# Patient Record
Sex: FEMALE | Race: WHITE | NOT HISPANIC OR LATINO | Employment: OTHER | URBAN - METROPOLITAN AREA
[De-identification: names, ages, dates, MRNs, and addresses within clinical notes are randomized per-mention and may not be internally consistent; named-entity substitution may affect disease eponyms.]

---

## 2017-08-31 ENCOUNTER — OFFICE VISIT (OUTPATIENT)
Dept: SPORTS MEDICINE | Facility: CLINIC | Age: 32
End: 2017-08-31

## 2017-08-31 VITALS
TEMPERATURE: 98.3 F | HEIGHT: 68 IN | DIASTOLIC BLOOD PRESSURE: 74 MMHG | OXYGEN SATURATION: 98 % | BODY MASS INDEX: 35.46 KG/M2 | HEART RATE: 77 BPM | WEIGHT: 234 LBS | SYSTOLIC BLOOD PRESSURE: 118 MMHG

## 2017-08-31 DIAGNOSIS — Z00.00 ANNUAL PHYSICAL EXAM: ICD-10-CM

## 2017-08-31 DIAGNOSIS — J34.2 DEVIATED NASAL SEPTUM: Primary | ICD-10-CM

## 2017-08-31 DIAGNOSIS — G47.33 OBSTRUCTIVE SLEEP APNEA SYNDROME: ICD-10-CM

## 2017-08-31 DIAGNOSIS — K21.9 GASTROESOPHAGEAL REFLUX DISEASE, ESOPHAGITIS PRESENCE NOT SPECIFIED: ICD-10-CM

## 2017-08-31 DIAGNOSIS — J30.2 SEASONAL ALLERGIC RHINITIS, UNSPECIFIED ALLERGIC RHINITIS TRIGGER: ICD-10-CM

## 2017-08-31 DIAGNOSIS — G43.109 MIGRAINE WITH AURA AND WITHOUT STATUS MIGRAINOSUS, NOT INTRACTABLE: ICD-10-CM

## 2017-08-31 PROCEDURE — 99204 OFFICE O/P NEW MOD 45 MIN: CPT | Performed by: FAMILY MEDICINE

## 2017-08-31 RX ORDER — OMEPRAZOLE 20 MG/1
20 CAPSULE, DELAYED RELEASE ORAL DAILY
COMMUNITY
End: 2019-05-02

## 2017-08-31 RX ORDER — MOMETASONE FUROATE 50 UG/1
2 SPRAY, METERED NASAL DAILY
COMMUNITY
End: 2018-11-16

## 2017-08-31 RX ORDER — RIZATRIPTAN BENZOATE 5 MG/1
5 TABLET, ORALLY DISINTEGRATING ORAL ONCE AS NEEDED
Qty: 30 TABLET | Refills: 1 | Status: SHIPPED | OUTPATIENT
Start: 2017-08-31 | End: 2018-11-16

## 2017-08-31 RX ORDER — LYSINE HCL 500 MG
TABLET ORAL DAILY
COMMUNITY
End: 2018-11-16

## 2017-08-31 RX ORDER — CYCLOBENZAPRINE HCL 10 MG
10 TABLET ORAL 3 TIMES DAILY PRN
COMMUNITY
End: 2018-11-16

## 2017-08-31 RX ORDER — TRAMADOL HYDROCHLORIDE 50 MG/1
50 TABLET ORAL EVERY 6 HOURS PRN
COMMUNITY
End: 2018-11-16

## 2017-08-31 RX ORDER — ALBUTEROL SULFATE 90 UG/1
2 AEROSOL, METERED RESPIRATORY (INHALATION) EVERY 4 HOURS PRN
COMMUNITY
End: 2022-03-21 | Stop reason: SDUPTHER

## 2017-08-31 RX ORDER — MONTELUKAST SODIUM 10 MG/1
10 TABLET ORAL DAILY
Qty: 30 TABLET | Refills: 2 | Status: SHIPPED | OUTPATIENT
Start: 2017-08-31 | End: 2017-10-19 | Stop reason: SDUPTHER

## 2017-08-31 RX ORDER — RANITIDINE 150 MG/1
150 TABLET ORAL 2 TIMES DAILY
COMMUNITY
End: 2017-11-17 | Stop reason: SDUPTHER

## 2017-08-31 RX ORDER — VITAMIN B COMPLEX
CAPSULE ORAL DAILY
COMMUNITY
End: 2018-11-16

## 2017-08-31 RX ORDER — ERGOCALCIFEROL (VITAMIN D2) 10 MCG
400 TABLET ORAL DAILY
COMMUNITY
End: 2017-09-04

## 2017-08-31 RX ORDER — VITAMIN E 268 MG
400 CAPSULE ORAL DAILY
COMMUNITY
End: 2019-05-02

## 2017-08-31 RX ORDER — LORATADINE 10 MG/1
CAPSULE, LIQUID FILLED ORAL
COMMUNITY
End: 2018-11-16

## 2017-08-31 RX ORDER — RIZATRIPTAN BENZOATE 5 MG/1
5 TABLET, ORALLY DISINTEGRATING ORAL ONCE AS NEEDED
COMMUNITY
End: 2017-08-31 | Stop reason: SDUPTHER

## 2017-08-31 RX ORDER — BUTALBITAL AND ACETAMINOPHEN 300; 50 MG/1; MG/1
TABLET ORAL
Qty: 90 TABLET | Refills: 1 | Status: SHIPPED | OUTPATIENT
Start: 2017-08-31 | End: 2018-11-16

## 2017-08-31 RX ORDER — LANOLIN ALCOHOL/MO/W.PET/CERES
200 CREAM (GRAM) TOPICAL DAILY
COMMUNITY
End: 2019-05-02 | Stop reason: SDUPTHER

## 2017-09-01 LAB
25(OH)D3+25(OH)D2 SERPL-MCNC: 16.3 NG/ML (ref 30–100)
ALBUMIN SERPL-MCNC: 4.5 G/DL (ref 3.5–5.2)
ALBUMIN/GLOB SERPL: 1.7 G/DL
ALP SERPL-CCNC: 58 U/L (ref 39–117)
ALT SERPL-CCNC: 13 U/L (ref 1–33)
APPEARANCE UR: CLEAR
AST SERPL-CCNC: 15 U/L (ref 1–32)
BACTERIA #/AREA URNS HPF: NORMAL /HPF
BASOPHILS # BLD AUTO: 0.03 10*3/MM3 (ref 0–0.2)
BASOPHILS NFR BLD AUTO: 0.3 % (ref 0–1.5)
BILIRUB SERPL-MCNC: 0.3 MG/DL (ref 0.1–1.2)
BILIRUB UR QL STRIP: NEGATIVE
BUN SERPL-MCNC: 12 MG/DL (ref 6–20)
BUN/CREAT SERPL: 17.6 (ref 7–25)
CALCIUM SERPL-MCNC: 9.6 MG/DL (ref 8.6–10.5)
CHLORIDE SERPL-SCNC: 105 MMOL/L (ref 98–107)
CHOLEST SERPL-MCNC: 159 MG/DL (ref 0–200)
CO2 SERPL-SCNC: 21.8 MMOL/L (ref 22–29)
COLOR UR: YELLOW
CREAT SERPL-MCNC: 0.68 MG/DL (ref 0.57–1)
EOSINOPHIL # BLD AUTO: 0.22 10*3/MM3 (ref 0–0.7)
EOSINOPHIL NFR BLD AUTO: 2.4 % (ref 0.3–6.2)
EPI CELLS #/AREA URNS HPF: NORMAL /HPF
ERYTHROCYTE [DISTWIDTH] IN BLOOD BY AUTOMATED COUNT: 13.2 % (ref 11.7–13)
GLOBULIN SER CALC-MCNC: 2.6 GM/DL
GLUCOSE SERPL-MCNC: 76 MG/DL (ref 65–99)
GLUCOSE UR QL: NEGATIVE
HCT VFR BLD AUTO: 44.5 % (ref 35.6–45.5)
HDLC SERPL-MCNC: 52 MG/DL (ref 40–60)
HGB BLD-MCNC: 14.4 G/DL (ref 11.9–15.5)
HGB UR QL STRIP: NEGATIVE
IMM GRANULOCYTES # BLD: 0.03 10*3/MM3 (ref 0–0.03)
IMM GRANULOCYTES NFR BLD: 0.3 % (ref 0–0.5)
KETONES UR QL STRIP: NEGATIVE
LDLC SERPL CALC-MCNC: 79 MG/DL (ref 0–100)
LEUKOCYTE ESTERASE UR QL STRIP: NEGATIVE
LYMPHOCYTES # BLD AUTO: 2.43 10*3/MM3 (ref 0.9–4.8)
LYMPHOCYTES NFR BLD AUTO: 26.4 % (ref 19.6–45.3)
MCH RBC QN AUTO: 31.1 PG (ref 26.9–32)
MCHC RBC AUTO-ENTMCNC: 32.4 G/DL (ref 32.4–36.3)
MCV RBC AUTO: 96.1 FL (ref 80.5–98.2)
MICRO URNS: NORMAL
MICRO URNS: NORMAL
MONOCYTES # BLD AUTO: 0.76 10*3/MM3 (ref 0.2–1.2)
MONOCYTES NFR BLD AUTO: 8.3 % (ref 5–12)
MUCOUS THREADS URNS QL MICRO: PRESENT /HPF
NEUTROPHILS # BLD AUTO: 5.72 10*3/MM3 (ref 1.9–8.1)
NEUTROPHILS NFR BLD AUTO: 62.3 % (ref 42.7–76)
NITRITE UR QL STRIP: NEGATIVE
PH UR STRIP: 7 [PH] (ref 5–7.5)
PLATELET # BLD AUTO: 291 10*3/MM3 (ref 140–500)
POTASSIUM SERPL-SCNC: 4.7 MMOL/L (ref 3.5–5.2)
PROT SERPL-MCNC: 7.1 G/DL (ref 6–8.5)
PROT UR QL STRIP: NEGATIVE
RBC # BLD AUTO: 4.63 10*6/MM3 (ref 3.9–5.2)
RBC #/AREA URNS HPF: NORMAL /HPF
SODIUM SERPL-SCNC: 142 MMOL/L (ref 136–145)
SP GR UR: 1.02 (ref 1–1.03)
TRIGL SERPL-MCNC: 138 MG/DL (ref 0–150)
TSH SERPL DL<=0.005 MIU/L-ACNC: 0.93 UIU/ML (ref 0.45–4.5)
URINALYSIS REFLEX: NORMAL
UROBILINOGEN UR STRIP-MCNC: 0.2 MG/DL (ref 0.2–1)
VLDLC SERPL CALC-MCNC: 27.6 MG/DL (ref 5–40)
WBC # BLD AUTO: 9.19 10*3/MM3 (ref 4.5–10.7)
WBC #/AREA URNS HPF: NORMAL /HPF

## 2017-09-04 RX ORDER — ERGOCALCIFEROL 1.25 MG/1
50000 CAPSULE ORAL WEEKLY
Qty: 6 CAPSULE | Refills: 1 | Status: SHIPPED | OUTPATIENT
Start: 2017-09-04 | End: 2018-11-16

## 2017-09-15 ENCOUNTER — TELEPHONE (OUTPATIENT)
Dept: SPORTS MEDICINE | Facility: CLINIC | Age: 32
End: 2017-09-15

## 2017-09-15 NOTE — TELEPHONE ENCOUNTER
----- Message from Praful Francisco MD sent at 9/4/2017  9:16 AM EDT -----  Labs are all ok except for low vitamin D level. Since you've been taking a supplement already, I'd like you to do a prescription dose supplement (50,000 units once a week for 6 weeks) and recheck your level in 2-3 months.

## 2017-10-05 ENCOUNTER — APPOINTMENT (OUTPATIENT)
Dept: GENERAL RADIOLOGY | Facility: HOSPITAL | Age: 32
End: 2017-10-05

## 2017-10-05 PROCEDURE — 73660 X-RAY EXAM OF TOE(S): CPT | Performed by: GENERAL PRACTICE

## 2017-10-19 ENCOUNTER — OFFICE VISIT (OUTPATIENT)
Dept: SPORTS MEDICINE | Facility: CLINIC | Age: 32
End: 2017-10-19

## 2017-10-19 VITALS
SYSTOLIC BLOOD PRESSURE: 122 MMHG | WEIGHT: 233 LBS | DIASTOLIC BLOOD PRESSURE: 76 MMHG | HEIGHT: 68 IN | BODY MASS INDEX: 35.31 KG/M2

## 2017-10-19 DIAGNOSIS — E55.9 VITAMIN D DEFICIENCY: ICD-10-CM

## 2017-10-19 DIAGNOSIS — S92.502A CLOSED FRACTURE OF PHALANX OF LEFT FIFTH TOE, INITIAL ENCOUNTER: Primary | ICD-10-CM

## 2017-10-19 LAB — 25(OH)D3+25(OH)D2 SERPL-MCNC: 20.3 NG/ML (ref 30–100)

## 2017-10-19 PROCEDURE — 73660 X-RAY EXAM OF TOE(S): CPT | Performed by: FAMILY MEDICINE

## 2017-10-19 PROCEDURE — 99214 OFFICE O/P EST MOD 30 MIN: CPT | Performed by: FAMILY MEDICINE

## 2017-10-19 RX ORDER — MONTELUKAST SODIUM 10 MG/1
10 TABLET ORAL DAILY
Qty: 90 TABLET | Refills: 3 | Status: SHIPPED | OUTPATIENT
Start: 2017-10-19 | End: 2018-05-24 | Stop reason: SDUPTHER

## 2017-10-19 NOTE — PROGRESS NOTES
"Sonam is a 32 y.o. year old female    Chief Complaint   Patient presents with   • Foot Injury     L foot pinky toe       History of Present Illness  HPI   Here to follow-up on fracture left fifth toe.  About 2 weeks ago she caught the toe on a hard bottom bag in her home and had immediate severe throbbing pain.  Was seen in urgent care, diagnosed with a fracture, has been treating it with a postoperative shoe and the pain is gradually improving.  Swelling is improving as well.  No pain with weightbearing on the shoe.    Also due to follow-up on vitamin D deficiency.  Taking rx supplement without side effects.    I have reviewed the patient's medical, family, and social history in detail and updated the computerized patient record.    Review of Systems   Constitutional: Negative for fever.   Skin: Negative for wound.   Neurological: Negative for numbness.   All other systems reviewed and are negative.      /76  Ht 68\" (172.7 cm)  Wt 233 lb (106 kg)  BMI 35.43 kg/m2     Physical Exam    Vital signs reviewed.   General: No acute distress.  Eyes: conjunctiva clear; pupils equally round and reactive  ENT: external ears and nose atraumatic; oropharynx clear  CV: no peripheral edema, 2+ distal pulses  Resp: normal respiratory effort, no use of accessory muscles  Skin: no rashes or wounds; normal turgor  Psych: mood and affect appropriate; recent and remote memory intact  Neuro: sensation to light touch intact    MSK Exam:  Ortho Exam  Left foot: There is swelling of the fifth toe.  Tenderness to palpation in the fifth toe.  Normal range of motion and tendon function.    Left Foot X-Ray  Indication: Pain  AP, Lateral, and Oblique views of the fifth digit    Findings:  Fracture of the fifth phalanx compared to films in PACS from 2 weeks ago, Appears very stable, in fact minimally visible today  No bony lesion  Normal soft tissues  Normal joint spaces      Diagnoses and all orders for this visit:    Closed fracture " of phalanx of left fifth toe, initial encounter  -     XR Toe 2+ View Left    Vitamin D deficiency  -     Vitamin D 25 Hydroxy      Fracture is healing well.  Discussed treatment with postoperative shoe and gradual transition to stiff supportive standard footwear.  Plan follow-up in one month to recheck, okay to cancel if she is pain-free by then.    Recheck vitamin D level today.    EMR Dragon/Transcription disclaimer:    Much of this encounter note is an electronic transcription/translation of spoken language to printed text.  The electronic translation of spoken language may permit erroneous, or at times, nonsensical words or phrases to be inadvertently transcribed.  Although I have reviewed the note for such errors some may still exist.

## 2017-11-17 ENCOUNTER — TELEPHONE (OUTPATIENT)
Dept: SPORTS MEDICINE | Facility: CLINIC | Age: 32
End: 2017-11-17

## 2017-11-17 ENCOUNTER — OFFICE VISIT (OUTPATIENT)
Dept: SPORTS MEDICINE | Facility: CLINIC | Age: 32
End: 2017-11-17

## 2017-11-17 VITALS
WEIGHT: 239.6 LBS | HEART RATE: 87 BPM | TEMPERATURE: 97.7 F | HEIGHT: 68 IN | DIASTOLIC BLOOD PRESSURE: 76 MMHG | SYSTOLIC BLOOD PRESSURE: 124 MMHG | OXYGEN SATURATION: 98 % | BODY MASS INDEX: 36.31 KG/M2

## 2017-11-17 DIAGNOSIS — K21.9 GASTROESOPHAGEAL REFLUX DISEASE, ESOPHAGITIS PRESENCE NOT SPECIFIED: ICD-10-CM

## 2017-11-17 DIAGNOSIS — R10.13 EPIGASTRIC PAIN: Primary | ICD-10-CM

## 2017-11-17 PROCEDURE — 99213 OFFICE O/P EST LOW 20 MIN: CPT | Performed by: FAMILY MEDICINE

## 2017-11-17 RX ORDER — MEDROXYPROGESTERONE ACETATE 150 MG/ML
150 INJECTION, SUSPENSION INTRAMUSCULAR
COMMUNITY
Start: 2017-11-17 | End: 2017-11-17

## 2017-11-17 RX ORDER — AZITHROMYCIN 250 MG/1
TABLET, FILM COATED ORAL
Qty: 6 TABLET | Refills: 0 | Status: SHIPPED | OUTPATIENT
Start: 2017-11-17 | End: 2018-08-03

## 2017-11-17 RX ORDER — RANITIDINE 150 MG/1
150 TABLET ORAL 2 TIMES DAILY
Qty: 180 TABLET | Refills: 1 | Status: SHIPPED | OUTPATIENT
Start: 2017-11-17 | End: 2019-05-02

## 2017-11-17 NOTE — PROGRESS NOTES
"Sonam is a 32 y.o. year old female    Chief Complaint   Patient presents with   • Heartburn     acid reflux has bben going on for several weeks tried many things        History of Present Illness   HPI   Here today for epigastric pain and acid reflux that has been present for several weeks.  Has a history of H. pylori infection twice in the past, both treated with appropriate antibiotics.  This feels similar.  Constant, moderately severe, not improved with diet.  She has not taken any proton pump inhibitors recently.  No bowel changes.    I have reviewed the patient's medical, family, and social history in detail and updated the computerized patient record.    Review of Systems   Constitutional: Negative.        /76 (BP Location: Left arm, Patient Position: Sitting, Cuff Size: Large Adult)  Pulse 87  Temp 97.7 °F (36.5 °C) (Oral)   Ht 68\" (172.7 cm)  Wt 239 lb 9.6 oz (109 kg)  SpO2 98%  BMI 36.43 kg/m2     Physical Exam   Constitutional: She appears well-developed and well-nourished.   HENT:   Mouth/Throat: Oropharynx is clear and moist.   Pulmonary/Chest: Effort normal.   Abdominal: There is tenderness (epigastric).   Psychiatric: She has a normal mood and affect.   Vitals reviewed.       Diagnoses and all orders for this visit:    Epigastric pain  -     H. Pylori Breath Test - Breath, Lung    Gastroesophageal reflux disease, esophagitis presence not specified  -     raNITIdine (ZANTAC) 150 MG tablet; Take 1 tablet by mouth 2 (Two) Times a Day.    Other orders  -     medroxyPROGESTERone (DEPO-PROVERA) 150 MG/ML injection; Inject 150 mg into the shoulder, thigh, or buttocks.  -     azithromycin (ZITHROMAX Z-KAY) 250 MG tablet; Take 2 tablets the first day, then 1 tablet daily for 4 days.  -     H. pylori Breath Test        EMR Dragon/Transcription disclaimer:    Much of this encounter note is an electronic transcription/translation of spoken language to printed text.  The electronic translation of spoken " language may permit erroneous, or at times, nonsensical words or phrases to be inadvertently transcribed.  Although I have reviewed the note for such errors some may still exist.

## 2017-11-20 LAB
UREA BREATH TEST QL: NEGATIVE
UREA BREATH TEST QL: NORMAL

## 2017-11-21 ENCOUNTER — TELEPHONE (OUTPATIENT)
Dept: SPORTS MEDICINE | Facility: CLINIC | Age: 32
End: 2017-11-21

## 2017-11-21 NOTE — TELEPHONE ENCOUNTER
----- Message from Praful Francisco MD sent at 11/20/2017  7:18 AM EST -----  H pylori test is negative. Recommend 2 weeks of prilosec OTC every morning, if not responding to that let me know.

## 2017-12-28 ENCOUNTER — OFFICE VISIT (OUTPATIENT)
Dept: SPORTS MEDICINE | Facility: CLINIC | Age: 32
End: 2017-12-28

## 2017-12-28 VITALS
OXYGEN SATURATION: 99 % | DIASTOLIC BLOOD PRESSURE: 70 MMHG | BODY MASS INDEX: 36.07 KG/M2 | TEMPERATURE: 97.7 F | SYSTOLIC BLOOD PRESSURE: 106 MMHG | HEART RATE: 81 BPM | RESPIRATION RATE: 16 BRPM | WEIGHT: 238 LBS | HEIGHT: 68 IN

## 2017-12-28 DIAGNOSIS — K21.9 GASTROESOPHAGEAL REFLUX DISEASE, ESOPHAGITIS PRESENCE NOT SPECIFIED: ICD-10-CM

## 2017-12-28 DIAGNOSIS — J01.10 ACUTE NON-RECURRENT FRONTAL SINUSITIS: Primary | ICD-10-CM

## 2017-12-28 PROCEDURE — 99213 OFFICE O/P EST LOW 20 MIN: CPT | Performed by: FAMILY MEDICINE

## 2017-12-28 RX ORDER — MEDROXYPROGESTERONE ACETATE 150 MG/ML
INJECTION, SUSPENSION INTRAMUSCULAR
COMMUNITY
Start: 2017-11-17 | End: 2018-11-16

## 2017-12-28 NOTE — PROGRESS NOTES
"Sonam is a 32 y.o. year old female    Chief Complaint   Patient presents with   • GI Problem     acid reflux    • Sore Throat   • Nasal Congestion       History of Present Illness   HPI   Postnasal drainage started Monday, progressively worsening also wtih headaches. Moderately severe. Started old zpak leftover yesterday.    Had an episode a fwe weeks ago where she helped push a stranded vehicle and was short of breath for 24 hours. Feels like asthma has always had an exercise-induced component    GERD not controlled with prilosec but better.     Migraines more frequent recently, episode of daily migraines earlier this month.     I have reviewed the patient's medical, family, and social history in detail and updated the computerized patient record.    Review of Systems   Constitutional: Negative for fever.   Respiratory: Negative for shortness of breath.        /70 (BP Location: Left arm, Patient Position: Sitting, Cuff Size: Large Adult)  Pulse 81  Temp 97.7 °F (36.5 °C) (Oral)   Resp 16  Ht 172.7 cm (68\")  Wt 108 kg (238 lb)  SpO2 99%  BMI 36.19 kg/m2     Physical Exam   Constitutional: She is oriented to person, place, and time. She appears well-developed and well-nourished.   HENT:   Posterior oropharynx erythema   Neck: Normal range of motion.   Cardiovascular: Normal rate, regular rhythm and normal heart sounds.    Pulmonary/Chest: Effort normal and breath sounds normal.   Neurological: She is alert and oriented to person, place, and time.   Skin: Skin is warm and dry.   Psychiatric: She has a normal mood and affect.   Vitals reviewed.       Diagnoses and all orders for this visit:    Acute non-recurrent frontal sinusitis    Gastroesophageal reflux disease, esophagitis presence not specified    Other orders  -     MedroxyPROGESTERone Acetate 150 MG/ML suspension prefilled syringe;     She will finish the Z-Evin.  Add over-the-counter Mucinex D.  Regarding her reflux she is on increased to 40 mg " once daily of omeprazole.  Encourage to use albuterol inhaler generously as needed.  If symptoms persist consider repeat PFTs and possible preventive inhaler.        EMR Dragon/Transcription disclaimer:    Much of this encounter note is an electronic transcription/translation of spoken language to printed text.  The electronic translation of spoken language may permit erroneous, or at times, nonsensical words or phrases to be inadvertently transcribed.  Although I have reviewed the note for such errors some may still exist.

## 2018-01-31 ENCOUNTER — TELEPHONE (OUTPATIENT)
Dept: SPORTS MEDICINE | Facility: CLINIC | Age: 33
End: 2018-01-31

## 2018-03-30 ENCOUNTER — OFFICE VISIT (OUTPATIENT)
Dept: NEUROLOGY | Facility: CLINIC | Age: 33
End: 2018-03-30

## 2018-03-30 VITALS
HEIGHT: 67 IN | BODY MASS INDEX: 36.88 KG/M2 | WEIGHT: 235 LBS | DIASTOLIC BLOOD PRESSURE: 82 MMHG | SYSTOLIC BLOOD PRESSURE: 124 MMHG

## 2018-03-30 DIAGNOSIS — G43.009 MIGRAINE WITHOUT AURA AND WITHOUT STATUS MIGRAINOSUS, NOT INTRACTABLE: ICD-10-CM

## 2018-03-30 DIAGNOSIS — H01.001 BLEPHARITIS OF UPPER EYELIDS OF BOTH EYES, UNSPECIFIED TYPE: Primary | ICD-10-CM

## 2018-03-30 DIAGNOSIS — H01.004 BLEPHARITIS OF UPPER EYELIDS OF BOTH EYES, UNSPECIFIED TYPE: Primary | ICD-10-CM

## 2018-03-30 PROBLEM — H01.006 BLEPHARITIS OF BOTH EYES: Status: ACTIVE | Noted: 2018-03-30

## 2018-03-30 PROBLEM — H01.003 BLEPHARITIS OF BOTH EYES: Status: ACTIVE | Noted: 2018-03-30

## 2018-03-30 PROCEDURE — 99244 OFF/OP CNSLTJ NEW/EST MOD 40: CPT | Performed by: PSYCHIATRY & NEUROLOGY

## 2018-03-30 RX ORDER — TOPIRAMATE 25 MG/1
25 TABLET ORAL DAILY
Qty: 30 TABLET | Refills: 11 | Status: SHIPPED | OUTPATIENT
Start: 2018-03-30 | End: 2018-05-10 | Stop reason: SDUPTHER

## 2018-03-30 NOTE — PROGRESS NOTES
Subjective:     Patient ID: Sonam Lang is a 32 y.o. female.    History of Present Illness     The patient is a 32-year-old right-handed woman who was seen for further evaluation of eye twitching and migraine. The patient was seen today in consultation per the request of Dr. Francisoc; history was also taken from the patient's .  Jessica's had headaches off and on since she was a kid she has of about 2 a week she will lie down the headache she takes butalbital at times and Tylenol she is apparently been on a preventive medicine years and years ago but nothing currently.  She does have GI upset sometimes it is very light sensitive with the headaches get severe she will have to lay down.  She cannot take NSAIDs.  He has had an unremarkable MRI of the brain on 3/1/18.    She also has pulsing of her eyelids.  She has been seen by ophthalmologist and found to have blepharitis.  She is a fair amount of caffeine intake.  She's been under some stress.  The following portions of the patient's history were reviewed and updated as appropriate: allergies, current medications, past family history, past medical history, past social history, past surgical history and problem list.  Family History   Problem Relation Age of Onset   • Migraines Mother    • Migraines Maternal Grandmother    • Migraines Paternal Grandmother    • Dementia Paternal Grandmother    • Stroke Paternal Grandmother      Active Ambulatory Problems     Diagnosis Date Noted   • Blepharitis of both eyes 03/30/2018   • Migraine without aura and without status migrainosus, not intractable 03/30/2018     Resolved Ambulatory Problems     Diagnosis Date Noted   • No Resolved Ambulatory Problems     Past Medical History:   Diagnosis Date   • ADHD (attention deficit hyperactivity disorder)    • Allergic    • Asthma    • Depression    • GERD (gastroesophageal reflux disease)    • Irritable bowel syndrome    • Knee sprain    • Migraine      Social History     Social  History   • Marital status:      Spouse name: N/A   • Number of children: N/A   • Years of education: N/A     Occupational History   • Not on file.     Social History Main Topics   • Smoking status: Former Smoker   • Smokeless tobacco: Never Used   • Alcohol use Yes   • Drug use: Unknown   • Sexual activity: Defer     Other Topics Concern   • Not on file     Social History Narrative   • No narrative on file     Current Outpatient Prescriptions on File Prior to Visit   Medication Sig Dispense Refill   • albuterol (PROVENTIL HFA;VENTOLIN HFA) 108 (90 Base) MCG/ACT inhaler Inhale 2 puffs Every 4 (Four) Hours As Needed for Wheezing.     • Butalbital-Acetaminophen  MG tablet Take 1-2 tabs po every 8 hours prn migraine 90 tablet 1   • Loratadine 10 MG capsule Take  by mouth.     • mometasone (NASONEX) 50 MCG/ACT nasal spray 2 sprays into each nostril Daily.     • montelukast (SINGULAIR) 10 MG tablet Take 1 tablet by mouth Daily. 90 tablet 3   • omeprazole (priLOSEC) 20 MG capsule Take 20 mg by mouth Daily.     • raNITIdine (ZANTAC) 150 MG tablet Take 1 tablet by mouth 2 (Two) Times a Day. 180 tablet 1   • azithromycin (ZITHROMAX Z-KAY) 250 MG tablet Take 2 tablets the first day, then 1 tablet daily for 4 days. 6 tablet 0   • B Complex Vitamins (VITAMIN B COMPLEX) capsule capsule Take  by mouth Daily.     • cyclobenzaprine (FLEXERIL) 10 MG tablet Take 10 mg by mouth 3 (Three) Times a Day As Needed for Muscle Spasms.     • Lysine HCl (L-LYSINE) 500 MG tablet tablet Take  by mouth Daily.     • MedroxyPROGESTERone Acetate 150 MG/ML suspension prefilled syringe      • rizatriptan MLT (MAXALT-MLT) 5 MG disintegrating tablet Take 1 tablet by mouth 1 (One) Time As Needed for Migraine. May repeat in 2 hours if needed 30 tablet 1   • traMADol (ULTRAM) 50 MG tablet Take 50 mg by mouth Every 6 (Six) Hours As Needed for Moderate Pain .     • vitamin B-6 (PYRIDOXINE) 50 MG tablet Take 200 mg by mouth Daily.     • vitamin C  (ASCORBIC ACID) 250 MG tablet Take 250 mg by mouth Daily.     • vitamin D (ERGOCALCIFEROL) 86692 units capsule capsule Take 1 capsule by mouth 1 (One) Time Per Week. 6 capsule 1   • vitamin E 400 UNIT capsule Take 400 Units by mouth Daily.       No current facility-administered medications on file prior to visit.        Review of Systems   Constitutional: Negative for chills, diaphoresis and fatigue.   HENT: Positive for sinus pressure. Negative for ear pain, facial swelling and hearing loss.    Eyes: Negative for pain and itching.   Respiratory: Positive for apnea. Negative for cough, choking and shortness of breath.    Cardiovascular: Negative for chest pain and leg swelling.   Gastrointestinal: Negative for abdominal pain, nausea and vomiting.   Endocrine: Negative for cold intolerance and heat intolerance.   Skin: Negative for color change, pallor, rash and wound.   Allergic/Immunologic: Positive for environmental allergies. Negative for food allergies.   Neurological: Positive for headaches. Negative for dizziness, tremors, seizures, syncope, facial asymmetry, speech difficulty, weakness, light-headedness and numbness.   Hematological: Negative for adenopathy. Does not bruise/bleed easily.   Psychiatric/Behavioral: Positive for sleep disturbance. Negative for agitation, behavioral problems, confusion, decreased concentration, dysphoric mood, hallucinations, self-injury and suicidal ideas. The patient is not nervous/anxious and is not hyperactive.         Objective:    Neurologic Exam  Mental status examination showed normal orientation, memory, and speech.  Attention span and concentration were normal.  Fund of knowledge was normal.  Funduscopic showed no abnormality.  Visual fields were full.  Pupillary reflexes were 5 mm, symmetric, and equally reactive to light.  Eye movements were full and conjugate.  Gag reflex was normal.  Hearing was normal.  Muscles of mastication were normal.  No facial weakness was  noted.  Shoulder shrug strength was normal bilaterally.  Tongue protrudes midline.  There is no drift of outstretched arms.  Deep tendon reflexes are 2+ and symmetric.  No focal weakness or atrophy was noted.  Tone was normal in all extremities.  No cerebellar signs were noted.  No abnormal movements were noted.  The patient's gait was normal.  No other pathologic reflexes such as Babinski's sign were noted.  No sensory abnormalities were noted.  Physical Exam    Assessment/Plan:     Sonam was seen today for migraine.    Diagnoses and all orders for this visit:    Blepharitis of upper eyelids of both eyes, unspecified type    Migraine without aura and without status migrainosus, not intractable    Other orders  -     topiramate (TOPAMAX) 25 MG tablet; Take 1 tablet by mouth Daily.         Her eye twitching is probably not significant neurologically.  She has been noted to have plepharitis the ophthalmologist..  No other testing is warranted.    E the more important problem is that of intermittent migraines.  She has these almost half the month.  She has not been on preventive medicine number of years.    Prescription drug management - med as above    Phone follow-up in 6 weeks.  Follow-up in the office in 3 months. Thank you for allowing me to share in the care of this patient.  Kit Hidalgo M.D.

## 2018-05-09 ENCOUNTER — TELEPHONE (OUTPATIENT)
Dept: NEUROLOGY | Facility: CLINIC | Age: 33
End: 2018-05-09

## 2018-05-09 NOTE — TELEPHONE ENCOUNTER
----- Message from Griffin Guadarrama sent at 5/9/2018 11:13 AM EDT -----  Pt stated that her Topamax was working in the beginning but now seems to have stopped helping.

## 2018-05-10 RX ORDER — TOPIRAMATE 50 MG/1
50 TABLET, FILM COATED ORAL DAILY
Qty: 30 TABLET | Refills: 10 | Status: SHIPPED | OUTPATIENT
Start: 2018-05-10 | End: 2018-08-03

## 2018-05-24 RX ORDER — MONTELUKAST SODIUM 10 MG/1
10 TABLET ORAL DAILY
Qty: 90 TABLET | Refills: 3 | Status: SHIPPED | OUTPATIENT
Start: 2018-05-24 | End: 2018-08-03

## 2018-06-01 ENCOUNTER — TELEPHONE (OUTPATIENT)
Dept: NEUROLOGY | Facility: CLINIC | Age: 33
End: 2018-06-01

## 2018-06-01 NOTE — TELEPHONE ENCOUNTER
----- Message from Agnieszka Douglas sent at 5/31/2018 10:27 AM EDT -----  Contact: [phone]  Patient called back about her medication.  Also she is pregnant or thinking about getting pregnant and the medication said to speak to your doctor about this medication.    If it is ok for her to take, please [facility] to [facility].

## 2018-06-07 ENCOUNTER — OFFICE VISIT (OUTPATIENT)
Dept: OBSTETRICS AND GYNECOLOGY | Facility: CLINIC | Age: 33
End: 2018-06-07

## 2018-06-07 VITALS
WEIGHT: 230 LBS | DIASTOLIC BLOOD PRESSURE: 70 MMHG | SYSTOLIC BLOOD PRESSURE: 110 MMHG | BODY MASS INDEX: 36.1 KG/M2 | HEIGHT: 67 IN

## 2018-06-07 DIAGNOSIS — Z13.9 SCREENING FOR CONDITION: ICD-10-CM

## 2018-06-07 DIAGNOSIS — Z30.09 FAMILY PLANNING COUNSELING: Primary | ICD-10-CM

## 2018-06-07 LAB
B-HCG UR QL: NEGATIVE
BILIRUB BLD-MCNC: NEGATIVE MG/DL
CLARITY, POC: CLEAR
COLOR UR: YELLOW
GLUCOSE UR STRIP-MCNC: NEGATIVE MG/DL
INTERNAL NEGATIVE CONTROL: NEGATIVE
INTERNAL POSITIVE CONTROL: POSITIVE
KETONES UR QL: NEGATIVE
LEUKOCYTE EST, POC: NEGATIVE
Lab: NORMAL
NITRITE UR-MCNC: NEGATIVE MG/ML
PH UR: 5 [PH] (ref 5–8)
PROT UR STRIP-MCNC: NEGATIVE MG/DL
RBC # UR STRIP: ABNORMAL /UL
SP GR UR: 1 (ref 1–1.03)
UROBILINOGEN UR QL: NORMAL

## 2018-06-07 PROCEDURE — 81002 URINALYSIS NONAUTO W/O SCOPE: CPT | Performed by: OBSTETRICS & GYNECOLOGY

## 2018-06-07 PROCEDURE — 99213 OFFICE O/P EST LOW 20 MIN: CPT | Performed by: OBSTETRICS & GYNECOLOGY

## 2018-06-07 PROCEDURE — 81025 URINE PREGNANCY TEST: CPT | Performed by: OBSTETRICS & GYNECOLOGY

## 2018-06-07 NOTE — PROGRESS NOTES
"      Sonam Lang is a 32 y.o. patient who presents for follow up of   Chief Complaint   Patient presents with   • Gynecologic Exam       HPI this is a 32-year-old female who has been attempting pregnancy for the last 9 months.  Her significant other has fathered children the past and is 47 years old.  This patient has irregular cycles and is concerned about tubal patency.  She also has severe pain with each period suggesting dysmenorrhea or endometriosis.    The following portions of the patient's history were reviewed and updated as appropriate: allergies, current medications and problem list.    Review of Systems   Constitutional: Negative for appetite change, fever and unexpected weight change.   Respiratory: Negative for cough and shortness of breath.    Cardiovascular: Negative for chest pain and palpitations.   Gastrointestinal: Negative for abdominal distention, abdominal pain, constipation, diarrhea, nausea and vomiting.   Endocrine: Negative.    Genitourinary: Positive for menstrual problem (irregular periods) and pelvic pain (dysmenorrhea). Negative for dyspareunia and vaginal discharge.   Skin: Negative.    Hematological: Negative.    Psychiatric/Behavioral: Negative for dysphoric mood and sleep disturbance. The patient is not nervous/anxious.        /70   Ht 170.2 cm (67\")   Wt 104 kg (230 lb)   LMP 06/04/2018 (Exact Date)   Breastfeeding? No   BMI 36.02 kg/m²     Physical Exam   Constitutional: She is oriented to person, place, and time. She appears well-developed and well-nourished.   HENT:   Head: Normocephalic and atraumatic.   Abdominal: Soft. She exhibits no distension. There is no tenderness. There is no guarding.   Neurological: She is alert and oriented to person, place, and time.   Psychiatric: She has a normal mood and affect. Her behavior is normal. Judgment and thought content normal.   Nursing note and vitals reviewed.        Assessment/Plan    Sonam was seen today for " gynecologic exam.    Diagnoses and all orders for this visit:    Encounter for initial prescription of injectable contraceptive    Screening for condition  -     POC Urinalysis Dipstick  -     POC Pregnancy, Urine    Given the fact the patient is 32 years old and her spouse's 47 I suggest an appointment with reproductive endocrine for consult and further workup if needed.    No Follow-up on file.      Nico Carlos MD  6/7/2018  4:50 PM

## 2018-08-03 ENCOUNTER — OFFICE VISIT (OUTPATIENT)
Dept: SPORTS MEDICINE | Facility: CLINIC | Age: 33
End: 2018-08-03

## 2018-08-03 VITALS
SYSTOLIC BLOOD PRESSURE: 116 MMHG | TEMPERATURE: 98.1 F | OXYGEN SATURATION: 98 % | HEART RATE: 85 BPM | DIASTOLIC BLOOD PRESSURE: 74 MMHG | BODY MASS INDEX: 35.31 KG/M2 | WEIGHT: 225 LBS | HEIGHT: 67 IN

## 2018-08-03 DIAGNOSIS — Z00.00 ANNUAL PHYSICAL EXAM: Primary | ICD-10-CM

## 2018-08-03 PROCEDURE — 99395 PREV VISIT EST AGE 18-39: CPT | Performed by: FAMILY MEDICINE

## 2018-08-03 RX ORDER — DEXTROAMPHETAMINE SACCHARATE, AMPHETAMINE ASPARTATE, DEXTROAMPHETAMINE SULFATE AND AMPHETAMINE SULFATE 2.5; 2.5; 2.5; 2.5 MG/1; MG/1; MG/1; MG/1
TABLET ORAL
Refills: 0 | COMMUNITY
Start: 2018-06-21 | End: 2019-05-02

## 2018-08-03 NOTE — PROGRESS NOTES
"Sonam Lang is here today for an annual physical exam.     Eating a healthy diet.   Trying to conceive - stopped topamax, now has imitrex if needed. Seeing ob/gyn, fertility, possible endometriosis.  Had to use some leftover hydrocodone due to severe pain.  Allergist this morning - stopping singulair and adding Symbicort, nasal spray, zyrtec, riboflavin, magnesium.     Health Maintenance   Topic Date Due   • ANNUAL PHYSICAL  09/28/1986   • TDAP/TD VACCINES (1 - Tdap) 09/28/2002   • PAP SMEAR  08/31/2017   • INFLUENZA VACCINE  08/01/2018       Review of Systems   Constitutional: Negative for chills and fever.   HENT: Positive for postnasal drip.    Respiratory: Negative for shortness of breath.    Cardiovascular: Negative for chest pain.   Genitourinary: Positive for pelvic pain.       /74   Pulse 85   Temp 98.1 °F (36.7 °C)   Ht 170.2 cm (67\")   Wt 102 kg (225 lb)   SpO2 98%   BMI 35.24 kg/m²      Physical Exam    Vital signs reviewed.  General appearance: No acute distress  Eyes: conjunctiva clear without erythema; pupils equally round and reactive  ENT: external ears and nose normal; hearing normal, oropharynx clear  Neck: supple; no thyromegaly  CV: normal rate and rhythm; no peripheral edema  Respiratory: normal respiratory effort; lungs clear to auscultation bilaterally  MSK: normal gait and station; no focal joint deformity or swelling  Skin: no rash or wounds; normal turgor  Neuro: cranial nerves 2-12 grossly intact; normal sensation to light touch  Psych: mood and affect normal; recent and remote memory intact    Sonam was seen today for annual exam.    Diagnoses and all orders for this visit:    Annual physical exam        Encourage healthy diet and exercise.  Encourage patient to stay up to date on screening examinations as indicated based on age and risk factors.  Encouraged to continue workup with OB/GYN/fertility specialist regarding her painful menstrual cycles.  I will not prescribe " anything for that other than recommend anti-inflammatory medication.  Continue workup with allergist for managing her allergies, suspected underlying asthma, migraine triggers.    EMR Dragon/Transcription disclaimer:    Much of this encounter note is an electronic transcription/translation of spoken language to printed text.  The electronic translation of spoken language may permit erroneous, or at times, nonsensical words or phrases to be inadvertently transcribed.  Although I have reviewed the note for such errors some may still exist.

## 2018-10-18 ENCOUNTER — TELEPHONE (OUTPATIENT)
Dept: SPORTS MEDICINE | Facility: CLINIC | Age: 33
End: 2018-10-18

## 2018-11-16 ENCOUNTER — OFFICE VISIT (OUTPATIENT)
Dept: SPORTS MEDICINE | Facility: CLINIC | Age: 33
End: 2018-11-16

## 2018-11-16 VITALS
HEIGHT: 67 IN | WEIGHT: 215 LBS | DIASTOLIC BLOOD PRESSURE: 70 MMHG | SYSTOLIC BLOOD PRESSURE: 120 MMHG | BODY MASS INDEX: 33.74 KG/M2

## 2018-11-16 DIAGNOSIS — R53.83 FATIGUE, UNSPECIFIED TYPE: ICD-10-CM

## 2018-11-16 DIAGNOSIS — M79.604 RIGHT LEG PAIN: Primary | ICD-10-CM

## 2018-11-16 DIAGNOSIS — L03.011 PARONYCHIA OF RIGHT RING FINGER: ICD-10-CM

## 2018-11-16 DIAGNOSIS — R68.89 COLD INTOLERANCE: ICD-10-CM

## 2018-11-16 LAB
25(OH)D3+25(OH)D2 SERPL-MCNC: 16.9 NG/ML (ref 30–100)
ALBUMIN SERPL-MCNC: 4.4 G/DL (ref 3.5–5.2)
ALBUMIN/GLOB SERPL: 1.6 G/DL
ALP SERPL-CCNC: 65 U/L (ref 39–117)
ALT SERPL-CCNC: 18 U/L (ref 1–33)
AST SERPL-CCNC: 16 U/L (ref 1–32)
BASOPHILS # BLD AUTO: 0.03 10*3/MM3 (ref 0–0.2)
BASOPHILS NFR BLD AUTO: 0.4 % (ref 0–1.5)
BILIRUB SERPL-MCNC: 0.3 MG/DL (ref 0.1–1.2)
BUN SERPL-MCNC: 10 MG/DL (ref 6–20)
BUN/CREAT SERPL: 12.7 (ref 7–25)
CALCIUM SERPL-MCNC: 9.9 MG/DL (ref 8.6–10.5)
CHLORIDE SERPL-SCNC: 104 MMOL/L (ref 98–107)
CO2 SERPL-SCNC: 20.9 MMOL/L (ref 22–29)
CREAT SERPL-MCNC: 0.79 MG/DL (ref 0.57–1)
DIFFERENTIAL COMMENT: NORMAL
EOSINOPHIL # BLD AUTO: 0.11 10*3/MM3 (ref 0–0.7)
EOSINOPHIL NFR BLD AUTO: 1.4 % (ref 0.3–6.2)
ERYTHROCYTE [DISTWIDTH] IN BLOOD BY AUTOMATED COUNT: 13.5 % (ref 11.7–13)
GLOBULIN SER CALC-MCNC: 2.8 GM/DL
GLUCOSE SERPL-MCNC: 84 MG/DL (ref 65–99)
HCT VFR BLD AUTO: 44 % (ref 35.6–45.5)
HGB BLD-MCNC: 14.4 G/DL (ref 11.9–15.5)
IMM GRANULOCYTES # BLD: 0.02 10*3/MM3 (ref 0–0.03)
IMM GRANULOCYTES NFR BLD: 0.2 % (ref 0–0.5)
LYMPHOCYTES # BLD AUTO: 1.68 10*3/MM3 (ref 0.9–4.8)
LYMPHOCYTES NFR BLD AUTO: 20.9 % (ref 19.6–45.3)
MAGNESIUM SERPL-MCNC: 2.3 MG/DL (ref 1.6–2.6)
MCH RBC QN AUTO: 30.9 PG (ref 26.9–32)
MCHC RBC AUTO-ENTMCNC: 32.7 G/DL (ref 32.4–36.3)
MCV RBC AUTO: 94.4 FL (ref 80.5–98.2)
MONOCYTES # BLD AUTO: 0.68 10*3/MM3 (ref 0.2–1.2)
MONOCYTES NFR BLD AUTO: 8.4 % (ref 5–12)
NEUTROPHILS # BLD AUTO: 5.55 10*3/MM3 (ref 1.9–8.1)
NEUTROPHILS NFR BLD AUTO: 68.9 % (ref 42.7–76)
NRBC BLD AUTO-RTO: 0 /100 WBC (ref 0–0)
PLATELET # BLD AUTO: 270 10*3/MM3 (ref 140–500)
PLATELET BLD QL SMEAR: NORMAL
POTASSIUM SERPL-SCNC: 4.3 MMOL/L (ref 3.5–5.2)
PROT SERPL-MCNC: 7.2 G/DL (ref 6–8.5)
RBC # BLD AUTO: 4.66 10*6/MM3 (ref 3.9–5.2)
RBC MORPH BLD: NORMAL
SODIUM SERPL-SCNC: 138 MMOL/L (ref 136–145)
T4 FREE SERPL-MCNC: 1.47 NG/DL (ref 0.93–1.7)
TSH SERPL DL<=0.005 MIU/L-ACNC: 1.6 MIU/ML (ref 0.27–4.2)
VIT B12 SERPL-MCNC: 581 PG/ML (ref 211–946)
WBC # BLD AUTO: 8.05 10*3/MM3 (ref 4.5–10.7)

## 2018-11-16 PROCEDURE — 72170 X-RAY EXAM OF PELVIS: CPT | Performed by: FAMILY MEDICINE

## 2018-11-16 PROCEDURE — 99215 OFFICE O/P EST HI 40 MIN: CPT | Performed by: FAMILY MEDICINE

## 2018-11-16 RX ORDER — PSEUDOEPHEDRINE HCL 30 MG
30 TABLET ORAL EVERY 4 HOURS PRN
COMMUNITY
End: 2018-12-14

## 2018-11-16 RX ORDER — BUDESONIDE AND FORMOTEROL FUMARATE DIHYDRATE 80; 4.5 UG/1; UG/1
2 AEROSOL RESPIRATORY (INHALATION)
COMMUNITY
End: 2022-10-28 | Stop reason: SDUPTHER

## 2018-11-16 RX ORDER — LANOLIN ALCOHOL/MO/W.PET/CERES
CREAM (GRAM) TOPICAL NIGHTLY PRN
COMMUNITY
End: 2019-05-02

## 2018-11-16 RX ORDER — ZOLMITRIPTAN 5 MG/1
5 TABLET, FILM COATED ORAL ONCE AS NEEDED
COMMUNITY
End: 2019-05-02

## 2018-11-16 RX ORDER — UBIDECARENONE 100 MG
100 CAPSULE ORAL DAILY
COMMUNITY
End: 2019-05-02 | Stop reason: SDUPTHER

## 2018-11-16 RX ORDER — LYSINE HCL 500 MG
TABLET ORAL
COMMUNITY
End: 2019-05-02

## 2018-11-16 RX ORDER — MULTIVITAMIN WITH IRON
TABLET ORAL
COMMUNITY
End: 2019-12-30 | Stop reason: SDUPTHER

## 2018-11-16 RX ORDER — CETIRIZINE HYDROCHLORIDE 10 MG/1
10 TABLET ORAL
COMMUNITY
End: 2022-03-21

## 2018-11-16 RX ORDER — CYANOCOBALAMIN (VITAMIN B-12) 500 MCG
LOZENGE ORAL
COMMUNITY
End: 2018-11-16

## 2018-11-16 RX ORDER — PRENATAL VIT NO.126/IRON/FOLIC 28MG-0.8MG
1 TABLET ORAL DAILY
COMMUNITY
End: 2019-12-30 | Stop reason: SDUPTHER

## 2018-11-16 RX ORDER — CLINDAMYCIN HYDROCHLORIDE 150 MG/1
150 CAPSULE ORAL 2 TIMES DAILY
Qty: 20 CAPSULE | Refills: 0 | Status: SHIPPED | OUTPATIENT
Start: 2018-11-16 | End: 2019-01-02

## 2018-11-16 RX ORDER — ALBUTEROL SULFATE 90 UG/1
2 AEROSOL, METERED RESPIRATORY (INHALATION) AS NEEDED
COMMUNITY
End: 2019-05-02 | Stop reason: SDUPTHER

## 2018-11-16 RX ORDER — CHLORAL HYDRATE 500 MG
CAPSULE ORAL
COMMUNITY
End: 2022-08-10 | Stop reason: ALTCHOICE

## 2018-11-16 NOTE — PROGRESS NOTES
"Sonam is a 33 y.o. year old female    Chief Complaint   Patient presents with   • Leg Pain     (r)   • Hand Pain       History of Present Illness  HPI   Recently noticed abnormal leg movement with new exercise - R foot tends to rotate outward.  Has some mild associated discomfort down the leg.  First noted a few weeks ago, constant.    Pain in the right ring finger around the nail with gradually worsening swelling and redness.  Throbbing.  Gradually worsening for several days.  Denies associated symptoms or radiation of the hand.    Also complains of generalized fatigue and cold intolerance.  Gradually worsening for several months.    I have reviewed the patient's medical, family, and social history in detail and updated the computerized patient record.    Review of Systems   Constitutional: Positive for fatigue.   Respiratory: Negative.    Cardiovascular: Negative.    Endocrine: Positive for cold intolerance.   Musculoskeletal: Negative for back pain.   All other systems reviewed and are negative.      /70   Ht 170.2 cm (67.01\")   Wt 97.5 kg (215 lb)   BMI 33.67 kg/m²      Physical Exam    Vital signs reviewed.   General: No acute distress.  Eyes: conjunctiva clear; pupils equally round and reactive  ENT: external ears and nose atraumatic; oropharynx clear  CV: no peripheral edema, 2+ distal pulses  Resp: normal respiratory effort, no use of accessory muscles  Skin: no rashes or wounds; normal turgor  Psych: mood and affect appropriate; recent and remote memory intact  Neuro: sensation to light touch intact    Right ring finger erythema around the nailbed without visible ingrowing    [facility] Exam:  Ortho Exam  Right leg: Normal appearance.  Appears to have some natural retroversion of the femur based on positioning of the leg when laying supine.  Normal range of motion.  Normal strength without pain.    Pelvis X-Ray  Indication: Pain, possible congenital malformation  AP and Frogleg views    Findings:  No " fracture  No bony lesion  Normal soft tissues  Normal joint spaces    No prior studies were available for comparison.        Diagnoses and all orders for this visit:    Right leg pain  -     XR Pelvis 1 or 2 View    Paronychia of right ring finger  -     clindamycin (CLEOCIN) 150 MG capsule; Take 1 capsule by mouth 2 (Two) Times a Day.    Fatigue, unspecified type  -     TSH  -     T4, Free  -     CBC & Differential  -     Comprehensive Metabolic Panel  -     Magnesium  -     Vitamin D 25 Hydroxy  -     Vitamin B12    Cold intolerance  -     TSH  -     T4, Free  -     CBC & Differential  -     Comprehensive Metabolic Panel  -     Magnesium  -     Vitamin D 25 Hydroxy  -     Vitamin B12    Other orders  -     cetirizine (zyrTEC) 10 MG tablet; Take 10 mg by mouth.  -     B COMPLEX-C-FOLIC ACID PO; Take  by mouth.  -     COD LIVER OIL/VITAMINS A & D PO; Take  by mouth.  -     Zinc Sulfate 66 MG tablet; Take  by mouth.  -     Acetaminophen-Caff-Pyrilamine 500-60-15 MG tablet; Take 1 tablet by mouth.  -     Biotin 5000 MCG capsule; Take  by mouth.  -     Omega-3 Fatty Acids (FISH OIL) 1000 MG capsule capsule; Take  by mouth.  -     albuterol (PROVENTIL HFA;VENTOLIN HFA) 108 (90 Base) MCG/ACT inhaler; Inhale 2 puffs As Needed.  -     Lysine HCl (L-LYSINE) 500 MG tablet tablet; Take  by mouth.  -     Pyridoxine HCl (VITAMIN B6) 200 MG tablet; Take  by mouth.  -     Discontinue: Vitamin E 400 units tablet; Take  by mouth.  -     coenzyme Q10 100 MG capsule; Take 100 mg by mouth Daily.  -     Prenatal Vit-Fe Fumarate-FA (PRENATAL, CLASSIC, VITAMIN) 28-0.8 MG tablet tablet; Take 1 tablet by mouth Daily.  -     Magnesium 250 MG tablet; Take  by mouth.  -     ZOLMitriptan (ZOMIG) 5 MG tablet; Take 5 mg by mouth 1 (One) Time As Needed for Migraine.  -     melatonin 3 MG tablet; Take  by mouth At Night As Needed for Sleep.  -     pseudoephedrine (SUDAFED) 30 MG tablet; Take 30 mg by mouth Every 4 (Four) Hours As Needed for  Congestion.  -     budesonide-formoterol (SYMBICORT) 80-4.5 MCG/ACT inhaler; Inhale 2 puffs 2 (Two) Times a Day.  -     budesonide (RHINOCORT AQUA) 32 MCG/ACT nasal spray; 1 spray into the nostril(s) as directed by provider Daily.  -     APAP-Pamabrom-Pyrilamine (PAMPRIN MAX PAIN FORMULA PO); Take 500 mg by mouth.  -     Manual Differential      Workup for fatigue and cold intolerance as above.  Plan clindamycin and soaks for paronychial infection.  Leg complaints appear to be related to some retroversion of the femur.  Discussed hip stability strengthening    EMR Dragon/Transcription disclaimer:    Much of this encounter note is an electronic transcription/translation of spoken language to printed text.  The electronic translation of spoken language may permit erroneous, or at times, nonsensical words or phrases to be inadvertently transcribed.  Although I have reviewed the note for such errors some may still exist.

## 2018-12-14 ENCOUNTER — OFFICE VISIT (OUTPATIENT)
Dept: RETAIL CLINIC | Facility: CLINIC | Age: 33
End: 2018-12-14

## 2018-12-14 VITALS
HEART RATE: 84 BPM | OXYGEN SATURATION: 98 % | DIASTOLIC BLOOD PRESSURE: 83 MMHG | TEMPERATURE: 98.3 F | SYSTOLIC BLOOD PRESSURE: 125 MMHG

## 2018-12-14 DIAGNOSIS — J32.9 SINUSITIS, UNSPECIFIED CHRONICITY, UNSPECIFIED LOCATION: ICD-10-CM

## 2018-12-14 DIAGNOSIS — J06.9 UPPER RESPIRATORY TRACT INFECTION, UNSPECIFIED TYPE: Primary | ICD-10-CM

## 2018-12-14 PROCEDURE — 99213 OFFICE O/P EST LOW 20 MIN: CPT | Performed by: NURSE PRACTITIONER

## 2018-12-14 RX ORDER — PREDNISONE 20 MG/1
20 TABLET ORAL 2 TIMES DAILY
Qty: 10 TABLET | Refills: 0 | Status: SHIPPED | OUTPATIENT
Start: 2018-12-14 | End: 2019-01-02

## 2018-12-14 RX ORDER — GUAIFENESIN 600 MG/1
600 TABLET, EXTENDED RELEASE ORAL 2 TIMES DAILY
Qty: 28 TABLET | Refills: 0 | Status: SHIPPED | OUTPATIENT
Start: 2018-12-14 | End: 2018-12-28

## 2018-12-14 RX ORDER — AMOXICILLIN 500 MG/1
500 CAPSULE ORAL 2 TIMES DAILY
Qty: 20 CAPSULE | Refills: 0 | Status: SHIPPED | OUTPATIENT
Start: 2018-12-14 | End: 2018-12-24

## 2018-12-14 RX ORDER — BROMPHENIRAMINE MALEATE, PSEUDOEPHEDRINE HYDROCHLORIDE, AND DEXTROMETHORPHAN HYDROBROMIDE 2; 30; 10 MG/5ML; MG/5ML; MG/5ML
SYRUP ORAL
Qty: 240 ML | Refills: 0 | Status: SHIPPED | OUTPATIENT
Start: 2018-12-14 | End: 2019-05-02

## 2018-12-14 NOTE — PROGRESS NOTES
Subjective:     Sonam Lang is a 33 y.o.     URI    This is a new problem. The current episode started in the past 7 days. There has been no fever. Associated symptoms include coughing, headaches, a plugged ear sensation, sinus pain, a sore throat and wheezing. Pertinent negatives include no congestion or ear pain. Treatments tried: sudafed, generic mucinex, hot tea, albuterol inhaler. The treatment provided mild relief.         The following portions of the patient's history were reviewed and updated as appropriate: allergies, current medications, past family history, past medical history, past social history, past surgical history and problem list.      Review of Systems   Constitutional: Negative for fever.   HENT: Positive for postnasal drip (yellow), sinus pressure, sinus pain and sore throat. Negative for congestion and ear pain.    Respiratory: Positive for cough, shortness of breath and wheezing.    Cardiovascular: Negative.    Neurological: Positive for headaches.         Objective:      Physical Exam   Constitutional: Vital signs are normal. She is cooperative.   HENT:   Head: Normocephalic and atraumatic.   Right Ear: Ear canal normal. Right ear middle ear effusion: mild    Left Ear: Tympanic membrane and ear canal normal.   Nose: Right sinus exhibits frontal sinus tenderness. Left sinus exhibits frontal sinus tenderness.   Mouth/Throat: Posterior oropharyngeal erythema present. No oropharyngeal exudate.   Post nasal drainage noted   Cardiovascular: Normal rate, regular rhythm, S1 normal, S2 normal and normal heart sounds.   Pulmonary/Chest: Effort normal and breath sounds normal.   Neurological: She is alert.   Vitals reviewed.          Sonam was seen today for cough.    Diagnoses and all orders for this visit:    Upper respiratory tract infection, unspecified type    Sinusitis, unspecified chronicity, unspecified location    Other orders  -     predniSONE (DELTASONE) 20 MG tablet; Take 1 tablet by  mouth 2 (Two) Times a Day.  -     guaiFENesin (MUCINEX) 600 MG 12 hr tablet; Take 1 tablet by mouth 2 (Two) Times a Day for 14 days.  -     brompheniramine-pseudoephedrine-DM (BROMFED DM) 30-2-10 MG/5ML syrup; 5 to 10 cc every 4 hours as needed for cough, congestion, allergies  -     amoxicillin (AMOXIL) 500 MG capsule; Take 1 capsule by mouth 2 (Two) Times a Day for 10 days.

## 2018-12-14 NOTE — PATIENT INSTRUCTIONS
Sinusitis, Adult  Sinusitis is soreness and inflammation of your sinuses. Sinuses are hollow spaces in the bones around your face. Your sinuses are located:  · Around your eyes.  · In the middle of your forehead.  · Behind your nose.  · In your cheekbones.    Your sinuses and nasal passages are lined with a stringy fluid (mucus). Mucus normally drains out of your sinuses. When your nasal tissues become inflamed or swollen, the mucus can become trapped or blocked so air cannot flow through your sinuses. This allows bacteria, viruses, and funguses to grow, which leads to infection.  Sinusitis can develop quickly and last for 7?10 days (acute) or for more than 12 weeks (chronic). Sinusitis often develops after a cold.  What are the causes?  This condition is caused by anything that creates swelling in the sinuses or stops mucus from draining, including:  · Allergies.  · Asthma.  · Bacterial or viral infection.  · Abnormally shaped bones between the nasal passages.  · Nasal growths that contain mucus (nasal polyps).  · Narrow sinus openings.  · Pollutants, such as chemicals or irritants in the air.  · A foreign object stuck in the nose.  · A fungal infection. This is rare.    What increases the risk?  The following factors may make you more likely to develop this condition:  · Having allergies or asthma.  · Having had a recent cold or respiratory tract infection.  · Having structural deformities or blockages in your nose or sinuses.  · Having a weak immune system.  · Doing a lot of swimming or diving.  · Overusing nasal sprays.  · Smoking.    What are the signs or symptoms?  The main symptoms of this condition are pain and a feeling of pressure around the affected sinuses. Other symptoms include:  · Upper toothache.  · Earache.  · Headache.  · Bad breath.  · Decreased sense of smell and taste.  · A cough that may get worse at night.  · Fatigue.  · Fever.  · Thick drainage from your nose. The drainage is often green and  it may contain pus (purulent).  · Stuffy nose or congestion.  · Postnasal drip. This is when extra mucus collects in the throat or back of the nose.  · Swelling and warmth over the affected sinuses.  · Sore throat.  · Sensitivity to light.    How is this diagnosed?  This condition is diagnosed based on symptoms, a medical history, and a physical exam. To find out if your condition is acute or chronic, your health care provider may:  · Look in your nose for signs of nasal polyps.  · Tap over the affected sinus to check for signs of infection.  · View the inside of your sinuses using an imaging device that has a light attached (endoscope).    If your health care provider suspects that you have chronic sinusitis, you may also:  · Be tested for allergies.  · Have a sample of mucus taken from your nose (nasal culture) and checked for bacteria.  · Have a mucus sample examined to see if your sinusitis is related to an allergy.    If your sinusitis does not respond to treatment and it lasts longer than 8 weeks, you may have an MRI or CT scan to check your sinuses. These scans also help to determine how severe your infection is.  In rare cases, a bone biopsy may be done to rule out more serious types of fungal sinus disease.  How is this treated?  Treatment for sinusitis depends on the cause and whether your condition is chronic or acute. If a virus is causing your sinusitis, your symptoms will go away on their own within 10 days. You may be given medicines to relieve your symptoms, including:  · Topical nasal decongestants. They shrink swollen nasal passages and let mucus drain from your sinuses.  · Antihistamines. These drugs block inflammation that is triggered by allergies. This can help to ease swelling in your nose and sinuses.  · Topical nasal corticosteroids. These are nasal sprays that ease inflammation and swelling in your nose and sinuses.  · Nasal saline washes. These rinses can help to get rid of thick mucus in  your nose.    If your condition is caused by bacteria, you will be given an antibiotic medicine. If your condition is caused by a fungus, you will be given an antifungal medicine.  Surgery may be needed to correct underlying conditions, such as narrow nasal passages. Surgery may also be needed to remove polyps.  Follow these instructions at home:  Medicines  · Take, use, or apply over-the-counter and prescription medicines only as told by your health care provider. These may include nasal sprays.  · If you were prescribed an antibiotic medicine, take it as told by your health care provider. Do not stop taking the antibiotic even if you start to feel better.  Hydrate and Humidify  · Drink enough water to keep your urine clear or pale yellow. Staying hydrated will help to thin your mucus.  · Use a cool mist humidifier to keep the humidity level in your home above 50%.  · Inhale steam for 10-15 minutes, 3-4 times a day or as told by your health care provider. You can do this in the bathroom while a hot shower is running.  · Limit your exposure to cool or dry air.  Rest  · Rest as much as possible.  · Sleep with your head raised (elevated).  · Make sure to get enough sleep each night.  General instructions  · Apply a warm, moist washcloth to your face 3-4 times a day or as told by your health care provider. This will help with discomfort.  · Wash your hands often with soap and water to reduce your exposure to viruses and other germs. If soap and water are not available, use hand .  · Do not smoke. Avoid being around people who are smoking (secondhand smoke).  · Keep all follow-up visits as told by your health care provider. This is important.  Contact a health care provider if:  · You have a fever.  · Your symptoms get worse.  · Your symptoms do not improve within 10 days.  Get help right away if:  · You have a severe headache.  · You have persistent vomiting.  · You have pain or swelling around your face or  "eyes.  · You have vision problems.  · You develop confusion.  · Your neck is stiff.  · You have trouble breathing.  This information is not intended to replace advice given to you by your health care provider. Make sure you discuss any questions you have with your health care provider.  Document Released: 12/18/2006 Document Revised: 08/13/2017 Document Reviewed: 10/12/2016  [employer] Interactive Patient Education © 2018 [employer]  Upper Respiratory Infection, Adult  Most upper respiratory infections (URIs) are caused by a virus. A URI affects the nose, throat, and upper air passages. The most common type of URI is often called \"the common cold.\"  Follow these instructions at home:  · Take medicines only as told by your doctor.  · Gargle warm saltwater or take cough drops to comfort your throat as told by your doctor.  · Use a warm mist humidifier or inhale steam from a shower to increase air moisture. This may make it easier to breathe.  · Drink enough fluid to keep your pee (urine) clear or pale yellow.  · Eat soups and other clear broths.  · Have a healthy diet.  · Rest as needed.  · Go back to work when your fever is gone or your doctor says it is okay.  ? You may need to stay home longer to avoid giving your URI to others.  ? You can also wear a face mask and wash your hands often to prevent spread of the virus.  · Use your inhaler more if you have asthma.  · Do not use any tobacco products, including cigarettes, chewing tobacco, or electronic cigarettes. If you need help quitting, ask your doctor.  Contact a doctor if:  · You are getting worse, not better.  · Your symptoms are not helped by medicine.  · You have chills.  · You are getting more short of breath.  · You have brown or red mucus.  · You have yellow or brown discharge from your nose.  · You have pain in your face, especially when you bend forward.  · You have a fever.  · You have puffy (swollen) neck glands.  · You have pain while " swallowing.  · You have white areas in the back of your throat.  Get help right away if:  · You have very bad or constant:  ? Headache.  ? Ear pain.  ? Pain in your forehead, behind your eyes, and over your cheekbones (sinus pain).  ? Chest pain.  · You have long-lasting (chronic) lung disease and any of the following:  ? Wheezing.  ? Long-lasting cough.  ? Coughing up blood.  ? A change in your usual mucus.  · You have a stiff neck.  · You have changes in your:  ? Vision.  ? Hearing.  ? Thinking.  ? Mood.  This information is not intended to replace advice given to you by your health care provider. Make sure you discuss any questions you have with your health care provider.  Document Released: 06/05/2009 Document Revised: 08/20/2017 Document Reviewed: 03/25/2015  [employer] Interactive Patient Education © 2018 [employer]

## 2018-12-17 RX ORDER — AZITHROMYCIN 250 MG/1
TABLET, FILM COATED ORAL
Qty: 6 TABLET | Refills: 0 | Status: SHIPPED | OUTPATIENT
Start: 2018-12-17 | End: 2019-01-02

## 2019-01-02 ENCOUNTER — OFFICE VISIT (OUTPATIENT)
Dept: SPORTS MEDICINE | Facility: CLINIC | Age: 34
End: 2019-01-02

## 2019-01-02 VITALS
DIASTOLIC BLOOD PRESSURE: 76 MMHG | OXYGEN SATURATION: 99 % | HEIGHT: 67 IN | BODY MASS INDEX: 33.9 KG/M2 | SYSTOLIC BLOOD PRESSURE: 118 MMHG | TEMPERATURE: 97.7 F | HEART RATE: 75 BPM | WEIGHT: 216 LBS

## 2019-01-02 DIAGNOSIS — J01.90 SUBACUTE SINUSITIS, UNSPECIFIED LOCATION: Primary | ICD-10-CM

## 2019-01-02 PROCEDURE — 99213 OFFICE O/P EST LOW 20 MIN: CPT | Performed by: FAMILY MEDICINE

## 2019-01-02 RX ORDER — AZITHROMYCIN 250 MG/1
TABLET, FILM COATED ORAL
Qty: 6 TABLET | Refills: 0 | Status: SHIPPED | OUTPATIENT
Start: 2019-01-02 | End: 2019-01-16

## 2019-01-02 NOTE — PROGRESS NOTES
"Sonam is a 33 y.o. year old female    Chief Complaint   Patient presents with   • URI     F/U from  for URI symptoms, x 3 weeks        History of Present Illness   HPI   Sinus congestion persistent for nearly 1 month. Thick yellow mucus with productrive cough, symptoms worse at night. Improved after UC treatment with prednisone and amoxil on 12/14. Moderate severity pressure deep in the head.     I have reviewed the patient's medical, family, and social history in detail and updated the computerized patient record.    Review of Systems   Constitutional: Negative for fever.   Respiratory: Negative for shortness of breath.        /76 (BP Location: Left arm, Patient Position: Sitting, Cuff Size: Large Adult)   Pulse 75   Temp 97.7 °F (36.5 °C)   Ht 170.2 cm (67.01\")   Wt 98 kg (216 lb)   SpO2 99%   BMI 33.82 kg/m²      Physical Exam   Constitutional: She appears well-developed and well-nourished.   HENT:   Right Ear: External ear normal.   Left Ear: External ear normal.   Mouth/Throat: Posterior oropharyngeal erythema present. No posterior oropharyngeal edema. No tonsillar exudate.   Eyes: Conjunctivae are normal. Pupils are equal, round, and reactive to light.   Neck: Normal range of motion.   Cardiovascular: Normal rate, regular rhythm and normal heart sounds.   Pulmonary/Chest: Effort normal and breath sounds normal.   Lymphadenopathy:     She has cervical adenopathy.   Vitals reviewed.       Diagnoses and all orders for this visit:    Subacute sinusitis, unspecified location  -     azithromycin (ZITHROMAX Z-KAY) 250 MG tablet; Take 2 tablets the first day, then 1 tablet daily for 4 days.             EMR Dragon/Transcription disclaimer:    Much of this encounter note is an electronic transcription/translation of spoken language to printed text.  The electronic translation of spoken language may permit erroneous, or at times, nonsensical words or phrases to be inadvertently transcribed.  Although I have " reviewed the note for such errors some may still exist.

## 2019-01-16 ENCOUNTER — TELEPHONE (OUTPATIENT)
Dept: SPORTS MEDICINE | Facility: CLINIC | Age: 34
End: 2019-01-16

## 2019-01-16 RX ORDER — CLARITHROMYCIN 500 MG/1
500 TABLET, COATED ORAL 2 TIMES DAILY
Qty: 20 TABLET | Refills: 0 | Status: SHIPPED | OUTPATIENT
Start: 2019-01-16 | End: 2019-05-02

## 2019-01-16 NOTE — TELEPHONE ENCOUNTER
PT called in stating that you told her if she was still feeling bad after her round of medication that you would call her something else in. She stated that she is still having sinus pressure and headaches and would like for you to call something in.     She stated that she works nights and only needs a call if there is an issue with calling something in.

## 2019-01-25 ENCOUNTER — TELEPHONE (OUTPATIENT)
Dept: SPORTS MEDICINE | Facility: CLINIC | Age: 34
End: 2019-01-25

## 2019-01-25 NOTE — TELEPHONE ENCOUNTER
PT called in stating that since she has been taking the the antibiotic she has been experiencing dry mouth so much so that it effects her sleep. She wants to know if there is something the can do or you can call in for her.     Please advise. Thank you.

## 2019-01-25 NOTE — TELEPHONE ENCOUNTER
Nothing really to call in; she should just increase her fluids as much as possible. If it's too bad she can just stop the antibiotic early.

## 2019-05-02 ENCOUNTER — OFFICE VISIT (OUTPATIENT)
Dept: NEUROLOGY | Facility: CLINIC | Age: 34
End: 2019-05-02

## 2019-05-02 VITALS
DIASTOLIC BLOOD PRESSURE: 70 MMHG | SYSTOLIC BLOOD PRESSURE: 120 MMHG | WEIGHT: 219 LBS | HEIGHT: 68 IN | BODY MASS INDEX: 33.19 KG/M2

## 2019-05-02 DIAGNOSIS — H01.004 BLEPHARITIS OF UPPER EYELIDS OF BOTH EYES, UNSPECIFIED TYPE: Primary | ICD-10-CM

## 2019-05-02 DIAGNOSIS — G43.009 MIGRAINE WITHOUT AURA AND WITHOUT STATUS MIGRAINOSUS, NOT INTRACTABLE: ICD-10-CM

## 2019-05-02 DIAGNOSIS — H01.001 BLEPHARITIS OF UPPER EYELIDS OF BOTH EYES, UNSPECIFIED TYPE: Primary | ICD-10-CM

## 2019-05-02 PROCEDURE — 99213 OFFICE O/P EST LOW 20 MIN: CPT | Performed by: PSYCHIATRY & NEUROLOGY

## 2019-05-02 NOTE — PROGRESS NOTES
Subjective:     Patient ID: Sonam Lang is a 33 y.o. female.    History of Present Illness    Patient was seen back in the office for follow-up of headaches.  Also the patient has a history of eye twitching.  I saw the patient last over a year ago.  That time she was placed on topiramate and was to see me back in the office in 3 months.  This is the first time of seeing her back.  She was seen by her OB/GYN yesterday and placed on butalbital.  She is already on magnesium.  Chronic daily headaches that worsen as the day goes on.  The eye twitching has resolved.  The following portions of the patient's history were reviewed and updated as appropriate: allergies, current medications, past family history, past medical history, past social history, past surgical history and problem list.      Current Outpatient Medications:   •  albuterol (PROVENTIL HFA;VENTOLIN HFA) 108 (90 Base) MCG/ACT inhaler, Inhale 2 puffs Every 4 (Four) Hours As Needed for Wheezing., Disp: , Rfl:   •  B COMPLEX-C-FOLIC ACID PO, Take  by mouth., Disp: , Rfl:   •  Biotin 5000 MCG capsule, Take  by mouth., Disp: , Rfl:   •  budesonide (RHINOCORT AQUA) 32 MCG/ACT nasal spray, 1 spray into the nostril(s) as directed by provider Daily., Disp: , Rfl:   •  budesonide-formoterol (SYMBICORT) 80-4.5 MCG/ACT inhaler, Inhale 2 puffs 2 (Two) Times a Day., Disp: , Rfl:   •  calcium citrate-vitamin d (CALCITRATE) 315-250 MG-UNIT tablet tablet, Take  by mouth Daily., Disp: , Rfl:   •  cetirizine (zyrTEC) 10 MG tablet, Take 10 mg by mouth., Disp: , Rfl:   •  Magnesium 250 MG tablet, Take  by mouth., Disp: , Rfl:   •  Omega-3 Fatty Acids (FISH OIL) 1000 MG capsule capsule, Take  by mouth., Disp: , Rfl:   •  Prenatal Vit-Fe Fumarate-FA (PRENATAL, CLASSIC, VITAMIN) 28-0.8 MG tablet tablet, Take 1 tablet by mouth Daily., Disp: , Rfl:   •  Pyridoxine HCl (VITAMIN B6) 200 MG tablet, Take  by mouth., Disp: , Rfl:   •  Riboflavin (VITAMIN B-2) 100 MG tablet, Take  by  mouth., Disp: , Rfl:     Review of Systems   Constitutional: Negative.    Neurological: Positive for light-headedness and headaches. Negative for dizziness, tremors, seizures, syncope, facial asymmetry, speech difficulty, weakness and numbness.   Psychiatric/Behavioral: Negative.           I have reviewed ROS completed by medical assistant.     Objective:    Neurologic Exam  Mental status examination was appropriate.  Funduscopy, visual fields, eye movements and pupillary reflexes were normal.  No facial weakness was noted.  Gait was normal.  No pattern of focal weakness was noted.  Physical Exam    Assessment/Plan:     Sonam was seen today for headache.    Diagnoses and all orders for this visit:    Blepharitis of upper eyelids of both eyes, unspecified type    Migraine without aura and without status migrainosus, not intractable         Patient has mixed headaches.  There is a combination of migraine and chronic daily headaches.  Unfortunately I have no medications that are known to be safe during pregnancy for the headaches.  Therefore I would recommend following directions by her obstetrician.  If she gets into the third trimester is still having a lot of problems we could consider placing her on a medication for the chronic headaches.  Neurologic examination is unremarkable.  Follow  up in the office in 1 year or sooner if deemed necessary.  Thank you for allowing me to share in the care of this patient.  Kit Hidalgo M.D.

## 2019-08-08 ENCOUNTER — TELEPHONE (OUTPATIENT)
Dept: NEUROLOGY | Facility: CLINIC | Age: 34
End: 2019-08-08

## 2019-08-08 NOTE — TELEPHONE ENCOUNTER
Pt called about FMLA forms, stating date needs to be changed as stated on forms.  I fixed the date and faxed papers back also mailed the pt a copy of forms.

## 2019-12-30 ENCOUNTER — OFFICE VISIT (OUTPATIENT)
Dept: SPORTS MEDICINE | Facility: CLINIC | Age: 34
End: 2019-12-30

## 2019-12-30 VITALS
WEIGHT: 226 LBS | HEIGHT: 68 IN | SYSTOLIC BLOOD PRESSURE: 110 MMHG | DIASTOLIC BLOOD PRESSURE: 66 MMHG | HEART RATE: 68 BPM | OXYGEN SATURATION: 96 % | BODY MASS INDEX: 34.25 KG/M2

## 2019-12-30 DIAGNOSIS — R53.82 CHRONIC FATIGUE: ICD-10-CM

## 2019-12-30 DIAGNOSIS — G43.009 MIGRAINE WITHOUT AURA AND WITHOUT STATUS MIGRAINOSUS, NOT INTRACTABLE: ICD-10-CM

## 2019-12-30 DIAGNOSIS — Z00.00 ANNUAL PHYSICAL EXAM: Primary | ICD-10-CM

## 2019-12-30 PROBLEM — G47.30 SLEEP APNEA: Status: ACTIVE | Noted: 2019-04-02

## 2019-12-30 PROBLEM — E66.9 OBESITY (BMI 30.0-34.9): Status: ACTIVE | Noted: 2019-04-02

## 2019-12-30 PROBLEM — L68.0 HIRSUTISM: Status: ACTIVE | Noted: 2019-02-21

## 2019-12-30 PROBLEM — N80.9 ENDOMETRIOSIS: Status: ACTIVE | Noted: 2019-02-13

## 2019-12-30 PROBLEM — Z86.59 HISTORY OF DEPRESSION: Status: ACTIVE | Noted: 2019-02-21

## 2019-12-30 PROBLEM — E05.90 SUBCLINICAL HYPERTHYROIDISM: Status: ACTIVE | Noted: 2019-04-21

## 2019-12-30 PROBLEM — J45.30 ASTHMA, MILD PERSISTENT: Status: ACTIVE | Noted: 2019-04-02

## 2019-12-30 PROBLEM — E66.811 OBESITY (BMI 30.0-34.9): Status: ACTIVE | Noted: 2019-04-02

## 2019-12-30 PROBLEM — Z88.6 ALLERGY TO NSAIDS: Status: ACTIVE | Noted: 2019-02-13

## 2019-12-30 PROCEDURE — 99395 PREV VISIT EST AGE 18-39: CPT | Performed by: FAMILY MEDICINE

## 2019-12-30 RX ORDER — IBUPROFEN 200 MG
1 CAPSULE ORAL DAILY
COMMUNITY
End: 2022-08-10 | Stop reason: ALTCHOICE

## 2019-12-30 RX ORDER — MONTELUKAST SODIUM 10 MG/1
10 TABLET ORAL DAILY
COMMUNITY
Start: 2019-12-14 | End: 2021-04-28 | Stop reason: SDUPTHER

## 2019-12-30 RX ORDER — GLUCOSAMINE HCL 500 MG
5000 TABLET ORAL
COMMUNITY
End: 2022-08-10 | Stop reason: ALTCHOICE

## 2019-12-30 RX ORDER — DEXTROAMPHETAMINE SACCHARATE, AMPHETAMINE ASPARTATE, DEXTROAMPHETAMINE SULFATE AND AMPHETAMINE SULFATE 2.5; 2.5; 2.5; 2.5 MG/1; MG/1; MG/1; MG/1
1 TABLET ORAL 2 TIMES DAILY
COMMUNITY
Start: 2019-12-14 | End: 2022-03-17

## 2019-12-30 RX ORDER — BUTALBITAL, ACETAMINOPHEN AND CAFFEINE 50; 325; 40 MG/1; MG/1; MG/1
1 TABLET ORAL
COMMUNITY
Start: 2019-12-11 | End: 2022-03-23

## 2019-12-30 RX ORDER — LANOLIN ALCOHOL/MO/W.PET/CERES
CREAM (GRAM) TOPICAL
COMMUNITY
End: 2022-08-10 | Stop reason: ALTCHOICE

## 2019-12-30 NOTE — PROGRESS NOTES
"Sonam Lang is here today for an annual physical exam.     Eating a healthy diet. Exercising routinely.   2 months postpartum, doing well overall.   Noticed increase in migraines during pregnancy and first 6 weeks postpartum.   Saw psychiatry as well, thought maybe a post-spinal headache. No clear depression symptoms but struggles with chronic fatigue. Using adderall 10mg daily with sight benefit.  Describes restlessness trying to go to sleep at night. No change with CPAP.   HA worse with bright lights, especially fluorescent lights.       Health Maintenance   Topic Date Due   • Annual Gynecologic Pelvic and Breast Exam  1985   • ANNUAL PHYSICAL  12/31/2020   • PAP SMEAR  09/20/2021   • TDAP/TD VACCINES (2 - Td) 08/29/2029   • INFLUENZA VACCINE  Completed       Review of Systems   Constitutional: Positive for fatigue.   Respiratory: Negative.    Cardiovascular: Negative.    Neurological: Positive for headaches.   Psychiatric/Behavioral: Positive for sleep disturbance.       /66   Pulse 68   Ht 172.7 cm (67.99\")   Wt 103 kg (226 lb)   SpO2 96%   BMI 34.37 kg/m²      Physical Exam    Vital signs reviewed.  General appearance: No acute distress  Eyes: conjunctiva clear without erythema; pupils equally round and reactive  ENT: external ears and nose normal; hearing normal, oropharynx clear  Neck: supple; no thyromegaly  CV: normal rate and rhythm; no peripheral edema  Respiratory: normal respiratory effort; lungs clear to auscultation bilaterally  MSK: normal gait and station; no focal joint deformity or swelling  Skin: no rash or wounds; normal turgor  Neuro: cranial nerves 2-12 grossly intact; normal sensation to light touch  Psych: mood and affect normal; recent and remote memory intact      Current Outpatient Medications:   •  albuterol (PROVENTIL HFA;VENTOLIN HFA) 108 (90 Base) MCG/ACT inhaler, Inhale 2 puffs Every 4 (Four) Hours As Needed for Wheezing., Disp: , Rfl:   •  " amphetamine-dextroamphetamine (ADDERALL) 10 MG tablet, Take 1 tablet by mouth 2 (Two) Times a Day., Disp: , Rfl:   •  B COMPLEX-C-FOLIC ACID PO, Take  by mouth., Disp: , Rfl:   •  Biotin 5000 MCG capsule, Take  by mouth., Disp: , Rfl:   •  budesonide (RHINOCORT AQUA) 32 MCG/ACT nasal spray, 1 spray into the nostril(s) as directed by provider Daily., Disp: , Rfl:   •  budesonide-formoterol (SYMBICORT) 80-4.5 MCG/ACT inhaler, Inhale 2 puffs 2 (Two) Times a Day., Disp: , Rfl:   •  butalbital-acetaminophen-caffeine (FIORICET, ESGIC) -40 MG per tablet, Take 1 tablet by mouth., Disp: , Rfl:   •  calcium citrate (CALCITRATE) 950 MG tablet, Take 1 tablet by mouth Daily., Disp: , Rfl:   •  cetirizine (zyrTEC) 10 MG tablet, Take 10 mg by mouth., Disp: , Rfl:   •  Cholecalciferol (VITAMIN D3) 75 MCG (3000 UT) tablet, Take 5,000 each by mouth., Disp: , Rfl:   •  Coenzyme Q10 (COQ-10) 100 MG capsule, Take  by mouth., Disp: , Rfl:   •  Magnesium Oxide 400 (240 Mg) MG tablet, Take  by mouth., Disp: , Rfl:   •  montelukast (SINGULAIR) 10 MG tablet, Take 10 mg by mouth Daily., Disp: , Rfl:   •  Omega-3 Fatty Acids (FISH OIL) 1000 MG capsule capsule, Take  by mouth., Disp: , Rfl:   •  Potassium Bicarb & Chloride (POTASSIUM BICARBORNATE & POTASSIUM CHLORIDE) 25 MEQ disintegrating tablet, Take 25 mEq by mouth., Disp: , Rfl:   •  Pyridoxine HCl (VITAMIN B6) 200 MG tablet, Take  by mouth., Disp: , Rfl:   •  Riboflavin (VITAMIN B-2) 100 MG tablet, Take  by mouth., Disp: , Rfl:   •  sertraline (ZOLOFT) 50 MG tablet, , Disp: , Rfl:     Sonam was seen today for annual exam.    Diagnoses and all orders for this visit:    Annual physical exam  -     CBC & Differential  -     Comprehensive Metabolic Panel  -     Lipid Panel With / Chol / HDL Ratio  -     Urinalysis With Culture If Indicated - Urine, Clean Catch  -     T4, Free  -     TSH  -     Vitamin B12  -     Iron Profile  -     Thyroid Antibodies  -     T3, Free    Chronic fatigue  -      CBC & Differential  -     Comprehensive Metabolic Panel  -     Lipid Panel With / Chol / HDL Ratio  -     Urinalysis With Culture If Indicated - Urine, Clean Catch  -     T4, Free  -     TSH  -     Vitamin B12  -     Iron Profile  -     Thyroid Antibodies  -     T3, Free    Migraine without aura and without status migrainosus, not intractable  -     CBC & Differential  -     Comprehensive Metabolic Panel  -     Lipid Panel With / Chol / HDL Ratio  -     Urinalysis With Culture If Indicated - Urine, Clean Catch  -     T4, Free  -     TSH  -     Vitamin B12  -     Iron Profile  -     Thyroid Antibodies  -     T3, Free        Encourage healthy diet and exercise.  Encourage patient to stay up to date on screening examinations as indicated based on age and risk factors.  Continue to optimize lifestyle factors, vitamins, diet, etc for migraines and fatigue.     EMR Dragon/Transcription disclaimer:    Much of this encounter note is an electronic transcription/translation of spoken language to printed text.  The electronic translation of spoken language may permit erroneous, or at times, nonsensical words or phrases to be inadvertently transcribed.  Although I have reviewed the note for such errors some may still exist.

## 2019-12-31 LAB
ALBUMIN SERPL-MCNC: 4.7 G/DL (ref 3.5–5.2)
ALBUMIN/GLOB SERPL: 1.9 G/DL
ALP SERPL-CCNC: 65 U/L (ref 39–117)
ALT SERPL-CCNC: 13 U/L (ref 1–33)
APPEARANCE UR: ABNORMAL
AST SERPL-CCNC: 17 U/L (ref 1–32)
BACTERIA #/AREA URNS HPF: NORMAL /HPF
BASOPHILS # BLD AUTO: 0.03 10*3/MM3 (ref 0–0.2)
BASOPHILS NFR BLD AUTO: 0.5 % (ref 0–1.5)
BILIRUB SERPL-MCNC: 0.3 MG/DL (ref 0.2–1.2)
BILIRUB UR QL STRIP: NEGATIVE
BUN SERPL-MCNC: 14 MG/DL (ref 6–20)
BUN/CREAT SERPL: 15.2 (ref 7–25)
CALCIUM SERPL-MCNC: 10 MG/DL (ref 8.6–10.5)
CHLORIDE SERPL-SCNC: 101 MMOL/L (ref 98–107)
CHOLEST SERPL-MCNC: 198 MG/DL (ref 0–200)
CHOLEST/HDLC SERPL: 3.67 {RATIO}
CO2 SERPL-SCNC: 22.5 MMOL/L (ref 22–29)
COLOR UR: YELLOW
CREAT SERPL-MCNC: 0.92 MG/DL (ref 0.57–1)
EOSINOPHIL # BLD AUTO: 0.08 10*3/MM3 (ref 0–0.4)
EOSINOPHIL NFR BLD AUTO: 1.2 % (ref 0.3–6.2)
EPI CELLS #/AREA URNS HPF: NORMAL /HPF (ref 0–10)
ERYTHROCYTE [DISTWIDTH] IN BLOOD BY AUTOMATED COUNT: 13 % (ref 12.3–15.4)
GLOBULIN SER CALC-MCNC: 2.5 GM/DL
GLUCOSE SERPL-MCNC: 81 MG/DL (ref 65–99)
GLUCOSE UR QL: NEGATIVE
HCT VFR BLD AUTO: 41.9 % (ref 34–46.6)
HDLC SERPL-MCNC: 54 MG/DL (ref 40–60)
HGB BLD-MCNC: 14.5 G/DL (ref 12–15.9)
HGB UR QL STRIP: NEGATIVE
IMM GRANULOCYTES # BLD AUTO: 0.02 10*3/MM3 (ref 0–0.05)
IMM GRANULOCYTES NFR BLD AUTO: 0.3 % (ref 0–0.5)
IRON SATN MFR SERPL: 36 % (ref 20–50)
IRON SERPL-MCNC: 143 MCG/DL (ref 37–145)
KETONES UR QL STRIP: NEGATIVE
LDLC SERPL CALC-MCNC: 109 MG/DL (ref 0–100)
LEUKOCYTE ESTERASE UR QL STRIP: NEGATIVE
LYMPHOCYTES # BLD AUTO: 1.82 10*3/MM3 (ref 0.7–3.1)
LYMPHOCYTES NFR BLD AUTO: 27.6 % (ref 19.6–45.3)
MCH RBC QN AUTO: 31.5 PG (ref 26.6–33)
MCHC RBC AUTO-ENTMCNC: 34.6 G/DL (ref 31.5–35.7)
MCV RBC AUTO: 91.1 FL (ref 79–97)
MICRO URNS: ABNORMAL
MICRO URNS: ABNORMAL
MONOCYTES # BLD AUTO: 0.52 10*3/MM3 (ref 0.1–0.9)
MONOCYTES NFR BLD AUTO: 7.9 % (ref 5–12)
MUCOUS THREADS URNS QL MICRO: PRESENT /HPF
NEUTROPHILS # BLD AUTO: 4.13 10*3/MM3 (ref 1.7–7)
NEUTROPHILS NFR BLD AUTO: 62.5 % (ref 42.7–76)
NITRITE UR QL STRIP: NEGATIVE
NRBC BLD AUTO-RTO: 0 /100 WBC (ref 0–0.2)
PH UR STRIP: 7 [PH] (ref 5–7.5)
PLATELET # BLD AUTO: 275 10*3/MM3 (ref 140–450)
POTASSIUM SERPL-SCNC: 4.2 MMOL/L (ref 3.5–5.2)
PROT SERPL-MCNC: 7.2 G/DL (ref 6–8.5)
PROT UR QL STRIP: NEGATIVE
RBC # BLD AUTO: 4.6 10*6/MM3 (ref 3.77–5.28)
RBC #/AREA URNS HPF: NORMAL /HPF (ref 0–2)
SODIUM SERPL-SCNC: 137 MMOL/L (ref 136–145)
SP GR UR: 1.02 (ref 1–1.03)
T3FREE SERPL-MCNC: 2.7 PG/ML (ref 2–4.4)
T4 FREE SERPL-MCNC: 1.29 NG/DL (ref 0.93–1.7)
THYROGLOB AB SERPL-ACNC: <1 IU/ML (ref 0–0.9)
THYROPEROXIDASE AB SERPL-ACNC: 20 IU/ML (ref 0–34)
TIBC SERPL-MCNC: 394 MCG/DL
TRIGL SERPL-MCNC: 175 MG/DL (ref 0–150)
TSH SERPL DL<=0.005 MIU/L-ACNC: 1.68 UIU/ML (ref 0.27–4.2)
UIBC SERPL-MCNC: 251 MCG/DL (ref 112–346)
URINALYSIS REFLEX: ABNORMAL
UROBILINOGEN UR STRIP-MCNC: 0.2 MG/DL (ref 0.2–1)
VIT B12 SERPL-MCNC: 523 PG/ML (ref 211–946)
VLDLC SERPL CALC-MCNC: 35 MG/DL
WBC # BLD AUTO: 6.6 10*3/MM3 (ref 3.4–10.8)
WBC #/AREA URNS HPF: NORMAL /HPF (ref 0–5)

## 2020-01-03 ENCOUNTER — TELEPHONE (OUTPATIENT)
Dept: SPORTS MEDICINE | Facility: CLINIC | Age: 35
End: 2020-01-03

## 2020-01-03 NOTE — TELEPHONE ENCOUNTER
----- Message from Praful Francisco MD sent at 1/2/2020 11:51 AM EST -----  Labs show increased cholesterol compared to last visit, otherwise normal.  Encourage continued healthy diet and exercise to address cholesterol.  Regarding her energy, I think she needs to continue to work on her physical activity and sleep schedule.

## 2020-01-08 ENCOUNTER — TELEPHONE (OUTPATIENT)
Dept: SPORTS MEDICINE | Facility: CLINIC | Age: 35
End: 2020-01-08

## 2020-01-08 NOTE — TELEPHONE ENCOUNTER
Pt states that she fell on ice and hit her head. Has an apt tomorrow but wants to know if its alright if she can take pain medication today. JORGE

## 2020-01-09 ENCOUNTER — OFFICE VISIT (OUTPATIENT)
Dept: SPORTS MEDICINE | Facility: CLINIC | Age: 35
End: 2020-01-09

## 2020-01-09 VITALS
WEIGHT: 227 LBS | HEIGHT: 68 IN | TEMPERATURE: 98.4 F | OXYGEN SATURATION: 99 % | DIASTOLIC BLOOD PRESSURE: 64 MMHG | BODY MASS INDEX: 34.4 KG/M2 | SYSTOLIC BLOOD PRESSURE: 112 MMHG | HEART RATE: 70 BPM | RESPIRATION RATE: 14 BRPM

## 2020-01-09 DIAGNOSIS — S06.0X0A CONCUSSION WITHOUT LOSS OF CONSCIOUSNESS, INITIAL ENCOUNTER: Primary | ICD-10-CM

## 2020-01-09 PROCEDURE — 99214 OFFICE O/P EST MOD 30 MIN: CPT | Performed by: FAMILY MEDICINE

## 2020-01-09 NOTE — PROGRESS NOTES
"Sonam is a 34 y.o. year old female evaluation of a problem that is new to this examiner.    Chief Complaint   Patient presents with   • Fall     hit back of head no loc   c/o headaches and neck sore        History of Present Illness   HPI   Here today for evaluation of a head injury.  On Monday this week she was skiing, fell backwards and hit her head on the ice.  She was not wearing a helmet.  Headache started about 10 minutes later, generalized, mostly occipital, throbbing, moderately severe, different from her baseline migraine headaches.  This has been waxing and waning with regard to severity since then.  She did use some pain medication yesterday which helped.  Also describes associated light sensitivity worse than baseline.  Also notes increased difficulty concentrating and forgetfulness.  Sleep is been normal.  Headache is worse with mental activity.  Associated with some mild neck stiffness.    I have reviewed the patient's medical, family, and social history in detail and updated the computerized patient record.    Review of Systems   Respiratory: Negative.    Cardiovascular: Negative.    Musculoskeletal: Positive for neck stiffness.   Neurological: Negative for numbness.       /64   Pulse 70   Temp 98.4 °F (36.9 °C)   Resp 14   Ht 172.7 cm (68\")   Wt 103 kg (227 lb)   SpO2 99%   BMI 34.52 kg/m²      Physical Exam   Constitutional: She is oriented to person, place, and time. She appears well-developed and well-nourished.   Eyes: Pupils are equal, round, and reactive to light. Conjunctivae are normal.   Pulmonary/Chest: Effort normal.   Neurological: She is alert and oriented to person, place, and time. She has normal strength. No sensory deficit. She displays a negative Romberg sign.   There is some subtle nystagmus with horizontal tracking.  This does increase symptoms.  Saccades are normal without increased symptoms.   Psychiatric: She has a normal mood and affect.   Vitals " reviewed.        Current Outpatient Medications:   •  albuterol (PROVENTIL HFA;VENTOLIN HFA) 108 (90 Base) MCG/ACT inhaler, Inhale 2 puffs Every 4 (Four) Hours As Needed for Wheezing., Disp: , Rfl:   •  amphetamine-dextroamphetamine (ADDERALL) 10 MG tablet, Take 1 tablet by mouth 2 (Two) Times a Day., Disp: , Rfl:   •  B COMPLEX-C-FOLIC ACID PO, Take  by mouth., Disp: , Rfl:   •  Biotin 5000 MCG capsule, Take  by mouth., Disp: , Rfl:   •  budesonide (RHINOCORT AQUA) 32 MCG/ACT nasal spray, 1 spray into the nostril(s) as directed by provider Daily., Disp: , Rfl:   •  budesonide-formoterol (SYMBICORT) 80-4.5 MCG/ACT inhaler, Inhale 2 puffs 2 (Two) Times a Day., Disp: , Rfl:   •  butalbital-acetaminophen-caffeine (FIORICET, ESGIC) -40 MG per tablet, Take 1 tablet by mouth., Disp: , Rfl:   •  calcium citrate (CALCITRATE) 950 MG tablet, Take 1 tablet by mouth Daily., Disp: , Rfl:   •  cetirizine (zyrTEC) 10 MG tablet, Take 10 mg by mouth., Disp: , Rfl:   •  Cholecalciferol (VITAMIN D3) 75 MCG (3000 UT) tablet, Take 5,000 each by mouth., Disp: , Rfl:   •  Coenzyme Q10 (COQ-10) 100 MG capsule, Take  by mouth., Disp: , Rfl:   •  Magnesium Oxide 400 (240 Mg) MG tablet, Take  by mouth., Disp: , Rfl:   •  montelukast (SINGULAIR) 10 MG tablet, Take 10 mg by mouth Daily., Disp: , Rfl:   •  Omega-3 Fatty Acids (FISH OIL) 1000 MG capsule capsule, Take  by mouth., Disp: , Rfl:   •  Potassium Bicarb & Chloride (POTASSIUM BICARBORNATE & POTASSIUM CHLORIDE) 25 MEQ disintegrating tablet, Take 25 mEq by mouth., Disp: , Rfl:   •  Pyridoxine HCl (VITAMIN B6) 200 MG tablet, Take  by mouth., Disp: , Rfl:   •  Riboflavin (VITAMIN B-2) 100 MG tablet, Take  by mouth., Disp: , Rfl:   •  sertraline (ZOLOFT) 50 MG tablet, , Disp: , Rfl:      Diagnoses and all orders for this visit:    Concussion without loss of consciousness, initial encounter    Discussed nature of concussion in detail including pathophysiology, treatment strategies, etc.   Continue gradual return to normal activities as tolerated.  Discussed gentle exercise as tolerated.  Discussed methods to minimize stressors in the balance of brain rest but continuing nonsymptomatic activity.        EMR Dragon/Transcription disclaimer:    Much of this encounter note is an electronic transcription/translation of spoken language to printed text.  The electronic translation of spoken language may permit erroneous, or at times, nonsensical words or phrases to be inadvertently transcribed.  Although I have reviewed the note for such errors some may still exist.

## 2020-02-10 ENCOUNTER — TELEPHONE (OUTPATIENT)
Dept: SPORTS MEDICINE | Facility: CLINIC | Age: 35
End: 2020-02-10

## 2020-02-10 RX ORDER — AZITHROMYCIN 250 MG/1
TABLET, FILM COATED ORAL
Qty: 6 TABLET | Refills: 0 | Status: SHIPPED | OUTPATIENT
Start: 2020-02-10 | End: 2020-03-25

## 2020-02-10 NOTE — TELEPHONE ENCOUNTER
Pt states that she has URI/ sinus infection x few day would like to have something / antibiotic called in due to her not being able to come in till later this week or next week. JORGE

## 2020-03-20 ENCOUNTER — TELEPHONE (OUTPATIENT)
Dept: SPORTS MEDICINE | Facility: CLINIC | Age: 35
End: 2020-03-20

## 2020-03-20 NOTE — TELEPHONE ENCOUNTER
Pt states that she has been having HA and fatigue x 3 days, would like recommendation on what to do. No other sx reported but states this not unusual for her but with all that was going on would like your recommendation. [phone]. JJ

## 2020-03-20 NOTE — TELEPHONE ENCOUNTER
Returned pt's call. Symptoms not currently concerning for COVID. Recommend standard isolation precautions.

## 2020-03-25 ENCOUNTER — TELEPHONE (OUTPATIENT)
Dept: SPORTS MEDICINE | Facility: CLINIC | Age: 35
End: 2020-03-25

## 2020-03-25 RX ORDER — AZITHROMYCIN 250 MG/1
TABLET, FILM COATED ORAL
Qty: 6 TABLET | Refills: 0 | Status: SHIPPED | OUTPATIENT
Start: 2020-03-25 | End: 2020-03-25 | Stop reason: SDUPTHER

## 2020-03-25 RX ORDER — AZITHROMYCIN 250 MG/1
TABLET, FILM COATED ORAL
Qty: 6 TABLET | Refills: 0 | Status: SHIPPED | OUTPATIENT
Start: 2020-03-25 | End: 2022-03-21

## 2020-03-25 NOTE — TELEPHONE ENCOUNTER
Pt says she has been battling a sinus infection. She says she is having trouble sleeping and is experiencing headaches and it is sometimes hard to breath.  She would like something called into pharmacy.

## 2020-06-16 ENCOUNTER — OFFICE VISIT (OUTPATIENT)
Dept: SPORTS MEDICINE | Facility: CLINIC | Age: 35
End: 2020-06-16

## 2020-06-16 DIAGNOSIS — Z83.3 FAMILY HISTORY OF DIABETES MELLITUS: ICD-10-CM

## 2020-06-16 DIAGNOSIS — E66.9 OBESITY (BMI 30.0-34.9): Primary | ICD-10-CM

## 2020-06-16 PROCEDURE — 99442 PR PHYS/QHP TELEPHONE EVALUATION 11-20 MIN: CPT | Performed by: FAMILY MEDICINE

## 2020-06-16 RX ORDER — FREMANEZUMAB-VFRM 225 MG/1.5ML
INJECTION SUBCUTANEOUS
COMMUNITY
Start: 2020-06-13 | End: 2022-03-21

## 2020-06-16 RX ORDER — SERTRALINE HYDROCHLORIDE 25 MG/1
75 TABLET, FILM COATED ORAL
COMMUNITY
Start: 2020-04-09 | End: 2022-03-23 | Stop reason: SDUPTHER

## 2020-06-16 NOTE — PROGRESS NOTES
Telephone Visit Note    Chief Complaint   Patient presents with   • Diabetes      History of Present Illness  Pt calling today for concern about possible diabetes. Recently learned more family history from her mother - strong FH type 2 DM (mother, father, grandparents) as well as HTN. Also late-onset Alzheimer's dementia. Remote FH spino-cerebellar ataxia.    Concerned about possible diabetes risk due to overweight/obesity.   Frequent HA, easily cold extremities.     A1c 5.1 4/19/19    Diagnoses and all orders for this visit:    Obesity (BMI 30.0-34.9)    Family history of diabetes mellitus    Other orders  -     AJOVY 225 MG/1.5ML solution prefilled syringe; INJECT 1 INJECTION SUBCUTANEOUSLY MONTHLY FOR MIGRAINE HEADACHES  -     sertraline (ZOLOFT) 25 MG tablet    We discussed the nature of diabetes and appropriate concerns.  Thankfully she had hemoglobin A1c last year that was normal, also did well during her pregnancy and passed her glucose tolerance test.  For now I think she should continue healthy diet and exercise and we will evaluate more cautiously at her next physical.      This patient was evaluated by telephone due to current precautions with COVID-19.  Consent to telephone visit for this problem was given by the patient. I spent a total of 15 minutes on the phone with the patient.

## 2020-10-10 ENCOUNTER — DOCUMENTATION (OUTPATIENT)
Dept: SPORTS MEDICINE | Facility: CLINIC | Age: 35
End: 2020-10-10

## 2020-10-10 RX ORDER — HYOSCYAMINE SULFATE 0.12 MG/1
0.12 TABLET SUBLINGUAL EVERY 8 HOURS
Qty: 120 EACH | Refills: 0 | Status: SHIPPED | OUTPATIENT
Start: 2020-10-10 | End: 2020-10-17

## 2021-04-28 ENCOUNTER — TELEPHONE (OUTPATIENT)
Dept: SPORTS MEDICINE | Facility: CLINIC | Age: 36
End: 2021-04-28

## 2021-04-28 RX ORDER — MONTELUKAST SODIUM 10 MG/1
10 TABLET ORAL DAILY
Qty: 90 TABLET | Refills: 0 | Status: SHIPPED | OUTPATIENT
Start: 2021-04-28 | End: 2021-07-21

## 2021-04-28 NOTE — TELEPHONE ENCOUNTER
Attempted to contact patient via phone with no answer. Left a detailed voicemail for patient with Dr. Francisco's response and recommendations, voiced to return call to office with any further questions or concerns.     Thanks  Xochitl

## 2021-04-28 NOTE — TELEPHONE ENCOUNTER
I would not expect hydroxyzine to as the effect of Singulair.  Hydroxyzine is an antihistamine, but Singulair has different effects on controlling asthma.  I will send a refill of it to her pharmacy.

## 2021-04-28 NOTE — TELEPHONE ENCOUNTER
Patient called in stating her insurance changed and she will have to change pcp's, our office and providers are no longer covered.   She is currently taking Singulair prescribed by our office, and has recently started taking Hydroxyzine 25 mg for anxiety. Wants to know if she should stop taking the singulair since she has started the hydroxyzine and if the hydroxyzine will do the same thing as the singulair will. If she needs to continue the singulair, she is out of refills and is requesting we send in a new rx for her to [facility] until she can find a new pcp.     Thanks  Xochitl

## 2021-07-21 RX ORDER — MONTELUKAST SODIUM 10 MG/1
TABLET ORAL
Qty: 90 TABLET | Refills: 0 | Status: SHIPPED | OUTPATIENT
Start: 2021-07-21 | End: 2022-06-29 | Stop reason: SDUPTHER

## 2021-12-15 NOTE — TELEPHONE ENCOUNTER
A catheter was exchanged for a (Cath Storque+ Jr4 6f 100) catheter.  Called pt to let her know per request, that her paperwork was available for pickup. Lm on pt's vm. Also copied paperwork for chart.

## 2022-01-25 ENCOUNTER — TELEPHONE (OUTPATIENT)
Dept: OBSTETRICS AND GYNECOLOGY | Age: 37
End: 2022-01-25

## 2022-01-25 LAB
C TRACH RRNA SPEC DONR QL NAA+PROBE: NEGATIVE
EXTERNAL ABO GROUPING: NORMAL
EXTERNAL ANTIBODY SCREEN: NORMAL
EXTERNAL HEMATOCRIT: 37 %
EXTERNAL HEMOGLOBIN: 12.5 G/DL
EXTERNAL HEPATITIS B SURFACE ANTIGEN: NEGATIVE
EXTERNAL PLATELET COUNT: 272 K/ΜL
EXTERNAL RH FACTOR: POSITIVE
EXTERNAL SYPHILIS RPR SCREEN: NORMAL
EXTERNAL THYROID STIMULATING HORMONE: 1.55 M[IU]/ML
HBA1C MFR BLD: NORMAL %
HIV 1+2 AB+HIV1 P24 AG SERPL QL IA: NORMAL
N GONORRHOEA DNA SPEC QL NAA+PROBE: NEGATIVE
RUBV IGG SERPL IA-ACNC: NORMAL

## 2022-01-25 NOTE — TELEPHONE ENCOUNTER
Pt calls about 21/2 months pregnant, could not provide an LMP but based on that she is estimated LMP of 11/15/2021. She is wanting to transfer care from [facility] due to wanting to delivery at [facility] and not wishing to travel downtown. She has been seen at [facility] 3 times, last appointment was today. , history of 2 ectopic pregnancies, has one daughter 2 years old. Pt is having her records faxed over to us today, please advise if you are willing to accept her as a new OB

## 2022-03-07 ENCOUNTER — TELEPHONE (OUTPATIENT)
Dept: SPORTS MEDICINE | Facility: CLINIC | Age: 37
End: 2022-03-07

## 2022-03-07 NOTE — TELEPHONE ENCOUNTER
Patient called in wanting to know if she can have her CPE labs done at another physicians office within [facility] [place], has an appointment on 03/17/2022 that she states she will probably have labs done for, she is a hard stick, and wants all of her labs drawn for them and Dr. Francisco at the 03/17/2022 visit. I informed patient that would be okay and we can place the orders for her, but she will need to verify with that office that they are okay with drawing and performing another providers lab orders that is not in their office, and we would like her labs to be fasting for accurate readings for her CPE. She verbally understood this information, she will contact that office and return a call to me with an answer.    Thank you   Xochitl

## 2022-03-09 NOTE — TELEPHONE ENCOUNTER
Patient called back in to office, cofirmed that OB office is okay with doing CPE labs for us.   Orders placed for her.   All information was verbally understood.    Thank you  Xochitl

## 2022-03-17 ENCOUNTER — INITIAL PRENATAL (OUTPATIENT)
Dept: OBSTETRICS AND GYNECOLOGY | Age: 37
End: 2022-03-17

## 2022-03-17 VITALS — SYSTOLIC BLOOD PRESSURE: 122 MMHG | DIASTOLIC BLOOD PRESSURE: 66 MMHG | BODY MASS INDEX: 32.2 KG/M2 | WEIGHT: 211.8 LBS

## 2022-03-17 DIAGNOSIS — Q27.0 SINGLE UMBILICAL ARTERY: ICD-10-CM

## 2022-03-17 DIAGNOSIS — Z3A.17 17 WEEKS GESTATION OF PREGNANCY: Primary | ICD-10-CM

## 2022-03-17 DIAGNOSIS — Z13.89 SCREENING FOR HEMATURIA OR PROTEINURIA: ICD-10-CM

## 2022-03-17 DIAGNOSIS — Z13.79 ENCOUNTER FOR GENETIC SCREENING FOR BIRTH DEFECT: ICD-10-CM

## 2022-03-17 DIAGNOSIS — R93.89 ABNORMAL ULTRASOUND: ICD-10-CM

## 2022-03-17 DIAGNOSIS — Z13.220 SCREENING CHOLESTEROL LEVEL: ICD-10-CM

## 2022-03-17 DIAGNOSIS — Z11.2 ENCOUNTER FOR SCREENING FOR OTHER BACTERIAL DISEASE: ICD-10-CM

## 2022-03-17 DIAGNOSIS — O09.529 ANTEPARTUM MULTIGRAVIDA OF ADVANCED MATERNAL AGE: ICD-10-CM

## 2022-03-17 DIAGNOSIS — Z11.3 ROUTINE SCREENING FOR STI (SEXUALLY TRANSMITTED INFECTION): ICD-10-CM

## 2022-03-17 PROCEDURE — 0501F PRENATAL FLOW SHEET: CPT | Performed by: OBSTETRICS & GYNECOLOGY

## 2022-03-17 NOTE — PROGRESS NOTES
New pt to me, see written gyn  Previous  x 1  Previous ectopic x 2-  treated with methotrexate  US done today is S<D, also evidence of ONTD seen, will refer for MFM consult  Panorama today, AFP today  Recommend repeat CS

## 2022-03-18 LAB
ALBUMIN SERPL-MCNC: 4.1 G/DL (ref 3.8–4.8)
ALBUMIN/GLOB SERPL: 1.6 {RATIO} (ref 1.2–2.2)
ALP SERPL-CCNC: 51 IU/L (ref 44–121)
ALT SERPL-CCNC: 8 IU/L (ref 0–32)
AST SERPL-CCNC: 15 IU/L (ref 0–40)
BILIRUB SERPL-MCNC: 0.3 MG/DL (ref 0–1.2)
BUN SERPL-MCNC: 7 MG/DL (ref 6–20)
BUN/CREAT SERPL: 13 (ref 9–23)
CALCIUM SERPL-MCNC: 9.2 MG/DL (ref 8.7–10.2)
CHLORIDE SERPL-SCNC: 102 MMOL/L (ref 96–106)
CHOLEST SERPL-MCNC: 181 MG/DL (ref 100–199)
CO2 SERPL-SCNC: 19 MMOL/L (ref 20–29)
CREAT SERPL-MCNC: 0.54 MG/DL (ref 0.57–1)
EGFRCR SERPLBLD CKD-EPI 2021: 121 ML/MIN/1.73
GLOBULIN SER CALC-MCNC: 2.5 G/DL (ref 1.5–4.5)
GLUCOSE SERPL-MCNC: 70 MG/DL (ref 65–99)
GLUCOSE UR STRIP-MCNC: NEGATIVE MG/DL
HDLC SERPL-MCNC: 86 MG/DL
LDLC SERPL CALC-MCNC: 80 MG/DL (ref 0–99)
POTASSIUM SERPL-SCNC: 4.6 MMOL/L (ref 3.5–5.2)
PROT SERPL-MCNC: 6.6 G/DL (ref 6–8.5)
PROT UR STRIP-MCNC: NEGATIVE MG/DL
SODIUM SERPL-SCNC: 137 MMOL/L (ref 134–144)
TRIGL SERPL-MCNC: 81 MG/DL (ref 0–149)
TSH SERPL DL<=0.005 MIU/L-ACNC: 1.63 UIU/ML (ref 0.45–4.5)
VLDLC SERPL CALC-MCNC: 15 MG/DL (ref 5–40)

## 2022-03-19 ENCOUNTER — TELEPHONE (OUTPATIENT)
Dept: SURGERY | Facility: CLINIC | Age: 37
End: 2022-03-19

## 2022-03-19 LAB
AFP INTERP SERPL-IMP: NORMAL
AFP INTERP SERPL-IMP: NORMAL
AFP MOM SERPL: 2.48
AFP SERPL-MCNC: 84.9 NG/ML
AGE AT DELIVERY: 38.8 YR
BACTERIA UR CULT: NORMAL
BACTERIA UR CULT: NORMAL
C TRACH RRNA SPEC QL NAA+PROBE: NEGATIVE
GA METHOD: NORMAL
GA: 17.7 WEEKS
IDDM PATIENT QL: NO
LABORATORY COMMENT REPORT: NORMAL
MULTIPLE PREGNANCY: NO
N GONORRHOEA RRNA SPEC QL NAA+PROBE: NEGATIVE
NEURAL TUBE DEFECT RISK FETUS: 299 %
RESULT: NORMAL
T VAGINALIS RRNA SPEC QL NAA+PROBE: NEGATIVE

## 2022-03-19 NOTE — TELEPHONE ENCOUNTER
Spoke with pt  Labs normal, AFP screen negative which I consider a positive sign  Consult appt not yet made, pt to call office Monday mid day if she hasnt had appt scheduled yet.

## 2022-03-21 ENCOUNTER — OFFICE VISIT (OUTPATIENT)
Dept: SPORTS MEDICINE | Facility: CLINIC | Age: 37
End: 2022-03-21

## 2022-03-21 VITALS
OXYGEN SATURATION: 98 % | WEIGHT: 210 LBS | DIASTOLIC BLOOD PRESSURE: 72 MMHG | SYSTOLIC BLOOD PRESSURE: 118 MMHG | HEIGHT: 68 IN | TEMPERATURE: 97.5 F | BODY MASS INDEX: 31.83 KG/M2 | HEART RATE: 74 BPM | RESPIRATION RATE: 16 BRPM

## 2022-03-21 DIAGNOSIS — Z00.00 ANNUAL PHYSICAL EXAM: Primary | ICD-10-CM

## 2022-03-21 DIAGNOSIS — J45.30 MILD PERSISTENT ASTHMA WITHOUT COMPLICATION: ICD-10-CM

## 2022-03-21 PROBLEM — H01.003 BLEPHARITIS OF BOTH EYES: Status: RESOLVED | Noted: 2018-03-30 | Resolved: 2022-03-21

## 2022-03-21 PROBLEM — E05.90 SUBCLINICAL HYPERTHYROIDISM: Status: RESOLVED | Noted: 2019-04-21 | Resolved: 2022-03-21

## 2022-03-21 PROBLEM — H01.006 BLEPHARITIS OF BOTH EYES: Status: RESOLVED | Noted: 2018-03-30 | Resolved: 2022-03-21

## 2022-03-21 PROCEDURE — 99395 PREV VISIT EST AGE 18-39: CPT | Performed by: FAMILY MEDICINE

## 2022-03-21 RX ORDER — ALBUTEROL SULFATE 90 UG/1
2 AEROSOL, METERED RESPIRATORY (INHALATION) EVERY 4 HOURS PRN
Qty: 18 G | Refills: 5 | Status: SHIPPED | OUTPATIENT
Start: 2022-03-21 | End: 2022-10-28 | Stop reason: SDUPTHER

## 2022-03-21 RX ORDER — VIT C/B6/B5/MAGNESIUM/HERB 173 50-5-6-5MG
CAPSULE ORAL
COMMUNITY
End: 2022-05-23 | Stop reason: HOSPADM

## 2022-03-21 RX ORDER — FLUTICASONE PROPIONATE 50 MCG
SPRAY, SUSPENSION (ML) NASAL
COMMUNITY
Start: 2022-01-19 | End: 2022-10-28 | Stop reason: SDUPTHER

## 2022-03-21 RX ORDER — HYDROXYZINE HYDROCHLORIDE 10 MG/1
10 TABLET, FILM COATED ORAL
COMMUNITY
Start: 2022-03-07 | End: 2022-05-23 | Stop reason: HOSPADM

## 2022-03-21 NOTE — PROGRESS NOTES
"Sonam Lang is here today for an annual physical exam.     Currently 18 wks pregnant  Sore throat for a few days  Left 1st MTP pain  Chronic sinus problems - better with floanse  Anxiety/Depression symptoms stable with zoloft.   Migraines currently stable; botox held due to pregnancy    Health Maintenance   Topic Date Due   • Annual Gynecologic Pelvic and Breast Exam  Never done   • Pneumococcal Vaccine 0-64 (1 of 2 - PPSV23) Never done   • ANNUAL PHYSICAL  12/31/2020   • COVID-19 Vaccine (3 - Booster for Moderna series) 07/23/2021   • PAP SMEAR  09/20/2021   • TDAP/TD VACCINES (2 - Td or Tdap) 08/29/2029   • HEPATITIS C SCREENING  Completed   • INFLUENZA VACCINE  Completed       Review of Systems    /72 (BP Location: Left arm, Patient Position: Sitting, Cuff Size: Adult)   Pulse 74   Temp 97.5 °F (36.4 °C)   Resp 16   Ht 172.7 cm (68\")   Wt 95.3 kg (210 lb)   SpO2 98%   BMI 31.93 kg/m²      Physical Exam    Vital signs reviewed.  General appearance: No acute distress  Eyes: conjunctiva clear without erythema; pupils equally round and reactive  ENT: external ears normal; hearing normal  Neck: supple; no thyromegaly  CV: normal rate and rhythm; no peripheral edema  Respiratory: normal respiratory effort; lungs clear to auscultation bilaterally  MSK: normal gait and station; no focal joint deformity or swelling  Skin: no rash or wounds; normal turgor  Neuro: cranial nerves 2-12 grossly intact; normal sensation to light touch  Psych: mood and affect normal; recent and remote memory intact      Current Outpatient Medications:   •  albuterol sulfate  (90 Base) MCG/ACT inhaler, Inhale 2 puffs Every 4 (Four) Hours As Needed for Wheezing., Disp: 18 g, Rfl: 5  •  Prenatal Vit-Fe Fumarate-FA (PRENATAL VITAMIN PO), Take  by mouth., Disp: , Rfl:   •  B COMPLEX-C-FOLIC ACID PO, Take  by mouth., Disp: , Rfl:   •  Biotin 5000 MCG capsule, Take  by mouth., Disp: , Rfl:   •  budesonide-formoterol (SYMBICORT) " 80-4.5 MCG/ACT inhaler, Inhale 2 puffs 2 (Two) Times a Day., Disp: , Rfl:   •  butalbital-acetaminophen-caffeine (FIORICET, ESGIC) -40 MG per tablet, Take 1 tablet by mouth., Disp: , Rfl:   •  calcium citrate (CALCITRATE) 950 MG tablet, Take 1 tablet by mouth Daily., Disp: , Rfl:   •  Cholecalciferol (VITAMIN D3) 75 MCG (3000 UT) tablet, Take 5,000 each by mouth., Disp: , Rfl:   •  fluticasone (FLONASE) 50 MCG/ACT nasal spray, , Disp: , Rfl:   •  hydrOXYzine (ATARAX) 10 MG tablet, TAKE 2 TABLETS BY MOUTH AT NIGHT AS NEEDED FOR ANXIETY AND FOR INSOMNIA, Disp: , Rfl:   •  Magnesium Oxide 400 (240 Mg) MG tablet, Take  by mouth., Disp: , Rfl:   •  montelukast (SINGULAIR) 10 MG tablet, TAKE 1 TABLET BY MOUTH EVERY DAY, Disp: 90 tablet, Rfl: 0  •  Omega-3 Fatty Acids (FISH OIL) 1000 MG capsule capsule, Take  by mouth., Disp: , Rfl:   •  Pyridoxine HCl (VITAMIN B6) 200 MG tablet, Take  by mouth., Disp: , Rfl:   •  Riboflavin (VITAMIN B-2) 100 MG tablet, Take  by mouth., Disp: , Rfl:   •  sertraline (ZOLOFT) 25 MG tablet, 75 mg., Disp: , Rfl:   •  sertraline (ZOLOFT) 50 MG tablet, , Disp: , Rfl:   •  Turmeric 500 MG capsule, Take  by mouth., Disp: , Rfl:     Diagnoses and all orders for this visit:    1. Annual physical exam (Primary)    2. Mild persistent asthma without complication  -     albuterol sulfate  (90 Base) MCG/ACT inhaler; Inhale 2 puffs Every 4 (Four) Hours As Needed for Wheezing.  Dispense: 18 g; Refill: 5        Encourage healthy diet and exercise.  Encourage patient to stay up to date on screening examinations as indicated based on age and risk factors.  Recent labs reassuring.  Rec increase to full dose flonase for sore throat appears allergy mediated

## 2022-03-22 ENCOUNTER — TELEPHONE (OUTPATIENT)
Dept: OBSTETRICS AND GYNECOLOGY | Facility: CLINIC | Age: 37
End: 2022-03-22

## 2022-03-22 ENCOUNTER — TELEPHONE (OUTPATIENT)
Dept: OBSTETRICS AND GYNECOLOGY | Age: 37
End: 2022-03-22

## 2022-03-22 DIAGNOSIS — O09.522 MULTIGRAVIDA OF ADVANCED MATERNAL AGE IN SECOND TRIMESTER: Primary | ICD-10-CM

## 2022-03-22 DIAGNOSIS — Q27.0 SINGLE UMBILICAL ARTERY: ICD-10-CM

## 2022-03-22 DIAGNOSIS — R93.89 ABNORMAL ULTRASOUND: ICD-10-CM

## 2022-03-22 NOTE — TELEPHONE ENCOUNTER
Pt calls stating she has not heard from [facility] on scheduling, I do not see a referral placed to [facility], please advise if you will place this referral. PT also states she has been having a lot of fatigue lately and her PCP recommends taking an iron supplement. Pt is wanting to know if you would also recommend taking iron?

## 2022-03-22 NOTE — TELEPHONE ENCOUNTER
Let her know I do recommend she take iron, also I sent another referral for MFM, she had abnormal findings on US that are suspicious for neural tube defect.  Please tell them to get her in this week

## 2022-03-22 NOTE — TELEPHONE ENCOUNTER
Called the patient and scheduled her for tomorrow 3/23/2022 at 2:00 PM at [facility].     Went over visitor policy and location with the patient.

## 2022-03-23 ENCOUNTER — HOSPITAL ENCOUNTER (OUTPATIENT)
Dept: ULTRASOUND IMAGING | Facility: HOSPITAL | Age: 37
Discharge: HOME | End: 2022-03-23
Admitting: OBSTETRICS & GYNECOLOGY

## 2022-03-23 ENCOUNTER — TRANSCRIBE ORDERS (OUTPATIENT)
Dept: ULTRASOUND IMAGING | Facility: HOSPITAL | Age: 37
End: 2022-03-23

## 2022-03-23 ENCOUNTER — OFFICE VISIT (OUTPATIENT)
Dept: OBSTETRICS AND GYNECOLOGY | Facility: CLINIC | Age: 37
End: 2022-03-23

## 2022-03-23 VITALS
DIASTOLIC BLOOD PRESSURE: 61 MMHG | SYSTOLIC BLOOD PRESSURE: 117 MMHG | TEMPERATURE: 98.2 F | HEART RATE: 66 BPM | HEIGHT: 68 IN | WEIGHT: 213 LBS | BODY MASS INDEX: 32.28 KG/M2

## 2022-03-23 DIAGNOSIS — O35.9XX0 PREGNANCY AFFECTED BY MULTIPLE CONGENITAL ANOMALIES OF FETUS, SINGLE OR UNSPECIFIED FETUS: Primary | ICD-10-CM

## 2022-03-23 DIAGNOSIS — Z86.59 HISTORY OF DEPRESSION: ICD-10-CM

## 2022-03-23 DIAGNOSIS — R93.89 ABNORMAL ULTRASOUND: ICD-10-CM

## 2022-03-23 DIAGNOSIS — O35.8XX0 MATERNAL CARE FOR OTHER (SUSPECTED) FETAL ABNORMALITY AND DAMAGE, NOT APPLICABLE OR UNSPECIFIED: ICD-10-CM

## 2022-03-23 DIAGNOSIS — F41.9 ANXIETY: ICD-10-CM

## 2022-03-23 DIAGNOSIS — Q27.0 SINGLE UMBILICAL ARTERY: ICD-10-CM

## 2022-03-23 DIAGNOSIS — J45.30 MILD PERSISTENT ASTHMA WITHOUT COMPLICATION: Primary | ICD-10-CM

## 2022-03-23 DIAGNOSIS — O09.522 MULTIGRAVIDA OF ADVANCED MATERNAL AGE IN SECOND TRIMESTER: ICD-10-CM

## 2022-03-23 PROCEDURE — 99215 OFFICE O/P EST HI 40 MIN: CPT | Performed by: OBSTETRICS & GYNECOLOGY

## 2022-03-23 PROCEDURE — 99417 PROLNG OP E/M EACH 15 MIN: CPT | Performed by: OBSTETRICS & GYNECOLOGY

## 2022-03-23 PROCEDURE — 76811 OB US DETAILED SNGL FETUS: CPT

## 2022-03-23 PROCEDURE — 76811 OB US DETAILED SNGL FETUS: CPT | Performed by: OBSTETRICS & GYNECOLOGY
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
## 2022-03-23 NOTE — PROGRESS NOTES
Pt reports that she is doing well and denies vaginal bleeding, cramping, contractions, LOF. Reports active fetal movement. Hx reviewed and updated with patient. Next OB appointment scheduled for 4/1.

## 2022-03-24 ENCOUNTER — HOSPITAL ENCOUNTER (OUTPATIENT)
Dept: ULTRASOUND IMAGING | Facility: HOSPITAL | Age: 37
Discharge: HOME | End: 2022-03-24
Admitting: OBSTETRICS & GYNECOLOGY

## 2022-03-24 ENCOUNTER — TRANSCRIBE ORDERS (OUTPATIENT)
Dept: ULTRASOUND IMAGING | Facility: HOSPITAL | Age: 37
End: 2022-03-24

## 2022-03-24 VITALS — DIASTOLIC BLOOD PRESSURE: 61 MMHG | TEMPERATURE: 97.8 F | SYSTOLIC BLOOD PRESSURE: 116 MMHG | HEART RATE: 66 BPM

## 2022-03-24 DIAGNOSIS — O35.9XX0 PREGNANCY AFFECTED BY MULTIPLE CONGENITAL ANOMALIES OF FETUS, SINGLE OR UNSPECIFIED FETUS: ICD-10-CM

## 2022-03-24 DIAGNOSIS — O35.9XX0 PREGNANCY AFFECTED BY MULTIPLE CONGENITAL ANOMALIES OF FETUS, SINGLE OR UNSPECIFIED FETUS: Primary | ICD-10-CM

## 2022-03-24 PROCEDURE — 76946 ECHO GUIDE FOR AMNIOCENTESIS: CPT | Performed by: OBSTETRICS & GYNECOLOGY

## 2022-03-24 PROCEDURE — 59000 AMNIOCENTESIS DIAGNOSTIC: CPT | Performed by: OBSTETRICS & GYNECOLOGY

## 2022-03-24 PROCEDURE — 76946 ECHO GUIDE FOR AMNIOCENTESIS: CPT

## 2022-03-24 PROCEDURE — 59000 AMNIOCENTESIS DIAGNOSTIC: CPT

## 2022-03-25 ENCOUNTER — TELEPHONE (OUTPATIENT)
Dept: OBSTETRICS AND GYNECOLOGY | Age: 37
End: 2022-03-25

## 2022-03-25 NOTE — TELEPHONE ENCOUNTER
Spoke with pt  T18 high risk panorama  amnio done yesterday per pt report  Keep appt 4/1 to discuss further

## 2022-04-01 ENCOUNTER — ROUTINE PRENATAL (OUTPATIENT)
Dept: OBSTETRICS AND GYNECOLOGY | Age: 37
End: 2022-04-01

## 2022-04-01 VITALS — WEIGHT: 212.2 LBS | BODY MASS INDEX: 32.26 KG/M2 | DIASTOLIC BLOOD PRESSURE: 62 MMHG | SYSTOLIC BLOOD PRESSURE: 114 MMHG

## 2022-04-01 DIAGNOSIS — Z3A.19 19 WEEKS GESTATION OF PREGNANCY: Primary | ICD-10-CM

## 2022-04-01 DIAGNOSIS — Z13.89 SCREENING FOR HEMATURIA OR PROTEINURIA: ICD-10-CM

## 2022-04-01 LAB
GLUCOSE UR STRIP-MCNC: NEGATIVE MG/DL
PROT UR STRIP-MCNC: NEGATIVE MG/DL

## 2022-04-01 PROCEDURE — 0502F SUBSEQUENT PRENATAL CARE: CPT | Performed by: OBSTETRICS & GYNECOLOGY

## 2022-04-01 RX ORDER — FERROUS SULFATE 325(65) MG
325 TABLET ORAL
COMMUNITY
End: 2022-08-10 | Stop reason: ALTCHOICE

## 2022-04-01 NOTE — PROGRESS NOTES
Pt feeling well, no issues  Discussed panorama result high risk T18  amnio results not in yet, MFM consult 4/7  Reviewed different scenarios with pt, desires to carry the baby if T18, not interested in termination  Follow up 2 weeks for reevaluation after results are in.

## 2022-04-07 ENCOUNTER — OFFICE VISIT (OUTPATIENT)
Dept: OBSTETRICS AND GYNECOLOGY | Facility: CLINIC | Age: 37
End: 2022-04-07

## 2022-04-07 ENCOUNTER — HOSPITAL ENCOUNTER (OUTPATIENT)
Dept: ULTRASOUND IMAGING | Facility: HOSPITAL | Age: 37
Discharge: HOME | End: 2022-04-07
Admitting: OBSTETRICS & GYNECOLOGY

## 2022-04-07 VITALS
TEMPERATURE: 97.8 F | SYSTOLIC BLOOD PRESSURE: 106 MMHG | HEIGHT: 68 IN | BODY MASS INDEX: 32.61 KG/M2 | DIASTOLIC BLOOD PRESSURE: 64 MMHG | HEART RATE: 60 BPM | WEIGHT: 215.2 LBS

## 2022-04-07 DIAGNOSIS — O35.9XX0 PREGNANCY AFFECTED BY MULTIPLE CONGENITAL ANOMALIES OF FETUS, SINGLE OR UNSPECIFIED FETUS: ICD-10-CM

## 2022-04-07 DIAGNOSIS — O35.8XX0 MATERNAL CARE FOR OTHER (SUSPECTED) FETAL ABNORMALITY AND DAMAGE, NOT APPLICABLE OR UNSPECIFIED: Primary | ICD-10-CM

## 2022-04-07 DIAGNOSIS — O35.12X0 TRISOMY 18 OF FETUS IN CURRENT PREGNANCY, SINGLE OR UNSPECIFIED FETUS: ICD-10-CM

## 2022-04-07 PROCEDURE — 99215 OFFICE O/P EST HI 40 MIN: CPT | Performed by: OBSTETRICS & GYNECOLOGY

## 2022-04-07 PROCEDURE — 76815 OB US LIMITED FETUS(S): CPT

## 2022-04-07 PROCEDURE — 99417 PROLNG OP E/M EACH 15 MIN: CPT | Performed by: OBSTETRICS & GYNECOLOGY

## 2022-04-07 PROCEDURE — 76815 OB US LIMITED FETUS(S): CPT | Performed by: OBSTETRICS & GYNECOLOGY

## 2022-04-07 NOTE — PROGRESS NOTES
Consult general  Sonam Lang is a 36 y.o.  @20w5d weeks who presents to review results of amniocentesis    Denies nausea or vomiting  Denies abdominal pain/cramping/tenderness/contractions  Denies vaginal bleeding or leaking of fluid      Patient Active Problem List   Diagnosis   • Migraine without aura and without status migrainosus, not intractable   • Allergy to NSAIDs   • Asthma, mild persistent   • Endometriosis   • Hirsutism   • History of depression   • Obesity (BMI 30.0-34.9)   • Sleep apnea   • Anxiety   • Maternal care for other (suspected) fetal abnormality and damage, not applicable or unspecified       OB History    Para Term  AB Living   4 1 1   2 1   SAB IAB Ectopic Molar Multiple Live Births       2     1      # Outcome Date GA Lbr Hernando/2nd Weight Sex Delivery Anes PTL Lv   4 Current            3 Ectopic      ECTOPIC      2 Ectopic 2020     ECTOPIC      1 Term 2019 38w0d   F [id number] EPI  EL      Complications: Failure to Progress in First Stage, Oligohydramnios      has a past medical history of ADHD (attention deficit hyperactivity disorder), Allergic, Anxiety, Asthma, Depression, GERD (gastroesophageal reflux disease), Hirsutism (2019), Irritable bowel syndrome, Knee sprain, Migraine, PTSD (post-traumatic stress disorder), Sleep apnea (), and Subclinical hyperthyroidism (2019).   has a past surgical history that includes  section (); Sinus surgery; [place] tooth extraction; and Colonoscopy ().    Current Outpatient Medications:   •  albuterol sulfate  (90 Base) MCG/ACT inhaler, Inhale 2 puffs Every 4 (Four) Hours As Needed for Wheezing., Disp: 18 g, Rfl: 5  •  B COMPLEX-C-FOLIC ACID PO, Take  by mouth., Disp: , Rfl:   •  Biotin 5000 MCG capsule, Take  by mouth., Disp: , Rfl:   •  budesonide-formoterol (SYMBICORT) 80-4.5 MCG/ACT inhaler, Inhale 2 puffs 2 (Two) Times a Day., Disp: , Rfl:   •  calcium citrate (CALCITRATE) 950 MG  "tablet, Take 1 tablet by mouth Daily., Disp: , Rfl:   •  Cholecalciferol (VITAMIN D3) 75 MCG (3000 UT) tablet, Take 5,000 each by mouth., Disp: , Rfl:   •  ferrous sulfate 325 (65 FE) MG tablet, Take 325 mg by mouth Daily With Breakfast., Disp: , Rfl:   •  fluticasone (FLONASE) 50 MCG/ACT nasal spray, , Disp: , Rfl:   •  hydrOXYzine (ATARAX) 10 MG tablet, 10 mg., Disp: , Rfl:   •  Magnesium Oxide 400 (240 Mg) MG tablet, Take  by mouth., Disp: , Rfl:   •  montelukast (SINGULAIR) 10 MG tablet, TAKE 1 TABLET BY MOUTH EVERY DAY, Disp: 90 tablet, Rfl: 0  •  Omega-3 Fatty Acids (FISH OIL) 1000 MG capsule capsule, Take  by mouth., Disp: , Rfl:   •  Prenatal Vit-Fe Fumarate-FA (PRENATAL VITAMIN PO), Take  by mouth., Disp: , Rfl:   •  Pyridoxine HCl (VITAMIN B6) 200 MG tablet, Take  by mouth., Disp: , Rfl:   •  Riboflavin (VITAMIN B-2) 100 MG tablet, Take  by mouth., Disp: , Rfl:   •  sertraline (ZOLOFT) 50 MG tablet, 100 mg., Disp: , Rfl:   •  Turmeric 500 MG capsule, Take  by mouth., Disp: , Rfl:   Allergies   Allergen Reactions   • Bactrim [Sulfamethoxazole-Trimethoprim] Hives, Itching and Swelling   • Cefdinir Hives, Itching and Swelling     FELT THROAT START TO SWELL   • Buspirone Unknown - Low Severity     migraines   • Contrast Dye Other (See Comments)     \"felt like insides were on fire\"   • Nsaids Itching and Swelling   • Eggs Or Egg-Derived Products Nausea Only   • Milk-Related Compounds Other (See Comments)     Upset stomach     family history includes Alzheimer's disease in her maternal grandfather; Dementia in her paternal grandmother; Diabetes in her mother, paternal grandfather, and paternal grandmother; Frontotemporal dementia in her paternal grandmother; Hypertension in her father, maternal grandmother, and mother; Migraines in her maternal grandmother, mother, and paternal grandmother; Stroke in her paternal grandmother.  Social History     Tobacco Use   • Smoking status: Former Smoker     Years: 10.00     " "Quit date:      Years since quitting: 10.2   • Smokeless tobacco: Never Used   Vaping Use   • Vaping Use: Never used   Substance Use Topics   • Alcohol use: Not Currently   • Drug use: No     REVIEW OF SYSTEMS  Constitutional:  No Weight Change, No Fever, No Chills, No Fatigue, No Malaise  ENT/Mouth:  No Hearing Changes, No Sinus Pain, No Hoarseness, No sore throat  Eyes:  No Eye Pain, No Vision Changes  Cardiovascular:  No Chest Pain, No SOB, No Palpitations  Respiratory:  No Cough, No Wheezing, No Dyspnea  Gastrointestinal:  No Nausea, No Vomiting, No Diarrhea, No Constipation, No Pain   Genitourinary:   No Dysuria, No Urinary Frequency, No Hematuria, No Flank Pain  Musculoskeletal:  No Arthralgias, No Myalgias  Skin:  No Skin Lesions, No Pruritis  Neuro:  No Weakness, No Numbness, No Loss of Consciousness, No Syncope  Psych:  No Memory Changes, No Violence/Abuse Hx., No Eating Concerns  Heme/Lymph:  No Bruising, No Bleeding  Endocrine:   No Temperature Intolerance    PHYSICAL EXAM:  /64 (BP Location: Right arm, Patient Position: Sitting)   Pulse 60   Temp 97.8 °F (36.6 °C)   Ht 172.7 cm (68\")   Wt 97.6 kg (215 lb 3.2 oz)   BMI 32.72 kg/m²     General: pleasant, alert and oriented  Abdomen: soft, non tender, gravid  Extremites: bilat lower extremities soft, non tender, no edema noted    Assessment: Karyotype consistent with trisomy 21, no additional abnormalities found on microarray  Discussed trisomy 18 at length with the patient who was alone.  Explained the poor prognosis and likely  death.  Answered questions    Recommendations: Consult Gita Barnard, Bereavement Coordinator  Patient to return to discuss when her  can be present next week  Fetal echocardiography at 26 weeks      Thank you for referring Sonam Chavesczyk for a Maternal Fetal Consult. If we can be of any further assistance to you, please do not hesitate to contact us.  Total consult time minutes with greater than 50% " spent in counseling and coordination of care.        Again thank you for requesting a MFM consult.  If we can be of any further assistance to you, please do not hesitate to contact us.    Jodie Gordon MD  MATERNAL FETAL MEDICINE              VISIT SYNOPSIS:    ASSESSMENT/PLAN:  MS. Sonam Lang is a 36 y.o.  at 20w5d with the following visit diagnoses    Diagnoses and all orders for this visit:    1. Maternal care for other (suspected) fetal abnormality and damage, not applicable or unspecified (Primary)  Overview:  SUA, meningocele, club foot, decreased HC and BPD, possible absent fetal kidney      2. Trisomy 18 of fetus in current pregnancy, single or unspecified fetus        The above diagnoses have been evaluated and determined to be stable    This note has been routed to Dr. El     At the end of this consultation all patient questions were answered, concerns addressed, and comprehensive management plan and follow up reviewed with patient.      I spent 120 minutes caring for Sonam on this date of service. This time includes time spent by me in the following activities: reviewing tests, obtaining and/or reviewing a separately obtained history, performing a medically appropriate examination and/or evaluation, counseling and educating the patient/family/caregiver, referring and communicating with other health care professionals, independently interpreting results and communicating that information with the patient/family/caregiver and care coordination with greater than 50% spent in counseling and coordination of care.

## 2022-04-07 NOTE — PROGRESS NOTES
Pt reports that she is doing well and denies vaginal bleeding, cramping, contractions, LOF. Reports active fetal movement. Amniocentesis results on chart for Dr. Gordon to review with patient. Next appointment with OB scheduled for 4/19.

## 2022-04-11 ENCOUNTER — TELEPHONE (OUTPATIENT)
Dept: OBSTETRICS AND GYNECOLOGY | Facility: CLINIC | Age: 37
End: 2022-04-11

## 2022-04-11 NOTE — TELEPHONE ENCOUNTER
Called the patient several times, voice mailbox is full. I left vm on her spouse phone asking for a call back. We have the patient scheduled in our office for this Wednesday for a 4:30 pm appt time due to her husbands work schedule. This appt is to discuss with neonatology and the  care coordinator.     Will call again later today and tomorrow.

## 2022-04-11 NOTE — TELEPHONE ENCOUNTER
Patient called back and confirmed Wednesday appt time. She will go over details with her . She will arrive at 4:00 pm for a limited ultrasound and her  will most likely meet her here at 4:30 pm to meet with Neonatology and Gita Barnard.

## 2022-04-13 ENCOUNTER — OFFICE VISIT (OUTPATIENT)
Dept: OBSTETRICS AND GYNECOLOGY | Facility: CLINIC | Age: 37
End: 2022-04-13

## 2022-04-13 ENCOUNTER — HOSPITAL ENCOUNTER (OUTPATIENT)
Dept: ULTRASOUND IMAGING | Facility: HOSPITAL | Age: 37
Discharge: HOME | End: 2022-04-13
Admitting: OBSTETRICS & GYNECOLOGY

## 2022-04-13 ENCOUNTER — TRANSCRIBE ORDERS (OUTPATIENT)
Dept: ULTRASOUND IMAGING | Facility: HOSPITAL | Age: 37
End: 2022-04-13

## 2022-04-13 VITALS
HEART RATE: 68 BPM | WEIGHT: 217.8 LBS | BODY MASS INDEX: 33.12 KG/M2 | DIASTOLIC BLOOD PRESSURE: 51 MMHG | SYSTOLIC BLOOD PRESSURE: 106 MMHG | TEMPERATURE: 98.2 F

## 2022-04-13 DIAGNOSIS — O35.8XX0 MATERNAL CARE FOR OTHER (SUSPECTED) FETAL ABNORMALITY AND DAMAGE, NOT APPLICABLE OR UNSPECIFIED: ICD-10-CM

## 2022-04-13 DIAGNOSIS — O35.12X0 TRISOMY 18 OF FETUS IN CURRENT PREGNANCY, SINGLE OR UNSPECIFIED FETUS: Primary | ICD-10-CM

## 2022-04-13 DIAGNOSIS — O35.8XX0 MATERNAL CARE FOR OTHER (SUSPECTED) FETAL ABNORMALITY AND DAMAGE, NOT APPLICABLE OR UNSPECIFIED: Primary | ICD-10-CM

## 2022-04-13 PROCEDURE — 76815 OB US LIMITED FETUS(S): CPT | Performed by: OBSTETRICS & GYNECOLOGY

## 2022-04-13 PROCEDURE — 76815 OB US LIMITED FETUS(S): CPT

## 2022-04-13 PROCEDURE — 99213 OFFICE O/P EST LOW 20 MIN: CPT | Performed by: OBSTETRICS & GYNECOLOGY

## 2022-04-13 NOTE — PROGRESS NOTES
Sonam Lang is a 36 y.o. female,  No LMP recorded. Patient is pregnant. EDC of 2022, by Ultrasound now 21w4d weeks seen for F/U as requested by Megan El MD secondary to:    1) Abnormal fetal karyotype on amniocentesis - Trisomy 18; AF-AFP - 5.01 MOM/+acetylcholinesterase     Genetic counseling was then performed.  The association of Trisomy 18 with mental retardation, various birth defects and very poor prognosis of this abnormality was explained.      Trisomy 18 is the second most common autosomal trisomy observed in live births (1 in 5500 live births)   As with trisomy 21, there is a relationship between advanced maternal age and the occurrence of trisomy 18 in offspring due to meiotic nondisjunction. There is a 3:1 female-to-male ratio among affected infants.  A high percentage of pregnancies end in miscarriage or stillbirth (85-90%). Of those pregnancies surviving into the third trimester, nearly 40% of babies diagnosed with Ramirez syndrome (trisomy 18) don’t survive labor, and nearly one-third of the surviving babies deliver . The average lifespan of those babies born with Trisomy 18 is 3 days to 2 weeks  60-70% survive for 24 hours, 20-60% for 7 days 22-44% for 1 month, 9-18% for 6 months and 5-10% past 1 year,    Vitals:    22 1612   BP: 106/51   Pulse: 68   Temp: 98.2 °F (36.8 °C)       Ultrasound images and findings reviewed. Multiple anomalies are again seen: meningocele, SUA, unilateral renal agenesis, club foot, lemon shaped calvarium..   Fetal cardiac activity is present.  Amniotic fluid volume is normal.     A multi-discipline conference was then convened which included Neonatology, MFM, Grief counselors and Hospice care. Trisomy 18 was then discussed in detail with Sonam and her .      ASSESSMENT:     1) Fetus with Trisomy 18    Questions answered.    Recommendations:    1) Follow-up in 2 weeks  2) Fetal ECHO in 2 weeks    Total time spent TODAY on this  encounter, including pre-visit review of separately obtained history, face-to-face interaction performing medically appropriate physical exam, patient counseling/education, interpretation of diagnostic results, care coordination and documentation was 25 minutes.      Thank you for utilizing our ultrasound and consultative services.  If I may be of any further service, please do not hesitate to contact me.    Sincerely,    Jesus Moeller MD  Maternal-Fetal Medicine

## 2022-04-25 ENCOUNTER — ROUTINE PRENATAL (OUTPATIENT)
Dept: OBSTETRICS AND GYNECOLOGY | Age: 37
End: 2022-04-25

## 2022-04-25 VITALS — DIASTOLIC BLOOD PRESSURE: 72 MMHG | BODY MASS INDEX: 33.91 KG/M2 | WEIGHT: 223 LBS | SYSTOLIC BLOOD PRESSURE: 128 MMHG

## 2022-04-25 DIAGNOSIS — Z13.89 SCREENING FOR HEMATURIA OR PROTEINURIA: ICD-10-CM

## 2022-04-25 DIAGNOSIS — O35.12X0 TRISOMY 18 OF FETUS IN CURRENT PREGNANCY, SINGLE OR UNSPECIFIED FETUS: ICD-10-CM

## 2022-04-25 DIAGNOSIS — O35.8XX0 MATERNAL CARE FOR OTHER (SUSPECTED) FETAL ABNORMALITY AND DAMAGE, NOT APPLICABLE OR UNSPECIFIED: ICD-10-CM

## 2022-04-25 DIAGNOSIS — Z3A.23 23 WEEKS GESTATION OF PREGNANCY: Primary | ICD-10-CM

## 2022-04-25 LAB
GLUCOSE UR STRIP-MCNC: NEGATIVE MG/DL
PROT UR STRIP-MCNC: NEGATIVE MG/DL

## 2022-04-25 PROCEDURE — 0502F SUBSEQUENT PRENATAL CARE: CPT | Performed by: OBSTETRICS & GYNECOLOGY

## 2022-04-25 NOTE — PROGRESS NOTES
Pt feels well, no issues  1 hr GCT at fu  Reviewed reports, T18 on amnio, has had Dionisio consult, echo scheduled soon  Pt reports trying to keep positive outlook and hoping for a miracle but has good understanding of prognosis.  Reviewed all questions and offered support to her for however she desires to proceed , plans no aggressive resuscitation

## 2022-05-02 ENCOUNTER — TRANSCRIBE ORDERS (OUTPATIENT)
Dept: ULTRASOUND IMAGING | Facility: HOSPITAL | Age: 37
End: 2022-05-02

## 2022-05-02 DIAGNOSIS — O35.12X0 TRISOMY 18 OF FETUS IN CURRENT PREGNANCY, SINGLE OR UNSPECIFIED FETUS: Primary | ICD-10-CM

## 2022-05-03 ENCOUNTER — TRANSCRIBE ORDERS (OUTPATIENT)
Dept: ULTRASOUND IMAGING | Facility: HOSPITAL | Age: 37
End: 2022-05-03

## 2022-05-03 ENCOUNTER — HOSPITAL ENCOUNTER (OUTPATIENT)
Dept: ULTRASOUND IMAGING | Facility: HOSPITAL | Age: 37
Discharge: HOME | End: 2022-05-03
Admitting: OBSTETRICS & GYNECOLOGY

## 2022-05-03 DIAGNOSIS — O35.12X0 TRISOMY 18 OF FETUS IN CURRENT PREGNANCY, SINGLE OR UNSPECIFIED FETUS: ICD-10-CM

## 2022-05-03 DIAGNOSIS — O35.12X0 TRISOMY 18 OF FETUS IN CURRENT PREGNANCY, SINGLE OR UNSPECIFIED FETUS: Primary | ICD-10-CM

## 2022-05-03 PROCEDURE — 93325 DOPPLER ECHO COLOR FLOW MAPG: CPT

## 2022-05-03 PROCEDURE — 76827 ECHO EXAM OF FETAL HEART: CPT

## 2022-05-03 PROCEDURE — 76825 ECHO EXAM OF FETAL HEART: CPT

## 2022-05-03 NOTE — PROGRESS NOTES
Fetal Cardiology Outpatient Consult  Note    Patient Name:Sonam Lang  :1985  Medical Record Number:[id number]  Date of Service:5/3/2022  Requesting Obstetrician: Dr. Moeller [facility] [place]  Primary Obstetrician: Dr. Megan El  Consult Location: [facility]    Reason for Visit:  Chromosomal abnormality in the fetus    History: I had the pleasure of seeing your patient, Sonam Lang, today for a Fetal Cardiology Initial Consultation.  Fetal cardiology evaluation was requested due to increased risk of Trisomy 18 and multiple congenital anomalies in the fetus.      Sonam is a 36 y.o. woman who presents today at 24 weeks gestation, with a given due date of 2022.   Prenatal imaging has demonstrated multiple congenital anomalies including appearance of the fetal head.  NIPT was performed and was positive for Trisomy 18.    OB History        4    Para   1    Term   1            AB   2    Living   1       SAB        IAB        Ectopic   2    Molar        Multiple        Live Births   1                Past Medical History:  Past Medical History:   Diagnosis Date   • ADHD (attention deficit hyperactivity disorder)    • Allergic    • Anxiety    • Asthma    • Depression    • GERD (gastroesophageal reflux disease)    • Hirsutism 2019   • Irritable bowel syndrome    • Knee sprain    • Migraine    • PTSD (post-traumatic stress disorder)    • Sleep apnea 2016    CPAP   • Subclinical hyperthyroidism 2019       Current Medications:    Current Outpatient Medications:   •  albuterol sulfate  (90 Base) MCG/ACT inhaler, Inhale 2 puffs Every 4 (Four) Hours As Needed for Wheezing., Disp: 18 g, Rfl: 5  •  B COMPLEX-C-FOLIC ACID PO, Take  by mouth., Disp: , Rfl:   •  Biotin 5000 MCG capsule, Take  by mouth., Disp: , Rfl:   •  budesonide-formoterol (SYMBICORT) 80-4.5 MCG/ACT inhaler, Inhale 2 puffs 2 (Two) Times a Day., Disp: , Rfl:   •  calcium  citrate (CALCITRATE) 950 MG tablet, Take 1 tablet by mouth Daily., Disp: , Rfl:   •  Cholecalciferol (VITAMIN D3) 75 MCG (3000 UT) tablet, Take 5,000 each by mouth., Disp: , Rfl:   •  ferrous sulfate 325 (65 FE) MG tablet, Take 325 mg by mouth Daily With Breakfast., Disp: , Rfl:   •  fluticasone (FLONASE) 50 MCG/ACT nasal spray, , Disp: , Rfl:   •  hydrOXYzine (ATARAX) 10 MG tablet, 10 mg., Disp: , Rfl:   •  Magnesium Oxide 400 (240 Mg) MG tablet, Take  by mouth., Disp: , Rfl:   •  montelukast (SINGULAIR) 10 MG tablet, TAKE 1 TABLET BY MOUTH EVERY DAY, Disp: 90 tablet, Rfl: 0  •  Omega-3 Fatty Acids (FISH OIL) 1000 MG capsule capsule, Take  by mouth., Disp: , Rfl:   •  Prenatal Vit-Fe Fumarate-FA (PRENATAL VITAMIN PO), Take  by mouth., Disp: , Rfl:   •  Pyridoxine HCl (VITAMIN B6) 200 MG tablet, Take  by mouth., Disp: , Rfl:   •  Riboflavin (VITAMIN B-2) 100 MG tablet, Take  by mouth., Disp: , Rfl:   •  sertraline (ZOLOFT) 50 MG tablet, 100 mg., Disp: , Rfl:   •  Turmeric 500 MG capsule, Take  by mouth., Disp: , Rfl:     Family History:  Family History   Problem Relation Age of Onset   • Hypertension Mother    • Diabetes Mother    • Migraines Mother    • Hypertension Father    • Hypertension Maternal Grandmother    • Migraines Maternal Grandmother    • Alzheimer's disease Maternal Grandfather    • Diabetes Paternal Grandmother    • Migraines Paternal Grandmother    • Dementia Paternal Grandmother    • Stroke Paternal Grandmother    • Frontotemporal dementia Paternal Grandmother    • Diabetes Paternal Grandfather      There is no known family history of congenital heart disease or genetic abnormalities or early childhood death.    Imaging: A complete 2-D, color, and spectral doppler fetal echocardiogram was performed.  A full report is available separately.  In summary, today's findings show the following:     - Small membranous VSD in the fetus.  - Otherwise normal fetal cardiac anatomy and function at 24 weeks  gestation.    A complete, detailed fetal echocardiogram can identify most major heart defects.  However, there are known limitations to this examination including a limited ability to diagnose some small atrial or ventricular septal defects, minor valve abnormalities, persistent patency of the ductus arteriosus, coarctation of the aorta, and abnormalities in small structures such as coronary arteries and pulmonary veins.    Discussion:   Findings were explained to patient and and her family.  The echo display screens in our examination room   were used.  Questions were answered at length and patient expressed understanding.  I explained the normal fetal circulation, today's fetal echocardiogram findings, the expected course of pregnancy, the impact of today's results on the location and mode of delivery and the expected  course.     Impression: In summary, today's evaluation found the followin) Small membranous VSD in the fetus.  2) Increased risk of Trisomy 18 in the fetus.  3) Multiple congenital anomalies.  4) 24 weeks gestation    Recommendations:   1. There is no evidence of a ductal dependent fetal cardiac lesion.  I do not anticipate the need for immediate initiation of prostaglandin therapy and pediatric cardiology evaluation after birth.  2. There is no fetal cardiac indication to delivery at a hospital which provides specialized pediatric cardiac care.  Sonam is currently planning to delivery at [facility], which is appropriate from the standpoint of the fetal heart and circulation.  3. There is no increased fetal cardiac risk from a trial of labor and vaginal or cesearian delivery based on today's fetal cardiac findings.  4. Based on the results of today's examination, no further fetal echocardiograms are recommended during pregnancy.  5. Presentation of clinical data at [facility] Fetal Care Conference planned.  6. [place] team may consider echocardiogram  based on overall clinical assessment and plan  7. I would be happy to see Sonam again during pregnancy for any changes, problems, or concerns that may arise.  Please do not hesitate to call with any questions you may have.    Thank you for allowing me to share with you in the care of your patient.    I personally spent 60 minutes of direct provider time for the visit today, including review of prior OB and MFM medical records, face-to-face discussion and review of fetal cardiac images in the examination room, and charting.     Raghavendra Cruz MD  [facility]  Pediatric Cardiology  [phone]    5/3/2022  17:10 EDT

## 2022-05-04 ENCOUNTER — HOSPITAL ENCOUNTER (OUTPATIENT)
Dept: ULTRASOUND IMAGING | Facility: HOSPITAL | Age: 37
Discharge: HOME | End: 2022-05-04

## 2022-05-04 ENCOUNTER — OFFICE VISIT (OUTPATIENT)
Dept: OBSTETRICS AND GYNECOLOGY | Facility: CLINIC | Age: 37
End: 2022-05-04

## 2022-05-04 VITALS
DIASTOLIC BLOOD PRESSURE: 65 MMHG | HEART RATE: 64 BPM | TEMPERATURE: 98 F | SYSTOLIC BLOOD PRESSURE: 111 MMHG | BODY MASS INDEX: 34.18 KG/M2 | WEIGHT: 224.8 LBS

## 2022-05-04 DIAGNOSIS — O36.5920 POOR FETAL GROWTH AFFECTING MANAGEMENT OF MOTHER IN SECOND TRIMESTER, SINGLE OR UNSPECIFIED FETUS: ICD-10-CM

## 2022-05-04 DIAGNOSIS — O09.899 SINGLE UMBILICAL ARTERY, MATERNAL, ANTEPARTUM: ICD-10-CM

## 2022-05-04 DIAGNOSIS — O35.9XX0 PREGNANCY COMPLICATED BY MULTIPLE FETAL CONGENITAL ANOMALIES, SINGLE GESTATION: ICD-10-CM

## 2022-05-04 DIAGNOSIS — O35.12X0 TRISOMY 18 OF FETUS IN CURRENT PREGNANCY, SINGLE OR UNSPECIFIED FETUS: Primary | ICD-10-CM

## 2022-05-04 DIAGNOSIS — O35.8XX0 MATERNAL CARE FOR OTHER (SUSPECTED) FETAL ABNORMALITY AND DAMAGE, NOT APPLICABLE OR UNSPECIFIED: ICD-10-CM

## 2022-05-04 DIAGNOSIS — O35.12X0 TRISOMY 18 OF FETUS IN CURRENT PREGNANCY, SINGLE OR UNSPECIFIED FETUS: ICD-10-CM

## 2022-05-04 NOTE — PROGRESS NOTES
Sonam Lang is a 36 y.o.  female,  LMP 21 BETO of 2022, by Ultrasound now 24w4d weeks seen for F/U as requested by Megan El MD secondary to:    1) Trisomy 18  2) Multiple fetal anomalies    22 - Fetal ECHO - small membranous VSD      Vitals:    22 1534   BP: 111/65   Pulse: 64   Temp: 98 °F (36.7 °C)       Ultrasound images and findings reviewed with patient.  IUGR is identified (EFW - 9%/AC - 2%). Multiple fetal anomalies present.  Fetus is known to have Trisomy 18. JOELLE is normal.    Reviewed with patient the rationale behind U/S every 4 weeks for viability as well as potential complications of an IUFD maintained in utero for greater than 4 weeks.  IUGR identified with decreased fetal growth in head, abdomen and femur at this point increases the possibility of IUFD.  Patient was given the option of F/U ultrasound with Provider or M.  Due to financial concerns, patient will F/U with Provider.  She has been encouraged to call us for any concerns.    ASSESSMENT:      1) Trisomy 18  2) Small membranous VSD  3) IUGR  4) Multiple fetal anomalies    Questions answered.    Recommendations:1) Serial U/S every 4 weeks for fetal viability to be done by Provider    Total time spent TODAY on this encounter, including pre-visit review of separately obtained history, face-to-face interaction performing medically appropriate physical exam, patient counseling/education, interpretation of diagnostic results, care coordination and documentation.  No charge.      Thank you for utilizing our ultrasound and consultative services.  If I may be of any further service, please do not hesitate to contact me.    Sincerely,    Jesus Moeller MD  Maternal-Fetal Medicine

## 2022-05-04 NOTE — PROGRESS NOTES
Pt reports that she is doing well and denies vaginal bleeding or LOF. Reports occasional cramping.  Reports active fetal movement. Discussed delivery POC. Sonam mentioned how she desires kangaroo care with baby and does not want intubation. Notes that she is trying to keep a positive outlook and hoping for a miracle at this time. Next OB appointment is scheduled for 5/27.     Vitals:    05/04/22 1534   BP: 111/65   Pulse: 64   Temp: 98 °F (36.7 °C)

## 2022-05-22 ENCOUNTER — HOSPITAL ENCOUNTER (INPATIENT)
Facility: HOSPITAL | Age: 37
LOS: 1 days | Discharge: HOME | End: 2022-05-23
Attending: OBSTETRICS & GYNECOLOGY | Admitting: OBSTETRICS & GYNECOLOGY

## 2022-05-22 DIAGNOSIS — O34.219 VBAC, DELIVERED: Primary | ICD-10-CM

## 2022-05-22 PROBLEM — Z34.90 PREGNANCY: Status: ACTIVE | Noted: 2022-05-22

## 2022-05-22 PROBLEM — L68.0 HIRSUTISM: Status: RESOLVED | Noted: 2019-02-21 | Resolved: 2022-05-22

## 2022-05-22 PROBLEM — O09.529 AMA (ADVANCED MATERNAL AGE) MULTIGRAVIDA 35+: Status: ACTIVE | Noted: 2022-05-22

## 2022-05-22 PROBLEM — Z86.59 HISTORY OF DEPRESSION: Status: RESOLVED | Noted: 2019-02-21 | Resolved: 2022-05-22

## 2022-05-22 PROBLEM — N80.9 ENDOMETRIOSIS: Status: RESOLVED | Noted: 2019-02-13 | Resolved: 2022-05-22

## 2022-05-22 PROBLEM — Z98.891 PREVIOUS CESAREAN SECTION: Status: ACTIVE | Noted: 2022-05-22

## 2022-05-22 PROBLEM — Q89.9 CONGENITAL ABNORMALITIES: Status: ACTIVE | Noted: 2022-05-22

## 2022-05-22 LAB
A1 MICROGLOB PLACENTAL VAG QL: NEGATIVE
ABO GROUP BLD: NORMAL
ALBUMIN SERPL-MCNC: 3.8 G/DL (ref 3.5–5.2)
ALBUMIN/GLOB SERPL: 1.7 G/DL
ALP SERPL-CCNC: 57 U/L (ref 39–117)
ALT SERPL W P-5'-P-CCNC: 16 U/L (ref 1–33)
ANION GAP SERPL CALCULATED.3IONS-SCNC: 12.6 MMOL/L (ref 5–15)
AST SERPL-CCNC: 16 U/L (ref 1–32)
BASOPHILS # BLD AUTO: 0.03 10*3/MM3 (ref 0–0.2)
BASOPHILS NFR BLD AUTO: 0.2 % (ref 0–1.5)
BILIRUB SERPL-MCNC: <0.2 MG/DL (ref 0–1.2)
BILIRUB UR QL STRIP: NEGATIVE
BLD GP AB SCN SERPL QL: NEGATIVE
BLOODY SPECIMEN?: ABNORMAL
BUN SERPL-MCNC: 7 MG/DL (ref 6–20)
BUN/CREAT SERPL: 14.6 (ref 7–25)
CALCIUM SPEC-SCNC: 8.8 MG/DL (ref 8.6–10.5)
CHLORIDE SERPL-SCNC: 104 MMOL/L (ref 98–107)
CLARITY UR: CLEAR
CO2 SERPL-SCNC: 20.4 MMOL/L (ref 22–29)
COLOR UR: YELLOW
CREAT SERPL-MCNC: 0.48 MG/DL (ref 0.57–1)
DEPRECATED RDW RBC AUTO: 46.3 FL (ref 37–54)
EGFRCR SERPLBLD CKD-EPI 2021: 124.5 ML/MIN/1.73
EOSINOPHIL # BLD AUTO: 0.08 10*3/MM3 (ref 0–0.4)
EOSINOPHIL NFR BLD AUTO: 0.6 % (ref 0.3–6.2)
ERYTHROCYTE [DISTWIDTH] IN BLOOD BY AUTOMATED COUNT: 13.1 % (ref 12.3–15.4)
FIBRONECTIN FETAL VAG QL: POSITIVE
GLOBULIN UR ELPH-MCNC: 2.3 GM/DL
GLUCOSE SERPL-MCNC: 87 MG/DL (ref 65–99)
GLUCOSE UR STRIP-MCNC: NEGATIVE MG/DL
HCT VFR BLD AUTO: 38.8 % (ref 34–46.6)
HGB BLD-MCNC: 12.9 G/DL (ref 12–15.9)
HGB UR QL STRIP.AUTO: NEGATIVE
IMM GRANULOCYTES # BLD AUTO: 0.04 10*3/MM3 (ref 0–0.05)
IMM GRANULOCYTES NFR BLD AUTO: 0.3 % (ref 0–0.5)
KETONES UR QL STRIP: ABNORMAL
LEUKOCYTE ESTERASE UR QL STRIP.AUTO: NEGATIVE
LYMPHOCYTES # BLD AUTO: 2.15 10*3/MM3 (ref 0.7–3.1)
LYMPHOCYTES NFR BLD AUTO: 17.4 % (ref 19.6–45.3)
MCH RBC QN AUTO: 31.8 PG (ref 26.6–33)
MCHC RBC AUTO-ENTMCNC: 33.2 G/DL (ref 31.5–35.7)
MCV RBC AUTO: 95.6 FL (ref 79–97)
MONOCYTES # BLD AUTO: 0.81 10*3/MM3 (ref 0.1–0.9)
MONOCYTES NFR BLD AUTO: 6.6 % (ref 5–12)
NEUTROPHILS NFR BLD AUTO: 74.9 % (ref 42.7–76)
NEUTROPHILS NFR BLD AUTO: 9.25 10*3/MM3 (ref 1.7–7)
NITRITE UR QL STRIP: NEGATIVE
NRBC BLD AUTO-RTO: 0 /100 WBC (ref 0–0.2)
PH UR STRIP.AUTO: 7 [PH] (ref 5–8)
PLATELET # BLD AUTO: 229 10*3/MM3 (ref 140–450)
PMV BLD AUTO: 10.2 FL (ref 6–12)
POTASSIUM SERPL-SCNC: 3.9 MMOL/L (ref 3.5–5.2)
PROT SERPL-MCNC: 6.1 G/DL (ref 6–8.5)
PROT UR QL STRIP: NEGATIVE
RBC # BLD AUTO: 4.06 10*6/MM3 (ref 3.77–5.28)
RH BLD: POSITIVE
SARS-COV-2 RNA RESP QL NAA+PROBE: NOT DETECTED
SODIUM SERPL-SCNC: 137 MMOL/L (ref 136–145)
SP GR UR STRIP: 1.01 (ref 1–1.03)
T&S EXPIRATION DATE: NORMAL
UROBILINOGEN UR QL STRIP: ABNORMAL
WBC NRBC COR # BLD: 12.36 10*3/MM3 (ref 3.4–10.8)

## 2022-05-22 PROCEDURE — 87086 URINE CULTURE/COLONY COUNT: CPT | Performed by: OBSTETRICS & GYNECOLOGY

## 2022-05-22 PROCEDURE — 81003 URINALYSIS AUTO W/O SCOPE: CPT | Performed by: OBSTETRICS & GYNECOLOGY

## 2022-05-22 PROCEDURE — 86850 RBC ANTIBODY SCREEN: CPT | Performed by: OBSTETRICS & GYNECOLOGY

## 2022-05-22 PROCEDURE — 0 MAGNESIUM SULFATE 20 GM/500ML SOLUTION: Performed by: OBSTETRICS & GYNECOLOGY

## 2022-05-22 PROCEDURE — 59025 FETAL NON-STRESS TEST: CPT | Performed by: OBSTETRICS & GYNECOLOGY

## 2022-05-22 PROCEDURE — 82731 ASSAY OF FETAL FIBRONECTIN: CPT | Performed by: OBSTETRICS & GYNECOLOGY

## 2022-05-22 PROCEDURE — 86901 BLOOD TYPING SEROLOGIC RH(D): CPT | Performed by: OBSTETRICS & GYNECOLOGY

## 2022-05-22 PROCEDURE — 85025 COMPLETE CBC W/AUTO DIFF WBC: CPT | Performed by: OBSTETRICS & GYNECOLOGY

## 2022-05-22 PROCEDURE — 84112 EVAL AMNIOTIC FLUID PROTEIN: CPT | Performed by: OBSTETRICS & GYNECOLOGY

## 2022-05-22 PROCEDURE — 25010000002 ONDANSETRON PER 1 MG: Performed by: OBSTETRICS & GYNECOLOGY

## 2022-05-22 PROCEDURE — 0 MAGNESIUM SULFATE 20 GM/500ML SOLUTION: Performed by: STUDENT IN AN ORGANIZED HEALTH CARE EDUCATION/TRAINING PROGRAM

## 2022-05-22 PROCEDURE — 99285 EMERGENCY DEPT VISIT HI MDM: CPT | Performed by: OBSTETRICS & GYNECOLOGY

## 2022-05-22 PROCEDURE — U0003 INFECTIOUS AGENT DETECTION BY NUCLEIC ACID (DNA OR RNA); SEVERE ACUTE RESPIRATORY SYNDROME CORONAVIRUS 2 (SARS-COV-2) (CORONAVIRUS DISEASE [COVID-19]), AMPLIFIED PROBE TECHNIQUE, MAKING USE OF HIGH THROUGHPUT TECHNOLOGIES AS DESCRIBED BY CMS-2020-01-R: HCPCS | Performed by: OBSTETRICS & GYNECOLOGY

## 2022-05-22 PROCEDURE — 80053 COMPREHEN METABOLIC PANEL: CPT | Performed by: OBSTETRICS & GYNECOLOGY

## 2022-05-22 PROCEDURE — 86900 BLOOD TYPING SEROLOGIC ABO: CPT | Performed by: OBSTETRICS & GYNECOLOGY

## 2022-05-22 RX ORDER — ALBUTEROL SULFATE 2.5 MG/3ML
2.5 SOLUTION RESPIRATORY (INHALATION) EVERY 4 HOURS PRN
Status: DISCONTINUED | OUTPATIENT
Start: 2022-05-22 | End: 2022-05-23 | Stop reason: HOSPADM

## 2022-05-22 RX ORDER — SODIUM CHLORIDE 0.9 % (FLUSH) 0.9 %
10 SYRINGE (ML) INJECTION AS NEEDED
Status: DISCONTINUED | OUTPATIENT
Start: 2022-05-22 | End: 2022-05-23 | Stop reason: HOSPADM

## 2022-05-22 RX ORDER — PRENATAL VIT/IRON FUM/FOLIC AC 27MG-0.8MG
1 TABLET ORAL DAILY
Status: DISCONTINUED | OUTPATIENT
Start: 2022-05-22 | End: 2022-05-23 | Stop reason: HOSPADM

## 2022-05-22 RX ORDER — CALCIUM CARBONATE 200(500)MG
2 TABLET,CHEWABLE ORAL 3 TIMES DAILY PRN
Status: DISCONTINUED | OUTPATIENT
Start: 2022-05-22 | End: 2022-05-23 | Stop reason: HOSPADM

## 2022-05-22 RX ORDER — MAGNESIUM SULFATE HEPTAHYDRATE 40 MG/ML
2 INJECTION, SOLUTION INTRAVENOUS CONTINUOUS
Status: DISCONTINUED | OUTPATIENT
Start: 2022-05-22 | End: 2022-05-23 | Stop reason: HOSPADM

## 2022-05-22 RX ORDER — SODIUM CHLORIDE 0.9 % (FLUSH) 0.9 %
10 SYRINGE (ML) INJECTION EVERY 12 HOURS SCHEDULED
Status: DISCONTINUED | OUTPATIENT
Start: 2022-05-22 | End: 2022-05-23 | Stop reason: HOSPADM

## 2022-05-22 RX ORDER — ONDANSETRON 2 MG/ML
4 INJECTION INTRAMUSCULAR; INTRAVENOUS EVERY 6 HOURS PRN
Status: DISCONTINUED | OUTPATIENT
Start: 2022-05-22 | End: 2022-05-23 | Stop reason: HOSPADM

## 2022-05-22 RX ORDER — DOCUSATE SODIUM 100 MG/1
100 CAPSULE, LIQUID FILLED ORAL DAILY PRN
Status: DISCONTINUED | OUTPATIENT
Start: 2022-05-22 | End: 2022-05-23 | Stop reason: HOSPADM

## 2022-05-22 RX ORDER — HYDROXYZINE 50 MG/1
50 TABLET, FILM COATED ORAL EVERY 4 HOURS PRN
Status: DISCONTINUED | OUTPATIENT
Start: 2022-05-22 | End: 2022-05-23 | Stop reason: HOSPADM

## 2022-05-22 RX ORDER — FLUTICASONE PROPIONATE 50 MCG
1 SPRAY, SUSPENSION (ML) NASAL DAILY
Status: DISCONTINUED | OUTPATIENT
Start: 2022-05-22 | End: 2022-05-23 | Stop reason: HOSPADM

## 2022-05-22 RX ORDER — BUDESONIDE AND FORMOTEROL FUMARATE DIHYDRATE 80; 4.5 UG/1; UG/1
2 AEROSOL RESPIRATORY (INHALATION)
Status: DISCONTINUED | OUTPATIENT
Start: 2022-05-22 | End: 2022-05-23 | Stop reason: HOSPADM

## 2022-05-22 RX ORDER — FAMOTIDINE 10 MG/ML
20 INJECTION, SOLUTION INTRAVENOUS 2 TIMES DAILY
Status: DISCONTINUED | OUTPATIENT
Start: 2022-05-22 | End: 2022-05-23 | Stop reason: HOSPADM

## 2022-05-22 RX ORDER — SERTRALINE HYDROCHLORIDE 100 MG/1
100 TABLET, FILM COATED ORAL DAILY
Status: DISCONTINUED | OUTPATIENT
Start: 2022-05-22 | End: 2022-05-23 | Stop reason: HOSPADM

## 2022-05-22 RX ORDER — SODIUM CHLORIDE, SODIUM LACTATE, POTASSIUM CHLORIDE, CALCIUM CHLORIDE 600; 310; 30; 20 MG/100ML; MG/100ML; MG/100ML; MG/100ML
75 INJECTION, SOLUTION INTRAVENOUS CONTINUOUS
Status: DISCONTINUED | OUTPATIENT
Start: 2022-05-22 | End: 2022-05-23 | Stop reason: HOSPADM

## 2022-05-22 RX ORDER — ACETAMINOPHEN 500 MG
1000 TABLET ORAL EVERY 6 HOURS PRN
Status: DISCONTINUED | OUTPATIENT
Start: 2022-05-22 | End: 2022-05-23 | Stop reason: HOSPADM

## 2022-05-22 RX ORDER — MONTELUKAST SODIUM 10 MG/1
10 TABLET ORAL DAILY
Status: DISCONTINUED | OUTPATIENT
Start: 2022-05-22 | End: 2022-05-23 | Stop reason: HOSPADM

## 2022-05-22 RX ADMIN — FAMOTIDINE 20 MG: 10 INJECTION, SOLUTION INTRAVENOUS at 21:20

## 2022-05-22 RX ADMIN — BUDESONIDE AND FORMOTEROL FUMARATE DIHYDRATE 2 PUFF: 80; 4.5 AEROSOL RESPIRATORY (INHALATION) at 14:53

## 2022-05-22 RX ADMIN — SERTRALINE 100 MG: 100 TABLET, FILM COATED ORAL at 14:17

## 2022-05-22 RX ADMIN — SODIUM CHLORIDE, POTASSIUM CHLORIDE, SODIUM LACTATE AND CALCIUM CHLORIDE 75 ML/HR: 600; 310; 30; 20 INJECTION, SOLUTION INTRAVENOUS at 05:12

## 2022-05-22 RX ADMIN — MAGNESIUM SULFATE IN WATER 2 G/HR: 40 INJECTION, SOLUTION INTRAVENOUS at 12:33

## 2022-05-22 RX ADMIN — DOCUSATE SODIUM 100 MG: 100 CAPSULE, LIQUID FILLED ORAL at 13:42

## 2022-05-22 RX ADMIN — ACETAMINOPHEN 1000 MG: 500 TABLET, FILM COATED ORAL at 13:42

## 2022-05-22 RX ADMIN — SODIUM CHLORIDE, POTASSIUM CHLORIDE, SODIUM LACTATE AND CALCIUM CHLORIDE 75 ML/HR: 600; 310; 30; 20 INJECTION, SOLUTION INTRAVENOUS at 15:42

## 2022-05-22 RX ADMIN — MAGNESIUM SULFATE IN WATER 2 G/HR: 40 INJECTION, SOLUTION INTRAVENOUS at 22:31

## 2022-05-22 RX ADMIN — BUDESONIDE AND FORMOTEROL FUMARATE DIHYDRATE 2 PUFF: 80; 4.5 AEROSOL RESPIRATORY (INHALATION) at 23:31

## 2022-05-22 RX ADMIN — ACETAMINOPHEN 1000 MG: 500 TABLET, FILM COATED ORAL at 19:37

## 2022-05-22 RX ADMIN — ONDANSETRON 4 MG: 2 INJECTION INTRAMUSCULAR; INTRAVENOUS at 05:22

## 2022-05-22 RX ADMIN — MONTELUKAST SODIUM 10 MG: 10 TABLET, FILM COATED ORAL at 14:16

## 2022-05-22 RX ADMIN — ACETAMINOPHEN 1000 MG: 500 TABLET, FILM COATED ORAL at 07:51

## 2022-05-22 RX ADMIN — FAMOTIDINE 20 MG: 10 INJECTION, SOLUTION INTRAVENOUS at 07:54

## 2022-05-22 RX ADMIN — Medication 2 TABLET: at 04:41

## 2022-05-22 RX ADMIN — HYDROXYZINE HYDROCHLORIDE 50 MG: 50 TABLET ORAL at 14:16

## 2022-05-22 RX ADMIN — FLUTICASONE PROPIONATE 1 SPRAY: 50 SPRAY, METERED NASAL at 14:55

## 2022-05-23 VITALS
OXYGEN SATURATION: 99 % | HEIGHT: 68 IN | RESPIRATION RATE: 18 BRPM | HEART RATE: 97 BPM | WEIGHT: 233 LBS | DIASTOLIC BLOOD PRESSURE: 68 MMHG | TEMPERATURE: 98.2 F | SYSTOLIC BLOOD PRESSURE: 126 MMHG | BODY MASS INDEX: 35.31 KG/M2

## 2022-05-23 PROBLEM — O34.219 VBAC, DELIVERED: Status: ACTIVE | Noted: 2022-05-23

## 2022-05-23 LAB — BACTERIA SPEC AEROBE CULT: NO GROWTH

## 2022-05-23 PROCEDURE — 25010000002 MORPHINE PER 10 MG: Performed by: STUDENT IN AN ORGANIZED HEALTH CARE EDUCATION/TRAINING PROGRAM

## 2022-05-23 PROCEDURE — 88307 TISSUE EXAM BY PATHOLOGIST: CPT

## 2022-05-23 RX ORDER — HYDROCORTISONE 25 MG/G
1 CREAM TOPICAL AS NEEDED
Status: DISCONTINUED | OUTPATIENT
Start: 2022-05-23 | End: 2022-05-23 | Stop reason: HOSPADM

## 2022-05-23 RX ORDER — MORPHINE SULFATE 2 MG/ML
2 INJECTION, SOLUTION INTRAMUSCULAR; INTRAVENOUS EVERY 4 HOURS PRN
Status: DISPENSED | OUTPATIENT
Start: 2022-05-23 | End: 2022-05-23

## 2022-05-23 RX ORDER — PROMETHAZINE HYDROCHLORIDE 25 MG/1
25 TABLET ORAL EVERY 6 HOURS PRN
Status: DISCONTINUED | OUTPATIENT
Start: 2022-05-23 | End: 2022-05-23 | Stop reason: HOSPADM

## 2022-05-23 RX ORDER — OXYCODONE AND ACETAMINOPHEN 7.5; 325 MG/1; MG/1
1 TABLET ORAL EVERY 4 HOURS PRN
Status: DISCONTINUED | OUTPATIENT
Start: 2022-05-23 | End: 2022-05-23 | Stop reason: HOSPADM

## 2022-05-23 RX ORDER — ACETAMINOPHEN 325 MG/1
650 TABLET ORAL EVERY 6 HOURS PRN
Status: DISCONTINUED | OUTPATIENT
Start: 2022-05-23 | End: 2022-05-23 | Stop reason: HOSPADM

## 2022-05-23 RX ORDER — CARBOPROST TROMETHAMINE 250 UG/ML
250 INJECTION, SOLUTION INTRAMUSCULAR ONCE AS NEEDED
Status: DISCONTINUED | OUTPATIENT
Start: 2022-05-23 | End: 2022-05-23 | Stop reason: HOSPADM

## 2022-05-23 RX ORDER — OXYCODONE HYDROCHLORIDE AND ACETAMINOPHEN 5; 325 MG/1; MG/1
1 TABLET ORAL EVERY 4 HOURS PRN
Status: DISCONTINUED | OUTPATIENT
Start: 2022-05-23 | End: 2022-05-23 | Stop reason: HOSPADM

## 2022-05-23 RX ORDER — OXYTOCIN/0.9 % SODIUM CHLORIDE 30/500 ML
PLASTIC BAG, INJECTION (ML) INTRAVENOUS
Status: COMPLETED
Start: 2022-05-23 | End: 2022-05-23

## 2022-05-23 RX ORDER — DOCUSATE SODIUM 100 MG/1
100 CAPSULE, LIQUID FILLED ORAL 2 TIMES DAILY
Status: DISCONTINUED | OUTPATIENT
Start: 2022-05-23 | End: 2022-05-23 | Stop reason: HOSPADM

## 2022-05-23 RX ORDER — MISOPROSTOL 200 UG/1
600 TABLET ORAL ONCE AS NEEDED
Status: DISCONTINUED | OUTPATIENT
Start: 2022-05-23 | End: 2022-05-23 | Stop reason: HOSPADM

## 2022-05-23 RX ORDER — PROMETHAZINE HYDROCHLORIDE 12.5 MG/1
12.5 SUPPOSITORY RECTAL EVERY 6 HOURS PRN
Status: DISCONTINUED | OUTPATIENT
Start: 2022-05-23 | End: 2022-05-23 | Stop reason: HOSPADM

## 2022-05-23 RX ORDER — MORPHINE SULFATE 2 MG/ML
2 INJECTION, SOLUTION INTRAMUSCULAR; INTRAVENOUS ONCE
Status: COMPLETED | OUTPATIENT
Start: 2022-05-23 | End: 2022-05-23

## 2022-05-23 RX ORDER — OXYTOCIN/0.9 % SODIUM CHLORIDE 30/500 ML
999 PLASTIC BAG, INJECTION (ML) INTRAVENOUS ONCE
Status: COMPLETED | OUTPATIENT
Start: 2022-05-23 | End: 2022-05-23

## 2022-05-23 RX ORDER — SODIUM CHLORIDE 0.9 % (FLUSH) 0.9 %
1-10 SYRINGE (ML) INJECTION AS NEEDED
Status: DISCONTINUED | OUTPATIENT
Start: 2022-05-23 | End: 2022-05-23 | Stop reason: HOSPADM

## 2022-05-23 RX ORDER — PROMETHAZINE HYDROCHLORIDE 25 MG/1
12.5 TABLET ORAL EVERY 4 HOURS PRN
Status: DISCONTINUED | OUTPATIENT
Start: 2022-05-23 | End: 2022-05-23 | Stop reason: HOSPADM

## 2022-05-23 RX ORDER — OXYTOCIN/0.9 % SODIUM CHLORIDE 30/500 ML
125 PLASTIC BAG, INJECTION (ML) INTRAVENOUS CONTINUOUS PRN
Status: COMPLETED | OUTPATIENT
Start: 2022-05-23 | End: 2022-05-23

## 2022-05-23 RX ORDER — DIPHENHYDRAMINE HCL 25 MG
50 CAPSULE ORAL NIGHTLY PRN
Status: DISCONTINUED | OUTPATIENT
Start: 2022-05-23 | End: 2022-05-23 | Stop reason: HOSPADM

## 2022-05-23 RX ORDER — OXYTOCIN/0.9 % SODIUM CHLORIDE 30/500 ML
250 PLASTIC BAG, INJECTION (ML) INTRAVENOUS CONTINUOUS PRN
Status: ACTIVE | OUTPATIENT
Start: 2022-05-23 | End: 2022-05-23

## 2022-05-23 RX ORDER — OXYCODONE HYDROCHLORIDE AND ACETAMINOPHEN 5; 325 MG/1; MG/1
1 TABLET ORAL EVERY 4 HOURS PRN
Qty: 10 TABLET | Refills: 0 | Status: SHIPPED | OUTPATIENT
Start: 2022-05-23 | End: 2022-08-10 | Stop reason: ALTCHOICE

## 2022-05-23 RX ORDER — ONDANSETRON 2 MG/ML
4 INJECTION INTRAMUSCULAR; INTRAVENOUS EVERY 6 HOURS PRN
Status: DISCONTINUED | OUTPATIENT
Start: 2022-05-23 | End: 2022-05-23 | Stop reason: HOSPADM

## 2022-05-23 RX ORDER — ONDANSETRON 4 MG/1
4 TABLET, FILM COATED ORAL EVERY 8 HOURS PRN
Status: DISCONTINUED | OUTPATIENT
Start: 2022-05-23 | End: 2022-05-23 | Stop reason: HOSPADM

## 2022-05-23 RX ORDER — BISACODYL 10 MG
10 SUPPOSITORY, RECTAL RECTAL DAILY PRN
Status: DISCONTINUED | OUTPATIENT
Start: 2022-05-24 | End: 2022-05-23 | Stop reason: HOSPADM

## 2022-05-23 RX ADMIN — DOCUSATE SODIUM 100 MG: 100 CAPSULE, LIQUID FILLED ORAL at 10:07

## 2022-05-23 RX ADMIN — MORPHINE SULFATE 2 MG: 2 INJECTION, SOLUTION INTRAMUSCULAR; INTRAVENOUS at 04:57

## 2022-05-23 RX ADMIN — SERTRALINE 100 MG: 100 TABLET, FILM COATED ORAL at 10:15

## 2022-05-23 RX ADMIN — Medication 125 ML/HR: at 07:13

## 2022-05-23 RX ADMIN — ACETAMINOPHEN 1000 MG: 500 TABLET, FILM COATED ORAL at 02:42

## 2022-05-23 RX ADMIN — Medication 999 ML/HR: at 05:56

## 2022-05-23 RX ADMIN — MONTELUKAST SODIUM 10 MG: 10 TABLET, FILM COATED ORAL at 10:15

## 2022-05-23 RX ADMIN — MORPHINE SULFATE 2 MG: 2 INJECTION, SOLUTION INTRAMUSCULAR; INTRAVENOUS at 06:22

## 2022-05-23 RX ADMIN — FLUTICASONE PROPIONATE 1 SPRAY: 50 SPRAY, METERED NASAL at 10:08

## 2022-05-23 RX ADMIN — BUDESONIDE AND FORMOTEROL FUMARATE DIHYDRATE 2 PUFF: 80; 4.5 AEROSOL RESPIRATORY (INHALATION) at 10:08

## 2022-05-23 RX ADMIN — SODIUM CHLORIDE, POTASSIUM CHLORIDE, SODIUM LACTATE AND CALCIUM CHLORIDE 75 ML/HR: 600; 310; 30; 20 INJECTION, SOLUTION INTRAVENOUS at 05:35

## 2022-06-07 ENCOUNTER — TELEPHONE (OUTPATIENT)
Dept: OBSTETRICS AND GYNECOLOGY | Age: 37
End: 2022-06-07

## 2022-06-07 NOTE — TELEPHONE ENCOUNTER
Spoke with pt  Kate-Nannette  Pt is doing well with grieving process, lochia minimal  Will keep appt in 2 days, will do pap

## 2022-06-09 ENCOUNTER — POSTPARTUM VISIT (OUTPATIENT)
Dept: OBSTETRICS AND GYNECOLOGY | Age: 37
End: 2022-06-09

## 2022-06-09 VITALS
SYSTOLIC BLOOD PRESSURE: 124 MMHG | BODY MASS INDEX: 34.1 KG/M2 | WEIGHT: 225 LBS | HEIGHT: 68 IN | DIASTOLIC BLOOD PRESSURE: 70 MMHG

## 2022-06-09 DIAGNOSIS — Z12.4 SCREENING FOR CERVICAL CANCER: ICD-10-CM

## 2022-06-09 DIAGNOSIS — Z11.51 SCREENING FOR HPV (HUMAN PAPILLOMAVIRUS): ICD-10-CM

## 2022-06-09 PROCEDURE — 0503F POSTPARTUM CARE VISIT: CPT | Performed by: OBSTETRICS & GYNECOLOGY

## 2022-06-09 NOTE — PROGRESS NOTES
GYN Visit    2022    Patient: Sonam Lang          MR#:[id number]      Chief Complaint   Patient presents with   • Postpartum Care     Pap Today, , Pap today, loss of baby girl, Kate-Emmalily, d/t Trisomy 18,       History of Present Illness    36 y.o. female  who presents for  Postpartum visit  Doing ok, tearful at times and grieving loss of 27 week  Daughter with Trisomy 18  Unsure whether she will try for pregnancy again  On zoloft  Pap due and done today  Declines any birth control method          No LMP recorded (exact date).    ________________________________________  Patient Active Problem List   Diagnosis   • Migraine without aura and without status migrainosus, not intractable   • Allergy to NSAIDs   • Asthma, mild persistent   • Obesity (BMI 30.0-34.9)   • Sleep apnea   • Anxiety   • Maternal care for other (suspected) fetal abnormality and damage, not applicable or unspecified   • Trisomy 18 of fetus in current pregnancy   • Maternal care for other (suspected) fetal abnormality and damage, not applicable or unspecified   • Pregnancy complicated by multiple fetal congenital anomalies, single gestation   • Poor fetal growth affecting management of mother in second trimester   • Pregnancy   • Previous  section   • AMA (advanced maternal age) multigravida 35+   • Congenital abnormalities   • , delivered       Past Medical History:   Diagnosis Date   • ADHD (attention deficit hyperactivity disorder)    • Anxiety    • Asthma    • Depression    • GERD (gastroesophageal reflux disease)    • History of depression 2019   • Irritable bowel syndrome    • Migraine    • PTSD (post-traumatic stress disorder)    • Sleep apnea 2016    CPAP       Past Surgical History:   Procedure Laterality Date   •  SECTION     • COLONOSCOPY  2014   • SINUS SURGERY     • WISDOM TOOTH EXTRACTION         Social History     Tobacco Use   Smoking Status Former Smoker   • Years: 10.00   • Quit  "date: 2012   • Years since quitting: 10.4   Smokeless Tobacco Never Used       has a current medication list which includes the following prescription(s): albuterol sulfate hfa, b complex-c-folic acid, biotin, budesonide-formoterol, calcium citrate, vitamin d3, ferrous sulfate, fluticasone, magnesium oxide, montelukast, sertraline, fish oil, oxycodone-acetaminophen, prenatal vit-fe fumarate-fa, vitamin b6, and vitamin b-2.  ________________________________________    Current contraception: abstinence      The following portions of the patient's history were reviewed and updated as appropriate: allergies, current medications, past family history, past medical history, past social history, past surgical history and problem list.    Review of Systems    Pertinent items are noted in HPI.     Objective   Physical Exam    /70   Ht 172.7 cm (68\")   Wt 102 kg (225 lb)   LMP  (Exact Date)   Breastfeeding No   BMI 34.21 kg/m²    BP Readings from Last 3 Encounters:   06/09/22 124/70   05/23/22 126/68   05/04/22 111/65      Wt Readings from Last 3 Encounters:   06/09/22 102 kg (225 lb)   05/22/22 106 kg (233 lb)   05/04/22 102 kg (224 lb 12.8 oz)      BMI: Estimated body mass index is 34.21 kg/m² as calculated from the following:    Height as of this encounter: 172.7 cm (68\").    Weight as of this encounter: 102 kg (225 lb).    Lungs: non labored breathing, no wheezing or tachpnea  Extremities: extremities normal, atraumatic, no cyanosis or edema  Skin: Skin color, texture, turgor normal. No rashes or lesions  Neurologic: Grossly normal  General:   alert, appears stated age and cooperative   Abdomen: soft, non-tender, without masses or organomegaly       Vulva: normal, perineum well healed   Vagina: normal mucosa   Cervix: no cervical motion tenderness and no lesions   Uterus: deferred   Adnexa: deferred     Assessment:      Diagnoses and all orders for this visit:    1. Postpartum care following vaginal delivery " (Primary)    2. Screening for cervical cancer  -     IGP, Apt HPV,rfx 16 / 18,45    3. Screening for HPV (human papillomavirus)  -     IGP, Apt HPV,rfx 16 / 18,45      Follow up 1 year or prn

## 2022-06-13 LAB
CYTOLOGIST CVX/VAG CYTO: NORMAL
CYTOLOGY CVX/VAG DOC CYTO: NORMAL
CYTOLOGY CVX/VAG DOC THIN PREP: NORMAL
DX ICD CODE: NORMAL
HIV 1 & 2 AB SER-IMP: NORMAL
HPV I/H RISK 4 DNA CVX QL PROBE+SIG AMP: NEGATIVE
OTHER STN SPEC: NORMAL
STAT OF ADQ CVX/VAG CYTO-IMP: NORMAL

## 2022-06-28 ENCOUNTER — TELEPHONE (OUTPATIENT)
Dept: SPORTS MEDICINE | Facility: CLINIC | Age: 37
End: 2022-06-28

## 2022-06-28 ENCOUNTER — TELEPHONE (OUTPATIENT)
Dept: OBSTETRICS AND GYNECOLOGY | Age: 37
End: 2022-06-28

## 2022-06-28 NOTE — TELEPHONE ENCOUNTER
Date Seen


The patient was seen on 1/19/19.





Progress Note


SUBJECTIVE: 





The patient  is still having nausea  and had emesis   X 1     this am  feels 

better since starting





 the  [id number]  


OBJECTIVE


PHYSICAL EXAMINATION:


VITAL SIGNS: Please see below. 


GENERAL: [color is intact  feels Ok  ]


HEENT: [NC  AT  PErrl   eomi    sclera  white  non icteric  intact  ]


CARDIOVASCULAR: [s1  s2  appreciated     no   rubs  gallops  murmurs or heaves 

].


RESPIRATORY: [clear  to A&P].


ABDOMINAL: soft mihnimal distention  min tenderness inthe epigastric area 


EXTREMITIES: no CCE 


NEUROLOGICAL: [less anxious today  ]


PSYCHOLOGICAL: [ more relaxed  but   still h as  concerns about her illness and 

the management of  her   disease 


]





LABORATORY DATA, IMAGING STUDIES, MICROBIOLOGY: Please see below.





Echocardiogram: .





DVT prophylaxis ordered?: 





ASSESSMENT AND PLAN: This is a -year-old [RACE] [GENDER] with .





PROBLEMS:


1. : [Porphyria    IAP ].





2. : .





3. : .





DISPOSITION:  The   paitent  will be reqwuesting  her   records from  [facility] in  [place]    as well  as the  re cords   she  has at home


there is no  sun for the next few days  so we cannot test the urine in direct   

sunlight  which is  a   quick  non qualitiative  test to assess  for   porphyrin

 presence 





Geovanni n    needs to be acquired   via a   contract with the company and pharmacy 

and  it is  [facility] [place] [facility] on MOnday   which may hold things  up as well

.    Currently  she is stable    but still symtomtic    ].





VS, I&O, 24H, Fishbone


Vital Signs/I&O





Vital Signs








  Date Time  Temp Pulse Resp B/P (MAP) Pulse Ox O2 Delivery O2 Flow Rate FiO2


 


1/19/19 06:00 98.4 68 20 110/75 (87) 100 Room Air  














I&O- Last 24 Hours up to 6 AM 


 


 1/19/19





 06:00


 


Intake Total 2560 ml


 


Output Total 1500 ml


 


Balance 1060 ml











Laboratory Data


24H LABS


Laboratory Tests 2


1/18/19 14:08: 


1/18/19 23:54: 


Urine Total Volume 1100, Urine Creatinine 80.9, Urine Creatinine 24 Hour 889.9


1/19/19 06:31: 


Nucleated Red Blood Cells % (auto) 0.0, Anion Gap 7L, Glomerular Filtration Rate

> 60.0, Blood Urea Nitrogen 4L, Creatinine 0.53L, Sodium Level 138, Potassium 

Level 3.4L, Chloride Level 106, Carbon Dioxide Level 25, Calcium Level 8.1L, 

Magnesium Level 2.0


CBC/BMP


Laboratory Tests


1/19/19 06:31








Red Blood Count 4.23, Mean Corpuscular Volume 92.4, Mean Corpuscular Hemoglobin 

32.6, Mean Corpuscular Hemoglobin Concent 35.3, Red Cell Distribution Width 

12.2, Calcium Level 8.1 L


Microbiology





Microbiology


1/15/19 Blood Culture - Preliminary, Resulted


          No Growth after 72 hours. All specime...


1/15/19 Blood Culture - Preliminary, Resulted


          No Growth after 72 hours. All specime...


1/15/19 Genital Culture - Final, Complete


          Gardnerella Vaginalis


1/16/19 Gram Stain - Final, Complete


          


1/16/19 Sputum Culture - Final, Complete


          


1/16/19 Respiratory Virus Panel (PCR) (FERDINAND) - Final, Complete











Tamiko Andrade MD                Jan 19, 2019 12:29 Patient called and wanted to know if she can get medication called in for her Singulair to the [facility] pharmacy. Please advise, thank you!

## 2022-06-29 RX ORDER — MONTELUKAST SODIUM 10 MG/1
10 TABLET ORAL DAILY
Qty: 90 TABLET | Refills: 3 | Status: SHIPPED | OUTPATIENT
Start: 2022-06-29 | End: 2022-08-10 | Stop reason: SDUPTHER

## 2022-06-30 ENCOUNTER — OFFICE VISIT (OUTPATIENT)
Dept: OBSTETRICS AND GYNECOLOGY | Age: 37
End: 2022-06-30

## 2022-06-30 VITALS
BODY MASS INDEX: 33.95 KG/M2 | HEIGHT: 68 IN | SYSTOLIC BLOOD PRESSURE: 120 MMHG | WEIGHT: 224 LBS | DIASTOLIC BLOOD PRESSURE: 68 MMHG

## 2022-06-30 DIAGNOSIS — Z01.812 PRE-PROCEDURE LAB EXAM: ICD-10-CM

## 2022-06-30 DIAGNOSIS — Z30.430 ENCOUNTER FOR IUD INSERTION: Primary | ICD-10-CM

## 2022-06-30 LAB
B-HCG UR QL: NEGATIVE
EXPIRATION DATE: NORMAL
INTERNAL NEGATIVE CONTROL: NEGATIVE
INTERNAL POSITIVE CONTROL: POSITIVE
Lab: NORMAL

## 2022-06-30 PROCEDURE — 81025 URINE PREGNANCY TEST: CPT | Performed by: OBSTETRICS & GYNECOLOGY

## 2022-06-30 PROCEDURE — 58300 INSERT INTRAUTERINE DEVICE: CPT | Performed by: OBSTETRICS & GYNECOLOGY

## 2022-06-30 NOTE — PROGRESS NOTES
GYN Visit    2022    Patient: Sonam Lang          MR#:[id number]      Chief Complaint   Patient presents with   • Follow-up     IUD Placement, Buy & Bill, C/O Vaginal itching       History of Present Illness    36 y.o. female  who presents for  IUD insert  No complaints        No LMP recorded (exact date).    ________________________________________  Patient Active Problem List   Diagnosis   • Migraine without aura and without status migrainosus, not intractable   • Allergy to NSAIDs   • Asthma, mild persistent   • Obesity (BMI 30.0-34.9)   • Sleep apnea   • Anxiety   • Maternal care for other (suspected) fetal abnormality and damage, not applicable or unspecified   • Trisomy 18 of fetus in current pregnancy   • Maternal care for other (suspected) fetal abnormality and damage, not applicable or unspecified   • Pregnancy complicated by multiple fetal congenital anomalies, single gestation   • Poor fetal growth affecting management of mother in second trimester   • Pregnancy   • Previous  section   • AMA (advanced maternal age) multigravida 35+   • Congenital abnormalities   • , delivered       Past Medical History:   Diagnosis Date   • ADHD (attention deficit hyperactivity disorder)    • Anxiety    • Asthma    • Depression    • GERD (gastroesophageal reflux disease)    • History of depression 2019   • Irritable bowel syndrome    • Migraine    • PTSD (post-traumatic stress disorder)    • Sleep apnea 2016    CPAP       Past Surgical History:   Procedure Laterality Date   •  SECTION     • COLONOSCOPY     • SINUS SURGERY     • WISDOM TOOTH EXTRACTION         Social History     Tobacco Use   Smoking Status Former Smoker   • Years: 10.00   • Quit date:    • Years since quitting: 10.5   Smokeless Tobacco Never Used       has a current medication list which includes the following prescription(s): albuterol sulfate hfa, b complex-c-folic acid, biotin,  "budesonide-formoterol, calcium citrate, vitamin d3, ferrous sulfate, fluticasone, magnesium oxide, montelukast, fish oil, oxycodone-acetaminophen, prenatal vit-fe fumarate-fa, vitamin b6, vitamin b-2, and sertraline.  ________________________________________    Current contraception: abstinence      The following portions of the patient's history were reviewed and updated as appropriate: allergies, current medications, past family history, past medical history, past social history, past surgical history and problem list.    Review of Systems    Pertinent items are noted in HPI.     Objective   Physical Exam    /68   Ht 172.7 cm (68\")   Wt 102 kg (224 lb)   LMP  (Exact Date)   Breastfeeding No   BMI 34.06 kg/m²    BP Readings from Last 3 Encounters:   06/30/22 120/68   06/09/22 124/70   05/23/22 126/68      Wt Readings from Last 3 Encounters:   06/30/22 102 kg (224 lb)   06/09/22 102 kg (225 lb)   05/22/22 106 kg (233 lb)      BMI: Estimated body mass index is 34.06 kg/m² as calculated from the following:    Height as of this encounter: 172.7 cm (68\").    Weight as of this encounter: 102 kg (224 lb).    IUD Insertion Procedure Note    Indication: Desires long acting reversible contraception     Procedure Details   Urine pregnancy test was done and was NEGATIVE .  The risks (including infection, bleeding, pain, and uterine perforation) and benefits of the procedure were explained to the patient and Written informed consent was obtained.      Cervix cleansed with Betadine. Uterus sounded to 7 cm. IUD inserted without difficulty. String visible and trimmed.    IUD Information:  Mirena.    Condition:  Stable    Complications:  None    Patient tolerated the procedure well without complications.    Plan:    The patient was advised to call for any fever or for prolonged or severe pain or bleeding. She was advised to use OTC acetaminophen as needed for mild to moderate pain.     Attending Physician " Documentation:  I was present for the entire procedure.    Megan El MD  6/30/2022  16:56 EDT      Assessment:      Diagnoses and all orders for this visit:    1. Encounter for IUD insertion (Primary)  -     POC Pregnancy, Urine  -     Levonorgestrel (MIRENA) 20 MCG/DAY IUD intrauterine device 1 each    2. Pre-procedure lab exam  -     POC Pregnancy, Urine

## 2022-07-19 ENCOUNTER — TELEPHONE (OUTPATIENT)
Dept: OBSTETRICS AND GYNECOLOGY | Age: 37
End: 2022-07-19

## 2022-07-19 NOTE — TELEPHONE ENCOUNTER
Pt calls c/o itching, stating she spoke with Dr El at her last visit on 06/30 about receiving a vaginal cream for yeast infection but nothing has been called in for her. Please advise if you will send in cream for pt, [facility] pharmacy verified

## 2022-08-04 ENCOUNTER — OFFICE VISIT (OUTPATIENT)
Dept: OBSTETRICS AND GYNECOLOGY | Age: 37
End: 2022-08-04

## 2022-08-04 VITALS
BODY MASS INDEX: 34.25 KG/M2 | DIASTOLIC BLOOD PRESSURE: 66 MMHG | WEIGHT: 226 LBS | SYSTOLIC BLOOD PRESSURE: 120 MMHG | HEIGHT: 68 IN

## 2022-08-04 DIAGNOSIS — Z30.431 IUD CHECK UP: Primary | ICD-10-CM

## 2022-08-04 PROCEDURE — 99212 OFFICE O/P EST SF 10 MIN: CPT | Performed by: OBSTETRICS & GYNECOLOGY

## 2022-08-04 NOTE — PROGRESS NOTES
GYN Visit    2022    Patient: Sonam Lang          MR#:[id number]      Chief Complaint   Patient presents with   • Follow-up     IUD Check, No concerns at this time       History of Present Illness    36 y.o. female  who presents for  IUD check  Pt feels ok, some irregular spotting and one episode of severe cramping  Feels ok today  IUD string appears normal on exam  Has tried nexplanon and had irregular bleeding with this          No LMP recorded (exact date). Patient has had an implant.    ________________________________________  Patient Active Problem List   Diagnosis   • Migraine without aura and without status migrainosus, not intractable   • Allergy to NSAIDs   • Asthma, mild persistent   • Obesity (BMI 30.0-34.9)   • Sleep apnea   • Anxiety   • Maternal care for other (suspected) fetal abnormality and damage, not applicable or unspecified   • Trisomy 18 of fetus in current pregnancy   • Maternal care for other (suspected) fetal abnormality and damage, not applicable or unspecified   • Pregnancy complicated by multiple fetal congenital anomalies, single gestation   • Poor fetal growth affecting management of mother in second trimester   • Pregnancy   • Previous  section   • AMA (advanced maternal age) multigravida 35+   • Congenital abnormalities   • , delivered       Past Medical History:   Diagnosis Date   • ADHD (attention deficit hyperactivity disorder)    • Anxiety    • Asthma    • Depression    • GERD (gastroesophageal reflux disease)    • History of depression 2019   • Irritable bowel syndrome    • Migraine    • PTSD (post-traumatic stress disorder)    • Sleep apnea 2016    CPAP       Past Surgical History:   Procedure Laterality Date   •  SECTION     • COLONOSCOPY     • SINUS SURGERY     • WISDOM TOOTH EXTRACTION         Social History     Tobacco Use   Smoking Status Former Smoker   • Years: 10.00   • Quit date:    • Years since quitting: 10.5  "  Smokeless Tobacco Never Used       has a current medication list which includes the following prescription(s): albuterol sulfate hfa, b complex-c-folic acid, biotin, budesonide-formoterol, calcium citrate, vitamin d3, ferrous sulfate, fluticasone, magnesium oxide, montelukast, fish oil, oxycodone-acetaminophen, prenatal vit-fe fumarate-fa, vitamin b6, vitamin b-2, and sertraline.  ________________________________________    Current contraception: IUD      The following portions of the patient's history were reviewed and updated as appropriate: allergies, current medications, past family history, past medical history, past social history, past surgical history and problem list.    Review of Systems    A comprehensive review of systems was negative.     Objective   Physical Exam    /66   Ht 172.7 cm (68\")   Wt 103 kg (226 lb)   LMP  (Exact Date)   Breastfeeding No   BMI 34.36 kg/m²    BP Readings from Last 3 Encounters:   08/04/22 120/66   06/30/22 120/68   06/09/22 124/70      Wt Readings from Last 3 Encounters:   08/04/22 103 kg (226 lb)   06/30/22 102 kg (224 lb)   06/09/22 102 kg (225 lb)      BMI: Estimated body mass index is 34.36 kg/m² as calculated from the following:    Height as of this encounter: 172.7 cm (68\").    Weight as of this encounter: 103 kg (226 lb).    Lungs: non labored breathing, no wheezing or tachpnea  Extremities: extremities normal, atraumatic, no cyanosis or edema  Skin: Skin color, texture, turgor normal. No rashes or lesions  Neurologic: Grossly normal  General:   alert, appears stated age and cooperative   Abdomen: soft, non-tender, without masses or organomegaly       Vulva: normal, Bartholin's, Urethra, Greenleaf's normal   Vagina: normal mucosa   Cervix: no cervical motion tenderness, no lesions and string present and normal length   Uterus: deferred   Adnexa: deferred     Assessment:      Diagnoses and all orders for this visit:    1. IUD check up (Primary)      Follow up 1 " year AE and prn any problems

## 2022-09-02 ENCOUNTER — TELEMEDICINE (OUTPATIENT)
Dept: SPORTS MEDICINE | Facility: CLINIC | Age: 37
End: 2022-09-02

## 2022-09-02 DIAGNOSIS — F90.9 ADULT ADHD: Primary | ICD-10-CM

## 2022-09-02 DIAGNOSIS — J01.00 ACUTE MAXILLARY SINUSITIS, RECURRENCE NOT SPECIFIED: ICD-10-CM

## 2022-09-02 PROCEDURE — 99214 OFFICE O/P EST MOD 30 MIN: CPT | Performed by: FAMILY MEDICINE

## 2022-09-02 RX ORDER — ATOMOXETINE 40 MG/1
CAPSULE ORAL
Qty: 60 CAPSULE | Refills: 0 | Status: SHIPPED | OUTPATIENT
Start: 2022-09-02 | End: 2022-10-10 | Stop reason: SDUPTHER

## 2022-09-02 RX ORDER — MONTELUKAST SODIUM 10 MG/1
10 TABLET ORAL DAILY
Qty: 90 TABLET | Refills: 1 | Status: SHIPPED | OUTPATIENT
Start: 2022-09-02 | End: 2023-03-27

## 2022-09-02 NOTE — PROGRESS NOTES
Video Visit Note    CC: med followup    History of Present Illness  Sleep doctor started adderall for daytime somnolence, also hx adhd. Had been off medicine due to pregnancy. However notes adderall did not work as well as desired - noted SE and more fatigue the next day.   Has been working with therapist, not on zoloft.     I have reviewed the patient's medical, family, and social history in detail and updated the computerized patient record.    Review of Systems    Physical Exam    General: No acute distress.  Eyes: conjunctiva clear; pupils equally round and reactive  Neck: Normal appearance, midline trachea, no visible masses  ENT: external ears and nose atraumatic; oropharynx clear; hearing appropriate  Resp: normal respiratory effort  Skin: No visible rashes  Psych: Alert and oriented to person place and time.  Mood and affect appropriate. Recent and remote memory intact.  Judgment and insight appropriate.      Diagnoses and all orders for this visit:    Adult ADHD  -     atomoxetine (Strattera) 40 MG capsule; Start 1 capsule every morning. After 1 week may increase to 2 capsules daily if needed.    Acute maxillary sinusitis, recurrence not specified  -     montelukast (SINGULAIR) 10 MG tablet; Take 1 tablet by mouth Daily for 90 days.      Try strattera to continue to manage ADHD symptoms.  Follow-up to check on status in 4 to 6 weeks.    This patient was evaluated by video due to current precautions with COVID-19. Consent to audio/video visit for this problem was given by the patient. Estimated time of visit duration: 15 minutes.  This visit was performed by telephone only due to technological difficulty.

## 2022-09-06 ENCOUNTER — TELEPHONE (OUTPATIENT)
Dept: SPORTS MEDICINE | Facility: CLINIC | Age: 37
End: 2022-09-06

## 2022-09-06 NOTE — TELEPHONE ENCOUNTER
PA has been submitted, will check back tomorrow morning to see if I have a response. I did call patient and inform her of the PA request. No further action needed at this time. Thank you!    -CHRISTOPHER Teague

## 2022-09-06 NOTE — TELEPHONE ENCOUNTER
Patient called stating that her strattera medication requires PA with her insurance and needs this to be done asap.     I informed patient that a message was being sent to MA notifying her if this urgent request, MA will call her once she has received a response from insurance.

## 2022-10-10 ENCOUNTER — OFFICE VISIT (OUTPATIENT)
Dept: SPORTS MEDICINE | Facility: CLINIC | Age: 37
End: 2022-10-10

## 2022-10-10 VITALS
HEIGHT: 68 IN | TEMPERATURE: 98.7 F | BODY MASS INDEX: 34.22 KG/M2 | SYSTOLIC BLOOD PRESSURE: 102 MMHG | DIASTOLIC BLOOD PRESSURE: 80 MMHG | HEART RATE: 81 BPM | OXYGEN SATURATION: 99 %

## 2022-10-10 DIAGNOSIS — F90.9 ADULT ADHD: Primary | ICD-10-CM

## 2022-10-10 DIAGNOSIS — L30.9 DERMATITIS: ICD-10-CM

## 2022-10-10 PROCEDURE — 99214 OFFICE O/P EST MOD 30 MIN: CPT | Performed by: FAMILY MEDICINE

## 2022-10-10 RX ORDER — BUDESONIDE 0.5 MG/2ML
INHALANT ORAL
COMMUNITY
Start: 2022-10-01

## 2022-10-10 RX ORDER — ATOMOXETINE 40 MG/1
80 CAPSULE ORAL DAILY
Qty: 60 CAPSULE | Refills: 5 | Status: SHIPPED | OUTPATIENT
Start: 2022-10-10 | End: 2022-11-11 | Stop reason: SDUPTHER

## 2022-10-10 RX ORDER — ATOMOXETINE 40 MG/1
80 CAPSULE ORAL DAILY
Qty: 60 CAPSULE | Refills: 5 | Status: SHIPPED | OUTPATIENT
Start: 2022-10-10 | End: 2022-10-10 | Stop reason: SDUPTHER

## 2022-10-10 RX ORDER — TRIAMCINOLONE ACETONIDE 0.25 MG/G
1 CREAM TOPICAL 2 TIMES DAILY
Qty: 454 G | Refills: 1 | Status: SHIPPED | OUTPATIENT
Start: 2022-10-10

## 2022-10-10 RX ORDER — TRIAMCINOLONE ACETONIDE 0.25 MG/G
1 CREAM TOPICAL 2 TIMES DAILY
Qty: 454 G | Refills: 1 | Status: SHIPPED | OUTPATIENT
Start: 2022-10-10 | End: 2022-10-10 | Stop reason: SDUPTHER

## 2022-10-10 NOTE — PROGRESS NOTES
"Sonam is a 37 y.o. year old female    Chief Complaint   Patient presents with   • Med Management     Patient is here to discuss medication Strattera; she would like to discuss other medication interactions as well         History of Present Illness   HPI   1.  Follow-up ADHD.  She did feel some benefit from Strattera but had some headaches so she reduced back to 1 capsule/day.  This seems to be giving her some relief, but unsure how much.  Headaches are not irritating at this level.    Also complains of some recent skin irritation and dryness.      Review of Systems    /80 (BP Location: Right arm, Patient Position: Sitting, Cuff Size: Adult)   Pulse 81   Temp 98.7 °F (37.1 °C) (Temporal)   Ht 172.7 cm (67.99\")   SpO2 99%   BMI 34.22 kg/m²          Physical Exam  Vitals reviewed.   Skin:     Comments: No rash present today.  She does show some pictures of her irritation in her legs last week, multiple small erythematous patches that seem to be more follicular   Neurological:      General: No focal deficit present.      Mental Status: She is oriented to person, place, and time.   Psychiatric:         Mood and Affect: Mood normal.         Behavior: Behavior normal.         Thought Content: Thought content normal.         Judgment: Judgment normal.           Current Outpatient Medications:   •  albuterol sulfate  (90 Base) MCG/ACT inhaler, Inhale 2 puffs Every 4 (Four) Hours As Needed for Wheezing., Disp: 18 g, Rfl: 5  •  atomoxetine (Strattera) 40 MG capsule, Take 2 capsules by mouth Daily., Disp: 60 capsule, Rfl: 5  •  budesonide (PULMICORT) 0.5 MG/2ML nebulizer solution, , Disp: , Rfl:   •  budesonide-formoterol (SYMBICORT) 80-4.5 MCG/ACT inhaler, Inhale 2 puffs 2 (Two) Times a Day., Disp: , Rfl:   •  fluticasone (FLONASE) 50 MCG/ACT nasal spray, , Disp: , Rfl:   •  montelukast (SINGULAIR) 10 MG tablet, Take 1 tablet by mouth Daily for 90 days., Disp: 90 tablet, Rfl: 1  •  triamcinolone (KENALOG) " 0.025 % cream, Apply 1 application topically to the appropriate area as directed 2 (Two) Times a Day., Disp: 454 g, Rfl: 1     Diagnoses and all orders for this visit:    Adult ADHD  -     Discontinue: atomoxetine (Strattera) 40 MG capsule; Take 2 capsules by mouth Daily.  -     atomoxetine (Strattera) 40 MG capsule; Take 2 capsules by mouth Daily.    Dermatitis  -     Discontinue: triamcinolone (KENALOG) 0.025 % cream; Apply 1 application topically to the appropriate area as directed 2 (Two) Times a Day.  -     triamcinolone (KENALOG) 0.025 % cream; Apply 1 application topically to the appropriate area as directed 2 (Two) Times a Day.    Other orders  -     budesonide (PULMICORT) 0.5 MG/2ML nebulizer solution       Discussed continued optimization of Strattera.  I think it is okay for her to try to go off of it for a week or 2 to clarify any benefit she may have versus side effect of increased headaches.  Okay to resume them to stay at 1 capsule if that is adequately beneficial and maintains fewer side effects.  We will plan to follow-up in 90 days, but certainly can discuss at any point sooner as well.    Discussed basic skin care measures dermatitis, likely follicular pattern in her legs, may also have atypical dryness element in her hands based on her description although normal exam here today.

## 2022-10-28 ENCOUNTER — OFFICE VISIT (OUTPATIENT)
Dept: OBSTETRICS AND GYNECOLOGY | Age: 37
End: 2022-10-28

## 2022-10-28 VITALS
SYSTOLIC BLOOD PRESSURE: 128 MMHG | DIASTOLIC BLOOD PRESSURE: 82 MMHG | HEIGHT: 68 IN | BODY MASS INDEX: 33.56 KG/M2 | WEIGHT: 221.4 LBS

## 2022-10-28 DIAGNOSIS — B37.31 ACUTE CANDIDIASIS OF VULVA AND VAGINA: ICD-10-CM

## 2022-10-28 DIAGNOSIS — J45.30 MILD PERSISTENT ASTHMA WITHOUT COMPLICATION: ICD-10-CM

## 2022-10-28 DIAGNOSIS — N89.8 VAGINAL IRRITATION: Primary | ICD-10-CM

## 2022-10-28 PROBLEM — O35.9XX0 PREGNANCY COMPLICATED BY MULTIPLE FETAL CONGENITAL ANOMALIES, SINGLE GESTATION: Status: RESOLVED | Noted: 2022-05-04 | Resolved: 2022-10-28

## 2022-10-28 PROBLEM — O36.5920 POOR FETAL GROWTH AFFECTING MANAGEMENT OF MOTHER IN SECOND TRIMESTER: Status: RESOLVED | Noted: 2022-05-04 | Resolved: 2022-10-28

## 2022-10-28 PROBLEM — Q89.9 CONGENITAL ABNORMALITIES: Status: RESOLVED | Noted: 2022-05-22 | Resolved: 2022-10-28

## 2022-10-28 PROBLEM — Z34.90 PREGNANCY: Status: RESOLVED | Noted: 2022-05-22 | Resolved: 2022-10-28

## 2022-10-28 PROBLEM — O35.12X0 TRISOMY 18 OF FETUS IN CURRENT PREGNANCY: Status: RESOLVED | Noted: 2022-04-13 | Resolved: 2022-10-28

## 2022-10-28 PROBLEM — O35.8XX0 MATERNAL CARE FOR OTHER (SUSPECTED) FETAL ABNORMALITY AND DAMAGE, NOT APPLICABLE OR UNSPECIFIED: Status: RESOLVED | Noted: 2022-05-04 | Resolved: 2022-10-28

## 2022-10-28 PROBLEM — O35.8XX0 MATERNAL CARE FOR OTHER (SUSPECTED) FETAL ABNORMALITY AND DAMAGE, NOT APPLICABLE OR UNSPECIFIED: Status: RESOLVED | Noted: 2022-03-23 | Resolved: 2022-10-28

## 2022-10-28 PROBLEM — O09.529 AMA (ADVANCED MATERNAL AGE) MULTIGRAVIDA 35+: Status: RESOLVED | Noted: 2022-05-22 | Resolved: 2022-10-28

## 2022-10-28 LAB
BILIRUB BLD-MCNC: NEGATIVE MG/DL
CLARITY, POC: CLEAR
COLOR UR: YELLOW
GLUCOSE UR STRIP-MCNC: NEGATIVE MG/DL
KETONES UR QL: ABNORMAL
LEUKOCYTE EST, POC: NEGATIVE
NITRITE UR-MCNC: NEGATIVE MG/ML
PH UR: 5.5 [PH] (ref 5–8)
PROT UR STRIP-MCNC: NEGATIVE MG/DL
RBC # UR STRIP: NEGATIVE /UL
SP GR UR: 1.03 (ref 1–1.03)
UROBILINOGEN UR QL: NORMAL

## 2022-10-28 PROCEDURE — 81002 URINALYSIS NONAUTO W/O SCOPE: CPT | Performed by: NURSE PRACTITIONER

## 2022-10-28 PROCEDURE — 99213 OFFICE O/P EST LOW 20 MIN: CPT | Performed by: NURSE PRACTITIONER

## 2022-10-28 RX ORDER — FLUCONAZOLE 150 MG/1
150 TABLET ORAL ONCE
Qty: 4 TABLET | Refills: 0 | Status: SHIPPED | OUTPATIENT
Start: 2022-10-28 | End: 2022-10-28

## 2022-10-28 RX ORDER — ALBUTEROL SULFATE 90 UG/1
2 AEROSOL, METERED RESPIRATORY (INHALATION) EVERY 4 HOURS PRN
Qty: 18 G | Refills: 5 | Status: SHIPPED | OUTPATIENT
Start: 2022-10-28

## 2022-10-28 RX ORDER — NYSTATIN AND TRIAMCINOLONE ACETONIDE 100000; 1 [USP'U]/G; MG/G
1 OINTMENT TOPICAL 2 TIMES DAILY
Qty: 28 G | Refills: 0 | Status: SHIPPED | OUTPATIENT
Start: 2022-10-28 | End: 2022-11-11

## 2022-10-28 RX ORDER — FLUTICASONE PROPIONATE 50 MCG
2 SPRAY, SUSPENSION (ML) NASAL DAILY
Qty: 11.1 ML | Refills: 3 | Status: SHIPPED | OUTPATIENT
Start: 2022-10-28

## 2022-10-28 RX ORDER — BUDESONIDE AND FORMOTEROL FUMARATE DIHYDRATE 80; 4.5 UG/1; UG/1
2 AEROSOL RESPIRATORY (INHALATION)
Qty: 10.2 G | Refills: 3 | Status: SHIPPED | OUTPATIENT
Start: 2022-10-28

## 2022-10-28 NOTE — PROGRESS NOTES
[facility]   Obstetrics and Gynecology     10/28/2022      Patient:  Sonam Lang   MR#:[id number]    Office note    Chief Complaint   Patient presents with   • Vaginal Itching     Pt states he has tried stuff for an external yeast infection and itching is still going on, pt states its external and nothing internal and doesn't understand what is causing itching        Subjective     History of Present Illness  37 y.o. female  presents for evaluation of vulvar irritation.  She reports she has had vaginal and vulvar itching on and off since childbirth in May of this year.  She had an IUD inserted in .          Relevant data reviewed:      Patient Active Problem List   Diagnosis   • Migraine without aura and without status migrainosus, not intractable   • Allergy to NSAIDs   • Asthma, mild persistent   • Obesity (BMI 30.0-34.9)   • Sleep apnea   • Anxiety   • Previous  section   • , delivered       Past Medical History:   Diagnosis Date   • ADHD (attention deficit hyperactivity disorder)    • Anxiety    • Asthma    • Congenital abnormalities 2022    Multiple fetal anomalies present including meningocele, unilateral real agenesis, unilateral club foot, membranous VSD    • Depression    • GERD (gastroesophageal reflux disease)    • History of depression 2019   • Irritable bowel syndrome    • Migraine    • Pregnancy complicated by multiple fetal congenital anomalies, single gestation 2022    NTD, SUA   • PTSD (post-traumatic stress disorder)    • Sleep apnea 2016    CPAP     Past Surgical History:   Procedure Laterality Date   •  SECTION     • COLONOSCOPY  2014   • SINUS SURGERY     • WISDOM TOOTH EXTRACTION       Obstetric History:  OB History        4    Para   2    Term   1       1    AB   2    Living   2       SAB        IAB        Ectopic   2    Molar        Multiple   0    Live Births   2               Menstrual History:     No LMP  recorded (lmp unknown). Patient has had an implant.       # 1 - Date: 10/28/19, Sex: Female, Weight: 3200 g (7 lb 0.9 oz), GA: 38w1d, Delivery: , Low Transverse, Apgar1: 8, Apgar5: 9, Living: Living, Birth Comments: None    # 2 - Date: , Sex: None, Weight: None, GA: None, Delivery: Ectopic, Apgar1: None, Apgar5: None, Living: None, Birth Comments: None    # 3 - Date: , Sex: None, Weight: None, GA: None, Delivery: Ectopic, Apgar1: None, Apgar5: None, Living: None, Birth Comments: None    # 4 - Date: 22, Sex: Female, Weight: 559 g (1 lb 3.7 oz), GA: 27w2d, Delivery: , Spontaneous, Apgar1: 2, Apgar5: 2, Living: Living, Birth Comments: scale 1    Family History   Problem Relation Age of Onset   • Hypertension Mother    • Diabetes Mother    • Migraines Mother    • Hypertension Father    • Hypertension Maternal Grandmother    • Migraines Maternal Grandmother    • Alzheimer's disease Maternal Grandfather    • Diabetes Paternal Grandmother    • Migraines Paternal Grandmother    • Dementia Paternal Grandmother    • Stroke Paternal Grandmother    • Frontotemporal dementia Paternal Grandmother    • Diabetes Paternal Grandfather      Social History     Tobacco Use   • Smoking status: Former     Years: 10.     Types: Cigarettes     Quit date:      Years since quitting: 10.8   • Smokeless tobacco: Never   Vaping Use   • Vaping Use: Never used   Substance Use Topics   • Alcohol use: Not Currently   • Drug use: No     Bactrim [sulfamethoxazole-trimethoprim], Cefdinir, Buspirone, Contrast dye, Nsaids, Eggs or egg-derived products, and Milk-related compounds    Current Outpatient Medications:   •  albuterol sulfate  (90 Base) MCG/ACT inhaler, Inhale 2 puffs Every 4 (Four) Hours As Needed for Wheezing., Disp: 18 g, Rfl: 5  •  atomoxetine (Strattera) 40 MG capsule, Take 2 capsules by mouth Daily., Disp: 60 capsule, Rfl: 5  •  budesonide (PULMICORT) 0.5 MG/2ML nebulizer solution, , Disp: , Rfl:    •  budesonide-formoterol (SYMBICORT) 80-4.5 MCG/ACT inhaler, Inhale 2 puffs 2 (Two) Times a Day., Disp: , Rfl:   •  fluticasone (FLONASE) 50 MCG/ACT nasal spray, , Disp: , Rfl:   •  montelukast (SINGULAIR) 10 MG tablet, Take 1 tablet by mouth Daily for 90 days., Disp: 90 tablet, Rfl: 1  •  mupirocin (BACTROBAN) 2 % ointment, Apply 1 application topically to the appropriate area as directed 2 (Two) Times a Day., Disp: 22 g, Rfl: 0  •  triamcinolone (KENALOG) 0.025 % cream, Apply 1 application topically to the appropriate area as directed 2 (Two) Times a Day., Disp: 454 g, Rfl: 1  •  fluconazole (DIFLUCAN) 150 MG tablet, Take 1 tablet by mouth 1 (One) Time for 1 dose. Take 1 tablet now. May repeat every 72 hours for persisting symptoms., Disp: 4 tablet, Rfl: 0  •  nystatin-triamcinolone (MYCOLOG) 028100-9.1 UNIT/GM-% ointment, Apply 1 application topically to the appropriate area as directed 2 (Two) Times a Day for 14 days., Disp: 28 g, Rfl: 0    The following portions of the patient's history were reviewed and updated as appropriate: allergies, current medications, past family history, past medical history, past social history, past surgical history, and problem list.    Review of Systems   Constitutional: Negative for activity change, appetite change, chills, fatigue and fever.   Respiratory: Negative for cough and shortness of breath.    Cardiovascular: Negative for chest pain.   Gastrointestinal: Negative for constipation, diarrhea, nausea and vomiting.   Genitourinary: Positive for vaginal pain. Negative for dysuria, flank pain, genital sores, hematuria, menstrual problem and vaginal bleeding.       BP Readings from Last 3 Encounters:   10/28/22 128/82   10/24/22 121/77   10/10/22 102/80      Wt Readings from Last 3 Encounters:   10/28/22 100 kg (221 lb 6.4 oz)   10/24/22 99.8 kg (220 lb)   08/10/22 102 kg (225 lb)      BMI: Estimated body mass index is 33.66 kg/m² as calculated from the following:    Height as  "of this encounter: 172.7 cm (68\").    Weight as of this encounter: 100 kg (221 lb 6.4 oz). BSA: Estimated body surface area is 2.13 meters squared as calculated from the following:    Height as of this encounter: 172.7 cm (68\").    Weight as of this encounter: 100 kg (221 lb 6.4 oz).    Objective   Physical Exam  Constitutional:       Appearance: Normal appearance.   HENT:      Head: Normocephalic and atraumatic.   Eyes:      Pupils: Pupils are equal, round, and reactive to light.   Pulmonary:      Effort: Pulmonary effort is normal.   Abdominal:      General: Abdomen is flat.      Palpations: Abdomen is soft.   Genitourinary:     General: Normal vulva.      Exam position: Lithotomy position.      Labia:         Right: Tenderness present. No rash or lesion.         Left: Tenderness present. No rash or lesion.       Vagina: Normal.      Cervix: Normal.      Uterus: Normal.       Adnexa: Right adnexa normal and left adnexa normal.      Rectum: Normal.          Comments: Generalized vulvar erythema and excoriation  Musculoskeletal:         General: Normal range of motion.      Cervical back: Normal range of motion.   Skin:     General: Skin is warm and dry.   Neurological:      Mental Status: She is alert.   Psychiatric:         Mood and Affect: Mood normal.         Behavior: Behavior normal.         Assessment & Plan     Diagnoses and all orders for this visit:    1. Vaginal irritation (Primary)  -     NuSwab VG+ - Swab, Vagina  -     Urine Culture - Urine, Urine, Clean Catch  -     POC Urinalysis Dipstick    2. Acute candidiasis of vulva and vagina  -     fluconazole (DIFLUCAN) 150 MG tablet; Take 1 tablet by mouth 1 (One) Time for 1 dose. Take 1 tablet now. May repeat every 72 hours for persisting symptoms.  Dispense: 4 tablet; Refill: 0  -     nystatin-triamcinolone (MYCOLOG) 606222-2.1 UNIT/GM-% ointment; Apply 1 application topically to the appropriate area as directed 2 (Two) Times a Day for 14 days.  Dispense: " 28 g; Refill: 0         No follow-ups on file.    Deya Hagen, RAJAN   10/28/2022 14:33 EDT

## 2022-10-30 LAB
BACTERIA UR CULT: NO GROWTH
BACTERIA UR CULT: NORMAL

## 2022-10-31 LAB
A VAGINAE DNA VAG QL NAA+PROBE: NORMAL SCORE
BVAB2 DNA VAG QL NAA+PROBE: NORMAL SCORE
C ALBICANS DNA VAG QL NAA+PROBE: NEGATIVE
C GLABRATA DNA VAG QL NAA+PROBE: NEGATIVE
C TRACH DNA VAG QL NAA+PROBE: NEGATIVE
MEGA1 DNA VAG QL NAA+PROBE: NORMAL SCORE
N GONORRHOEA DNA VAG QL NAA+PROBE: NEGATIVE
T VAGINALIS DNA VAG QL NAA+PROBE: NEGATIVE

## 2022-11-02 ENCOUNTER — TELEPHONE (OUTPATIENT)
Dept: SPORTS MEDICINE | Facility: CLINIC | Age: 37
End: 2022-11-02

## 2022-11-02 RX ORDER — METHYLPREDNISOLONE 4 MG/1
TABLET ORAL
Qty: 21 TABLET | Refills: 0 | Status: SHIPPED | OUTPATIENT
Start: 2022-11-02 | End: 2023-01-09

## 2022-11-02 NOTE — TELEPHONE ENCOUNTER
Patient called and states that she went to urgent care and got an antibiotic called in. She states that the cough isn't gone away and would like to get another antibiotic called in or get your input. She believes that it is due to weather changes but wanted to get something called in today. Please advise, thank you!

## 2022-11-02 NOTE — TELEPHONE ENCOUNTER
If it's weather then antibiotics aren't going to help. Sounds like it could be a flare of her underlying asthma; send in steroid dose pack. If not better after a couple days of that should be seen in person.

## 2022-11-02 NOTE — TELEPHONE ENCOUNTER
I spoke with patient and informed her of the message below, she verbalized understanding and will make an appointment after she is done with her steroid pack. No further action needed at this time.     -Candy Teague

## 2022-11-08 ENCOUNTER — TELEPHONE (OUTPATIENT)
Dept: SPORTS MEDICINE | Facility: CLINIC | Age: 37
End: 2022-11-08

## 2022-11-08 NOTE — TELEPHONE ENCOUNTER
Patient called to inform us she has finished the steroid pack and she is not feeling any better.   She requested to be seen, I scheduled patient for Friday November 11th at 3pm.    Please advise if any further action is needed.    Ezekiel

## 2022-11-11 ENCOUNTER — OFFICE VISIT (OUTPATIENT)
Dept: SPORTS MEDICINE | Facility: CLINIC | Age: 37
End: 2022-11-11

## 2022-11-11 VITALS
WEIGHT: 221 LBS | TEMPERATURE: 98 F | HEART RATE: 90 BPM | HEIGHT: 68 IN | OXYGEN SATURATION: 99 % | SYSTOLIC BLOOD PRESSURE: 112 MMHG | BODY MASS INDEX: 33.49 KG/M2 | DIASTOLIC BLOOD PRESSURE: 70 MMHG

## 2022-11-11 DIAGNOSIS — F90.9 ADULT ADHD: ICD-10-CM

## 2022-11-11 DIAGNOSIS — J01.01 ACUTE RECURRENT MAXILLARY SINUSITIS: Primary | ICD-10-CM

## 2022-11-11 PROCEDURE — 99214 OFFICE O/P EST MOD 30 MIN: CPT | Performed by: FAMILY MEDICINE

## 2022-11-11 RX ORDER — ATOMOXETINE 60 MG/1
60 CAPSULE ORAL DAILY
Qty: 30 CAPSULE | Refills: 2 | Status: SHIPPED | OUTPATIENT
Start: 2022-11-11 | End: 2023-01-09

## 2022-11-11 RX ORDER — AMOXICILLIN AND CLAVULANATE POTASSIUM 875; 125 MG/1; MG/1
1 TABLET, FILM COATED ORAL 2 TIMES DAILY
Qty: 20 TABLET | Refills: 0 | Status: SHIPPED | OUTPATIENT
Start: 2022-11-11 | End: 2023-01-09

## 2022-11-11 NOTE — PROGRESS NOTES
"Sonam is a 37 y.o. year old female    Chief Complaint   Patient presents with   • Cough     Patient states that she is not feeling any better after the steroid pack and wanted to get further eval        History of Present Illness   HPI   SInus problems since mid-October. Finally improving slightly in the past few days.  Recently prescribed amoxicillin, and started on a steroid Dosepak last week.  Has sinus surgery scheduled later this month with Dr. Wilson.    Also has ongoing she is with ADHD symptoms, still feels like 40 mg Strattera is not enough but 80 is too much.    Review of Systems    /70 (BP Location: Left arm, Patient Position: Sitting, Cuff Size: Adult)   Pulse 90   Temp 98 °F (36.7 °C) (Temporal)   Ht 172.7 cm (67.99\")   Wt 100 kg (221 lb)   LMP  (LMP Unknown) Comment: Iud  SpO2 99%   BMI 33.61 kg/m²          Physical Exam  Vitals reviewed.   HENT:      Head:      Comments: There is no significant sinus tenderness.  Ears are normal bilaterally.  There is erythema of the turbinates  Cardiovascular:      Heart sounds: Normal heart sounds.   Pulmonary:      Effort: Pulmonary effort is normal.      Breath sounds: Normal breath sounds.   Neurological:      Mental Status: She is alert.           Current Outpatient Medications:   •  albuterol sulfate  (90 Base) MCG/ACT inhaler, Inhale 2 puffs Every 4 (Four) Hours As Needed for Wheezing., Disp: 18 g, Rfl: 5  •  atomoxetine (Strattera) 60 MG capsule, Take 1 capsule by mouth Daily., Disp: 30 capsule, Rfl: 2  •  budesonide (PULMICORT) 0.5 MG/2ML nebulizer solution, , Disp: , Rfl:   •  budesonide-formoterol (SYMBICORT) 80-4.5 MCG/ACT inhaler, Inhale 2 puffs 2 (Two) Times a Day., Disp: 10.2 g, Rfl: 3  •  fluticasone (FLONASE) 50 MCG/ACT nasal spray, 2 sprays into the nostril(s) as directed by provider Daily., Disp: 11.1 mL, Rfl: 3  •  methylPREDNISolone (MEDROL) 4 MG dose pack, Take as directed on package instructions., Disp: 21 tablet, Rfl: 0  •  " montelukast (SINGULAIR) 10 MG tablet, Take 1 tablet by mouth Daily for 90 days., Disp: 90 tablet, Rfl: 1  •  mupirocin (BACTROBAN) 2 % ointment, Apply 1 application topically to the appropriate area as directed 2 (Two) Times a Day., Disp: 22 g, Rfl: 0  •  nystatin-triamcinolone (MYCOLOG) 615809-7.1 UNIT/GM-% ointment, Apply 1 application topically to the appropriate area as directed 2 (Two) Times a Day for 14 days., Disp: 28 g, Rfl: 0  •  triamcinolone (KENALOG) 0.025 % cream, Apply 1 application topically to the appropriate area as directed 2 (Two) Times a Day., Disp: 454 g, Rfl: 1  •  amoxicillin-clavulanate (Augmentin) 875-125 MG per tablet, Take 1 tablet by mouth 2 (Two) Times a Day., Disp: 20 tablet, Rfl: 0     Diagnoses and all orders for this visit:    Acute recurrent maxillary sinusitis  -     amoxicillin-clavulanate (Augmentin) 875-125 MG per tablet; Take 1 tablet by mouth 2 (Two) Times a Day.    Adult ADHD  -     atomoxetine (Strattera) 60 MG capsule; Take 1 capsule by mouth Daily.    Regarding her sinusitis it is thankfully improving.  If she has a rebound of symptoms I think repeat treatment using Augmentin would be appropriate.    Regarding her adult ADHD will try 60 mg, if that is not available we could consider alternating 40/80        EMR Dragon/Transcription disclaimer:    Much of this encounter note is an electronic transcription/translation of spoken language to printed text.  The electronic translation of spoken language may permit erroneous, or at times, nonsensical words or phrases to be inadvertently transcribed.  Although I have reviewed the note for such errors some may still exist.

## 2022-12-19 ENCOUNTER — TELEPHONE (OUTPATIENT)
Dept: SPORTS MEDICINE | Facility: CLINIC | Age: 37
End: 2022-12-19

## 2022-12-19 NOTE — TELEPHONE ENCOUNTER
Patient called in requesting if she needs another round of abx's. Her sinus infection has returned, states she has 3 rounds of abx's with Dr. Francisco and her surgeon, reports having sinus surgery 11/22/2022. She has left a message with her surgeons office this morning, awaiting a reply from him as well. Informed Dr. Francisco out of the office til Wednesday, she requested I send a message to Dr. Francisco and see if one of the other providers in office can give advice or write script as well.     Thank you   Xochitl

## 2022-12-19 NOTE — TELEPHONE ENCOUNTER
Called and spoke with patient, advised to follow up with her surgeon. All information was verbally understood     Thanks  Xochitl

## 2022-12-27 ENCOUNTER — LAB REQUISITION (OUTPATIENT)
Dept: LAB | Facility: HOSPITAL | Age: 37
End: 2022-12-27
Payer: COMMERCIAL

## 2022-12-27 DIAGNOSIS — Z00.00 ENCOUNTER FOR GENERAL ADULT MEDICAL EXAMINATION WITHOUT ABNORMAL FINDINGS: ICD-10-CM

## 2022-12-27 PROCEDURE — 87070 CULTURE OTHR SPECIMN AEROBIC: CPT | Performed by: OTOLARYNGOLOGY

## 2022-12-27 PROCEDURE — 87205 SMEAR GRAM STAIN: CPT | Performed by: OTOLARYNGOLOGY

## 2022-12-27 PROCEDURE — 87186 SC STD MICRODIL/AGAR DIL: CPT | Performed by: OTOLARYNGOLOGY

## 2022-12-30 LAB
BACTERIA SPEC AEROBE CULT: ABNORMAL
GRAM STN SPEC: ABNORMAL
GRAM STN SPEC: ABNORMAL

## 2023-01-09 ENCOUNTER — OFFICE VISIT (OUTPATIENT)
Dept: SPORTS MEDICINE | Facility: CLINIC | Age: 38
End: 2023-01-09
Payer: COMMERCIAL

## 2023-01-09 VITALS
HEART RATE: 88 BPM | DIASTOLIC BLOOD PRESSURE: 78 MMHG | OXYGEN SATURATION: 99 % | RESPIRATION RATE: 16 BRPM | WEIGHT: 221 LBS | SYSTOLIC BLOOD PRESSURE: 122 MMHG | TEMPERATURE: 98.2 F | BODY MASS INDEX: 33.49 KG/M2 | HEIGHT: 68 IN

## 2023-01-09 DIAGNOSIS — F90.9 ADULT ADHD: Primary | ICD-10-CM

## 2023-01-09 PROCEDURE — 99213 OFFICE O/P EST LOW 20 MIN: CPT | Performed by: FAMILY MEDICINE

## 2023-01-09 RX ORDER — AMOXICILLIN 875 MG/1
TABLET, COATED ORAL
COMMUNITY
Start: 2023-01-06

## 2023-01-09 RX ORDER — ATOMOXETINE 80 MG/1
80 CAPSULE ORAL DAILY
Qty: 90 CAPSULE | Refills: 1 | Status: SHIPPED | OUTPATIENT
Start: 2023-01-09

## 2023-01-09 RX ORDER — UBROGEPANT 50 MG/1
TABLET ORAL
COMMUNITY
Start: 2022-11-22

## 2023-01-09 NOTE — PROGRESS NOTES
Sonam is a 37 y.o. year old female    Chief Complaint   Patient presents with   • med check   recheck adhd    History of Present Illness   HPI   Still battling with sinus infection - had culture with ENT Dr. Wilson. Otherwise feels much better after turbinate reduction.     Feeling well with increase to 80mg strattera - maybe 40 to 80 was just too fast, did well after time at 60mg.     Review of Systems    /78 (BP Location: Right arm, Patient Position: Sitting, Cuff Size: Adult)   Pulse 88   Temp 98.2 °F (36.8 °C)   Resp 16   Ht 172.7 cm (67.99\")   Wt 100 kg (221 lb)   SpO2 99%   BMI 33.61 kg/m²          Physical Exam  Vitals reviewed.   Pulmonary:      Effort: Pulmonary effort is normal.   Psychiatric:         Mood and Affect: Mood normal.         Thought Content: Thought content normal.         Judgment: Judgment normal.           Current Outpatient Medications:   •  albuterol sulfate  (90 Base) MCG/ACT inhaler, Inhale 2 puffs Every 4 (Four) Hours As Needed for Wheezing., Disp: 18 g, Rfl: 5  •  budesonide (PULMICORT) 0.5 MG/2ML nebulizer solution, , Disp: , Rfl:   •  budesonide-formoterol (SYMBICORT) 80-4.5 MCG/ACT inhaler, Inhale 2 puffs 2 (Two) Times a Day., Disp: 10.2 g, Rfl: 3  •  fluticasone (FLONASE) 50 MCG/ACT nasal spray, 2 sprays into the nostril(s) as directed by provider Daily., Disp: 11.1 mL, Rfl: 3  •  mupirocin (BACTROBAN) 2 % ointment, Apply 1 application topically to the appropriate area as directed 2 (Two) Times a Day., Disp: 22 g, Rfl: 0  •  triamcinolone (KENALOG) 0.025 % cream, Apply 1 application topically to the appropriate area as directed 2 (Two) Times a Day., Disp: 454 g, Rfl: 1  •  amoxicillin (AMOXIL) 875 MG tablet, , Disp: , Rfl:   •  atomoxetine (Strattera) 80 MG capsule, Take 1 capsule by mouth Daily., Disp: 90 capsule, Rfl: 1  •  montelukast (SINGULAIR) 10 MG tablet, Take 1 tablet by mouth Daily for 90 days., Disp: 90 tablet, Rfl: 1  •  ubrogepant tablet, TAKE ONE  TABLET BY MOUTH AT ONSET OF MIGRAINE. IF SYMPTOMS PERSIST, A SECOND DOSE MAY BE TAKEN IN 2 HOURS., Disp: , Rfl:      Diagnoses and all orders for this visit:    Adult ADHD  -     atomoxetine (Strattera) 80 MG capsule; Take 1 capsule by mouth Daily.    Other orders  -     amoxicillin (AMOXIL) 875 MG tablet  -     ubrogepant tablet; TAKE ONE TABLET BY MOUTH AT ONSET OF MIGRAINE. IF SYMPTOMS PERSIST, A SECOND DOSE MAY BE TAKEN IN 2 HOURS.       Continue Strattera 80 mg daily.  Follow-up in 6 months.      EMR Dragon/Transcription disclaimer:    Much of this encounter note is an electronic transcription/translation of spoken language to printed text.  The electronic translation of spoken language may permit erroneous, or at times, nonsensical words or phrases to be inadvertently transcribed.  Although I have reviewed the note for such errors some may still exist.

## 2023-01-12 ENCOUNTER — OFFICE VISIT (OUTPATIENT)
Dept: OBSTETRICS AND GYNECOLOGY | Age: 38
End: 2023-01-12
Payer: COMMERCIAL

## 2023-01-12 VITALS
DIASTOLIC BLOOD PRESSURE: 76 MMHG | BODY MASS INDEX: 33.34 KG/M2 | WEIGHT: 220 LBS | SYSTOLIC BLOOD PRESSURE: 124 MMHG | HEIGHT: 68 IN

## 2023-01-12 DIAGNOSIS — Z30.432 ENCOUNTER FOR IUD REMOVAL: ICD-10-CM

## 2023-01-12 DIAGNOSIS — N89.8 VAGINAL IRRITATION: Primary | ICD-10-CM

## 2023-01-12 PROCEDURE — 99213 OFFICE O/P EST LOW 20 MIN: CPT | Performed by: OBSTETRICS & GYNECOLOGY

## 2023-01-12 RX ORDER — FLUCONAZOLE 150 MG/1
TABLET ORAL
Qty: 2 TABLET | Refills: 1 | Status: SHIPPED | OUTPATIENT
Start: 2023-01-12

## 2023-01-12 NOTE — PROGRESS NOTES
GYN Visit    2023    Patient: Sonam Lang          MR#:[id number]      Chief Complaint   Patient presents with   • Follow-up     IUD Removal, No Concerns at this time       History of Present Illness    37 y.o. female  who presents for  IUD side effects and issue of vaginal irritation  Has been on multiple antibiotics as well, but seems to have recurrent itching issues  Was having amenorrhea with IUD  Desires IUD removed    Also white discharge and itching- no concerns about STD    Discussed allergies, pt has taken Ibuprofen recently with no issues, in past had some itching and lip swelling with nsaids  Pt is unsure if she is really allergic  Will change allergy severity to low        No LMP recorded (exact date). Patient has had an implant.    ________________________________________  Patient Active Problem List   Diagnosis   • Migraine without aura and without status migrainosus, not intractable   • Allergy to NSAIDs   • Asthma, mild persistent   • Obesity (BMI 30.0-34.9)   • Sleep apnea   • Anxiety   • Previous  section   • , delivered   • Adult ADHD       Past Medical History:   Diagnosis Date   • ADHD (attention deficit hyperactivity disorder)    • Anxiety    • Asthma    • Congenital abnormalities 2022    Multiple fetal anomalies present including meningocele, unilateral real agenesis, unilateral club foot, membranous VSD    • Depression    • GERD (gastroesophageal reflux disease)    • History of depression 2019   • Irritable bowel syndrome    • Migraine    • Pregnancy complicated by multiple fetal congenital anomalies, single gestation 2022    NTD, SUA   • PTSD (post-traumatic stress disorder)    • Sleep apnea 2016    CPAP       Past Surgical History:   Procedure Laterality Date   •  SECTION     • COLONOSCOPY  2014   • SINUS SURGERY     • WISDOM TOOTH EXTRACTION         Social History     Tobacco Use   Smoking Status Former   • Years: 10.00   • Types:  "Cigarettes   • Quit date:    • Years since quittin.0   Smokeless Tobacco Never       has a current medication list which includes the following prescription(s): albuterol sulfate hfa, amoxicillin, atomoxetine, budesonide, budesonide-formoterol, fluticasone, mupirocin, triamcinolone, ubrogepant, fluconazole, and montelukast.  ________________________________________    Current contraception: IUD      The following portions of the patient's history were reviewed and updated as appropriate: allergies, current medications, past family history, past medical history, past social history, past surgical history and problem list.    Review of Systems    Pertinent items are noted in HPI.     Objective   Physical Exam    /76   Ht 172.7 cm (68\")   Wt 99.8 kg (220 lb)   LMP  (Exact Date)   BMI 33.45 kg/m²    BP Readings from Last 3 Encounters:   23 124/76   23 122/78   22 112/70      Wt Readings from Last 3 Encounters:   23 99.8 kg (220 lb)   23 100 kg (221 lb)   22 100 kg (221 lb)      BMI: Estimated body mass index is 33.45 kg/m² as calculated from the following:    Height as of this encounter: 172.7 cm (68\").    Weight as of this encounter: 99.8 kg (220 lb).    Lungs: non labored breathing, no wheezing or tachpnea  Extremities: extremities normal, atraumatic, no cyanosis or edema  Skin: Skin color, texture, turgor normal. No rashes or lesions  Neurologic: Grossly normal  General:   alert, appears stated age and cooperative   Abdomen: soft, non-tender, without masses or organomegaly       Vulva: normal, Bartholin's, Urethra, Bly's normal   Some mild erythema present   Vagina: normal mucosa, white discharge   Cervix: no cervical motion tenderness, no lesions and IUD removed, see note             IUD Removal Procedure Note    Type of IUD:  Mirena  Date of insertion:  known  Reason for removal:  Side effect: vaginal irritation  Other relevant history/information:  " none    Procedure Time Documentation  The risks of the procedure were reviewed with the patient including bleeding, infection and unlikely damage to the uterus and the benefits of the procedure were explained to the patient and Verbal informed consent was obtained    Procedure Details  IUD strings visible:  yes  Local anesthesia:  None  Tenaculum used:  None  Removal:  IUD removed intact with forceps and discarded    All appropriate instructions regarding removal were reviewed.    Patient tolerated the procedure well without complications.    Plans for contraception:  no method    Other follow-up needed:  none    The patient was advised to call for any fever or for prolonged or severe pain or bleeding. She was advised to use OTC acetaminophen as needed for mild to moderate pain.     Megan El MD  1/12/2023  10:05 EST    Assessment:      Diagnoses and all orders for this visit:    1. Vaginal irritation (Primary)  -     NuSwab BV & Candida - Swab, Vagina    2. Encounter for IUD removal    Other orders  -     fluconazole (Diflucan) 150 MG tablet; Take one now and repeat in 1 week  Dispense: 2 tablet; Refill: 1      Follow up AE and Mammo in October

## 2023-01-15 LAB
A VAGINAE DNA VAG QL NAA+PROBE: ABNORMAL SCORE
BVAB2 DNA VAG QL NAA+PROBE: ABNORMAL SCORE
C ALBICANS DNA VAG QL NAA+PROBE: POSITIVE
C GLABRATA DNA VAG QL NAA+PROBE: NEGATIVE
MEGA1 DNA VAG QL NAA+PROBE: ABNORMAL SCORE

## 2023-01-27 ENCOUNTER — OFFICE VISIT (OUTPATIENT)
Dept: SPORTS MEDICINE | Facility: CLINIC | Age: 38
End: 2023-01-27
Payer: COMMERCIAL

## 2023-01-27 VITALS
DIASTOLIC BLOOD PRESSURE: 74 MMHG | HEART RATE: 90 BPM | BODY MASS INDEX: 33.34 KG/M2 | SYSTOLIC BLOOD PRESSURE: 122 MMHG | OXYGEN SATURATION: 99 % | HEIGHT: 68 IN | WEIGHT: 220 LBS

## 2023-01-27 DIAGNOSIS — Z11.1 SCREENING-PULMONARY TB: Primary | ICD-10-CM

## 2023-01-27 PROCEDURE — 99212 OFFICE O/P EST SF 10 MIN: CPT | Performed by: FAMILY MEDICINE

## 2023-02-15 ENCOUNTER — TELEPHONE (OUTPATIENT)
Dept: SPORTS MEDICINE | Facility: CLINIC | Age: 38
End: 2023-02-15
Payer: MEDICAID

## 2023-02-15 NOTE — TELEPHONE ENCOUNTER
Patient called and was requesting we send over her physical and TB screening to the [facility]. I verified the paperwork with her and called [facility] to get the correct fax number. I faxed over her physical and TB screening paperwork to [phone]. I informed patient that I faxed over the paperwork and that I was unable to get an answer from the fax. I informed the patient to come  her paperwork from the office to give to them instead. She stated she would come pick it up tomorrow 2/16/23.     -SIMON Spear

## 2023-03-06 ENCOUNTER — TELEPHONE (OUTPATIENT)
Dept: OBSTETRICS AND GYNECOLOGY | Age: 38
End: 2023-03-06
Payer: MEDICAID

## 2023-03-06 DIAGNOSIS — Z34.90 EARLY STAGE OF PREGNANCY: Primary | ICD-10-CM

## 2023-03-06 NOTE — TELEPHONE ENCOUNTER
+ preg test 3/2... pt has unknown LMP , pt has had 2 prior ectopic pregnancys , plus advise on timing of labwork and or appt

## 2023-03-06 NOTE — TELEPHONE ENCOUNTER
Pt scheduled 3/13 for us and visit, pt not sure if she is going to have labs drawn due to cost , is calling [facility] to decide

## 2023-03-06 NOTE — TELEPHONE ENCOUNTER
Schedule US in 1 week with me as new ob slot  I will place order for quant and progesterone, she should come in today or tomorrow for that

## 2023-03-27 ENCOUNTER — OFFICE VISIT (OUTPATIENT)
Dept: OBSTETRICS AND GYNECOLOGY | Age: 38
End: 2023-03-27
Payer: MEDICAID

## 2023-03-27 VITALS
DIASTOLIC BLOOD PRESSURE: 78 MMHG | SYSTOLIC BLOOD PRESSURE: 118 MMHG | WEIGHT: 225.6 LBS | BODY MASS INDEX: 34.19 KG/M2 | HEIGHT: 68 IN

## 2023-03-27 DIAGNOSIS — Z32.00 ENCOUNTER FOR CONFIRMATION OF PREGNANCY TEST RESULT WITH PHYSICAL EXAMINATION: Primary | ICD-10-CM

## 2023-03-27 DIAGNOSIS — O09.521 MULTIGRAVIDA OF ADVANCED MATERNAL AGE IN FIRST TRIMESTER: ICD-10-CM

## 2023-03-27 DIAGNOSIS — O09.91 HIGH-RISK PREGNANCY IN FIRST TRIMESTER: ICD-10-CM

## 2023-03-27 DIAGNOSIS — Z3A.10 10 WEEKS GESTATION OF PREGNANCY: ICD-10-CM

## 2023-03-27 LAB
GLUCOSE UR STRIP-MCNC: NEGATIVE MG/DL
PROT UR STRIP-MCNC: NEGATIVE MG/DL

## 2023-03-27 RX ORDER — LANOLIN ALCOHOL/MO/W.PET/CERES
25 CREAM (GRAM) TOPICAL 3 TIMES DAILY
COMMUNITY

## 2023-03-27 NOTE — PROGRESS NOTES
"GYN Visit    3/27/2023    Patient: Sonam Lang          MR#:[id number]      Chief Complaint   Patient presents with   • Possible Pregnancy     Pt is having US confirmation today, + test on 3/2/22, last pap 22       History of Present Illness    37 y.o. female  who presents for pregnancy confirmation    Feels ok , had some nausea but better  Got an US at [facility] [address] which is 9 days off our US measurement  Pt interested in this BETO as closer to her daughters birthday  Advised I recommend choosing the BETO we got today as accurate and up to date images  Will do labs and panorama  Advised start baby asa in 2 weeks, she was determined to not be allergic to all nsaids and will try it, if allergy or rash will have to stop    Pt also interested in     Will continue to discuss, arrest of dilation ,  with 1 lb fetus        No LMP recorded (lmp unknown). Patient is pregnant.    ________________________________________  Patient Active Problem List   Diagnosis   • Migraine without aura and without status migrainosus, not intractable   • Allergy to NSAIDs   • Asthma, mild persistent   • Obesity (BMI 30.0-34.9)   • Sleep apnea   • Anxiety   • Previous  section   • , delivered   • Adult ADHD       Past Medical History:   Diagnosis Date   • ADHD (attention deficit hyperactivity disorder)    • Anxiety    • Asthma    • Congenital abnormalities 2022    Multiple fetal anomalies present including meningocele, unilateral real agenesis, unilateral club foot, membranous VSD    • Depression    • GERD (gastroesophageal reflux disease)    • History of depression 2019   • Irritable bowel syndrome    • Migraine    • Pregnancy complicated by multiple fetal congenital anomalies, single gestation 2022    NTD, SUA   • PTSD (post-traumatic stress disorder)    • Sleep apnea 2016    CPAP       Past Surgical History:   Procedure Laterality Date   •  SECTION     • COLONOSCOPY  2014   • SINUS " "SURGERY  2022   • WISDOM TOOTH EXTRACTION         Social History     Tobacco Use   Smoking Status Former   • Years: 10.00   • Types: Cigarettes   • Quit date:    • Years since quittin.2   Smokeless Tobacco Never       has a current medication list which includes the following prescription(s): albuterol sulfate hfa, budesonide, budesonide-formoterol, doxylamine, fluconazole, fluticasone, montelukast, prenatal multivit-min-fe-fa, triamcinolone, vitamin b-6, amoxicillin, atomoxetine, mupirocin, and ubrelvy.  ________________________________________    Current contraception: none      The following portions of the patient's history were reviewed and updated as appropriate: allergies, current medications, past family history, past medical history, past social history, past surgical history and problem list.    Review of Systems    Pertinent items are noted in HPI.     Objective   Physical Exam    /78   Ht 172.7 cm (68\")   Wt 102 kg (225 lb 9.6 oz)   LMP  (LMP Unknown)   BMI 34.30 kg/m²    BP Readings from Last 3 Encounters:   23 118/78   23 122/74   23 124/76      Wt Readings from Last 3 Encounters:   23 102 kg (225 lb 9.6 oz)   23 99.8 kg (220 lb)   23 99.8 kg (220 lb)      BMI: Estimated body mass index is 34.3 kg/m² as calculated from the following:    Height as of this encounter: 172.7 cm (68\").    Weight as of this encounter: 102 kg (225 lb 9.6 oz).    Lungs: non labored breathing, no wheezing or tachpnea  Extremities: extremities normal, atraumatic, no cyanosis or edema  Skin: Skin color, texture, turgor normal. No rashes or lesions  Neurologic: Grossly normal  General:   alert, appears stated age and cooperative   Abdomen: soft, non-tender, without masses or organomegaly            See US 10 week IUP                 Assessment:      Diagnoses and all orders for this visit:    1. Encounter for confirmation of pregnancy test result with physical " examination (Primary)    2. 10 weeks gestation of pregnancy  -     POC Urinalysis Dipstick  -     OB Panel With HIV  -     Cancel: Chlamydia trachomatis, Neisseria gonorrhoeae, Trichomonas vaginalis, PCR - Swab, Vagina  -     TSH  -     Hemoglobin A1c  -     Cancel: [facility] Prenatal Test: Chromosomes 13, 18, 21, X & Y: Triploidy 22Q.11.2 Deletion - Blood,  -     Chlamydia / Gonococcus / Mycoplasma Genitalium, DAWOOD, Urine - Urine, Clean Catch; Future  -     Chlamydia / Gonococcus / Mycoplasma Genitalium, DAWOOD, Urine - Urine, Clean Catch  -     [facility] Prenatal Test: Chromosomes 13, 18, 21, X & Y: Triploidy 22Q.11.2 Deletion - Blood,    3. Multigravida of advanced maternal age in first trimester    4. High-risk pregnancy in first trimester

## 2023-03-28 LAB
ABO GROUP BLD: NORMAL
BASOPHILS # BLD AUTO: 0 X10E3/UL (ref 0–0.2)
BASOPHILS NFR BLD AUTO: 1 %
BLD GP AB SCN SERPL QL: NEGATIVE
EOSINOPHIL # BLD AUTO: 0.1 X10E3/UL (ref 0–0.4)
EOSINOPHIL NFR BLD AUTO: 1 %
ERYTHROCYTE [DISTWIDTH] IN BLOOD BY AUTOMATED COUNT: 12.7 % (ref 11.7–15.4)
HBA1C MFR BLD: 5 % (ref 4.8–5.6)
HBV SURFACE AG SERPL QL IA: NEGATIVE
HCT VFR BLD AUTO: 42.6 % (ref 34–46.6)
HCV IGG SERPL QL IA: NON REACTIVE
HGB BLD-MCNC: 14.2 G/DL (ref 11.1–15.9)
HIV 1+2 AB+HIV1 P24 AG SERPL QL IA: NON REACTIVE
IMM GRANULOCYTES # BLD AUTO: 0 X10E3/UL (ref 0–0.1)
IMM GRANULOCYTES NFR BLD AUTO: 0 %
LYMPHOCYTES # BLD AUTO: 1.8 X10E3/UL (ref 0.7–3.1)
LYMPHOCYTES NFR BLD AUTO: 21 %
MCH RBC QN AUTO: 31.3 PG (ref 26.6–33)
MCHC RBC AUTO-ENTMCNC: 33.3 G/DL (ref 31.5–35.7)
MCV RBC AUTO: 94 FL (ref 79–97)
MONOCYTES # BLD AUTO: 0.4 X10E3/UL (ref 0.1–0.9)
MONOCYTES NFR BLD AUTO: 5 %
NEUTROPHILS # BLD AUTO: 6.1 X10E3/UL (ref 1.4–7)
NEUTROPHILS NFR BLD AUTO: 72 %
PLATELET # BLD AUTO: 327 X10E3/UL (ref 150–450)
RBC # BLD AUTO: 4.54 X10E6/UL (ref 3.77–5.28)
RH BLD: POSITIVE
RPR SER QL: NON REACTIVE
RUBV IGG SERPL IA-ACNC: 5.38 INDEX
TSH SERPL DL<=0.005 MIU/L-ACNC: 0.55 UIU/ML (ref 0.45–4.5)
WBC # BLD AUTO: 8.4 X10E3/UL (ref 3.4–10.8)

## 2023-03-31 LAB
C TRACH RRNA UR QL NAA+PROBE: NEGATIVE
M GENITALIUM DNA UR QL NAA+PROBE: NEGATIVE
N GONORRHOEA RRNA UR QL NAA+PROBE: NEGATIVE

## 2023-04-01 LAB
Lab: NORMAL
NTRA FETAL FRACTION: NORMAL
NTRA GENDER OF FETUS: NORMAL
NTRA MONOSOMY X AGE-BASED RISK TEXT: NORMAL
NTRA MONOSOMY X RESULT TEXT: NORMAL
NTRA MONOSOMY X RISK SCORE TEXT: NORMAL
NTRA TRIPLOIDY RESULT TEXT: NORMAL
NTRA TRISOMY 13 AGE-BASED RISK TEXT: NORMAL
NTRA TRISOMY 13 RESULT TEXT: NORMAL
NTRA TRISOMY 13 RISK SCORE TEXT: NORMAL
NTRA TRISOMY 18 AGE-BASED RISK TEXT: NORMAL
NTRA TRISOMY 18 RESULT TEXT: NORMAL
NTRA TRISOMY 18 RISK SCORE TEXT: NORMAL
NTRA TRISOMY 21 AGE-BASED RISK TEXT: NORMAL
NTRA TRISOMY 21 RESULT TEXT: NORMAL
NTRA TRISOMY 21 RISK SCORE TEXT: NORMAL

## 2023-04-17 ENCOUNTER — TELEPHONE (OUTPATIENT)
Dept: OBSTETRICS AND GYNECOLOGY | Age: 38
End: 2023-04-17
Payer: MEDICAID

## 2023-04-24 ENCOUNTER — INITIAL PRENATAL (OUTPATIENT)
Dept: OBSTETRICS AND GYNECOLOGY | Age: 38
End: 2023-04-24
Payer: MEDICAID

## 2023-04-24 VITALS — SYSTOLIC BLOOD PRESSURE: 112 MMHG | DIASTOLIC BLOOD PRESSURE: 74 MMHG | BODY MASS INDEX: 33.91 KG/M2 | WEIGHT: 223 LBS

## 2023-04-24 DIAGNOSIS — Z98.891 PREVIOUS CESAREAN SECTION: ICD-10-CM

## 2023-04-24 DIAGNOSIS — O09.91 HIGH-RISK PREGNANCY IN FIRST TRIMESTER: ICD-10-CM

## 2023-04-24 DIAGNOSIS — Z3A.14 14 WEEKS GESTATION OF PREGNANCY: Primary | ICD-10-CM

## 2023-04-24 DIAGNOSIS — O09.521 MULTIGRAVIDA OF ADVANCED MATERNAL AGE IN FIRST TRIMESTER: ICD-10-CM

## 2023-04-24 LAB
GLUCOSE UR STRIP-MCNC: NEGATIVE MG/DL
PROT UR STRIP-MCNC: NEGATIVE MG/DL

## 2023-04-24 PROCEDURE — 99213 OFFICE O/P EST LOW 20 MIN: CPT | Performed by: OBSTETRICS & GYNECOLOGY

## 2023-04-24 NOTE — PROGRESS NOTES
Pt still with nausea, no vomiting, decreased appetite, depression , ? PTSD  Feeling a bit better with start of zoloft  See US today, cw dates and normal growth, pt reassured  Fu 4 weeks for AFP  Chief Complaint   Patient presents with   • Routine Prenatal Visit     OB Check & US - Pt is 14w2d, Pt states she has had some morning sickness & has had a low appetite, Pt states she has been struggling with some depression       HPI:  Pt presents for routine prenatal visit    ROS:  No fever or chills, no nausea or vomiting, no contractions, no leg pain, no LE edema, no leaking fluid, no bleeding, no headache, no dysuria  All other systems reviewed and negative    Exam:  See flow sheet  General:  Alert and oriented and no distress  Neck: no lymphadenopathy or thyromegaly  Lungs: non - labored breathing  Abd:  See flow sheet, fundus nontender  Ext: see flow sheet, non-tender bilateral , no lesions  Neuro: grossly normal    Assessment:/ PLAN:    Diagnoses and all orders for this visit:    1. 14 weeks gestation of pregnancy (Primary)  -     POC Urinalysis Dipstick    2. High-risk pregnancy in first trimester    3. Multigravida of advanced maternal age in first trimester    4. Previous  section

## 2023-05-09 ENCOUNTER — HOSPITAL ENCOUNTER (EMERGENCY)
Facility: HOSPITAL | Age: 38
Discharge: HOME | End: 2023-05-09
Attending: EMERGENCY MEDICINE | Admitting: EMERGENCY MEDICINE
Payer: MEDICAID

## 2023-05-09 VITALS
SYSTOLIC BLOOD PRESSURE: 122 MMHG | TEMPERATURE: 99 F | RESPIRATION RATE: 14 BRPM | HEIGHT: 68 IN | DIASTOLIC BLOOD PRESSURE: 79 MMHG | OXYGEN SATURATION: 97 % | HEART RATE: 80 BPM | WEIGHT: 225 LBS | BODY MASS INDEX: 34.1 KG/M2

## 2023-05-09 DIAGNOSIS — R05.1 ACUTE COUGH: ICD-10-CM

## 2023-05-09 DIAGNOSIS — B34.9 VIRAL SYNDROME: Primary | ICD-10-CM

## 2023-05-09 LAB
FLUAV SUBTYP SPEC NAA+PROBE: NOT DETECTED
FLUBV RNA ISLT QL NAA+PROBE: NOT DETECTED
SARS-COV-2 RNA RESP QL NAA+PROBE: NOT DETECTED
STREP A PCR: NOT DETECTED

## 2023-05-09 PROCEDURE — 87636 SARSCOV2 & INF A&B AMP PRB: CPT | Performed by: EMERGENCY MEDICINE

## 2023-05-09 PROCEDURE — 99283 EMERGENCY DEPT VISIT LOW MDM: CPT

## 2023-05-09 PROCEDURE — 87651 STREP A DNA AMP PROBE: CPT | Performed by: EMERGENCY MEDICINE

## 2023-05-09 NOTE — Clinical Note
[facility]  [address]  [place] [id number]    Sonam Lang was seen and treated in our emergency department on 5/9/2023.  She may return to work on 05/10/2023.         Thank you for choosing [facility].    Jared Pacheco MD

## 2023-05-09 NOTE — FSED PROVIDER NOTE
"Subjective   History of Present Illness  The patient is a 37-year-old female approximately 16 weeks gestation who presents with cough, sore throat, and headache that started on Sunday.  She states it started out with a sore throat and then tonight she had a coughing spell which almost made her throw up.  She denies any congestion or runny nose.  She has been taking Tylenol for her symptoms without much relief.  She denies any vomiting but does report some diarrhea.  No known fevers.  She does report sick contacts.  Denies any problems with this pregnancy.  She reports some shortness of breath but she thinks it might be emanating from her sore throat.  Review of Systems   Constitutional: Negative for fever.   HENT: Positive for sore throat.    Respiratory: Positive for cough.    Gastrointestinal: Positive for diarrhea. Negative for vomiting.   Skin: Negative for rash.   All other systems reviewed and are negative.      Past Medical History:   Diagnosis Date   • ADHD (attention deficit hyperactivity disorder)    • Anxiety    • Asthma    • Congenital abnormalities 5/22/2022    Multiple fetal anomalies present including meningocele, unilateral real agenesis, unilateral club foot, membranous VSD    • Depression    • GERD (gastroesophageal reflux disease)    • History of depression 02/21/2019   • Irritable bowel syndrome    • Migraine    • Pregnancy complicated by multiple fetal congenital anomalies, single gestation 5/4/2022    NTD, SUA   • PTSD (post-traumatic stress disorder)    • Sleep apnea 2016    CPAP       Allergies   Allergen Reactions   • Bactrim [Sulfamethoxazole-Trimethoprim] Hives, Itching and Swelling   • Cefdinir Hives, Itching and Swelling     FELT THROAT START TO SWELL   • Buspirone Unknown - Low Severity     migraines   • Contrast Dye (Echo Or Unknown Ct/Mr) Other (See Comments)     \"felt like insides were on fire\"   • Naproxen Swelling   • Eggs Or Egg-Derived Products Nausea Only     Pt can have cooked " eggs    • Milk-Related Compounds Other (See Comments)     Upset stomach, pt is able to have milk products in cooked foods   • Nsaids Itching and Swelling       Past Surgical History:   Procedure Laterality Date   •  SECTION  2019   • COLONOSCOPY  2014   • SINUS SURGERY  2022   • WISDOM TOOTH EXTRACTION         Family History   Problem Relation Age of Onset   • Hypertension Mother    • Diabetes Mother    • Migraines Mother    • Hypertension Father    • Hypertension Maternal Grandmother    • Migraines Maternal Grandmother    • Alzheimer's disease Maternal Grandfather    • Diabetes Paternal Grandmother    • Migraines Paternal Grandmother    • Dementia Paternal Grandmother    • Stroke Paternal Grandmother    • Frontotemporal dementia Paternal Grandmother    • Diabetes Paternal Grandfather        Social History     Socioeconomic History   • Marital status:    Tobacco Use   • Smoking status: Former     Years: 10.00     Types: Cigarettes     Quit date:      Years since quittin.3   • Smokeless tobacco: Never   Vaping Use   • Vaping Use: Never used   Substance and Sexual Activity   • Alcohol use: Not Currently   • Drug use: No   • Sexual activity: Yes     Partners: Male     Birth control/protection: None           Objective   Physical Exam  Constitutional:       General: She is not in acute distress.     Appearance: Normal appearance. She is not ill-appearing.   HENT:      Right Ear: Tympanic membrane normal.      Left Ear: Tympanic membrane normal.      Mouth/Throat:      Mouth: Mucous membranes are moist.      Pharynx: No oropharyngeal exudate or posterior oropharyngeal erythema.   Eyes:      Conjunctiva/sclera: Conjunctivae normal.   Cardiovascular:      Rate and Rhythm: Normal rate and regular rhythm.   Pulmonary:      Effort: Pulmonary effort is normal.      Breath sounds: Normal breath sounds.   Abdominal:      General: There is no distension.      Tenderness: There is no abdominal  tenderness.   Musculoskeletal:         General: Normal range of motion.      Cervical back: Normal range of motion.   Skin:     Findings: No rash.   Neurological:      General: No focal deficit present.      Mental Status: She is alert.   Psychiatric:         Mood and Affect: Mood normal.         Procedures       37-year-old female presents with sore throat, cough and headache.  Patient is very well-appearing with unremarkable vital signs.  Lungs clear.  Will check for strep, COVID, and flu.  Fetal heart tones.    ED Course  ED Course as of 05/09/23 0340   Tue May 09, 2023   0317 Fetal heart tones 148 [DH]   0336 Swabs negative.  Will recommend Tylenol, Robitussin, and close follow-up with OB/GYN. [DH]      ED Course User Index  [DH] Jared Pacheco MD                                           [facility]    Final diagnoses:   Viral syndrome   Acute cough       ED Disposition  ED Disposition     ED Disposition   Discharge    Condition   Stable    Comment   --             Praful Francisco MD  [address]  [place] [id number] [phone]               Medication List      No changes were made to your prescriptions during this visit.

## 2023-05-09 NOTE — DISCHARGE INSTRUCTIONS
Use over-the-counter Tylenol and Robitussin for the sore throat and the cough, respectively.  Follow-up with your OB/GYN as soon as possible.

## 2023-05-10 ENCOUNTER — TELEPHONE (OUTPATIENT)
Dept: OBSTETRICS AND GYNECOLOGY | Age: 38
End: 2023-05-10
Payer: MEDICAID

## 2023-05-10 NOTE — TELEPHONE ENCOUNTER
16 weeks gestation  with cough, sore throat, nasal congestion and headache  that started on Sunday went to ER at [facility] last night 5/9/23 , she denies any vomiting or fever . Today c/o headache and  RLS ( twitching sensation) when  having this cold. Patient is taking OTC meds .   Patient wants to know if she should be seeing sooner than 5/25/23 . Please let patient know , thanks .

## 2023-05-11 NOTE — TELEPHONE ENCOUNTER
I agree with symptomatic treatment for viral URI  She doesn't need to be seen sooner unless develops worsening symptoms, high fever, chest pain or SOB

## 2023-05-19 ENCOUNTER — TELEPHONE (OUTPATIENT)
Dept: OBSTETRICS AND GYNECOLOGY | Age: 38
End: 2023-05-19
Payer: MEDICAID

## 2023-05-19 NOTE — TELEPHONE ENCOUNTER
Pt had intercourse last night  And this morning passed some greenish discharge and wants to make sure it was the semen and not an infection

## 2023-05-19 NOTE — TELEPHONE ENCOUNTER
I would recommend observe for a few days, most likely discharge is from intercourse and not an infection , can make appt Monday or Tuesday to assess or she can call Monday for appt?

## 2023-05-25 ENCOUNTER — ROUTINE PRENATAL (OUTPATIENT)
Dept: OBSTETRICS AND GYNECOLOGY | Age: 38
End: 2023-05-25
Payer: MEDICAID

## 2023-05-25 VITALS — DIASTOLIC BLOOD PRESSURE: 72 MMHG | BODY MASS INDEX: 34.97 KG/M2 | WEIGHT: 230 LBS | SYSTOLIC BLOOD PRESSURE: 116 MMHG

## 2023-05-25 DIAGNOSIS — Z13.79 ENCOUNTER FOR GENETIC SCREENING FOR BIRTH DEFECT: ICD-10-CM

## 2023-05-25 DIAGNOSIS — F41.9 ANXIETY IN PREGNANCY, ANTEPARTUM: ICD-10-CM

## 2023-05-25 DIAGNOSIS — Z3A.18 18 WEEKS GESTATION OF PREGNANCY: Primary | ICD-10-CM

## 2023-05-25 DIAGNOSIS — O09.522 MULTIGRAVIDA OF ADVANCED MATERNAL AGE IN SECOND TRIMESTER: ICD-10-CM

## 2023-05-25 DIAGNOSIS — O99.340 ANXIETY IN PREGNANCY, ANTEPARTUM: ICD-10-CM

## 2023-05-25 DIAGNOSIS — Z98.891 PREVIOUS CESAREAN SECTION: ICD-10-CM

## 2023-05-25 LAB
GLUCOSE UR STRIP-MCNC: NEGATIVE MG/DL
PROT UR STRIP-MCNC: NEGATIVE MG/DL

## 2023-05-25 NOTE — PROGRESS NOTES
Pregnancy loss discussion  Long discussion with pt regarding   AFP today  Taking Zoloft and ASA  Anatomy at follow up   Chief Complaint   Patient presents with   • Routine Prenatal Visit     Ob Check - Pt is 18w5d, Pt is having some anxiety due to her hx with miscarriages and would like to know the chances of it happening again, Pt has no other complaints today       HPI:  Pt presents for routine prenatal visit    ROS:  No fever or chills, no nausea or vomiting, no contractions, no leg pain, no LE edema, no leaking fluid, no bleeding, no headache, no dysuria  All other systems reviewed and negative    Exam:  See flow sheet  General:  Alert and oriented and no distress  Neck: no lymphadenopathy or thyromegaly  Lungs: non - labored breathing  Abd:  See flow sheet, fundus nontender  Ext: see flow sheet, non-tender bilateral , no lesions  Neuro: grossly normal    Assessment:/ PLAN:    Diagnoses and all orders for this visit:    1. 18 weeks gestation of pregnancy (Primary)  -     POC Urinalysis Dipstick  -     Alpha Fetoprotein, Maternal    2. Encounter for genetic screening for birth defect  -     Alpha Fetoprotein, Maternal    3. Multigravida of advanced maternal age in second trimester    4. Previous  section    5. Anxiety in pregnancy, antepartum

## 2023-05-27 LAB
AFP INTERP SERPL-IMP: NORMAL
AFP INTERP SERPL-IMP: NORMAL
AFP MOM SERPL: 1.32
AFP SERPL-MCNC: 50.3 NG/ML
AGE AT DELIVERY: 40 YR
GA METHOD: NORMAL
GA: 18.7 WEEKS
IDDM PATIENT QL: NO
LABORATORY COMMENT REPORT: NORMAL
MULTIPLE PREGNANCY: NO
NEURAL TUBE DEFECT RISK FETUS: 4529 %
RESULT: NORMAL

## 2023-06-09 ENCOUNTER — ROUTINE PRENATAL (OUTPATIENT)
Dept: OBSTETRICS AND GYNECOLOGY | Age: 38
End: 2023-06-09
Payer: MEDICAID

## 2023-06-09 DIAGNOSIS — Z36.89 ENCOUNTER FOR FETAL ANATOMIC SURVEY: ICD-10-CM

## 2023-06-09 DIAGNOSIS — O99.340 ANXIETY IN PREGNANCY, ANTEPARTUM: ICD-10-CM

## 2023-06-09 DIAGNOSIS — F41.9 ANXIETY IN PREGNANCY, ANTEPARTUM: ICD-10-CM

## 2023-06-09 DIAGNOSIS — Z98.891 PREVIOUS CESAREAN SECTION: ICD-10-CM

## 2023-06-09 DIAGNOSIS — O09.522 MULTIGRAVIDA OF ADVANCED MATERNAL AGE IN SECOND TRIMESTER: ICD-10-CM

## 2023-06-09 DIAGNOSIS — Z36.86 ENCOUNTER FOR ANTENATAL SCREENING FOR CERVICAL LENGTH: ICD-10-CM

## 2023-06-09 DIAGNOSIS — Z3A.20 20 WEEKS GESTATION OF PREGNANCY: Primary | ICD-10-CM

## 2023-06-09 LAB
GLUCOSE UR STRIP-MCNC: NEGATIVE MG/DL
PROT UR STRIP-MCNC: NEGATIVE MG/DL

## 2023-06-09 NOTE — PROGRESS NOTES
Pt feels well, lots to discuss  Concerned about   Agreeable to  only if goes into labor earlier than 39 weeks IF okay with practice   calculator is 42%  See US- normal and complete anatomy  3.85 is CL  Lots of questions regarding possibility of having more children after age 38 and also about BTL/salpingectomy  Discussed bilateral salpingectomy would need to sign consent prior to surgery  Overall questions answered,   Follow up 4 weeks for 1 hr GCT  Referral to [facility] due to AMA  Chief Complaint   Patient presents with    Routine Prenatal Visit       HPI:  Pt presents for routine prenatal visit    ROS:  No fever or chills, no nausea or vomiting, no contractions, no leg pain, no LE edema, no leaking fluid, no bleeding, no headache, no dysuria  All other systems reviewed and negative    Exam:  See flow sheet  General:  Alert and oriented and no distress  Neck: no lymphadenopathy or thyromegaly  Lungs: non - labored breathing  Abd:  See flow sheet, fundus nontender  Ext: see flow sheet, non-tender bilateral , no lesions  Neuro: grossly normal    Assessment:/ PLAN:    Diagnoses and all orders for this visit:    1. 20 weeks gestation of pregnancy (Primary)  -     POC Urinalysis Dipstick    2. Encounter for fetal anatomic survey  -     US Ob 14 + Weeks Single or First Gestation    3. Encounter for  screening for cervical length  -     US Ob Transvaginal    4. Multigravida of advanced maternal age in second trimester  -     [facility] reproductive imaging [place]; Future  -     Ambulatory Referral to [facility]/Perinatology    5. Anxiety in pregnancy, antepartum    6. Previous  section  -     [facility] reproductive [facility]; Future

## 2023-06-19 ENCOUNTER — DOCUMENTATION (OUTPATIENT)
Dept: OBSTETRICS AND GYNECOLOGY | Age: 38
End: 2023-06-19
Payer: MEDICAID

## 2023-06-19 NOTE — PROGRESS NOTES
[facility]   Obstetrics and Gynecology     2023      Patient:  Sonam Lang   MR#:[id number]    Chart note    Dr. El -     Physicians in the practice have reviewed details in the medical record of the patient listed above per practice protocol for supporting a trial of labor with prior .    The consensus is that the majority do not consider the patient to be an optimal candidate for .  In that setting, we cannot support a decision to .     The group understands that highly motivated patients may not agree with the decision.  In that case, we encourage the patient to explore other practices that may be willing to support the request.      Routinely, the [facility] offers the option for a trial of labor to most any patient, but the majority of our physician do not agree a trial of labor serves the best interests of this patient       Neri Dean MD   2023 17:10 EDT

## 2023-07-14 PROBLEM — O09.522 MULTIGRAVIDA OF ADVANCED MATERNAL AGE IN SECOND TRIMESTER: Status: ACTIVE | Noted: 2023-07-14

## 2023-08-03 ENCOUNTER — ROUTINE PRENATAL (OUTPATIENT)
Dept: OBSTETRICS AND GYNECOLOGY | Age: 38
End: 2023-08-03
Payer: MEDICAID

## 2023-08-03 VITALS — DIASTOLIC BLOOD PRESSURE: 74 MMHG | SYSTOLIC BLOOD PRESSURE: 116 MMHG | WEIGHT: 247 LBS | BODY MASS INDEX: 37.56 KG/M2

## 2023-08-03 DIAGNOSIS — O09.523 MULTIGRAVIDA OF ADVANCED MATERNAL AGE IN THIRD TRIMESTER: ICD-10-CM

## 2023-08-03 DIAGNOSIS — Z3A.28 28 WEEKS GESTATION OF PREGNANCY: Primary | ICD-10-CM

## 2023-08-03 DIAGNOSIS — Z98.891 PREVIOUS CESAREAN SECTION: ICD-10-CM

## 2023-08-03 LAB
GLUCOSE UR STRIP-MCNC: NEGATIVE MG/DL
PROT UR STRIP-MCNC: NEGATIVE MG/DL

## 2023-08-18 ENCOUNTER — ROUTINE PRENATAL (OUTPATIENT)
Dept: OBSTETRICS AND GYNECOLOGY | Age: 38
End: 2023-08-18
Payer: MEDICAID

## 2023-08-18 VITALS — WEIGHT: 254 LBS | BODY MASS INDEX: 38.62 KG/M2 | DIASTOLIC BLOOD PRESSURE: 74 MMHG | SYSTOLIC BLOOD PRESSURE: 114 MMHG

## 2023-08-18 DIAGNOSIS — F41.9 ANXIETY IN PREGNANCY, ANTEPARTUM: ICD-10-CM

## 2023-08-18 DIAGNOSIS — O99.340 ANXIETY IN PREGNANCY, ANTEPARTUM: ICD-10-CM

## 2023-08-18 DIAGNOSIS — Z98.891 PREVIOUS CESAREAN SECTION: ICD-10-CM

## 2023-08-18 DIAGNOSIS — M54.9 BACK PAIN AFFECTING PREGNANCY IN THIRD TRIMESTER: ICD-10-CM

## 2023-08-18 DIAGNOSIS — Z3A.30 30 WEEKS GESTATION OF PREGNANCY: Primary | ICD-10-CM

## 2023-08-18 DIAGNOSIS — O99.891 BACK PAIN AFFECTING PREGNANCY IN THIRD TRIMESTER: ICD-10-CM

## 2023-08-18 DIAGNOSIS — O09.523 AMA (ADVANCED MATERNAL AGE) MULTIGRAVIDA 35+, THIRD TRIMESTER: ICD-10-CM

## 2023-08-18 LAB
GLUCOSE UR STRIP-MCNC: NEGATIVE MG/DL
PROT UR STRIP-MCNC: NEGATIVE MG/DL

## 2023-08-18 NOTE — PROGRESS NOTES
The patient feels overall well.  She is reporting some back pain and would like to do physical therapy.  She is also having some mild ankle swelling.  Her blood pressure looks fine.  She is feeling active fetal movement.  She has an ultrasound scheduled at her next appointment.  Chief Complaint   Patient presents with    Routine Prenatal Visit     Ob Check - Pt is 30w4d, Pt c/o back pain & ankle swelling, Breast Pump order sent today       HPI:  Pt presents for routine prenatal visit    ROS:  No fever or chills, no nausea or vomiting, no contractions, no leg pain, no LE edema, no leaking fluid, no bleeding, no headache, no dysuria  All other systems reviewed and negative    Exam:  See flow sheet  General:  Alert and oriented and no distress  Neck: no lymphadenopathy or thyromegaly  Lungs: non - labored breathing  Abd:  See flow sheet, fundus nontender  Ext: see flow sheet, non-tender bilateral , no lesions  Neuro: grossly normal    Assessment:/ PLAN:    Diagnoses and all orders for this visit:    1. 30 weeks gestation of pregnancy (Primary)  -     POC Urinalysis Dipstick    2. AMA (advanced maternal age) multigravida 35+, third trimester    3. Previous  section    4. Anxiety in pregnancy, antepartum    5. Back pain affecting pregnancy in third trimester  -     Ambulatory Referral to Physical Therapy Evaluate and treat

## 2023-08-22 ENCOUNTER — HOSPITAL ENCOUNTER (OUTPATIENT)
Dept: PHYSICAL THERAPY | Facility: HOSPITAL | Age: 38
Discharge: HOME | End: 2023-08-22
Admitting: OBSTETRICS & GYNECOLOGY
Payer: MEDICAID

## 2023-08-22 DIAGNOSIS — M54.9 BACK PAIN DURING PREGNANCY: Primary | ICD-10-CM

## 2023-08-22 DIAGNOSIS — O99.891 BACK PAIN DURING PREGNANCY: Primary | ICD-10-CM

## 2023-08-22 PROCEDURE — 97530 THERAPEUTIC ACTIVITIES: CPT

## 2023-08-22 PROCEDURE — 97161 PT EVAL LOW COMPLEX 20 MIN: CPT

## 2023-08-22 PROCEDURE — 97110 THERAPEUTIC EXERCISES: CPT

## 2023-08-22 NOTE — THERAPY EVALUATION
Outpatient Physical Therapy Pelvic Health Initial Evaluation  [facility]     Patient Name: Sonam Lang  : 1985  MRN: [id number]  Today's Date: 2023        Visit Date: 2023    Patient Active Problem List   Diagnosis    Migraine without aura and without status migrainosus, not intractable    Allergy to NSAIDs    Asthma, mild persistent    Obesity (BMI 30.0-34.9)    Sleep apnea    Anxiety    Previous  section    , delivered    Adult ADHD    Anxiety in pregnancy, antepartum    Multigravida of advanced maternal age in second trimester        Past Medical History:   Diagnosis Date    ADHD (attention deficit hyperactivity disorder)     Anxiety     Asthma     Congenital abnormalities 2022    Multiple fetal anomalies present including meningocele, unilateral real agenesis, unilateral club foot, membranous VSD     Depression     GERD (gastroesophageal reflux disease)     History of depression 2019    Irritable bowel syndrome     Migraine     Pregnancy complicated by multiple fetal congenital anomalies, single gestation 2022    NTD, SUA    PTSD (post-traumatic stress disorder)     Sleep apnea 2016    CPAP        Past Surgical History:   Procedure Laterality Date     SECTION      COLONOSCOPY  2014    SINUS SURGERY  2022    WISDOM TOOTH EXTRACTION           Visit Dx:    ICD-10-CM ICD-9-CM   1. Back pain during pregnancy  O99.891 646.80    M54.9 724.5        Patient History       Row Name 23 1300             History    Chief Complaint Pain  -CC      Type of Pain Back pain  -CC      Brief Description of Current Complaint Sonam Lang is a 37 y.o. female who presents today with back pain during pregnancy. Pt is 31w3d GA, reports onset of back pain about a month ago. Pt is . Reports able to work out back pain during the day. Reports pain primarily wakes her up at night when sleeping. And will hurt throughout the day. Pt flips back and forth  between L and R s/l. Sleeps with pillow under head and between knees. Sonam Lnag reports difficulty/increased pain with sleeping pain, standing/sitting for long periods of time. PMH includes neck pain, Hx of  in 2019, hx of vaginal deliver in  at 27 weeks GA. Scheduled for  at 39 weeks.  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  -CC      Are you or can you be pregnant Yes  -CC         Pain     Pain Location Back  -CC      Pain at Present 5  -CC         Daily Activities    Primary Language English  -CC      Barriers to learning None  -CC      Pt Participated in POC and Goals Yes  -CC                User Key  (r) = Recorded By, (t) = Taken By, (c) = Cosigned By      Initials Name Provider Type    CC Caroline Hernandes, PT Physical Therapist                     Pelvic Health       Row Name 23 1300             Pregnancy Questions    Number of Pregnancies 5  -CC      Number of Miscarriages 2  -CC      Number of Children 1  -CC      How many weeks pregnant are you? 31 weeks  3d  -CC      Type of Previous Deliveries Vaginal;  -CC      Due Date 10/16/23  scheduled   -CC      Do you have radicular pain or numbness? Yes  -CC      Pregnancy Comments Hx of live birth at 27 weeks GA, baby passed away shortly after being born.  -CC         Lumbar/SI Special Tests    Stork Test (SI Dysfunction) Negative  -CC      Lumbar/SI Special Tests Comments weight shift noted with SLS, approx 5 sec khoi  -CC         Lumbosacral Palpation    Lumbosacral Palpation? No Tenderness/Abnormality  -CC      Lumbosacral Palpation Comments khoi UT TTP  -CC         Education Provided On:    Education Points HEP;Behavioral modifications  -CC      Method of Delivery Verbal;Demonstration;Written  -CC      Education Provided To Patient  -CC      Level of Understanding Teach back education performed;Verbalized;Demonstrated  -CC      HEP Comments Access Code [email or web address];  -CC      Education  Comments eval findings, goals of PT, POC, sleeping position, car transfers, avoiding steps, belly band  -CC         MMT (Manual Muscle Testing)    Rt Lower Ext Rt Hip Flexion;Rt Hip ABduction;Rt Knee Extension;Rt Knee Flexion  -CC      Lt Lower Ext Lt Hip Flexion;Lt Hip ABduction;Lt Knee Extension;Lt Knee Flexion  -CC         MMT Right Lower Ext    Rt Hip Flexion MMT, Gross Movement (4-/5) good minus  -CC      Rt Hip ABduction MMT, Gross Movement (4-/5) good minus  -CC      Rt Knee Extension MMT, Gross Movement (5/5) normal  -CC      Rt Knee Flexion MMT, Gross Movement (5/5) normal  -CC         MMT Left Lower Ext    Lt Hip Flexion MMT, Gross Movement (4-/5) good minus  -CC      Lt Hip ABduction MMT, Gross Movement (4-/5) good minus  -CC      Lt Knee Extension MMT, Gross Movement (5/5) normal  -CC      Lt Knee Flexion MMT, Gross Movement (5/5) normal  -CC                User Key  (r) = Recorded By, (t) = Taken By, (c) = Cosigned By      Initials Name Provider Type    Caroline Redman, PT Physical Therapist                   PT Ortho       Row Name 08/22/23 1300       Posture/Observations    Alignment Options Lumbar lordosis;Rounded shoulders  -CC    Rounded Shoulders Mild  -CC    Lumbar lordosis Increased  -CC       Lumbar ROM Screen- Lower Quarter Clearing    Lumbar Flexion Impaired  WFL, impaired secondary to pregnant belly  -CC    Lumbar Extension Normal  -CC    Lumbar Rotation Normal  -CC              User Key  (r) = Recorded By, (t) = Taken By, (c) = Cosigned By      Initials Name Provider Type    Caroline Redman PT Physical Therapist                                PT Assessment/Plan       Row Name 08/22/23 1400          PT Assessment    Functional Limitations Impaired locomotion;Limitation in home management;Limitations in community activities;Limitations in functional capacity and performance;Performance in leisure activities  -CC     Impairments Range of motion;Posture;Poor body  mechanics;Pain;Muscle strength;Impaired flexibility  -     Assessment Comments Sonam Lang is a 37 y.o. female referred to physical therapy for back pain during pregnancy. She presents with an evolving clinical presentation, along with  comorbidities of increased BMI and personal factors of hx of neck pain  that may impact her progress in the plan of care. Pt presents today with decreased lumbar mobility impacted by pregnancy, decreased core/hip strength, and increased lumbar lordosis in standing . Her signs and symptoms are consistent with referring diagnosis. The previous impairments limit her ability to stand or sit for long periods of time and sleep through the night without pain. Pt will benefit from skilled PT to address the previous impairments and return to PLOF.  -CC     Please refer to paper survey for additional self-reported information No  -CC     Rehab Potential Good  -CC     Patient/caregiver participated in establishment of treatment plan and goals Yes  -CC     Patient would benefit from skilled therapy intervention Yes  -CC        PT Plan    PT Frequency 1x/week  -CC     Predicted Duration of Therapy Intervention (PT) 6-8 visits  -CC     Planned CPT's? PT EVAL LOW COMPLEXITY: 39589;PT RE-EVAL: 23641;PT THER PROC EA 15 MIN: 30918;PT THER ACT EA 15 MIN: 83703;PT MANUAL THERAPY EA 15 MIN: 83621;PT NEUROMUSC RE-EDUCATION EA 15 MIN: 30721;PT GAIT TRAINING EA 15 MIN: 76076;PT SELF CARE/HOME MGMT/TRAIN EA 15: 41599;PT HOT OR COLD PACK TREAT MCARE  -CC     PT Plan Comments next visit: response to sleeping position? Belly band? Response to HEP? Add seated march with PPT, hip abd, side steps, monster walk, AR Press, uni row with hip   -CC               User Key  (r) = Recorded By, (t) = Taken By, (c) = Cosigned By      Initials Name Provider Type    Caroline Redman, PT Physical Therapist                       OP Exercises       Row Name 08/22/23 1400             Total Minutes    76122 -  PT Therapeutic Exercise Minutes 14  -CC      23725 - PT Therapeutic Activity Minutes 10  -CC         Exercise 1    Exercise Name 1 seated PPT  -CC      Cueing 1 Verbal  -CC      Reps 1 10  -CC         Exercise 2    Exercise Name 2 seated SB rollout  -CC      Cueing 2 Verbal  -CC      Reps 2 10  -CC         Exercise 3    Exercise Name 3 seated hip add  -CC      Cueing 3 Verbal  -CC      Reps 3 10  -CC      Time 3 5 sec  -CC         Exercise 4    Exercise Name 4 discussed sleeping support with pillow btwn knees and under belly  -CC      Cueing 4 Verbal;Demo  -CC         Exercise 5    Exercise Name 5 Discussed potential purchase of belly band for back support in standing  -CC      Cueing 5 Verbal  -CC                User Key  (r) = Recorded By, (t) = Taken By, (c) = Cosigned By      Initials Name Provider Type    Caroline Redman, PT Physical Therapist                                 PT OP Goals       Row Name 08/22/23 1400          PT Short Term Goals    STG Date to Achieve 09/21/23  -     STG 1 Patient will be independent with education for symptom management and initial HEP to decrease LBP pain.  -CC     STG 1 Progress New  -     STG 2 Pt will verbalize and demo good understanding of postural awareness to minimize low back stress during prenancy  -     STG 2 Progress New  [facility]        Long Term Goals    LTG Date to Achieve 10/21/23  -     LTG 1 Patient will be independent with education for symptom management and advanced HEP to decrease LBP pain.  -     LTG 1 Progress New  -     LTG 2 Pt will report 50% improve sleep patterns due to minimized low back pain.  -     LTG 2 Progress New  [facility]     LTG 3 Pt will report 50% overall improvement in s/s to improve participation in ADLs  -     LTG 3 Progress New  [facility]        Time Calculation    PT Goal Re-Cert Due Date 11/20/23  -               User Key  (r) = Recorded By, (t) = Taken By, (c) = Cosigned By      Initials Name Provider Type    GAEL Hernandes  Caroline, PT Physical Therapist                                    Time Calculation:   Start Time: 1345  Stop Time: 1432  Time Calculation (min): 47 min  Total Timed Code Minutes- PT: 24 minute(s)  Timed Charges  84032 - PT Therapeutic Exercise Minutes: 14  99225 - PT Therapeutic Activity Minutes: 10  Untimed Charges  PT Eval/Re-eval Minutes: 23  Total Minutes  Timed Charges Total Minutes: 24  Untimed Charges Total Minutes: 23   Total Minutes: 47  Therapy Charges for Today       Code Description Service Date Service Provider Modifiers Qty    83996219194 HC PT THER PROC EA 15 MIN 8/22/2023 Caroline Hernandes, PT GP 1    80452254788 HC PT THERAPEUTIC ACT EA 15 MIN 8/22/2023 Caroline Hernandes, PT GP 1    10430819925 HC PT EVAL LOW COMPLEXITY 2 8/22/2023 Caroline Hernandes, PT GP 1                    Caroline Hernandes, PT  8/22/2023

## 2023-08-28 ENCOUNTER — ROUTINE PRENATAL (OUTPATIENT)
Dept: OBSTETRICS AND GYNECOLOGY | Age: 38
End: 2023-08-28
Payer: MEDICAID

## 2023-08-28 VITALS — DIASTOLIC BLOOD PRESSURE: 78 MMHG | SYSTOLIC BLOOD PRESSURE: 116 MMHG | BODY MASS INDEX: 38.77 KG/M2 | WEIGHT: 255 LBS

## 2023-08-28 DIAGNOSIS — O99.340 ANXIETY IN PREGNANCY, ANTEPARTUM: ICD-10-CM

## 2023-08-28 DIAGNOSIS — J20.9 ACUTE BRONCHITIS, UNSPECIFIED ORGANISM: ICD-10-CM

## 2023-08-28 DIAGNOSIS — O09.523 AMA (ADVANCED MATERNAL AGE) MULTIGRAVIDA 35+, THIRD TRIMESTER: ICD-10-CM

## 2023-08-28 DIAGNOSIS — D22.9 NUMEROUS SKIN MOLES: ICD-10-CM

## 2023-08-28 DIAGNOSIS — Z3A.32 32 WEEKS GESTATION OF PREGNANCY: Primary | ICD-10-CM

## 2023-08-28 DIAGNOSIS — F41.9 ANXIETY IN PREGNANCY, ANTEPARTUM: ICD-10-CM

## 2023-08-28 DIAGNOSIS — Z98.891 PREVIOUS CESAREAN SECTION: ICD-10-CM

## 2023-08-28 DIAGNOSIS — J45.30 MILD PERSISTENT ASTHMA WITHOUT COMPLICATION: ICD-10-CM

## 2023-08-28 LAB
GLUCOSE UR STRIP-MCNC: NEGATIVE MG/DL
PROT UR STRIP-MCNC: NEGATIVE MG/DL

## 2023-08-28 RX ORDER — AZITHROMYCIN 250 MG/1
TABLET, FILM COATED ORAL
Qty: 6 TABLET | Refills: 0 | Status: SHIPPED | OUTPATIENT
Start: 2023-08-28 | End: 2023-09-02

## 2023-08-28 NOTE — PROGRESS NOTES
The patient is having some upper respiratory infections symptoms  She went to urgent care and they recommended some antibiotics and steroids but the patient wanted to speak with me first  She does have a sore throat she has congestion she has a history of asthma and she does have some wheezing.  She does have a albuterol inhaler to use as needed.  I discussed with the patient that we will add Flovent and a Z-Evin.  She can use over-the-counter symptomatic treatments like Tylenol multisymptom cold  The patient also is worried about a couple little skin tags/moles in her umbilicus.  She showed them to me and I did not think that they were appeared worrisome  However the patient would like a dermatology consult for ongoing skin surveillance due to her many moles and fair skin.  She is using a pregnancy belt and she does have some lower abdominal pressure and pain but no contractions.  Ultrasound was done today showing good growth at 87th percentile and an JOELLE of 21.  Follow-up in 2 weeks and start weekly BPP's at that time.    Chief Complaint   Patient presents with    Routine Prenatal Visit     Ob Check & US - Pt is 32w2d, pt c/o difficulty breathing & nasal congestion, pt states she went to urgent care yesterday & they advised to take a steroid but she wanted to discuss with Dr Nikko nj, Pt c/o lower abdominal pain       HPI:  Pt presents for routine prenatal visit    ROS:  No fever or chills, no nausea or vomiting, no contractions, no leg pain, no LE edema, no leaking fluid, no bleeding, no headache, no dysuria  All other systems reviewed and negative    Exam:  See flow sheet  General:  Alert and oriented and no distress  Neck: no lymphadenopathy or thyromegaly  Lungs: non - labored breathing  Abd:  See flow sheet, fundus nontender  Ext: see flow sheet, non-tender bilateral , no lesions  Neuro: grossly normal    Assessment:/ PLAN:    Diagnoses and all orders for this visit:    1. 32 weeks gestation of pregnancy  (Primary)  -     POC Urinalysis Dipstick    2. AMA (advanced maternal age) multigravida 35+, third trimester    3. Previous  section    4. Anxiety in pregnancy, antepartum    5. Acute bronchitis, unspecified organism    6. Mild persistent asthma without complication    7. Numerous skin moles  -     Ambulatory Referral to Dermatology    Other orders  -     azithromycin (Zithromax Z-Evin) 250 MG tablet; Take 2 tablets (500 mg) on  Day 1,  followed by 1 tablet (250 mg) once daily on Days 2 through 5.  Dispense: 6 tablet; Refill: 0

## 2023-08-29 ENCOUNTER — TELEPHONE (OUTPATIENT)
Dept: OBSTETRICS AND GYNECOLOGY | Age: 38
End: 2023-08-29
Payer: MEDICAID

## 2023-08-29 RX ORDER — BUDESONIDE AND FORMOTEROL FUMARATE DIHYDRATE 80; 4.5 UG/1; UG/1
2 AEROSOL RESPIRATORY (INHALATION)
Qty: 10.2 G | Refills: 3 | Status: SHIPPED | OUTPATIENT
Start: 2023-08-29

## 2023-08-29 NOTE — TELEPHONE ENCOUNTER
I noticed that pt already has a steroid inhaler on her list and was marked as already using it.  If this is true then a steroid inhaler unnecessary . Please go over her med list.  If she needs a steroid inhaler I can send in but looks like she is already on one. (Symbicort and pulmicort both steroid inhalers on her list)

## 2023-08-29 NOTE — TELEPHONE ENCOUNTER
Ob pt calling stating she was told at her appt yesterday that an Rx would be sent into her pharmacy for a steroid inhaler as well as a Z-Evin. Pt states pharmacy received Rx for Z-Evin but does not have an Rx on file for the steroid inhaler. Pt asking if that can be sent into pharmacy on file. FYI - pt aware you are out of office today.

## 2023-08-30 ENCOUNTER — HOSPITAL ENCOUNTER (OUTPATIENT)
Dept: PHYSICAL THERAPY | Facility: HOSPITAL | Age: 38
Setting detail: THERAPIES SERIES
Discharge: HOME | End: 2023-08-30
Payer: MEDICAID

## 2023-08-30 DIAGNOSIS — M54.9 BACK PAIN DURING PREGNANCY: Primary | ICD-10-CM

## 2023-08-30 DIAGNOSIS — O99.891 BACK PAIN DURING PREGNANCY: Primary | ICD-10-CM

## 2023-08-30 PROCEDURE — 97110 THERAPEUTIC EXERCISES: CPT

## 2023-08-30 PROCEDURE — 97530 THERAPEUTIC ACTIVITIES: CPT

## 2023-08-30 NOTE — THERAPY TREATMENT NOTE
Outpatient Physical Therapy Pelvic Health Treatment Note  [facility]     Patient Name: Sonam Lang  : 1985  MRN: [id number]  Today's Date: 2023        Visit Date: 2023    Visit Dx:    ICD-10-CM ICD-9-CM   1. Back pain during pregnancy  O99.891 646.80    M54.9 724.5       Patient Active Problem List   Diagnosis    Migraine without aura and without status migrainosus, not intractable    Allergy to NSAIDs    Asthma, mild persistent    Obesity (BMI 30.0-34.9)    Sleep apnea    Anxiety    Previous  section    , delivered    Adult ADHD    Anxiety in pregnancy, antepartum    Multigravida of advanced maternal age in second trimester                          PT Assessment/Plan       Row Name 23 1400          PT Assessment    Assessment Comments Sonam returns for first f/u visit since eval reporting good HEP compliance. Reports trial of pilllow positioning for sidelying hip and belly support, with good response and decreased pain. Reviewed donning belly band today and when to use. Discussed and reviewed role of core and back positioning to minimize lumbar stress and progressed core and hip strengthening.  -CC        PT Plan    PT Plan Comments next visit: monster walk, AR Press, uni row to hip   -CC               User Key  (r) = Recorded By, (t) = Taken By, (c) = Cosigned By      Initials Name Provider Type    Caroline Redman, PT Physical Therapist                       OP Exercises       Row Name 23 1300             Total Minutes    53660 - PT Therapeutic Exercise Minutes 30  -CC      05594 - PT Therapeutic Activity Minutes 10  -CC         Exercise 1    Exercise Name 1 seated PPT with march  -CC      Cueing 1 Verbal  -CC      Reps 1 10 ea leg  -CC         Exercise 2    Exercise Name 2 seated SB rollout  -CC      Cueing 2 Verbal  -CC      Reps 2 10  -CC         Exercise 3    Exercise Name 3 seated hip add  -CC      Cueing 3 Verbal  -CC      Reps 3 10  -CC       Time 3 5 sec  -CC         Exercise 4    Exercise Name 4 brief review of sleep position  -CC         Exercise 5    Exercise Name 5 demo of belly band  -CC      Cueing 5 Demo  -CC         Exercise 6    Exercise Name 6 seated hip abd  -CC      Cueing 6 Verbal  -CC      Reps 6 20  -CC      Time 6 RTB  -CC         Exercise 7    Exercise Name 7 side steps  -CC      Cueing 7 Verbal  -CC      Reps 7 4 laps  -CC      Time 7 RTB  -CC         Exercise 8    Exercise Name 8 alt tband shoulder ext  -CC      Cueing 8 Verbal  -CC      Reps 8 20 alt arm  -CC      Time 8 RTB  -CC         Exercise 9    Exercise Name 9 mountain climber at wall  -CC      Cueing 9 Verbal  -CC      Reps 9 20 total, alt legs  -CC      Time 9 cues for PPT  -CC         Exercise 10    Exercise Name 10 alt tspine rot at wall  -CC      Cueing 10 Verbal  -CC      Reps 10 10 ea way  -CC      Time 10 on elbows  at wall  -CC                User Key  (r) = Recorded By, (t) = Taken By, (c) = Cosigned By      Initials Name Provider Type    CC Caroline Hernandes, PT Physical Therapist                                 PT OP Goals       Row Name 08/30/23 1400          PT Short Term Goals    STG Date to Achieve 09/21/23  -CC     STG 1 Patient will be independent with education for symptom management and initial HEP to decrease LBP pain.  -CC     STG 1 Progress Ongoing  -CC     STG 2 Pt will verbalize and demo good understanding of postural awareness to minimize low back stress during prenancy  -CC     STG 2 Progress Ongoing  -        Long Term Goals    LTG Date to Achieve 10/21/23  -CC     LTG 1 Patient will be independent with education for symptom management and advanced HEP to decrease LBP pain.  -CC     LTG 1 Progress Ongoing  -CC     LTG 2 Pt will report 50% improve sleep patterns due to minimized low back pain.  -CC     LTG 2 Progress Met  -CC     LTG 3 Pt will report 50% overall improvement in s/s to improve participation in ADLs  -CC     LTG 3 Progress Ongoing   -CC               User Key  (r) = Recorded By, (t) = Taken By, (c) = Cosigned By      Initials Name Provider Type    CC Caroline Hernandes, PT Physical Therapist                                    Time Calculation:   Start Time: 1350  Stop Time: 1430  Time Calculation (min): 40 min  Total Timed Code Minutes- PT: 40 minute(s)  Timed Charges  92847 - PT Therapeutic Exercise Minutes: 30  95036 - PT Therapeutic Activity Minutes: 10  Total Minutes  Timed Charges Total Minutes: 40   Total Minutes: 40  Therapy Charges for Today       Code Description Service Date Service Provider Modifiers Qty    00891361090  PT THER PROC EA 15 MIN 8/30/2023 Caroline Hernandes, PT GP 2    [id number]  PT THERAPEUTIC ACT EA 15 MIN 8/30/2023 Caroline Hernandes, PT GP 1                      Caroline Hernandes PT  8/30/2023

## 2023-09-08 ENCOUNTER — TELEPHONE (OUTPATIENT)
Dept: SPORTS MEDICINE | Facility: CLINIC | Age: 38
End: 2023-09-08
Payer: MEDICAID

## 2023-09-08 DIAGNOSIS — L98.9 SKIN LESIONS: Primary | ICD-10-CM

## 2023-09-08 NOTE — TELEPHONE ENCOUNTER
Caller: Sonam Lang    Relationship to patient: Self    Best call back number: [phone]    Patient is needing: PATIENT WOULD LIKE A REFERRAL TO A DERMATOLOGIST THAT IS IN NETWORK WITH HER INSURANCE DUE TO QUESTIONABLE MOLES

## 2023-09-11 ENCOUNTER — HOSPITAL ENCOUNTER (OUTPATIENT)
Dept: PHYSICAL THERAPY | Facility: HOSPITAL | Age: 38
Setting detail: THERAPIES SERIES
Discharge: HOME | End: 2023-09-11
Payer: MEDICAID

## 2023-09-11 DIAGNOSIS — O99.891 BACK PAIN DURING PREGNANCY: Primary | ICD-10-CM

## 2023-09-11 DIAGNOSIS — M54.9 BACK PAIN DURING PREGNANCY: Primary | ICD-10-CM

## 2023-09-11 PROCEDURE — 97110 THERAPEUTIC EXERCISES: CPT

## 2023-09-11 NOTE — TELEPHONE ENCOUNTER
TABITHA Chacon.      I called and informed patient that referral has been placed, patient clarified that insurance required that referral be placed by PCP, that is why there is a secondary referral.

## 2023-09-11 NOTE — THERAPY TREATMENT NOTE
Outpatient Physical Therapy Pelvic Health Treatment Note  [facility]     Patient Name: Sonam Lang  : 1985  MRN: [id number]  Today's Date: 2023        Visit Date: 2023    Visit Dx:    ICD-10-CM ICD-9-CM   1. Back pain during pregnancy  O99.891 646.80    M54.9 724.5       Patient Active Problem List   Diagnosis    Migraine without aura and without status migrainosus, not intractable    Allergy to NSAIDs    Asthma, mild persistent    Obesity (BMI 30.0-34.9)    Sleep apnea    Anxiety    Previous  section    , delivered    Adult ADHD    Anxiety in pregnancy, antepartum    Multigravida of advanced maternal age in second trimester                          PT Assessment/Plan       Row Name 23 1100          PT Assessment    Assessment Comments Sonam returns with ongoing reports of mid and low back pain. Reports sleeping at night continues to be most painful, but now goes to bed later which has helped. Reports intermittent HEP compliance. Progressed rotational and scap strengthenig, and reviewed importance of lumbar positioning and TrA, as well as HEP compliance. Remains appropriate or skilled PT.  -CC        PT Plan    PT Plan Comments next visit: monster walk, lat str at counter/wall?  -CC               User Key  (r) = Recorded By, (t) = Taken By, (c) = Cosigned By      Initials Name Provider Type    Caroline Redman, PT Physical Therapist                       OP Exercises       Row Name 23 1100             Total Minutes    45290 - PT Therapeutic Exercise Minutes 40  -CC         Exercise 1    Exercise Name 1 APT/PPT on SB  -CC      Cueing 1 Verbal  -CC      Reps 1 10 ea  -CC         Exercise 2    Exercise Name 2 seated SB rollout  -CC      Cueing 2 Verbal  -CC      Reps 2 10  -CC         Exercise 4    Exercise Name 4 hip circles seated on SB  -CC      Cueing 4 Verbal  -CC      Reps 4 10 ea way  -CC         Exercise 5    Exercise Name 5 mini lunge to hip   with uni row  -CC      Cueing 5 Verbal  -CC      Reps 5 10 ea leg  -CC      Time 5 RTB  -CC         Exercise 6    Exercise Name 6 lat pulldown  -CC      Cueing 6 Verbal  -CC      Sets 6 2  -CC      Reps 6 10  -CC      Time 6 GTB  -CC      Additional Comments cues for elbows up, cues for UT relaxed  -CC         Exercise 7    Exercise Name 7 side steps  -CC      Cueing 7 Verbal  -CC      Reps 7 4 laps  -CC      Time 7 RTB  -CC         Exercise 8    Exercise Name 8 alt tband shoulder ext  -CC      Cueing 8 Verbal  -CC      Reps 8 20 alt arm  -CC      Time 8 RTB  -CC         Exercise 9    Exercise Name 9 mountain climber at wall  -CC      Cueing 9 Verbal  -CC      Reps 9 20 total, alt legs  -CC      Time 9 cues for PPT  -CC         Exercise 10    Exercise Name 10 alt tspine rot at wall  -CC      Cueing 10 Verbal  -CC      Reps 10 10 ea way  -CC      Time 10 on elbows  at wall  -CC      Additional Comments with reach through  -CC         Exercise 11    Exercise Name 11 Palloff rotation  -CC      Cueing 11 Verbal;Demo  -CC      Reps 11 10 ea side  -CC      Time 11 RTB  -CC      Additional Comments cues to minimize hip rotation, cues for TrA  -CC                User Key  (r) = Recorded By, (t) = Taken By, (c) = Cosigned By      Initials Name Provider Type    CC Caroline Hernandes, PT Physical Therapist                                                    Time Calculation:   Start Time: 1101  Stop Time: 1141  Time Calculation (min): 40 min  Total Timed Code Minutes- PT: 40 minute(s)  Timed Charges  76458 - PT Therapeutic Exercise Minutes: 40  Total Minutes  Timed Charges Total Minutes: 40   Total Minutes: 40  Therapy Charges for Today       Code Description Service Date Service Provider Modifiers Qty    35817464767 HC PT THER PROC EA 15 MIN 9/11/2023 Caroline Hernandes, PT GP 3                      Caroline Hernandes PT  9/11/2023

## 2023-09-11 NOTE — TELEPHONE ENCOUNTER
Faxed referral to [facility] after speaking with the office and they stated that they do take patients insurance.

## 2023-09-14 ENCOUNTER — ROUTINE PRENATAL (OUTPATIENT)
Dept: OBSTETRICS AND GYNECOLOGY | Age: 38
End: 2023-09-14
Payer: MEDICAID

## 2023-09-14 VITALS — DIASTOLIC BLOOD PRESSURE: 76 MMHG | SYSTOLIC BLOOD PRESSURE: 118 MMHG | WEIGHT: 261.6 LBS | BODY MASS INDEX: 39.78 KG/M2

## 2023-09-14 DIAGNOSIS — Z3A.34 34 WEEKS GESTATION OF PREGNANCY: Primary | ICD-10-CM

## 2023-09-14 DIAGNOSIS — Z98.891 PREVIOUS CESAREAN SECTION: ICD-10-CM

## 2023-09-14 DIAGNOSIS — O99.891 BACK PAIN AFFECTING PREGNANCY IN THIRD TRIMESTER: ICD-10-CM

## 2023-09-14 DIAGNOSIS — F41.9 ANXIETY IN PREGNANCY, ANTEPARTUM: ICD-10-CM

## 2023-09-14 DIAGNOSIS — M54.9 BACK PAIN AFFECTING PREGNANCY IN THIRD TRIMESTER: ICD-10-CM

## 2023-09-14 DIAGNOSIS — O09.523 AMA (ADVANCED MATERNAL AGE) MULTIGRAVIDA 35+, THIRD TRIMESTER: ICD-10-CM

## 2023-09-14 DIAGNOSIS — O99.340 ANXIETY IN PREGNANCY, ANTEPARTUM: ICD-10-CM

## 2023-09-14 DIAGNOSIS — J45.30 MILD PERSISTENT ASTHMA WITHOUT COMPLICATION: ICD-10-CM

## 2023-09-14 LAB
GLUCOSE UR STRIP-MCNC: NEGATIVE MG/DL
PROT UR STRIP-MCNC: NEGATIVE MG/DL

## 2023-09-14 NOTE — PROGRESS NOTES
The patient feels well today no complaints  BPP is 8 out of 8 JOELLE is 20.6  We discussed fetal movement counts  Weekly BPP's until delivery    Chief Complaint   Patient presents with    Routine Prenatal Visit     Ob Check & BPP US - Pt is 34w5d, Pt has no complaints today       HPI:  Pt presents for routine prenatal visit    ROS:  No fever or chills, no nausea or vomiting, no contractions, no leg pain, no LE edema, no leaking fluid, no bleeding, no headache, no dysuria  All other systems reviewed and negative    Exam:  See flow sheet  General:  Alert and oriented and no distress  Neck: no lymphadenopathy or thyromegaly  Lungs: non - labored breathing  Abd:  See flow sheet, fundus nontender  Ext: see flow sheet, non-tender bilateral , no lesions  Neuro: grossly normal    Assessment:/ PLAN:    Diagnoses and all orders for this visit:    1. 34 weeks gestation of pregnancy (Primary)  -     POC Urinalysis Dipstick    2. AMA (advanced maternal age) multigravida 35+, third trimester    3. Previous  section    4. Anxiety in pregnancy, antepartum    5. Mild persistent asthma without complication    6. Back pain affecting pregnancy in third trimester

## 2023-09-18 ENCOUNTER — HOSPITAL ENCOUNTER (OUTPATIENT)
Dept: PHYSICAL THERAPY | Facility: HOSPITAL | Age: 38
Setting detail: THERAPIES SERIES
Discharge: HOME | End: 2023-09-18
Payer: MEDICAID

## 2023-09-18 DIAGNOSIS — O99.891 BACK PAIN DURING PREGNANCY: Primary | ICD-10-CM

## 2023-09-18 DIAGNOSIS — M54.9 BACK PAIN DURING PREGNANCY: Primary | ICD-10-CM

## 2023-09-18 PROCEDURE — 97110 THERAPEUTIC EXERCISES: CPT

## 2023-09-18 NOTE — THERAPY TREATMENT NOTE
Outpatient Physical Therapy Pelvic Health Treatment Note  [facility]     Patient Name: Sonam Lang  : 1985  MRN: [id number]  Today's Date: 2023        Visit Date: 2023    Visit Dx:    ICD-10-CM ICD-9-CM   1. Back pain during pregnancy  O99.891 646.80    M54.9 724.5       Patient Active Problem List   Diagnosis    Migraine without aura and without status migrainosus, not intractable    Allergy to NSAIDs    Asthma, mild persistent    Obesity (BMI 30.0-34.9)    Sleep apnea    Anxiety    Previous  section    , delivered    Adult ADHD    Anxiety in pregnancy, antepartum    Multigravida of advanced maternal age in second trimester                          PT Assessment/Plan       Row Name 23 1200          PT Assessment    Assessment Comments Sonam returns reporting improvements in low back pain, but continues to have difficulty sleeping. Worked on breath mechanics and spinal mobility, and reviewed appropriate posture and core use through pregnancy.  -CC        PT Plan    PT Plan Comments next visit: lat str at counter/wall? Possible d/c?  -CC               User Key  (r) = Recorded By, (t) = Taken By, (c) = Cosigned By      Initials Name Provider Type    CC Caroline Hernandes, PT Physical Therapist                       OP Exercises       Row Name 23 0900             Subjective    Subjective Comments Feeling pretty good today since it's AM, Back has been doing a little better except for sleep. Got a ball for home.  -CC         Subjective Pain    Able to rate subjective pain? yes  -CC      Subjective Pain Comment slight  -CC         Total Minutes    31432 - PT Therapeutic Exercise Minutes 38  -CC         Exercise 1    Exercise Name 1 APT/PPT on SB  -CC      Cueing 1 Verbal  -CC      Reps 1 10 ea  -CC         Exercise 2    Exercise Name 2 sidelying diaphragmatic breathing  -CC      Cueing 2 Verbal  -CC      Reps 2 10 ea side  -CC         Exercise 3    Exercise Name 3 s/l  thoracic rot  -CC      Cueing 3 Verbal  -CC      Reps 3 10 ea side  -CC      Time 3 5 sec  -CC         Exercise 4    Exercise Name 4 hip circles seated on SB  -CC      Cueing 4 Verbal  -CC      Reps 4 10 ea way  -CC         Exercise 7    Exercise Name 7 s/l clam  -CC      Cueing 7 Verbal  -CC      Reps 7 10 khoi  -CC         Exercise 8    Exercise Name 8 monster walk  -CC      Cueing 8 Verbal  -CC      Reps 8 4 laps  -CC      Time 8 RTB  -CC                User Key  (r) = Recorded By, (t) = Taken By, (c) = Cosigned By      Initials Name Provider Type    CC Caroline Hernandes, PT Physical Therapist                                 PT OP Goals       Row Name 09/18/23 0900          PT Short Term Goals    STG Date to Achieve 09/21/23  -CC     STG 1 Patient will be independent with education for symptom management and initial HEP to decrease LBP pain.  -CC     STG 1 Progress Met  -CC     STG 2 Pt will verbalize and demo good understanding of postural awareness to minimize low back stress during prenancy  -CC     STG 2 Progress Met  -CC        Long Term Goals    LTG Date to Achieve 10/21/23  -CC     LTG 1 Patient will be independent with education for symptom management and advanced HEP to decrease LBP pain.  -CC     LTG 1 Progress Ongoing  -CC     LTG 2 Pt will report 50% improve sleep patterns due to minimized low back pain.  -CC     LTG 2 Progress Met  -CC     LTG 3 Pt will report 50% overall improvement in s/s to improve participation in ADLs  -CC     LTG 3 Progress Ongoing  -CC               User Key  (r) = Recorded By, (t) = Taken By, (c) = Cosigned By      Initials Name Provider Type    CC Caroline Hernandes, PT Physical Therapist                                    Time Calculation:   Start Time: 0915  Stop Time: 0953  Time Calculation (min): 38 min  Total Timed Code Minutes- PT: 38 minute(s)  Timed Charges  48387 - PT Therapeutic Exercise Minutes: 38  Total Minutes  Timed Charges Total Minutes: 38   Total  Minutes: 38  Therapy Charges for Today       Code Description Service Date Service Provider Modifiers Qty    55313027153 HC PT THER PROC EA 15 MIN 9/18/2023 Caroline Hernandes, PT GP 3                      Caroline Hernandes, PT  9/18/2023

## 2023-09-21 ENCOUNTER — ROUTINE PRENATAL (OUTPATIENT)
Dept: OBSTETRICS AND GYNECOLOGY | Age: 38
End: 2023-09-21
Payer: MEDICAID

## 2023-09-21 VITALS — SYSTOLIC BLOOD PRESSURE: 110 MMHG | BODY MASS INDEX: 39.44 KG/M2 | WEIGHT: 259.4 LBS | DIASTOLIC BLOOD PRESSURE: 70 MMHG

## 2023-09-21 DIAGNOSIS — Z3A.35 35 WEEKS GESTATION OF PREGNANCY: Primary | ICD-10-CM

## 2023-09-21 DIAGNOSIS — Z36.85 ANTENATAL SCREENING FOR STREPTOCOCCUS B: ICD-10-CM

## 2023-09-21 DIAGNOSIS — O09.523 AMA (ADVANCED MATERNAL AGE) MULTIGRAVIDA 35+, THIRD TRIMESTER: ICD-10-CM

## 2023-09-21 DIAGNOSIS — Z98.891 PREVIOUS CESAREAN SECTION: ICD-10-CM

## 2023-09-21 LAB
GLUCOSE UR STRIP-MCNC: NEGATIVE MG/DL
PROT UR STRIP-MCNC: ABNORMAL MG/DL

## 2023-09-21 NOTE — PROGRESS NOTES
Patient feels well  Active fetal movement  She is having some swelling in her hands and feet but her blood pressure remains good  Ultrasound was done today showing the baby is large for gestational age at 92 percentile  Her JOELLE is currently 15  Her JOELLE has been in the polyhydramnios range and has dropped to 15 which is still normal  The patient is worried about this and we discussed what to do if it drops further  Labor warnings and fetal movement counts  Group B strep done today

## 2023-09-23 LAB — GP B STREP DNA SPEC QL NAA+PROBE: NEGATIVE

## 2023-09-25 ENCOUNTER — HOSPITAL ENCOUNTER (OUTPATIENT)
Dept: PHYSICAL THERAPY | Facility: HOSPITAL | Age: 38
Setting detail: THERAPIES SERIES
Discharge: HOME | End: 2023-09-25
Payer: MEDICAID

## 2023-09-25 DIAGNOSIS — M54.9 BACK PAIN DURING PREGNANCY: Primary | ICD-10-CM

## 2023-09-25 DIAGNOSIS — O99.891 BACK PAIN DURING PREGNANCY: Primary | ICD-10-CM

## 2023-09-25 PROCEDURE — 97110 THERAPEUTIC EXERCISES: CPT

## 2023-09-25 NOTE — THERAPY PROGRESS REPORT/RE-CERT
Outpatient Physical Therapy Pelvic Health Progress Note  [facility]     Patient Name: Sonam Lang  : 1985  MRN: [id number]  Today's Date: 2023        Visit Date: 2023    Visit Dx:    ICD-10-CM ICD-9-CM   1. Back pain during pregnancy  O99.891 646.80    M54.9 724.5       Patient Active Problem List   Diagnosis    Migraine without aura and without status migrainosus, not intractable    Allergy to NSAIDs    Asthma, mild persistent    Obesity (BMI 30.0-34.9)    Sleep apnea    Anxiety    Previous  section    , delivered    Adult ADHD    Anxiety in pregnancy, antepartum    Multigravida of advanced maternal age in second trimester                          PT Assessment/Plan       Row Name 23 0900          PT Assessment    Functional Limitations Impaired locomotion;Limitation in home management;Limitations in community activities;Limitations in functional capacity and performance;Performance in leisure activities  -CC     Impairments Range of motion;Posture;Poor body mechanics;Pain;Muscle strength;Impaired flexibility  -CC     Assessment Comments Sonam Lang has been seen for 5 physical therapy sessions for back pain secondary to pregnancy.  Treatment has included therapeutic exercise, therapeutic activity, and patient education with home exercise program . Progress to physical therapy goals is good. Pt has met 2/2 STG and 2/3 LTG. Pt continues to report ongoing mid back pain, and continues to demo decreased TrA activation and increased lumbar lordosis secondary to pregnancy. Reviewed anatomical changes secondary to pregnancy and discussed realistic expectations over the coming weeks. She will benefit from continued skilled physical therapy to address remaining impairments and functional limitations.  -CC     Please refer to paper survey for additional self-reported information No  -CC     Rehab Potential Good  -CC     Patient/caregiver participated in establishment of  treatment plan and goals Yes  -CC     Patient would benefit from skilled therapy intervention Yes  -CC        PT Plan    PT Frequency 1x/week  -CC     Predicted Duration of Therapy Intervention (PT) 1-2 more visits  -CC     PT Plan Comments Review current HEP then d/c to indep program  -CC               User Key  (r) = Recorded By, (t) = Taken By, (c) = Cosigned By      Initials Name Provider Type    CC Caroline Hernandes, PT Physical Therapist                       OP Exercises       Row Name 09/25/23 0900             Subjective    Subjective Comments Mid back still hurts  -CC         Total Minutes    41647 - PT Therapeutic Exercise Minutes 44  -CC         Exercise 1    Exercise Name 1 APT/PPT on SB  -CC      Cueing 1 Verbal  -CC      Reps 1 10 ea  -CC         Exercise 2    Exercise Name 2 sidelying diaphragmatic breathing  -CC      Cueing 2 Verbal  -CC      Reps 2 10 ea side  -CC         Exercise 3    Exercise Name 3 s/l thoracic rot  -CC      Cueing 3 Verbal  -CC      Reps 3 10 ea side  -CC      Time 3 5 sec  -CC         Exercise 4    Exercise Name 4 wall j luis on foam roll  -CC      Cueing 4 Verbal  -CC      Sets 4 2  -CC      Reps 4 10  -CC      Time 4 cues for slow movement  -CC         Exercise 5    Exercise Name 5 lateral hip shift with lean on SB  -CC      Cueing 5 Verbal  -CC      Reps 5 10 ea side  -CC      Time 5 with reach  -CC         Exercise 6    Exercise Name 6 Cat/cow  -CC      Cueing 6 Verbal  -CC      Reps 6 10 ea way  -CC         Exercise 7    Exercise Name 7 s/l clam and rev clam  -CC      Cueing 7 Verbal  -CC      Sets 7 1 ea  -CC      Reps 7 10 khoi  -CC         Exercise 9    Exercise Name 9 low row  -CC      Cueing 9 Verbal  -CC      Sets 9 2  -CC      Reps 9 15  -CC      Time 9 RTB  -CC      Additional Comments cues for PPT  -CC         Exercise 10    Exercise Name 10 Lat str at counter  -CC      Cueing 10 Verbal;Tactile  -CC      Reps 10 5 ea side  -CC      Time 10 20 sec  -CC       Additional Comments cues for form  -CC         Exercise 12    Exercise Name 12 HF str at step  -CC      Cueing 12 Verbal  -CC      Reps 12 3 ea side  -CC      Time 12 20 sec  -CC      Additional Comments cues for toes fwd and PPT  -CC                User Key  (r) = Recorded By, (t) = Taken By, (c) = Cosigned By      Initials Name Provider Type    CC Caroline Hernandes, PT Physical Therapist                                 PT OP Goals       Row Name 09/25/23 0900          PT Short Term Goals    STG Date to Achieve 09/21/23  -CC     STG 1 Patient will be independent with education for symptom management and initial HEP to decrease LBP pain.  -CC     STG 1 Progress Met  -CC     STG 2 Pt will verbalize and demo good understanding of postural awareness to minimize low back stress during prenancy  -CC     STG 2 Progress Met  -CC        Long Term Goals    LTG Date to Achieve 10/21/23  -CC     LTG 1 Patient will be independent with education for symptom management and advanced HEP to decrease LBP pain.  -CC     LTG 1 Progress Met  -CC     LTG 2 Pt will report 50% improve sleep patterns due to minimized low back pain.  -CC     LTG 2 Progress Met  -CC     LTG 3 Pt will report 50% overall improvement in s/s to improve participation in ADLs  -CC     LTG 3 Progress Ongoing  -CC     LTG 3 Progress Comments not much change since start of PT, reviewed anatomical changes secondary to pregnancy.  -CC               User Key  (r) = Recorded By, (t) = Taken By, (c) = Cosigned By      Initials Name Provider Type    CC Caroline Hernandes, PT Physical Therapist                                    Time Calculation:   Start Time: 0915  Stop Time: 0959  Time Calculation (min): 44 min  Total Timed Code Minutes- PT: 44 minute(s)  Timed Charges  61680 - PT Therapeutic Exercise Minutes: 44  Total Minutes  Timed Charges Total Minutes: 44   Total Minutes: 44  Therapy Charges for Today       Code Description Service Date Service Provider  Modifiers Qty    [id number]  PT THER PROC EA 15 MIN 9/25/2023 Caroline Hernandes, PT GP 3                      Caroline Hernandes, PT  9/25/2023

## 2023-09-26 ENCOUNTER — TELEPHONE (OUTPATIENT)
Dept: OBSTETRICS AND GYNECOLOGY | Age: 38
End: 2023-09-26
Payer: MEDICAID

## 2023-09-26 ENCOUNTER — HOSPITAL ENCOUNTER (EMERGENCY)
Facility: HOSPITAL | Age: 38
Discharge: HOME | End: 2023-09-26
Attending: OBSTETRICS & GYNECOLOGY | Admitting: OBSTETRICS & GYNECOLOGY
Payer: MEDICAID

## 2023-09-26 VITALS — TEMPERATURE: 98.1 F

## 2023-09-26 LAB
A1 MICROGLOB PLACENTAL VAG QL: NEGATIVE
ABO GROUP BLD: NORMAL
BACTERIA UR QL AUTO: ABNORMAL /HPF
BASOPHILS # BLD AUTO: 0.03 10*3/MM3 (ref 0–0.2)
BASOPHILS NFR BLD AUTO: 0.3 % (ref 0–1.5)
BILIRUB UR QL STRIP: NEGATIVE
BLD GP AB SCN SERPL QL: NEGATIVE
CLARITY UR: CLEAR
COLOR UR: YELLOW
DEPRECATED RDW RBC AUTO: 42.3 FL (ref 37–54)
EOSINOPHIL # BLD AUTO: 0.06 10*3/MM3 (ref 0–0.4)
EOSINOPHIL NFR BLD AUTO: 0.5 % (ref 0.3–6.2)
ERYTHROCYTE [DISTWIDTH] IN BLOOD BY AUTOMATED COUNT: 12.8 % (ref 12.3–15.4)
GLUCOSE UR STRIP-MCNC: NEGATIVE MG/DL
HCT VFR BLD AUTO: 35.5 % (ref 34–46.6)
HGB BLD-MCNC: 12.4 G/DL (ref 12–15.9)
HGB UR QL STRIP.AUTO: NEGATIVE
HYALINE CASTS UR QL AUTO: ABNORMAL /LPF
IMM GRANULOCYTES # BLD AUTO: 0.11 10*3/MM3 (ref 0–0.05)
IMM GRANULOCYTES NFR BLD AUTO: 1 % (ref 0–0.5)
KETONES UR QL STRIP: NEGATIVE
LEUKOCYTE ESTERASE UR QL STRIP.AUTO: ABNORMAL
LYMPHOCYTES # BLD AUTO: 1.78 10*3/MM3 (ref 0.7–3.1)
LYMPHOCYTES NFR BLD AUTO: 15.6 % (ref 19.6–45.3)
MCH RBC QN AUTO: 32.1 PG (ref 26.6–33)
MCHC RBC AUTO-ENTMCNC: 34.9 G/DL (ref 31.5–35.7)
MCV RBC AUTO: 92 FL (ref 79–97)
MONOCYTES # BLD AUTO: 0.82 10*3/MM3 (ref 0.1–0.9)
MONOCYTES NFR BLD AUTO: 7.2 % (ref 5–12)
NEUTROPHILS NFR BLD AUTO: 75.4 % (ref 42.7–76)
NEUTROPHILS NFR BLD AUTO: 8.61 10*3/MM3 (ref 1.7–7)
NITRITE UR QL STRIP: NEGATIVE
NRBC BLD AUTO-RTO: 0 /100 WBC (ref 0–0.2)
PH UR STRIP.AUTO: 7 [PH] (ref 5–8)
PLATELET # BLD AUTO: 267 10*3/MM3 (ref 140–450)
PMV BLD AUTO: 10.1 FL (ref 6–12)
PROT UR QL STRIP: NEGATIVE
RBC # BLD AUTO: 3.86 10*6/MM3 (ref 3.77–5.28)
RBC # UR STRIP: ABNORMAL /HPF
REF LAB TEST METHOD: ABNORMAL
RH BLD: POSITIVE
SP GR UR STRIP: 1.02 (ref 1–1.03)
SQUAMOUS #/AREA URNS HPF: ABNORMAL /HPF
T&S EXPIRATION DATE: NORMAL
UROBILINOGEN UR QL STRIP: ABNORMAL
WBC # UR STRIP: ABNORMAL /HPF
WBC NRBC COR # BLD: 11.41 10*3/MM3 (ref 3.4–10.8)
YEAST URNS QL MICRO: ABNORMAL /HPF

## 2023-09-26 PROCEDURE — 86901 BLOOD TYPING SEROLOGIC RH(D): CPT | Performed by: OBSTETRICS & GYNECOLOGY

## 2023-09-26 PROCEDURE — 86900 BLOOD TYPING SEROLOGIC ABO: CPT | Performed by: OBSTETRICS & GYNECOLOGY

## 2023-09-26 PROCEDURE — 86850 RBC ANTIBODY SCREEN: CPT | Performed by: OBSTETRICS & GYNECOLOGY

## 2023-09-26 PROCEDURE — 87086 URINE CULTURE/COLONY COUNT: CPT | Performed by: OBSTETRICS & GYNECOLOGY

## 2023-09-26 PROCEDURE — 84112 EVAL AMNIOTIC FLUID PROTEIN: CPT | Performed by: OBSTETRICS & GYNECOLOGY

## 2023-09-26 PROCEDURE — 59025 FETAL NON-STRESS TEST: CPT

## 2023-09-26 PROCEDURE — 96360 HYDRATION IV INFUSION INIT: CPT | Performed by: OBSTETRICS & GYNECOLOGY

## 2023-09-26 PROCEDURE — 81001 URINALYSIS AUTO W/SCOPE: CPT | Performed by: OBSTETRICS & GYNECOLOGY

## 2023-09-26 PROCEDURE — 85025 COMPLETE CBC W/AUTO DIFF WBC: CPT | Performed by: OBSTETRICS & GYNECOLOGY

## 2023-09-26 PROCEDURE — 99284 EMERGENCY DEPT VISIT MOD MDM: CPT | Performed by: OBSTETRICS & GYNECOLOGY

## 2023-09-26 RX ORDER — SODIUM CHLORIDE 0.9 % (FLUSH) 0.9 %
10 SYRINGE (ML) INJECTION AS NEEDED
Status: DISCONTINUED | OUTPATIENT
Start: 2023-09-26 | End: 2023-09-26 | Stop reason: HOSPADM

## 2023-09-26 RX ORDER — NITROFURANTOIN 25; 75 MG/1; MG/1
100 CAPSULE ORAL 2 TIMES DAILY
Qty: 14 CAPSULE | Refills: 0 | Status: SHIPPED | OUTPATIENT
Start: 2023-09-26 | End: 2023-10-03

## 2023-09-26 RX ORDER — SODIUM CHLORIDE 0.9 % (FLUSH) 0.9 %
10 SYRINGE (ML) INJECTION EVERY 12 HOURS SCHEDULED
Status: DISCONTINUED | OUTPATIENT
Start: 2023-09-26 | End: 2023-09-26 | Stop reason: HOSPADM

## 2023-09-26 RX ADMIN — SODIUM CHLORIDE, POTASSIUM CHLORIDE, SODIUM LACTATE AND CALCIUM CHLORIDE 1000 ML: 600; 310; 30; 20 INJECTION, SOLUTION INTRAVENOUS at 16:44

## 2023-09-26 NOTE — NON STRESS TEST
Sonam Lang, a  at 36w3d with an BETO of 10/21/2023, Date entered prior to episode creation, was seen at [facility] for a nonstress test.    Chief Complaint   Patient presents with    Rupture of Membranes     , Pt states leaking of fluid last night, and continued trickling this morning. +FM, no vaginal bleeding, and pt states unsure if actual rupture because not much fluid has come out this morning.        Patient Active Problem List   Diagnosis    Migraine without aura and without status migrainosus, not intractable    Allergy to NSAIDs    Asthma, mild persistent    Obesity (BMI 30.0-34.9)    Sleep apnea    Anxiety    Previous  section    , delivered    Adult ADHD    Anxiety in pregnancy, antepartum    Multigravida of advanced maternal age in second trimester       Start Time:   Stop Time:     Interpretation A  Nonstress Test Interpretation A: Reactive

## 2023-09-26 NOTE — OBED NOTES
[facility]  Sonam Lang  : 1985  MRN: [id number]  CSN: [id number]    OB ED Provider Note    Subjective   Chief Complaint   Patient presents with    Rupture of Membranes     , Pt states leaking of fluid last night, and continued trickling this morning. +FM, no vaginal bleeding, and pt states unsure if actual rupture because not much fluid has come out this morning.      Sonam Lang is a 37 y.o. year old  with an Estimated Date of Delivery: 10/21/23 currently at 36w3d presenting with leaking clear fluid several times since last night in small amounts. She is noting some irregular CTX. No VB noted. FM is present.     Prenatal care has been with Dr. El.  It has been complicated by previous C/S - (desires repeat ).    OB History    Para Term  AB Living   5 2 1 1 2 1   SAB IAB Ectopic Molar Multiple Live Births   0 0 2 0 0 1      # Outcome Date GA Lbr Hernando/2nd Weight Sex Delivery Anes PTL Lv   5 Current            4  22 27w2d / 01:03 559 g (1 lb 3.7 oz) F  None Y FD      Birth Comments: scale 1      Name: MIKE LANG      Apgar1: 2  Apgar5: 2   3 Ectopic      ECTOPIC      2 Ectopic      ECTOPIC      1 Term 10/28/19 38w1d  3200 g (7 lb 0.9 oz) F CS-LTranv EPI N EL      Complications: Failure to Progress in First Stage, Oligohydramnios      Name: NORBERTO LANG      Apgar1: 8  Apgar5: 9     Past Medical History:   Diagnosis Date    Sleep apnea     CPAP    History of depression 2019    Pregnancy complicated by multiple fetal congenital anomalies, single gestation 2022    NTD, SUA    Congenital abnormalities 2022    Multiple fetal anomalies present including meningocele, unilateral real agenesis, unilateral club foot, membranous VSD     ADHD (attention deficit hyperactivity disorder)     Anxiety     Asthma     Depression     GERD (gastroesophageal reflux disease)     Irritable bowel syndrome     Migraine      "PTSD (post-traumatic stress disorder)      Past Surgical History:   Procedure Laterality Date    COLONOSCOPY  2014     SECTION  2019    SINUS SURGERY  2022    WISDOM TOOTH EXTRACTION       No current facility-administered medications for this encounter.    Allergies   Allergen Reactions    Bactrim [Sulfamethoxazole-Trimethoprim] Hives, Itching and Swelling    Cefdinir Hives, Itching and Swelling     FELT THROAT START TO SWELL    Buspirone Unknown - Low Severity     migraines    Contrast Dye (Echo Or Unknown Ct/Mr) Other (See Comments)     \"felt like insides were on fire\"    Naproxen Swelling    Eggs Or Egg-Derived Products Nausea Only     Pt can have cooked eggs     Milk-Related Compounds Other (See Comments)     Upset stomach, pt is able to have milk products in cooked foods     Social History    Tobacco Use      Smoking status: Former        Years: 10.        Types: Cigarettes        Quit date:         Years since quittin.        Passive exposure: Never      Smokeless tobacco: Never    Review of Systems   Gastrointestinal:  Positive for abdominal pain.   Genitourinary:  Positive for vaginal discharge.   All other systems reviewed and are negative.      Objective   Temp 98.1 °F (36.7 °C) (Oral)   LMP  (LMP Unknown)   General: well developed; well nourished  no acute distress   Abdomen: soft, non-tender; no masses  gravid    FHT's: reactive and category 1      Cervix: was checked (by RN): 0.5 cm / 25 % / -3   Presentation: cephalic   Contractions: every 2-8 minutes   Chest: Unlabored respirations    CV:  RRR   Ext:   No C/C/E   Back: CVA tenderness is deferred bilateral        Prenatal Labs  Lab Results   Component Value Date    HGB 11.6 (L) 2023    RUBELLAABIGG 5.38 2023    HEPBSAG Negative 2023    ABORH O Positive 10/24/2019    ABSCRN Negative 2023    YNC1BLV4 Non Reactive 2023    HEPCVIRUSABY Non Reactive 2023    GCT 90 2023    STREPGPB Negative " 2023    URINECX Final report 10/28/2022    CHLAMNAA Negative 2023    NGONORRHON Negative 2023       Current Labs Reviewed   CBC w/ diff:   Lab Results   Component Value Date    WBC 11.41 (H) 2023    NEUTRORELPCT 75.4 2023    AUTOIGPER 1.0 (H) 2023    LYMPHORELPCT 15.6 (L) 2023    MONORELPCT 7.2 2023    EOSRELPCT 0.5 2023    BASORELPCT 0.3 2023    HGB 12.4 2023    HCT 35.5 2023    MCV 92.0 2023    RDW 12.8 2023     2023     UA:    Lab Results   Component Value Date    SQUAMEPIUA 0-2 2023    SPECGRAVUR 1.018 2023    KETONESU Negative 2023    BLOODU Negative 2023    LEUKOCYTESUR Small (1+) (A) 2023    NITRITEU Negative 2023    RBCUA 0-2 2023    WBCUA 3-5 (A) 2023    BACTERIA None Seen 2023     ROM+ negative     Assessment   IUP at 36w3d   CTX- no real improvement with IV fluids, but she isn't really noting them. No significant dilation on exam.  Possibly secondary to UTI  UTI- leukocyte esterase on u/a without evidence of contamination.  No evidence of PPROM     Plan   D/C home on macrobid x 1 week.  Return for worsening symptoms, acute changes.  Discussed with Dr. El.  Keep regularly scheduled prenatal appointments.      Griselda Javier MD  2023  16:13 EDT

## 2023-09-26 NOTE — TELEPHONE ENCOUNTER
Pt calling and states last night felt a lot of pressure and possible some fluid leaking. Pt denies any further leakage since last night but is afraid water may be leaking, please advise

## 2023-09-27 DIAGNOSIS — J01.00 ACUTE MAXILLARY SINUSITIS, RECURRENCE NOT SPECIFIED: ICD-10-CM

## 2023-09-27 LAB — BACTERIA SPEC AEROBE CULT: NORMAL

## 2023-09-27 RX ORDER — MONTELUKAST SODIUM 10 MG/1
TABLET ORAL
Qty: 90 TABLET | Refills: 0 | Status: SHIPPED | OUTPATIENT
Start: 2023-09-27

## 2023-09-28 ENCOUNTER — ROUTINE PRENATAL (OUTPATIENT)
Dept: OBSTETRICS AND GYNECOLOGY | Age: 38
End: 2023-09-28
Payer: MEDICAID

## 2023-09-28 DIAGNOSIS — Z98.891 PREVIOUS CESAREAN SECTION: ICD-10-CM

## 2023-09-28 DIAGNOSIS — F41.9 ANXIETY IN PREGNANCY, ANTEPARTUM: ICD-10-CM

## 2023-09-28 DIAGNOSIS — Z3A.36 36 WEEKS GESTATION OF PREGNANCY: Primary | ICD-10-CM

## 2023-09-28 DIAGNOSIS — O99.340 ANXIETY IN PREGNANCY, ANTEPARTUM: ICD-10-CM

## 2023-09-28 DIAGNOSIS — O09.523 AMA (ADVANCED MATERNAL AGE) MULTIGRAVIDA 35+, THIRD TRIMESTER: ICD-10-CM

## 2023-09-28 LAB
GLUCOSE UR STRIP-MCNC: NEGATIVE MG/DL
PROT UR STRIP-MCNC: NEGATIVE MG/DL

## 2023-09-28 NOTE — PROGRESS NOTES
"The patient feels well today no complaints  Ultrasound was done which showed a reassuring BPP of   JOELLE was 16.6  Cervix is thick closed and -3  Baby weighed 7 pounds 3 ounces 1 week ago  Follow-up 1 week for BPP  Consider moving up  to 10/14 which is right at 39 weeks  We will check on hospital availability as we get closer  Patient brought up a concern about   again  We discussed again that she is not a good  candidate and the reasons why.  Office meeting was done and we as a group do not recommend that she attempt a .  The patient is still having a difficult time with this.  She declined to change providers earlier in pregnancy in order to do a trial of labor.  At the end of the discussion I think that she understands our concerns and is agreeable to repeat  even if she comes in in labor.     Chief Complaint   Patient presents with    Routine Prenatal Visit     Ob Check & BPP US - Pt is 36w5d, Pt was seen at L&D for \"leaking fluid\" on 23, Pt has no complaints today       HPI:  Pt presents for routine prenatal visit    ROS:  No fever or chills, no nausea or vomiting, no contractions, no leg pain, no LE edema, no leaking fluid, no bleeding, no headache, no dysuria  All other systems reviewed and negative    Exam:  See flow sheet  General:  Alert and oriented and no distress  Neck: no lymphadenopathy or thyromegaly  Lungs: non - labored breathing  Abd:  See flow sheet, fundus nontender  Ext: see flow sheet, non-tender bilateral , no lesions  Neuro: grossly normal    Assessment:/ PLAN:    Diagnoses and all orders for this visit:    1. 36 weeks gestation of pregnancy (Primary)  -     POC Urinalysis Dipstick    2. AMA (advanced maternal age) multigravida 35+, third trimester    3. Previous  section    4. Anxiety in pregnancy, antepartum           "

## 2023-10-03 ENCOUNTER — HOSPITAL ENCOUNTER (EMERGENCY)
Facility: HOSPITAL | Age: 38
Discharge: HOME | End: 2023-10-03
Attending: OBSTETRICS & GYNECOLOGY | Admitting: OBSTETRICS & GYNECOLOGY
Payer: MEDICAID

## 2023-10-03 VITALS
BODY MASS INDEX: 40.32 KG/M2 | TEMPERATURE: 98.2 F | HEART RATE: 86 BPM | DIASTOLIC BLOOD PRESSURE: 67 MMHG | SYSTOLIC BLOOD PRESSURE: 111 MMHG | RESPIRATION RATE: 18 BRPM | WEIGHT: 266 LBS | HEIGHT: 68 IN

## 2023-10-03 LAB
CLUE CELLS SPEC QL WET PREP: NORMAL
HYDATID CYST SPEC WET PREP: NORMAL
T VAGINALIS SPEC QL WET PREP: NORMAL
WBC SPEC QL WET PREP: NORMAL
YEAST GENITAL QL WET PREP: NORMAL

## 2023-10-03 PROCEDURE — 87210 SMEAR WET MOUNT SALINE/INK: CPT | Performed by: OBSTETRICS & GYNECOLOGY

## 2023-10-03 PROCEDURE — 99283 EMERGENCY DEPT VISIT LOW MDM: CPT | Performed by: OBSTETRICS & GYNECOLOGY

## 2023-10-03 PROCEDURE — 59025 FETAL NON-STRESS TEST: CPT

## 2023-10-03 RX ORDER — ASPIRIN 81 MG/1
81 TABLET, CHEWABLE ORAL DAILY
Status: ON HOLD | COMMUNITY
End: 2023-10-14

## 2023-10-03 NOTE — OBED NOTES
[facility] Note OB        Patient Name: Sonam Lang  YOB: 1985  MRN: [id number]  Admission Date: 10/3/2023  8:59 AM  Date of Service: 10/3/2023    Chief Complaint: Abdominal Cramping (Patient presents to labor and delivery [facility] at 37 weeks gestation with complaints of abdominal pain last night)        Subjective     Sonam Lang is a 38 y.o. female  at 37w3d with Estimated Date of Delivery: 10/21/23 who presents with the chief complaint listed above.  She felt some low abdominal pain last night but it resolved with positin change. She is having mild, infrequent contractions. She sees Megan El MD for her prenatal care. Her pregnancy has been complicated by:  Advanced maternal age, history of previous  section, anxiety.    She describes fetal movement as normal.  She denies rupture of membranes.  She denies vaginal bleeding. She is feeling contractions.    The patient has been taking antibiotics for a UTI.  She would like testing for a yeast infection.  She prefers to have this testing done in [facility] rather than during her office appointment tomorrow so that she can get the results back more quickly.          Objective   Patient Active Problem List    Diagnosis     Multigravida of advanced maternal age in second trimester [O09.522]     Anxiety in pregnancy, antepartum [O99.340, F41.9]     Adult ADHD [F90.9]     , delivered [O34.219]     Previous  section [Z98.891]     Anxiety [F41.9]     Asthma, mild persistent [J45.30]     Obesity (BMI 30.0-34.9) [E66.9]     Sleep apnea [G47.30]     Allergy to NSAIDs [Z88.6]     Migraine without aura and without status migrainosus, not intractable [G43.009]         OB History    Para Term  AB Living   5 2 1 1 2 1   SAB IAB Ectopic Molar Multiple Live Births   0 0 2 0 0 1      # Outcome Date GA Lbr Hernando/2nd Weight Sex Delivery Anes PTL Lv   5 Current            4  22 27w2d / 01:03 559 g (1 lb 3.7 oz) F   None Y FD      Birth Comments: scale 1      Name: MIKE CAMEJO      Apgar1: 2  Apgar5: 2   3 Ectopic      ECTOPIC      2 Ectopic      ECTOPIC      1 Term 10/28/19 38w1d  3200 g (7 lb 0.9 oz) F CS-LTranv EPI N EL      Complications: Failure to Progress in First Stage, Oligohydramnios      Name: BASHIRGIRL  ELDER      Apgar1: 8  Apgar5: 9        Past Medical History:   Diagnosis Date    ADHD (attention deficit hyperactivity disorder)     Anxiety     Asthma     Congenital abnormalities 2022    Multiple fetal anomalies present including meningocele, unilateral real agenesis, unilateral club foot, membranous VSD     Depression     GERD (gastroesophageal reflux disease)     History of depression 2019    Irritable bowel syndrome     Migraine     Pregnancy complicated by multiple fetal congenital anomalies, single gestation 2022    NTD, SUA    PTSD (post-traumatic stress disorder)     Sleep apnea 2016    CPAP       Past Surgical History:   Procedure Laterality Date     SECTION      COLONOSCOPY  2014    SINUS SURGERY  2022    WISDOM TOOTH EXTRACTION         No current facility-administered medications on file prior to encounter.     Current Outpatient Medications on File Prior to Encounter   Medication Sig Dispense Refill    albuterol sulfate  (90 Base) MCG/ACT inhaler Inhale 2 puffs Every 4 (Four) Hours As Needed for Wheezing. 18 g 5    aspirin 81 MG chewable tablet Chew 1 tablet Daily.      budesonide-formoterol (SYMBICORT) 80-4.5 MCG/ACT inhaler Inhale 2 puffs 2 (Two) Times a Day. 10.2 g 3    diphenhydrAMINE (BENADRYL) 25 mg capsule Take 1 capsule by mouth Every 6 (Six) Hours As Needed for Itching.      docusate sodium (COLACE) 50 MG capsule Take  by mouth 2 (Two) Times a Day.      ferrous sulfate 325 (65 FE) MG tablet Take 1 tablet by mouth Daily With Breakfast. 90 tablet 1    fluticasone (FLONASE) 50 MCG/ACT nasal spray 2 sprays into the nostril(s) as  "directed by provider Daily. 11.1 mL 3    nitrofurantoin, macrocrystal-monohydrate, (Macrobid) 100 MG capsule Take 1 capsule by mouth 2 (Two) Times a Day for 7 days. 14 capsule 0    Prenatal Multivit-Min-Fe-FA (PRE-AMANDA FORMULA PO) Take 1 tablet by mouth Daily.      Riboflavin (VITAMIN B2 PO) Take 1 tablet by mouth Daily.      sertraline (Zoloft) 50 MG tablet Take 1 tablet by mouth Daily. 30 tablet 2    vitamin B-6 (PYRIDOXINE) 50 MG tablet Take 0.5 tablets by mouth 3 (Three) Times a Day.      doxylamine (UNISOM) 25 MG tablet Take 1 tablet by mouth At Night As Needed for Sleep.      MAGNESIUM PO Take  by mouth Daily.      Pseudoephedrine HCl (SUDAFED PO) Take  by mouth As Needed.      triamcinolone (KENALOG) 0.025 % cream Apply 1 application topically to the appropriate area as directed 2 (Two) Times a Day. 454 g 1       Allergies   Allergen Reactions    Bactrim [Sulfamethoxazole-Trimethoprim] Hives, Itching and Swelling    Cefdinir Hives, Itching and Swelling     FELT THROAT START TO SWELL    Buspirone Unknown - Low Severity     migraines    Contrast Dye (Echo Or Unknown Ct/Mr) Other (See Comments)     \"felt like insides were on fire\"    Naproxen Swelling    Eggs Or Egg-Derived Products Nausea Only     Pt can have cooked eggs     Milk-Related Compounds Other (See Comments)     Upset stomach, pt is able to have milk products in cooked foods       Family History   Problem Relation Age of Onset    Hypertension Mother     Diabetes Mother     Migraines Mother     Hypertension Father     Hypertension Maternal Grandmother     Migraines Maternal Grandmother     Alzheimer's disease Maternal Grandfather     Diabetes Paternal Grandmother     Migraines Paternal Grandmother     Dementia Paternal Grandmother     Stroke Paternal Grandmother     Frontotemporal dementia Paternal Grandmother     Diabetes Paternal Grandfather        Social History     Socioeconomic History    Marital status:    Tobacco Use    Smoking status: " "Former     Years: 10.00     Types: Cigarettes     Quit date:      Years since quittin.7     Passive exposure: Never    Smokeless tobacco: Never   Vaping Use    Vaping Use: Never used   Substance and Sexual Activity    Alcohol use: Not Currently    Drug use: No    Sexual activity: Yes     Partners: Male     Birth control/protection: None           Review of Systems   HEENT: negative  Pulmonary: negative  CV: negative  GI: negative  : negative  Musculoskeletal: negative  Neuro: negative    PHYSICAL EXAM:      VITAL SIGNS:  Vitals:    10/03/23 0700 10/03/23 0926   Weight: 121 kg (266 lb 9.6 oz) 121 kg (266 lb)   Height:  172.7 cm (68\")        FHT'S:                   Baseline:  140 BPM  Variability:  Moderate = 6 - 25 BPM  Accelerations:  15 x 15 accelerations present     Decelerations:  absent  Contractions:    rare      Interpretation:    Reactive NST, CAT 1 tracing        PHYSICAL EXAM:    General: well developed; well nourished  no acute distress   Heart: Not performed.   Lungs  : breathing is unlabored     Abdomen: soft, non-tender; no masses       Cervix: was checked (by RN): 0 cm / 1 % / -3  Cervical Dilation (cm): 0  Cervical Effacement: 0%  Fetal Station: -3  Cervical Consistency: soft  Cervical Position: posterior   Contractions: none        Extremities: peripheral pulses normal, no pedal edema, no clubbing or cyanosis      LABS AND TESTING ORDERED:  Uterine and fetal monitoring  2.   NST    LAB RESULTS:    No results found for this or any previous visit (from the past 24 hour(s)).    Lab Results   Component Value Date    ABO O 2023    RH Positive 2023       Lab Results   Component Value Date    STREPGPB Negative 2023                 Assessment & Plan     ASSESSMENT/PLAN:  Sonam Lang is a 38 y.o. female  at 37w3d who presented with: abdominal pain, resolved          Final Impression:  Pregnancy at 37w3d  Reactive NST.  CAT 1 tracing  Maternal vital signs were reviewed " "and were unremarkable              Vitals:    10/03/23 0700 10/03/23 0926   Weight: 121 kg (266 lb 9.6 oz) 121 kg (266 lb)   Height:  172.7 cm (68\")       Lab Results   Component Value Date    STREPGPB Negative 09/21/2023     Lab Results   Component Value Date    ABO O 09/26/2023    RH Positive 09/26/2023       PLAN:     Reactive NST, fetal reassurance. This information was shared with patient  Wet prep sent to lab.  Advised patient that the results may take a few days.  Patient will keep her scheduled prenatal appointment tomorrow.    I have spent 20 minutes including face to face time with the patient, greater than 50% in discussion of the diagnosis (counseling) and/or coordination of care.     Gwen Georges MD  10/3/2023  09:36 EDT  OB Hospitalist  Phone:  [phone]  "

## 2023-10-04 ENCOUNTER — ROUTINE PRENATAL (OUTPATIENT)
Dept: OBSTETRICS AND GYNECOLOGY | Age: 38
End: 2023-10-04
Payer: MEDICAID

## 2023-10-04 VITALS — WEIGHT: 267 LBS | SYSTOLIC BLOOD PRESSURE: 112 MMHG | DIASTOLIC BLOOD PRESSURE: 74 MMHG | BODY MASS INDEX: 40.6 KG/M2

## 2023-10-04 DIAGNOSIS — Z3A.37 37 WEEKS GESTATION OF PREGNANCY: Primary | ICD-10-CM

## 2023-10-04 DIAGNOSIS — O99.340 ANXIETY IN PREGNANCY, ANTEPARTUM: ICD-10-CM

## 2023-10-04 DIAGNOSIS — O09.523 MULTIGRAVIDA OF ADVANCED MATERNAL AGE IN THIRD TRIMESTER: ICD-10-CM

## 2023-10-04 DIAGNOSIS — Z98.891 PREVIOUS CESAREAN SECTION: ICD-10-CM

## 2023-10-04 DIAGNOSIS — F41.9 ANXIETY IN PREGNANCY, ANTEPARTUM: ICD-10-CM

## 2023-10-04 DIAGNOSIS — E66.9 OBESITY (BMI 30.0-34.9): ICD-10-CM

## 2023-10-04 LAB
GLUCOSE UR STRIP-MCNC: NEGATIVE MG/DL
PROT UR STRIP-MCNC: NEGATIVE MG/DL

## 2023-10-04 NOTE — PROGRESS NOTES
Chief Complaint   Patient presents with    Routine Prenatal Visit     37 weeks, US today       HPI: 38 y.o.  at 37w4d     Doing well  Reports good FM  Denies LOF, bleeding or ctx's  Pt of Dr. Nikko Soria:    10/04/23 1327   BP: 112/74   Weight: 121 kg (267 lb)       ROS:  GI:  Negative  : Negative  Pulmonary: Negative     A/P  1. Intrauterine pregnancy at 37w4d   2. Pregnancy Risk:  HIGH RISK    Diagnoses and all orders for this visit:    1. 37 weeks gestation of pregnancy (Primary)  -     POC Urinalysis Dipstick    2. Obesity (BMI 30.0-34.9)    3. Previous  section    4. Anxiety in pregnancy, antepartum    5. Multigravida of advanced maternal age in third trimester        -----------------------  PLAN:   [facility]   Flu vaccine today  Labor warnings/FKC  Pt has repeat C/S scheduled  F/u 1 week      RAJAN Chavez  10/4/2023 13:42 EDT

## 2023-10-09 ENCOUNTER — ROUTINE PRENATAL (OUTPATIENT)
Dept: OBSTETRICS AND GYNECOLOGY | Age: 38
End: 2023-10-09
Payer: MEDICAID

## 2023-10-09 VITALS — WEIGHT: 264.4 LBS | DIASTOLIC BLOOD PRESSURE: 72 MMHG | BODY MASS INDEX: 40.2 KG/M2 | SYSTOLIC BLOOD PRESSURE: 114 MMHG

## 2023-10-09 DIAGNOSIS — Z98.891 PREVIOUS CESAREAN SECTION: ICD-10-CM

## 2023-10-09 DIAGNOSIS — O09.523 MULTIGRAVIDA OF ADVANCED MATERNAL AGE IN THIRD TRIMESTER: ICD-10-CM

## 2023-10-09 DIAGNOSIS — O99.340 ANXIETY IN PREGNANCY, ANTEPARTUM: ICD-10-CM

## 2023-10-09 DIAGNOSIS — F41.9 ANXIETY IN PREGNANCY, ANTEPARTUM: ICD-10-CM

## 2023-10-09 DIAGNOSIS — Z3A.38 38 WEEKS GESTATION OF PREGNANCY: Primary | ICD-10-CM

## 2023-10-09 PROBLEM — O09.522 MULTIGRAVIDA OF ADVANCED MATERNAL AGE IN SECOND TRIMESTER: Status: ACTIVE | Noted: 2023-07-14

## 2023-10-09 LAB
GLUCOSE UR STRIP-MCNC: NEGATIVE MG/DL
PROT UR STRIP-MCNC: NEGATIVE MG/DL

## 2023-10-09 NOTE — PROGRESS NOTES
Patient is doing well  BPP is 8 out of 8 with an JOELLE of 20  Vertex presentation  No contractions her cervix is thick/closed/-3  Labor warnings and fetal movement counts  We did move her  up to 10/14/2023 due to openings becoming available  She will be 39 weeks that day    Chief Complaint   Patient presents with    Routine Prenatal Visit     Ob Check & BPP US - Pt is 38w2d, Pt has no complaints today       HPI:  Pt presents for routine prenatal visit    ROS:  No fever or chills, no nausea or vomiting, no contractions, no leg pain, no LE edema, no leaking fluid, no bleeding, no headache, no dysuria  All other systems reviewed and negative    Exam:  See flow sheet  General:  Alert and oriented and no distress  Neck: no lymphadenopathy or thyromegaly  Lungs: non - labored breathing  Abd:  See flow sheet, fundus nontender  Ext: see flow sheet, non-tender bilateral , no lesions  Neuro: grossly normal    Assessment:/ PLAN:    Diagnoses and all orders for this visit:    1. 38 weeks gestation of pregnancy (Primary)  -     POC Urinalysis Dipstick    2. Previous  section    3. Multigravida of advanced maternal age in third trimester    4. Anxiety in pregnancy, antepartum

## 2023-10-11 ENCOUNTER — ANESTHESIA EVENT (OUTPATIENT)
Dept: LABOR AND DELIVERY | Facility: HOSPITAL | Age: 38
End: 2023-10-11
Payer: MEDICAID

## 2023-10-12 ENCOUNTER — TELEPHONE (OUTPATIENT)
Dept: OBSTETRICS AND GYNECOLOGY | Age: 38
End: 2023-10-12

## 2023-10-12 NOTE — TELEPHONE ENCOUNTER
Patient is scheduled for her  on Saturday,10/14/23 and has been experiencing some sinus drainage and sore throat.Wants to know if she should go get tested for Covid? Please advise.

## 2023-10-14 ENCOUNTER — HOSPITAL ENCOUNTER (INPATIENT)
Facility: HOSPITAL | Age: 38
LOS: 2 days | Discharge: HOME | End: 2023-10-16
Attending: OBSTETRICS & GYNECOLOGY | Admitting: OBSTETRICS & GYNECOLOGY
Payer: MEDICAID

## 2023-10-14 ENCOUNTER — ANESTHESIA (OUTPATIENT)
Dept: LABOR AND DELIVERY | Facility: HOSPITAL | Age: 38
End: 2023-10-14
Payer: MEDICAID

## 2023-10-14 DIAGNOSIS — Z98.891 PREVIOUS CESAREAN SECTION: ICD-10-CM

## 2023-10-14 DIAGNOSIS — O09.522 MULTIGRAVIDA OF ADVANCED MATERNAL AGE IN SECOND TRIMESTER: ICD-10-CM

## 2023-10-14 PROBLEM — O34.219 PREVIOUS CESAREAN DELIVERY AFFECTING PREGNANCY: Status: ACTIVE | Noted: 2023-10-14

## 2023-10-14 LAB
ABO GROUP BLD: NORMAL
BLD GP AB SCN SERPL QL: NEGATIVE
DEPRECATED RDW RBC AUTO: 42.7 FL (ref 37–54)
ERYTHROCYTE [DISTWIDTH] IN BLOOD BY AUTOMATED COUNT: 13 % (ref 12.3–15.4)
HCT VFR BLD AUTO: 33.9 % (ref 34–46.6)
HGB BLD-MCNC: 11.8 G/DL (ref 12–15.9)
MCH RBC QN AUTO: 31.8 PG (ref 26.6–33)
MCHC RBC AUTO-ENTMCNC: 34.8 G/DL (ref 31.5–35.7)
MCV RBC AUTO: 91.4 FL (ref 79–97)
PLATELET # BLD AUTO: 239 10*3/MM3 (ref 140–450)
PMV BLD AUTO: 10.4 FL (ref 6–12)
RBC # BLD AUTO: 3.71 10*6/MM3 (ref 3.77–5.28)
RH BLD: POSITIVE
T&S EXPIRATION DATE: NORMAL
WBC NRBC COR # BLD: 9.76 10*3/MM3 (ref 3.4–10.8)

## 2023-10-14 PROCEDURE — S0260 H&P FOR SURGERY: HCPCS | Performed by: OBSTETRICS & GYNECOLOGY

## 2023-10-14 PROCEDURE — 25010000002 BUPIVACAINE PF 0.75 % SOLUTION: Performed by: ANESTHESIOLOGY

## 2023-10-14 PROCEDURE — 59515 CESAREAN DELIVERY: CPT | Performed by: OBSTETRICS & GYNECOLOGY

## 2023-10-14 PROCEDURE — 86900 BLOOD TYPING SEROLOGIC ABO: CPT | Performed by: OBSTETRICS & GYNECOLOGY

## 2023-10-14 PROCEDURE — 25010000002 GENTAMICIN PER 80 MG: Performed by: OBSTETRICS & GYNECOLOGY

## 2023-10-14 PROCEDURE — 25010000002 KETOROLAC TROMETHAMINE PER 15 MG: Performed by: NURSE ANESTHETIST, CERTIFIED REGISTERED

## 2023-10-14 PROCEDURE — 25010000002 MORPHINE PER 10 MG: Performed by: ANESTHESIOLOGY

## 2023-10-14 PROCEDURE — 86901 BLOOD TYPING SEROLOGIC RH(D): CPT | Performed by: OBSTETRICS & GYNECOLOGY

## 2023-10-14 PROCEDURE — 86850 RBC ANTIBODY SCREEN: CPT | Performed by: OBSTETRICS & GYNECOLOGY

## 2023-10-14 PROCEDURE — 25010000002 DIPHENHYDRAMINE PER 50 MG: Performed by: NURSE ANESTHETIST, CERTIFIED REGISTERED

## 2023-10-14 PROCEDURE — 25010000002 VANCOMYCIN PER 500 MG: Performed by: OBSTETRICS & GYNECOLOGY

## 2023-10-14 PROCEDURE — 25010000002 FENTANYL CITRATE (PF) 50 MCG/ML SOLUTION: Performed by: ANESTHESIOLOGY

## 2023-10-14 PROCEDURE — 25010000002 ONDANSETRON PER 1 MG: Performed by: OBSTETRICS & GYNECOLOGY

## 2023-10-14 PROCEDURE — 85027 COMPLETE CBC AUTOMATED: CPT | Performed by: OBSTETRICS & GYNECOLOGY

## 2023-10-14 PROCEDURE — 25810000003 LACTATED RINGERS PER 1000 ML: Performed by: OBSTETRICS & GYNECOLOGY

## 2023-10-14 PROCEDURE — 25010000002 KETOROLAC TROMETHAMINE PER 15 MG: Performed by: OBSTETRICS & GYNECOLOGY

## 2023-10-14 RX ORDER — ALBUTEROL SULFATE 2.5 MG/3ML
2.5 SOLUTION RESPIRATORY (INHALATION) EVERY 6 HOURS PRN
Status: DISCONTINUED | OUTPATIENT
Start: 2023-10-14 | End: 2023-10-16 | Stop reason: HOSPADM

## 2023-10-14 RX ORDER — DIPHENHYDRAMINE HYDROCHLORIDE 50 MG/ML
25 INJECTION INTRAMUSCULAR; INTRAVENOUS EVERY 4 HOURS PRN
Status: DISCONTINUED | OUTPATIENT
Start: 2023-10-14 | End: 2023-10-16 | Stop reason: HOSPADM

## 2023-10-14 RX ORDER — HYDROMORPHONE HYDROCHLORIDE 1 MG/ML
0.5 INJECTION, SOLUTION INTRAMUSCULAR; INTRAVENOUS; SUBCUTANEOUS
Status: ACTIVE | OUTPATIENT
Start: 2023-10-14 | End: 2023-10-14

## 2023-10-14 RX ORDER — SODIUM CHLORIDE 0.9 % (FLUSH) 0.9 %
3 SYRINGE (ML) INJECTION EVERY 12 HOURS SCHEDULED
Status: DISCONTINUED | OUTPATIENT
Start: 2023-10-14 | End: 2023-10-14 | Stop reason: HOSPADM

## 2023-10-14 RX ORDER — OXYTOCIN/0.9 % SODIUM CHLORIDE 30/500 ML
999 PLASTIC BAG, INJECTION (ML) INTRAVENOUS ONCE
Status: COMPLETED | OUTPATIENT
Start: 2023-10-14 | End: 2023-10-14

## 2023-10-14 RX ORDER — MISOPROSTOL 200 UG/1
800 TABLET ORAL AS NEEDED
Status: DISCONTINUED | OUTPATIENT
Start: 2023-10-14 | End: 2023-10-14 | Stop reason: HOSPADM

## 2023-10-14 RX ORDER — OXYCODONE HYDROCHLORIDE 10 MG/1
10 TABLET ORAL EVERY 4 HOURS PRN
Status: DISCONTINUED | OUTPATIENT
Start: 2023-10-14 | End: 2023-10-16 | Stop reason: HOSPADM

## 2023-10-14 RX ORDER — KETOROLAC TROMETHAMINE 30 MG/ML
INJECTION, SOLUTION INTRAMUSCULAR; INTRAVENOUS AS NEEDED
Status: DISCONTINUED | OUTPATIENT
Start: 2023-10-14 | End: 2023-10-14 | Stop reason: SURG

## 2023-10-14 RX ORDER — HYDROXYZINE 50 MG/1
50 TABLET, FILM COATED ORAL EVERY 6 HOURS PRN
Status: DISCONTINUED | OUTPATIENT
Start: 2023-10-14 | End: 2023-10-16 | Stop reason: HOSPADM

## 2023-10-14 RX ORDER — METHYLERGONOVINE MALEATE 0.2 MG/ML
200 INJECTION INTRAVENOUS ONCE AS NEEDED
Status: DISCONTINUED | OUTPATIENT
Start: 2023-10-14 | End: 2023-10-14 | Stop reason: HOSPADM

## 2023-10-14 RX ORDER — MONTELUKAST SODIUM 10 MG/1
10 TABLET ORAL DAILY
Status: DISCONTINUED | OUTPATIENT
Start: 2023-10-15 | End: 2023-10-16 | Stop reason: HOSPADM

## 2023-10-14 RX ORDER — ALUMINA, MAGNESIA, AND SIMETHICONE 2400; 2400; 240 MG/30ML; MG/30ML; MG/30ML
15 SUSPENSION ORAL EVERY 4 HOURS PRN
Status: DISCONTINUED | OUTPATIENT
Start: 2023-10-14 | End: 2023-10-16 | Stop reason: HOSPADM

## 2023-10-14 RX ORDER — ONDANSETRON 2 MG/ML
4 INJECTION INTRAMUSCULAR; INTRAVENOUS ONCE AS NEEDED
Status: DISCONTINUED | OUTPATIENT
Start: 2023-10-14 | End: 2023-10-14 | Stop reason: HOSPADM

## 2023-10-14 RX ORDER — BUPIVACAINE HYDROCHLORIDE 7.5 MG/ML
INJECTION, SOLUTION EPIDURAL; RETROBULBAR
Status: COMPLETED | OUTPATIENT
Start: 2023-10-14 | End: 2023-10-14

## 2023-10-14 RX ORDER — PHYTONADIONE 1 MG/.5ML
INJECTION, EMULSION INTRAMUSCULAR; INTRAVENOUS; SUBCUTANEOUS
Status: ACTIVE
Start: 2023-10-14 | End: 2023-10-14

## 2023-10-14 RX ORDER — FAMOTIDINE 10 MG/ML
20 INJECTION, SOLUTION INTRAVENOUS ONCE AS NEEDED
Status: DISCONTINUED | OUTPATIENT
Start: 2023-10-14 | End: 2023-10-14 | Stop reason: HOSPADM

## 2023-10-14 RX ORDER — MORPHINE SULFATE 2 MG/ML
2 INJECTION, SOLUTION INTRAMUSCULAR; INTRAVENOUS
Status: ACTIVE | OUTPATIENT
Start: 2023-10-14 | End: 2023-10-14

## 2023-10-14 RX ORDER — ECHINACEA PURPUREA EXTRACT 125 MG
1 TABLET ORAL AS NEEDED
Status: DISCONTINUED | OUTPATIENT
Start: 2023-10-14 | End: 2023-10-16 | Stop reason: HOSPADM

## 2023-10-14 RX ORDER — OXYCODONE HYDROCHLORIDE 5 MG/1
5 TABLET ORAL EVERY 4 HOURS PRN
Status: DISCONTINUED | OUTPATIENT
Start: 2023-10-14 | End: 2023-10-16 | Stop reason: HOSPADM

## 2023-10-14 RX ORDER — DOCUSATE SODIUM 100 MG/1
100 CAPSULE, LIQUID FILLED ORAL 2 TIMES DAILY PRN
Status: DISCONTINUED | OUTPATIENT
Start: 2023-10-14 | End: 2023-10-16 | Stop reason: HOSPADM

## 2023-10-14 RX ORDER — PROMETHAZINE HYDROCHLORIDE 12.5 MG/1
12.5 TABLET ORAL EVERY 4 HOURS PRN
Status: DISCONTINUED | OUTPATIENT
Start: 2023-10-14 | End: 2023-10-16 | Stop reason: HOSPADM

## 2023-10-14 RX ORDER — VANCOMYCIN HYDROCHLORIDE 1 G/200ML
1000 INJECTION, SOLUTION INTRAVENOUS ONCE
Status: COMPLETED | OUTPATIENT
Start: 2023-10-14 | End: 2023-10-14

## 2023-10-14 RX ORDER — ONDANSETRON 2 MG/ML
4 INJECTION INTRAMUSCULAR; INTRAVENOUS ONCE AS NEEDED
Status: DISCONTINUED | OUTPATIENT
Start: 2023-10-14 | End: 2023-10-16 | Stop reason: HOSPADM

## 2023-10-14 RX ORDER — OXYTOCIN/0.9 % SODIUM CHLORIDE 30/500 ML
250 PLASTIC BAG, INJECTION (ML) INTRAVENOUS CONTINUOUS
Status: DISPENSED | OUTPATIENT
Start: 2023-10-14 | End: 2023-10-14

## 2023-10-14 RX ORDER — SODIUM CHLORIDE, SODIUM LACTATE, POTASSIUM CHLORIDE, CALCIUM CHLORIDE 600; 310; 30; 20 MG/100ML; MG/100ML; MG/100ML; MG/100ML
125 INJECTION, SOLUTION INTRAVENOUS CONTINUOUS
Status: DISCONTINUED | OUTPATIENT
Start: 2023-10-14 | End: 2023-10-16 | Stop reason: HOSPADM

## 2023-10-14 RX ORDER — BUDESONIDE AND FORMOTEROL FUMARATE DIHYDRATE 80; 4.5 UG/1; UG/1
2 AEROSOL RESPIRATORY (INHALATION)
Status: DISCONTINUED | OUTPATIENT
Start: 2023-10-15 | End: 2023-10-14

## 2023-10-14 RX ORDER — KETOROLAC TROMETHAMINE 15 MG/ML
15 INJECTION, SOLUTION INTRAMUSCULAR; INTRAVENOUS EVERY 6 HOURS
Status: DISPENSED | OUTPATIENT
Start: 2023-10-14 | End: 2023-10-15

## 2023-10-14 RX ORDER — NALOXONE HCL 0.4 MG/ML
0.2 VIAL (ML) INJECTION
Status: CANCELLED | OUTPATIENT
Start: 2023-10-14

## 2023-10-14 RX ORDER — MORPHINE SULFATE 4 MG/ML
INJECTION, SOLUTION INTRAMUSCULAR; INTRAVENOUS
Status: COMPLETED | OUTPATIENT
Start: 2023-10-14 | End: 2023-10-14

## 2023-10-14 RX ORDER — ONDANSETRON 2 MG/ML
4 INJECTION INTRAMUSCULAR; INTRAVENOUS ONCE
Status: COMPLETED | OUTPATIENT
Start: 2023-10-14 | End: 2023-10-14

## 2023-10-14 RX ORDER — LIDOCAINE HYDROCHLORIDE 10 MG/ML
5 INJECTION, SOLUTION EPIDURAL; INFILTRATION; INTRACAUDAL; PERINEURAL AS NEEDED
Status: DISCONTINUED | OUTPATIENT
Start: 2023-10-14 | End: 2023-10-14 | Stop reason: HOSPADM

## 2023-10-14 RX ORDER — DROPERIDOL 2.5 MG/ML
0.62 INJECTION, SOLUTION INTRAMUSCULAR; INTRAVENOUS
Status: DISCONTINUED | OUTPATIENT
Start: 2023-10-14 | End: 2023-10-16 | Stop reason: HOSPADM

## 2023-10-14 RX ORDER — OXYTOCIN/0.9 % SODIUM CHLORIDE 30/500 ML
125 PLASTIC BAG, INJECTION (ML) INTRAVENOUS CONTINUOUS PRN
Status: COMPLETED | OUTPATIENT
Start: 2023-10-14 | End: 2023-10-14

## 2023-10-14 RX ORDER — ACETAMINOPHEN 500 MG
1000 TABLET ORAL EVERY 6 HOURS
Status: COMPLETED | OUTPATIENT
Start: 2023-10-14 | End: 2023-10-15

## 2023-10-14 RX ORDER — HYDROCORTISONE 25 MG/G
CREAM TOPICAL 3 TIMES DAILY PRN
Status: DISCONTINUED | OUTPATIENT
Start: 2023-10-14 | End: 2023-10-16 | Stop reason: HOSPADM

## 2023-10-14 RX ORDER — FAMOTIDINE 10 MG/ML
20 INJECTION, SOLUTION INTRAVENOUS ONCE
Status: COMPLETED | OUTPATIENT
Start: 2023-10-14 | End: 2023-10-14

## 2023-10-14 RX ORDER — IBUPROFEN 600 MG/1
600 TABLET ORAL EVERY 6 HOURS
Status: DISCONTINUED | OUTPATIENT
Start: 2023-10-15 | End: 2023-10-16 | Stop reason: HOSPADM

## 2023-10-14 RX ORDER — CALCIUM CARBONATE 500 MG/1
1 TABLET, CHEWABLE ORAL EVERY 4 HOURS PRN
Status: DISCONTINUED | OUTPATIENT
Start: 2023-10-14 | End: 2023-10-16 | Stop reason: HOSPADM

## 2023-10-14 RX ORDER — SIMETHICONE 80 MG
80 TABLET,CHEWABLE ORAL 4 TIMES DAILY PRN
Status: DISCONTINUED | OUTPATIENT
Start: 2023-10-14 | End: 2023-10-16 | Stop reason: HOSPADM

## 2023-10-14 RX ORDER — ONDANSETRON 4 MG/1
4 TABLET, FILM COATED ORAL EVERY 8 HOURS PRN
Status: DISCONTINUED | OUTPATIENT
Start: 2023-10-14 | End: 2023-10-16 | Stop reason: HOSPADM

## 2023-10-14 RX ORDER — ERYTHROMYCIN 5 MG/G
OINTMENT OPHTHALMIC
Status: ACTIVE
Start: 2023-10-14 | End: 2023-10-14

## 2023-10-14 RX ORDER — ACETAMINOPHEN 500 MG
1000 TABLET ORAL ONCE
Status: COMPLETED | OUTPATIENT
Start: 2023-10-14 | End: 2023-10-14

## 2023-10-14 RX ORDER — SODIUM CHLORIDE 0.9 % (FLUSH) 0.9 %
3-10 SYRINGE (ML) INJECTION AS NEEDED
Status: DISCONTINUED | OUTPATIENT
Start: 2023-10-14 | End: 2023-10-14 | Stop reason: HOSPADM

## 2023-10-14 RX ORDER — GLYCOPYRROLATE 0.2 MG/ML
INJECTION INTRAMUSCULAR; INTRAVENOUS AS NEEDED
Status: DISCONTINUED | OUTPATIENT
Start: 2023-10-14 | End: 2023-10-14 | Stop reason: SURG

## 2023-10-14 RX ORDER — CITRIC ACID/SODIUM CITRATE 334-500MG
30 SOLUTION, ORAL ORAL ONCE
Status: DISCONTINUED | OUTPATIENT
Start: 2023-10-14 | End: 2023-10-14 | Stop reason: HOSPADM

## 2023-10-14 RX ORDER — BUDESONIDE AND FORMOTEROL FUMARATE DIHYDRATE 80; 4.5 UG/1; UG/1
2 AEROSOL RESPIRATORY (INHALATION)
Status: DISCONTINUED | OUTPATIENT
Start: 2023-10-15 | End: 2023-10-16 | Stop reason: HOSPADM

## 2023-10-14 RX ORDER — FENTANYL CITRATE 50 UG/ML
INJECTION, SOLUTION INTRAMUSCULAR; INTRAVENOUS
Status: COMPLETED | OUTPATIENT
Start: 2023-10-14 | End: 2023-10-14

## 2023-10-14 RX ORDER — KETOROLAC TROMETHAMINE 30 MG/ML
30 INJECTION, SOLUTION INTRAMUSCULAR; INTRAVENOUS ONCE
Status: DISCONTINUED | OUTPATIENT
Start: 2023-10-14 | End: 2023-10-14 | Stop reason: HOSPADM

## 2023-10-14 RX ORDER — DIPHENHYDRAMINE HCL 25 MG
25 CAPSULE ORAL EVERY 4 HOURS PRN
Status: DISCONTINUED | OUTPATIENT
Start: 2023-10-14 | End: 2023-10-16 | Stop reason: HOSPADM

## 2023-10-14 RX ORDER — OXYTOCIN/0.9 % SODIUM CHLORIDE 30/500 ML
125 PLASTIC BAG, INJECTION (ML) INTRAVENOUS CONTINUOUS PRN
Status: DISCONTINUED | OUTPATIENT
Start: 2023-10-14 | End: 2023-10-16 | Stop reason: HOSPADM

## 2023-10-14 RX ORDER — CARBOPROST TROMETHAMINE 250 UG/ML
250 INJECTION, SOLUTION INTRAMUSCULAR AS NEEDED
Status: DISCONTINUED | OUTPATIENT
Start: 2023-10-14 | End: 2023-10-14 | Stop reason: HOSPADM

## 2023-10-14 RX ORDER — PHENYLEPHRINE HCL IN 0.9% NACL 0.5 MG/5ML
SYRINGE (ML) INTRAVENOUS AS NEEDED
Status: DISCONTINUED | OUTPATIENT
Start: 2023-10-14 | End: 2023-10-14 | Stop reason: SURG

## 2023-10-14 RX ORDER — ACETAMINOPHEN 325 MG/1
650 TABLET ORAL EVERY 6 HOURS
Status: DISCONTINUED | OUTPATIENT
Start: 2023-10-15 | End: 2023-10-16 | Stop reason: HOSPADM

## 2023-10-14 RX ORDER — ENOXAPARIN SODIUM 100 MG/ML
40 INJECTION SUBCUTANEOUS NIGHTLY
Status: DISCONTINUED | OUTPATIENT
Start: 2023-10-15 | End: 2023-10-16 | Stop reason: HOSPADM

## 2023-10-14 RX ADMIN — SALINE NASAL SPRAY 1 SPRAY: 1.5 SOLUTION NASAL at 09:46

## 2023-10-14 RX ADMIN — KETOROLAC TROMETHAMINE 30 MG: 30 INJECTION, SOLUTION INTRAMUSCULAR at 08:33

## 2023-10-14 RX ADMIN — GENTAMICIN SULFATE 410 MG: 40 INJECTION, SOLUTION INTRAMUSCULAR; INTRAVENOUS at 06:54

## 2023-10-14 RX ADMIN — DIPHENHYDRAMINE HYDROCHLORIDE 25 MG: 50 INJECTION, SOLUTION INTRAMUSCULAR; INTRAVENOUS at 16:42

## 2023-10-14 RX ADMIN — FAMOTIDINE 20 MG: 10 INJECTION INTRAVENOUS at 06:55

## 2023-10-14 RX ADMIN — BUPIVACAINE HYDROCHLORIDE 1.6 ML: 7.5 INJECTION, SOLUTION EPIDURAL; RETROBULBAR at 07:49

## 2023-10-14 RX ADMIN — ACETAMINOPHEN 1000 MG: 500 TABLET ORAL at 19:45

## 2023-10-14 RX ADMIN — MORPHINE SULFATE 150 MCG: 4 INJECTION, SOLUTION INTRAMUSCULAR; INTRAVENOUS at 07:49

## 2023-10-14 RX ADMIN — Medication 999 ML/HR: at 08:13

## 2023-10-14 RX ADMIN — Medication 200 MCG: at 07:57

## 2023-10-14 RX ADMIN — KETOROLAC TROMETHAMINE 15 MG: 15 INJECTION, SOLUTION INTRAMUSCULAR; INTRAVENOUS at 15:53

## 2023-10-14 RX ADMIN — ACETAMINOPHEN 1000 MG: 500 TABLET ORAL at 06:56

## 2023-10-14 RX ADMIN — VANCOMYCIN HYDROCHLORIDE 1000 MG: 1 INJECTION, SOLUTION INTRAVENOUS at 07:37

## 2023-10-14 RX ADMIN — ACETAMINOPHEN 1000 MG: 500 TABLET ORAL at 12:57

## 2023-10-14 RX ADMIN — ONDANSETRON 4 MG: 2 INJECTION INTRAMUSCULAR; INTRAVENOUS at 06:56

## 2023-10-14 RX ADMIN — GLYCOPYRROLATE 0.2 MG: 0.2 INJECTION INTRAMUSCULAR; INTRAVENOUS at 07:58

## 2023-10-14 RX ADMIN — SERTRALINE 50 MG: 50 TABLET, FILM COATED ORAL at 23:45

## 2023-10-14 RX ADMIN — FENTANYL CITRATE 20 MCG: 50 INJECTION, SOLUTION INTRAMUSCULAR; INTRAVENOUS at 07:49

## 2023-10-14 RX ADMIN — SODIUM CHLORIDE, POTASSIUM CHLORIDE, SODIUM LACTATE AND CALCIUM CHLORIDE 125 ML/HR: 600; 310; 30; 20 INJECTION, SOLUTION INTRAVENOUS at 06:14

## 2023-10-14 RX ADMIN — Medication 125 ML/HR: at 09:30

## 2023-10-14 NOTE — PLAN OF CARE
Problem: Adult Inpatient Plan of Care  Goal: Plan of Care Review  Outcome: Ongoing, Progressing  Flowsheets (Taken 10/14/2023 0828)  Progress: improving  Plan of Care Reviewed With:   patient   spouse  Outcome Evaluation: Pt came in for scheduled 0730 repeat , went back to the OR at 0734, viable baby boy born at 0810, vitals stable at this time, will continue to monitor and transfer to mother/baby after recovery.  Goal: Patient-Specific Goal (Individualized)  Outcome: Ongoing, Progressing  Flowsheets (Taken 10/14/2023 0828)  Patient-Specific Goals (Include Timeframe): Healthy mom and baby at discharge.  Individualized Care Needs: education, and pain managment  Anxieties, Fears or Concerns: none  Goal: Absence of Hospital-Acquired Illness or Injury  Outcome: Ongoing, Progressing  Goal: Optimal Comfort and Wellbeing  Outcome: Ongoing, Progressing  Goal: Readiness for Transition of Care  Outcome: Ongoing, Progressing   Goal Outcome Evaluation:  Plan of Care Reviewed With: patient, spouse        Progress: improving  Outcome Evaluation: Pt came in for scheduled 0730 repeat , went back to the OR at 0734, viable baby boy born at 0810, vitals stable at this time, will continue to monitor and transfer to mother/baby after recovery.

## 2023-10-14 NOTE — ANESTHESIA POSTPROCEDURE EVALUATION
"Patient: Sonam Lang    Procedure Summary       Date: 10/14/23 Room / Location:  [facility]    Anesthesia Start: 735 Anesthesia Stop: 851    Procedure:  SECTION REPEAT (Abdomen) Diagnosis:       Multigravida of advanced maternal age in second trimester      Previous  section      (Multigravida of advanced maternal age in second trimester [O09.522])      (Previous  section [Z98.891])    Surgeons: Megan El MD Provider: Sergey Perez MD    Anesthesia Type: spinal ASA Status: 3            Anesthesia Type: spinal    Vitals  Vitals Value Taken Time   /70 10/14/23 1045   Temp     Pulse 62 10/14/23 1045   Resp 18 10/14/23 1030   SpO2 100 % 10/14/23 1044   Vitals shown include unfiled device data.        Post Anesthesia Care and Evaluation    Patient location during evaluation: bedside  Patient participation: complete - patient participated  Level of consciousness: awake and alert  Pain management: adequate    Airway patency: patent  Anesthetic complications: No anesthetic complications  PONV Status: controlled  Cardiovascular status: acceptable  Respiratory status: acceptable  Hydration status: acceptable  Post Neuraxial Block status: No signs or symptoms of PDPH  Comments: /73   Pulse 65   Temp 36.5 øC (97.7 øF) (Oral)   Resp 18   Ht 172.7 cm (68\")   Wt 123 kg (270 lb 6.4 oz)   LMP  (LMP Unknown)   SpO2 99%   Breastfeeding Yes   BMI 41.11 kg/mý       "

## 2023-10-14 NOTE — H&P
[facility]  Obstetric History and Physical    Chief Complaint   Patient presents with    Scheduled      Scheduled C/S for term. +FM. Denies LOF or VB       Subjective     Patient is a 38 y.o. female  currently at 39w0d, who presents with term pregnancy for repeat  section.    Her prenatal care is complicated by  advanced maternal age  genetic screening was normal and prior   desires repeat .  Her previous obstetric/gynecological history is noted for  IUFD with Trisomy  18 last pregnancy and  at 27 weeks.    The following portions of the patients history were reviewed and updated as appropriate: current medications, allergies, past medical history, past surgical history, past family history, past social history, and problem list .       Prenatal Information:  Prenatal Results       Initial Prenatal Labs       Test Value Reference Range Date Time    Hemoglobin  14.2 g/dL 11.1 - 15.9 23 1146    Hematocrit  42.6 % 34.0 - 46.6 23 1146    Platelets  327 x10E3/uL 150 - 450 23 1146    Rubella IgG  5.38 index Immune >0.99 23 1146    Hepatitis B SAg  Negative  Negative 23 1146    Hepatitis C Ab  Non Reactive  Non Reactive 23 1146    RPR  Non Reactive  Non Reactive 23 1146    T. Pallidum Ab         ABO  O   23 1703    Rh  Positive   23 1703    Antibody Screen  Negative  Negative 23 1146    HIV  Non Reactive  Non Reactive 23 1146    Urine Culture  50,000 CFU/mL Normal Urogenital Kerri   23 1409    Gonorrhea  Negative  Negative 23 1157    Chlamydia  Negative  Negative 23 1157    TSH  0.553 uIU/mL 0.450 - 4.500 23 1146    HgB A1c   5.0 % 4.8 - 5.6 23 1146    Varicella IgG        HgB Electrophoresis         Cystic fibrosis                   Fetal testing        Test Value Reference Range Date Time    NIPT        MSAFP  *Screen Negative*   23 1010    AFP-4                  2nd and  3rd Trimester       Test Value Reference Range Date Time    Hemoglobin (repeated)  11.8 g/dL 12.0 - 15.9 10/14/23 0613       12.4 g/dL 12.0 - 15.9 09/26/23 1703       11.6 g/dL 12.0 - 15.9 07/14/23 1303    Hematocrit (repeated)  33.9 % 34.0 - 46.6 10/14/23 0613       35.5 % 34.0 - 46.6 09/26/23 1703       34.6 % 34.0 - 46.6 07/14/23 1303    Platelets   239 10*3/mm3 140 - 450 10/14/23 0613       267 10*3/mm3 140 - 450 09/26/23 1703       252 10*3/mm3 140 - 450 07/14/23 1303       327 x10E3/uL 150 - 450 03/27/23 1146    GCT  90 mg/dL 65 - 139 07/14/23 1303       90 mg/dL 65 - 139 06/21/23 0950    Antibody Screen (repeated)  Negative   09/26/23 1703       Negative  Negative 03/27/23 1146    GTT Fasting        GTT 1 Hr        GTT 2 Hr        GTT 3 Hr        Group B Strep  Negative  Negative 09/21/23 1219              Other testing        Test Value Reference Range Date Time    Parvo IgG         CMV IgG                   Drug Screening       Test Value Reference Range Date Time    Amphetamine Screen        Barbiturate Screen        Benzodiazepine Screen        Methadone Screen        Phencyclidine Screen        Opiates Screen        THC Screen        Cocaine Screen        Propoxyphene Screen        Buprenorphine Screen        Methamphetamine Screen        Oxycodone Screen        Tricyclic Antidepressants Screen                  Legend    ^: Historical                          External Prenatal Results       Pregnancy Outside Results - Transcribed From Office Records - See Scanned Records For Details       Test Value Date Time    ABO  O  09/26/23 1703    Rh  Positive  09/26/23 1703    Antibody Screen  Negative  09/26/23 1703       Negative  03/27/23 1146    Varicella IgG ^ 3.4 AI 04/19/19 1318    Rubella  5.38 index 03/27/23 1146    Hgb  11.8 g/dL 10/14/23 0613       12.4 g/dL 09/26/23 1703       11.6 g/dL 07/14/23 1303       14.2 g/dL 03/27/23 1146    Hct  33.9 % 10/14/23 0613       35.5 % 09/26/23 1703       34.6 %  23 1303       42.6 % 23 1146    Glucose Fasting GTT       Glucose Tolerance Test 1 hour       Glucose Tolerance Test 3 hour       Gonorrhea (discrete)  Negative  23 1157    Chlamydia (discrete)  Negative  23 1157    RPR  Non Reactive  23 1146    VDRL       Syphilis Antibody ^ <0.2 AI 19 1318    HBsAg  Negative  23 1146    Herpes Simplex Virus PCR       Herpes Simplex VIrus Culture       HIV  Non Reactive  23 1146    Hep C RNA Quant PCR       Hep C Antibody  Non Reactive  23 1146    AFP  50.3 ng/mL 23 1010    Group B Strep  Negative  23 1219    GBS Susceptibility to Clindamycin       GBS Susceptibility to Erythromycin       Fetal Fibronectin  Positive  22 0355    Genetic Testing, Maternal Blood                 Drug Screening       Test Value Date Time    Urine Drug Screen       Amphetamine Screen ^ Negative (arb'U) 19 1318    Barbiturate Screen       Benzodiazepine Screen       Methadone Screen       Phencyclidine Screen       Opiates Screen ^ Negative  19 1318    THC Screen       Cocaine Screen       Propoxyphene Screen       Buprenorphine Screen       Methamphetamine Screen       Oxycodone Screen       Tricyclic Antidepressants Screen                 Legend    ^: Historical                             Past OB History:     OB History    Para Term  AB Living   5 2 1 1 2 1   SAB IAB Ectopic Molar Multiple Live Births   0 0 2 0 0 1      # Outcome Date GA Lbr Hernando/2nd Weight Sex Delivery Anes PTL Lv   5 Current            4  22 27w2d / 01:03 559 g (1 lb 3.7 oz) F  None Y FD      Birth Comments: scale 1      Name: MIKE CAMEJO      Apgar1: 2  Apgar5: 2   3 Ectopic      ECTOPIC      2 Ectopic      ECTOPIC      1 Term 10/28/19 38w1d  3200 g (7 lb 0.9 oz) F CS-LTranv EPI N EL      Complications: Failure to Progress in First Stage, Oligohydramnios      Name: NORBERTO CAMEJO      Apgar1: 8   Apgar5: 9       Past Medical History: Past Medical History:   Diagnosis Date    ADHD (attention deficit hyperactivity disorder)     Anxiety     Asthma     Congenital abnormalities 2022    Multiple fetal anomalies present including meningocele, unilateral real agenesis, unilateral club foot, membranous VSD     Depression     GERD (gastroesophageal reflux disease)     History of depression 2019    Irritable bowel syndrome     Migraine     Pregnancy complicated by multiple fetal congenital anomalies, single gestation 2022    NTD, SUA    PTSD (post-traumatic stress disorder)     Sleep apnea 2016    CPAP      Past Surgical History Past Surgical History:   Procedure Laterality Date     SECTION      COLONOSCOPY      with endoscopy    SINUS SURGERY  11/22/2022    x2, 2018 (first)    WISDOM TOOTH EXTRACTION        Family History: Family History   Problem Relation Age of Onset    Hypertension Mother     Diabetes Mother     Migraines Mother     Hypertension Father     Hypertension Maternal Grandmother     Migraines Maternal Grandmother     Alzheimer's disease Maternal Grandfather     Diabetes Paternal Grandmother     Migraines Paternal Grandmother     Dementia Paternal Grandmother     Stroke Paternal Grandmother     Frontotemporal dementia Paternal Grandmother     Diabetes Paternal Grandfather       Social History:  reports that she quit smoking about 11 years ago. Her smoking use included cigarettes. She has never been exposed to tobacco smoke. She has never used smokeless tobacco.   reports that she does not currently use alcohol.   reports no history of drug use.        General ROS: Pertinent items are noted in HPI, all other systems reviewed and negative    Objective       Vital Signs Range for the last 24 hours  Temperature: Temp:  [97.7 °F (36.5 °C)] 97.7 °F (36.5 °C)   Temp Source: Temp src: Oral   BP: BP: (122)/(72) 122/72   Pulse: Heart Rate:  [89] 89   Respirations: Resp:  [16] 16    SPO2: SpO2:  [99 %] 99 %   O2 Amount (l/min):     O2 Devices     Weight: Weight:  [123 kg (270 lb 6.4 oz)] 123 kg (270 lb 6.4 oz)     Physical Examination: General appearance - alert, well appearing, and in no distress  Mental status - alert, oriented to person, place, and time  Neck - supple, no significant adenopathy  Chest - no tachypnea, retractions or cyanosis  Heart - normal rate and regular rhythm  Abdomen - soft, nontender, gravid  Pelvic - cervix closed last check  Neurological - alert, oriented, normal speech, no focal findings or movement disorder noted  Extremities - peripheral pulses normal, no pedal edema, no clubbing or cyanosis  Skin - normal coloration and turgor, no rashes, no suspicious skin lesions noted    Presentation: vtx   Cervix: Exam by:     Dilation:     Effacement:     Station:         Fetal Heart Rate Assessment   Method:     Beats/min:     Baseline:     Variability:     Accels:     Decels:     Tracing Category:       Uterine Assessment   Method:     Frequency (min):     Ctx Count in 10 min:     Duration:     Intensity:     Intensity by IUPC:     Resting Tone:     Resting Tone by IUPC:     [place] Units:       Laboratory Results:   Lab Results (last 24 hours)       Procedure Component Value Units Date/Time    CBC (No Diff) [[id number]]  (Abnormal) Collected: 10/14/23 0613    Specimen: Blood Updated: 10/14/23 0638     WBC 9.76 10*3/mm3      RBC 3.71 10*6/mm3      Hemoglobin 11.8 g/dL      Hematocrit 33.9 %      MCV 91.4 fL      MCH 31.8 pg      MCHC 34.8 g/dL      RDW 13.0 %      RDW-SD 42.7 fl      MPV 10.4 fL      Platelets 239 10*3/mm3            Radiology Review: none  Other Studies: none    Assessment & Plan       Previous  delivery affecting pregnancy    Previous  section    Multigravida of advanced maternal age in third trimester    Multigravida of advanced maternal age in second trimester        Assessment:  1.  Intrauterine pregnancy at 39w0d gestation with  reactive fetal status.    2.  For repeat  section  3.  Obstetrical history significant for is non-contributory.  4.  GBS status:   Strep Gp B DAWOOD   Date Value Ref Range Status   2023 Negative Negative Final     Comment:     Centers for Disease Control and Prevention (CDC) and American Congress  of Obstetricians and Gynecologists (ACOG) guidelines for prevention of   group B streptococcal (GBS) disease specify co-collection of  a vaginal and rectal swab specimen to maximize sensitivity of GBS  detection. Per the CDC and ACOG, swabbing both the lower vagina and  rectum substantially increases the yield of detection compared with  sampling the vagina alone.  Penicillin G, ampicillin, or cefazolin are indicated for intrapartum  prophylaxis of  GBS colonization. Reflex susceptibility  testing should be performed prior to use of clindamycin only on GBS  isolates from penicillin-allergic women who are considered a high risk  for anaphylaxis. Treatment with vancomycin without additional testing  is warranted if resistance to clindamycin is noted.         Plan:  1. Repeat  scheduled  2. Plan of care has been reviewed with patient and family  3.  Risks, benefits of treatment plan have been discussed.  4.  All questions have been answered.        Megan El MD  10/14/2023  06:43 EDT

## 2023-10-14 NOTE — L&D DELIVERY NOTE
"[facility]   Section Operative Note    Pre-Operative Dx:   1.  IUP at Gestational Age: 39w0d  weeks    2.   Previous     3.  Labor status:  Without Labor   4.  AMA     Postoperative dx:    1.  Same     Procedure: Procedure(s):   SECTION REPEAT   Surgeon/Assistant: Surgeon(s):  Megan El MD         Anesthesia:  Anesthesiologist: Choice  Anesthesiologist: Sergey Perez MD  CRNA: Yessenia Villar CRNA     EBL: 500   mls.          QBL:          IV Fluids: 1000 mls.   UOP: 250 mls.    I/O this shift:  In: 200 [IV Piggyback:200]  Out: 0    Antibiotics: gentamycin (Garamycin) and vancomycin     Infant:      Name:  Alban         Gender: male  infant    Weight: 4070 g (8 lb 15.6 oz)     Apgars: 7  @ 1 minute /     8  @ 5 minutes    Cord gases: Venous:  No results found for: \"PHCVEN\", \"BECVEN\"     Arterial:  No results found for: \"PHCART\", \"BECART\"     Indication for C/Section:           Priority for C/Section:  routine      Procedure Details:       Description of Procedure:  The patient was taken to the operating room where anesthesia was found to be adequate. She was prepped and draped in the usual sterile fashion in the dorsal supine position with a leftward tilt.   A Pfannenstiel skin incision was made with the scalpel and carried through the fat to the underlying layer of fascia. The fascia was then incised in the midline and the incision was extended laterally with rodriguez scissors. The superior aspect of the fascia was then grasped with Kocher clamps and the underlying rectus muscles were then dissected off bluntly with the Rodriguez scissors. The inferior aspect of the fascia was then grasped with Kocher clamps and dissected off similarly with Rodriguez scissors. The rectus muscles were  and the peritoneum entered. The peritoneal incision was then carried superiorly and inferiorly taking care to identify the bladder at the lower aspect.   The bladder blade was inserted. " The vesicouterine peritoneum was then identified and entered sharply with Metzenbaum scissors and a bladder flap was created.. The bladder blade was reinserted and the uterine incision was made in a low transverse fashion using the scalpel. This was extended with the bandage scissors.  The bladder blade was removed and the infant was delivered atraumatically. The nose and mouth were suctioned and the cord was clamped and cut. The infant was taken to the warmer for the waiting staff. Cord blood was collected. The placenta was removed, and the uterus was cleared of all clots and debris. The uterine incision was repaired in two layers using 0- vicryl suture. The uterus was examined and noted to be hemostatic.   The posterior cul-de-sac and gutters were cleared of all clots and debris. The uterus was then re-inspected to ensure hemostasis as were all subfascial tissues. The peritoneum was closed using 2-0 vicryl suture in a running fashion.  The fascia was re-approximated in a running fashion using 0-vicryl suture. The subcutaneous tissue was re-approximated in a running fashion with 3-0 vicryl. The skin was closed with 4-0 vicryl.  Steri strips were applied.    The patient tolerated the procedure well. Sponge, lap and needle counts were correct. The patient was taken to recovery in stable condition.    Megan El MD  October 14, 2023        Assistant: Zoran Loredo MD was responsible for performing the following activities: Retraction, Suction, Irrigation, and assist in delivery of baby  and their skilled assistance was necessary for the success of this case.    Complications:   None      Disposition:   Mother to Mother Baby/Postpartum  in stable condition currently.   Baby to remains with mom  in stable condition currently.       Megan El MD  10/14/2023  08:51 EDT

## 2023-10-14 NOTE — ANESTHESIA PREPROCEDURE EVALUATION
Anesthesia Evaluation     Patient summary reviewed and Nursing notes reviewed   NPO Solid Status: > 8 hours  NPO Liquid Status: > 2 hours           Airway   Mallampati: II  TM distance: >3 FB  Neck ROM: full  No difficulty expected  Dental - normal exam     Pulmonary - normal exam   (+) asthma,sleep apnea  Cardiovascular - negative cardio ROS and normal exam  Exercise tolerance: good (4-7 METS)        Neuro/Psych  (+) headaches, psychiatric history Anxiety and Depression  GI/Hepatic/Renal/Endo    (+) GERD    Musculoskeletal (-) negative ROS    Abdominal    Substance History - negative use     OB/GYN    (+) Pregnant        Other                    Anesthesia Plan    ASA 3     spinal     (39w0d  )    Anesthetic plan, risks, benefits, and alternatives have been provided, discussed and informed consent has been obtained with: patient and spouse/significant other.    Plan discussed with CRNA.    CODE STATUS:

## 2023-10-14 NOTE — LACTATION NOTE
P2. Term baby. Bgm 37 with repeat of 40 per RN. Pt attempting to latch baby. Baby sleepy at this time. RN gave pt hand pump. LC assisting pt with pumping both breasts and syringe feeding all colostrum to baby. Encouraged to BF at least every 2-3 hours for 10-15 min on each breast. Encouraged pt to pump every 2-3 hours and supplement all colostrum to baby. Call LC as needed. Pt has personal pump

## 2023-10-14 NOTE — ANESTHESIA PROCEDURE NOTES
Spinal Block      Patient reassessed immediately prior to procedure    Patient location during procedure: OB  Start Time: 10/14/2023 7:40 AM  Stop Time: 10/14/2023 7:49 AM  Indication:at surgeon's request  Performed By  Anesthesiologist: Sergey Perez MD CRNA/CAA: Yessenia Villar CRNA  Preanesthetic Checklist  Completed: patient identified, IV checked, site marked, risks and benefits discussed, surgical consent, monitors and equipment checked, pre-op evaluation and timeout performed  Spinal Block Prep:  Patient Position:sitting  Sterile Tech:gloves, cap, gown, mask and sterile barriers  Prep:Chloraprep  Patient Monitoring:blood pressure monitoring and continuous pulse oximetry    Spinal Block Procedure  Approach:midline  Guidance:landmark technique and palpation technique  Location:L4-L5  Needle Type:Pencan  Needle Gauge:24 G  Placement of Spinal needle event:cerebrospinal fluid aspirated    Fluid Appearance:clear  Medications: fentaNYL citrate (PF) (SUBLIMAZE) injection - Intrathecal   20 mcg - 10/14/2023 7:49:00 AM  Morphine sulfate (PF) injection - Spinal   150 mcg - 10/14/2023 7:49:00 AM  bupivacaine PF (MARCAINE) 0.75 % injection - Spinal   1.6 mL - 10/14/2023 7:49:00 AM   Post Assessment  Patient Tolerance:patient tolerated the procedure well with no apparent complications  Complications no

## 2023-10-15 LAB
BASOPHILS # BLD AUTO: 0.04 10*3/MM3 (ref 0–0.2)
BASOPHILS NFR BLD AUTO: 0.4 % (ref 0–1.5)
DEPRECATED RDW RBC AUTO: 43.2 FL (ref 37–54)
EOSINOPHIL # BLD AUTO: 0.12 10*3/MM3 (ref 0–0.4)
EOSINOPHIL NFR BLD AUTO: 1.1 % (ref 0.3–6.2)
ERYTHROCYTE [DISTWIDTH] IN BLOOD BY AUTOMATED COUNT: 12.8 % (ref 12.3–15.4)
HCT VFR BLD AUTO: 31.9 % (ref 34–46.6)
HGB BLD-MCNC: 10.8 G/DL (ref 12–15.9)
IMM GRANULOCYTES # BLD AUTO: 0.09 10*3/MM3 (ref 0–0.05)
IMM GRANULOCYTES NFR BLD AUTO: 0.8 % (ref 0–0.5)
LYMPHOCYTES # BLD AUTO: 1.57 10*3/MM3 (ref 0.7–3.1)
LYMPHOCYTES NFR BLD AUTO: 14 % (ref 19.6–45.3)
MCH RBC QN AUTO: 31.5 PG (ref 26.6–33)
MCHC RBC AUTO-ENTMCNC: 33.9 G/DL (ref 31.5–35.7)
MCV RBC AUTO: 93 FL (ref 79–97)
MONOCYTES # BLD AUTO: 0.92 10*3/MM3 (ref 0.1–0.9)
MONOCYTES NFR BLD AUTO: 8.2 % (ref 5–12)
NEUTROPHILS NFR BLD AUTO: 75.5 % (ref 42.7–76)
NEUTROPHILS NFR BLD AUTO: 8.45 10*3/MM3 (ref 1.7–7)
NRBC BLD AUTO-RTO: 0 /100 WBC (ref 0–0.2)
PLATELET # BLD AUTO: 178 10*3/MM3 (ref 140–450)
PMV BLD AUTO: 9.9 FL (ref 6–12)
RBC # BLD AUTO: 3.43 10*6/MM3 (ref 3.77–5.28)
WBC NRBC COR # BLD: 11.19 10*3/MM3 (ref 3.4–10.8)

## 2023-10-15 PROCEDURE — 94799 UNLISTED PULMONARY SVC/PX: CPT

## 2023-10-15 PROCEDURE — 94761 N-INVAS EAR/PLS OXIMETRY MLT: CPT

## 2023-10-15 PROCEDURE — 94640 AIRWAY INHALATION TREATMENT: CPT

## 2023-10-15 PROCEDURE — 85025 COMPLETE CBC W/AUTO DIFF WBC: CPT | Performed by: OBSTETRICS & GYNECOLOGY

## 2023-10-15 PROCEDURE — 94760 N-INVAS EAR/PLS OXIMETRY 1: CPT

## 2023-10-15 PROCEDURE — 25010000002 ENOXAPARIN PER 10 MG: Performed by: OBSTETRICS & GYNECOLOGY

## 2023-10-15 PROCEDURE — 25010000002 DIPHENHYDRAMINE PER 50 MG: Performed by: NURSE ANESTHETIST, CERTIFIED REGISTERED

## 2023-10-15 RX ADMIN — SIMETHICONE CHEW TAB 80 MG 80 MG: 80 TABLET ORAL at 07:58

## 2023-10-15 RX ADMIN — SERTRALINE 50 MG: 50 TABLET, FILM COATED ORAL at 20:22

## 2023-10-15 RX ADMIN — ALBUTEROL SULFATE 2.5 MG: 2.5 SOLUTION RESPIRATORY (INHALATION) at 10:30

## 2023-10-15 RX ADMIN — OXYCODONE HYDROCHLORIDE 10 MG: 10 TABLET ORAL at 20:22

## 2023-10-15 RX ADMIN — SIMETHICONE CHEW TAB 80 MG 80 MG: 80 TABLET ORAL at 20:34

## 2023-10-15 RX ADMIN — DIPHENHYDRAMINE HYDROCHLORIDE 25 MG: 50 INJECTION, SOLUTION INTRAMUSCULAR; INTRAVENOUS at 01:14

## 2023-10-15 RX ADMIN — ACETAMINOPHEN 650 MG: 325 TABLET, FILM COATED ORAL at 14:27

## 2023-10-15 RX ADMIN — ALBUTEROL SULFATE 2.5 MG: 2.5 SOLUTION RESPIRATORY (INHALATION) at 00:07

## 2023-10-15 RX ADMIN — SIMETHICONE CHEW TAB 80 MG 80 MG: 80 TABLET ORAL at 16:41

## 2023-10-15 RX ADMIN — BUDESONIDE AND FORMOTEROL FUMARATE DIHYDRATE 2 PUFF: 80; 4.5 AEROSOL RESPIRATORY (INHALATION) at 20:11

## 2023-10-15 RX ADMIN — ACETAMINOPHEN 650 MG: 325 TABLET, FILM COATED ORAL at 20:21

## 2023-10-15 RX ADMIN — ACETAMINOPHEN 1000 MG: 500 TABLET ORAL at 07:57

## 2023-10-15 RX ADMIN — DOCUSATE SODIUM 100 MG: 100 CAPSULE, LIQUID FILLED ORAL at 20:22

## 2023-10-15 RX ADMIN — ALBUTEROL SULFATE 2.5 MG: 2.5 SOLUTION RESPIRATORY (INHALATION) at 20:10

## 2023-10-15 RX ADMIN — OXYCODONE HYDROCHLORIDE 5 MG: 5 TABLET ORAL at 10:14

## 2023-10-15 RX ADMIN — BUDESONIDE AND FORMOTEROL FUMARATE DIHYDRATE 2 PUFF: 80; 4.5 AEROSOL RESPIRATORY (INHALATION) at 10:31

## 2023-10-15 RX ADMIN — OXYCODONE HYDROCHLORIDE 5 MG: 5 TABLET ORAL at 07:57

## 2023-10-15 RX ADMIN — DOCUSATE SODIUM 100 MG: 100 CAPSULE, LIQUID FILLED ORAL at 07:58

## 2023-10-15 RX ADMIN — ENOXAPARIN SODIUM 40 MG: 100 INJECTION SUBCUTANEOUS at 10:15

## 2023-10-15 RX ADMIN — ACETAMINOPHEN 1000 MG: 500 TABLET ORAL at 00:36

## 2023-10-15 RX ADMIN — BUDESONIDE AND FORMOTEROL FUMARATE DIHYDRATE 2 PUFF: 80; 4.5 AEROSOL RESPIRATORY (INHALATION) at 00:07

## 2023-10-15 RX ADMIN — OXYCODONE HYDROCHLORIDE 10 MG: 10 TABLET ORAL at 14:27

## 2023-10-15 NOTE — PROGRESS NOTES
10/15/2023    Name:Sonam Lang    MR#:[id number]     Progress Note:  Post-Op day 1 S/P    HD:1    Subjective   38 y.o. yo Female  s/p CS at 39w0d doing well. Pain well controlled. Tolerating regular diet and having flatus. Lochia normal.     Patient Active Problem List   Diagnosis    Migraine without aura and without status migrainosus, not intractable    Allergy to NSAIDs    Asthma, mild persistent    Obesity (BMI 30.0-34.9)    Sleep apnea    Anxiety    Previous  section    , delivered    Adult ADHD    Anxiety in pregnancy, antepartum    Multigravida of advanced maternal age in third trimester    Multigravida of advanced maternal age in second trimester    Previous  delivery affecting pregnancy        Objective    Vitals  Temp:  Temp:  [97 °F (36.1 °C)-98.1 °F (36.7 °C)] 98.1 °F (36.7 °C)  Temp src: Oral  BP:  BP: (105-124)/(65-80) 120/80  Pulse:  Heart Rate:  [62-82] 72  RR:   Resp:  [16-18] 16  Weight: 123 kg (270 lb 6.4 oz)  BMI:  Body mass index is 41.11 kg/m².      General Awake, alert, no distress  Abdomen Soft, non-distended, fundus firm, 2 cm below umbilicus, appropriately tender  Incision  Intact, no erythema or exudate  Extremities Calves NT bilaterally         I/O last 3 completed shifts:  In: 1900 [P.O.:1000; I.V.:700; IV Piggyback:200]  Out: 2934 [Urine:2450; Blood:484]    LABS:   Lab Results   Component Value Date    WBC 11.19 (H) 10/15/2023    HGB 10.8 (L) 10/15/2023    HCT 31.9 (L) 10/15/2023    MCV 93.0 10/15/2023     10/15/2023       Infant: male       Assessment   1.  POD 1    Desires circumcision. Discussed is cosmetic. Risks of bleeding, damage to penis, infection and signs of infection, aftercare discussed. Desires to proceed.      Plan: Doing well.  Routine postoperative care      Previous  delivery affecting pregnancy    Previous  section    Multigravida of advanced maternal age in third trimester    Multigravida of  advanced maternal age in second trimester      Candy Parmar MD  10/15/2023 10:20 EDT

## 2023-10-15 NOTE — PLAN OF CARE
Goal Outcome Evaluation:               Mother stated she did not understand to call out for pain relief. Mother reminded that she called out for pain medication this morning and received Tanesha 5mg for pain rated at a 7.  Pt has refused all Toradol and Motrin. Pt stated that she believes she maybe allergic to both medications. Tylenol and Tanesha have been given. Pt stated she understands she can call out for meds.

## 2023-10-15 NOTE — PLAN OF CARE
Goal Outcome Evaluation:           Progress: improving  Outcome Evaluation: VSS, assessments wnl, F/C in place, pain well controlled, dressing CDI.  Problem: Adult Inpatient Plan of Care  Goal: Plan of Care Review  Outcome: Ongoing, Progressing  Flowsheets  Taken 10/14/2023 2040 by Farrah Nesbitt RN  Progress: improving  Outcome Evaluation: VSS, assessments wnl, F/C in place, pain well controlled, dressing CDI.  Taken 10/14/2023 0828 by Moose Trent RN  Plan of Care Reviewed With:   patient   spouse  Goal: Patient-Specific Goal (Individualized)  Outcome: Ongoing, Progressing  Goal: Absence of Hospital-Acquired Illness or Injury  Outcome: Ongoing, Progressing  Intervention: Identify and Manage Fall Risk  Recent Flowsheet Documentation  Taken 10/14/2023 1945 by Farrah Nesbitt RN  Safety Promotion/Fall Prevention: safety round/check completed  Intervention: Prevent Skin Injury  Recent Flowsheet Documentation  Taken 10/14/2023 1945 by Farrah Nesbitt RN  Body Position: position changed independently  Intervention: Prevent and Manage VTE (Venous Thromboembolism) Risk  Recent Flowsheet Documentation  Taken 10/14/2023 1945 by Farrah Nesbitt RN  Activity Management: activity encouraged  VTE Prevention/Management:   bilateral   sequential compression devices on  Goal: Optimal Comfort and Wellbeing  Outcome: Ongoing, Progressing  Intervention: Monitor Pain and Promote Comfort  Recent Flowsheet Documentation  Taken 10/14/2023 1945 by Farrah Nesbitt RN  Pain Management Interventions:   see MAR   quiet environment facilitated   care clustered  Intervention: Provide Person-Centered Care  Recent Flowsheet Documentation  Taken 10/14/2023 1945 by Farrah Nesbitt RN  Trust Relationship/Rapport:   care explained   choices provided  Goal: Readiness for Transition of Care  Outcome: Ongoing, Progressing     Problem: Skin Injury Risk Increased  Goal: Skin Health and Integrity  Outcome: Ongoing, Progressing  Intervention: Optimize Skin  Protection  Recent Flowsheet Documentation  Taken 10/14/2023 1945 by Farrah Nesbitt, RN  Head of Bed (HOB) Positioning: HOB at 45 degrees

## 2023-10-15 NOTE — LACTATION NOTE
Discussed setting up hgp with pt. Pt wanting to use hgp so LC set it up with instructions on use and cleaning. Encouraged to BF at least every 2-3 hours, pump and hand express after and give all colostrum. Baby is getting supplemental formula. Encouraged pt to call LC as needed. Baby in nsy getting v/s and hearing test at this time.    Lactation Consult Note    Evaluation Completed: 10/15/2023 09:17 EDT  Patient Name: Sonam Lang  :  1985  MRN:  [id number]     REFERRAL  INFORMATION:                                         DELIVERY HISTORY:        Skin to skin initiation date/time:      Skin to skin end date/time:           MATERNAL ASSESSMENT:                               INFANT ASSESSMENT:  Information for the patient's :  Parveen Lang [[id number]]   No past medical history on file.                                                                                                   MATERNAL INFANT FEEDING:                                                                       EQUIPMENT TYPE:                                 BREAST PUMPING:          LACTATION REFERRALS:

## 2023-10-16 VITALS
RESPIRATION RATE: 16 BRPM | HEIGHT: 68 IN | DIASTOLIC BLOOD PRESSURE: 76 MMHG | TEMPERATURE: 97.7 F | OXYGEN SATURATION: 96 % | HEART RATE: 84 BPM | WEIGHT: 270.4 LBS | SYSTOLIC BLOOD PRESSURE: 117 MMHG | BODY MASS INDEX: 40.98 KG/M2

## 2023-10-16 PROBLEM — O09.522 MULTIGRAVIDA OF ADVANCED MATERNAL AGE IN SECOND TRIMESTER: Status: RESOLVED | Noted: 2023-07-14 | Resolved: 2023-10-16

## 2023-10-16 PROCEDURE — 94760 N-INVAS EAR/PLS OXIMETRY 1: CPT

## 2023-10-16 PROCEDURE — 94664 DEMO&/EVAL PT USE INHALER: CPT

## 2023-10-16 PROCEDURE — 94799 UNLISTED PULMONARY SVC/PX: CPT

## 2023-10-16 PROCEDURE — 94761 N-INVAS EAR/PLS OXIMETRY MLT: CPT

## 2023-10-16 RX ORDER — OXYCODONE HYDROCHLORIDE 5 MG/1
5 TABLET ORAL EVERY 6 HOURS PRN
Qty: 15 TABLET | Refills: 0 | Status: SHIPPED | OUTPATIENT
Start: 2023-10-16 | End: 2023-10-21

## 2023-10-16 RX ORDER — IBUPROFEN 600 MG/1
600 TABLET ORAL EVERY 6 HOURS
Qty: 60 TABLET | Refills: 1 | Status: SHIPPED | OUTPATIENT
Start: 2023-10-16

## 2023-10-16 RX ORDER — ACETAMINOPHEN 325 MG/1
650 TABLET ORAL EVERY 6 HOURS
Qty: 60 TABLET | Refills: 1 | Status: SHIPPED | OUTPATIENT
Start: 2023-10-16

## 2023-10-16 RX ORDER — PSEUDOEPHEDRINE HCL 30 MG
100 TABLET ORAL 2 TIMES DAILY PRN
Qty: 60 CAPSULE | Refills: 1 | Status: SHIPPED | OUTPATIENT
Start: 2023-10-16

## 2023-10-16 RX ADMIN — ACETAMINOPHEN 650 MG: 325 TABLET, FILM COATED ORAL at 09:12

## 2023-10-16 RX ADMIN — ACETAMINOPHEN 650 MG: 325 TABLET, FILM COATED ORAL at 02:13

## 2023-10-16 RX ADMIN — SIMETHICONE CHEW TAB 80 MG 80 MG: 80 TABLET ORAL at 04:10

## 2023-10-16 RX ADMIN — OXYCODONE HYDROCHLORIDE 10 MG: 10 TABLET ORAL at 05:12

## 2023-10-16 RX ADMIN — BUDESONIDE AND FORMOTEROL FUMARATE DIHYDRATE 2 PUFF: 80; 4.5 AEROSOL RESPIRATORY (INHALATION) at 08:05

## 2023-10-16 RX ADMIN — DOCUSATE SODIUM 100 MG: 100 CAPSULE, LIQUID FILLED ORAL at 09:12

## 2023-10-16 RX ADMIN — OXYCODONE HYDROCHLORIDE 10 MG: 10 TABLET ORAL at 01:02

## 2023-10-16 RX ADMIN — OXYCODONE HYDROCHLORIDE 10 MG: 10 TABLET ORAL at 11:32

## 2023-10-16 RX ADMIN — ALBUTEROL SULFATE 2.5 MG: 2.5 SOLUTION RESPIRATORY (INHALATION) at 08:04

## 2023-10-16 NOTE — DISCHARGE SUMMARY
[facility]   Obstetrics and Gynecology    Section Discharge Summary    Date of Admission: 10/14/2023  Date of Discharge:        Patient: Sonam Lang      MR#:[id number]    Surgeon/OB: Megan El MD    Discharge Diagnosis:    section at 39w0d, uncomplicated recovery    Previous  delivery affecting pregnancy    Previous  section    Multigravida of advanced maternal age in third trimester     delivery delivered        Procedures:  , Low Transverse     10/14/2023    8:10 AM      Anesthesia:  Spinal     Hospital Course  Patient is a 38 y.o. female  at 39w0d status post  section with uneventful postoperative recovery.  Patient was advanced to regular diet on postoperative day#1.  On discharge, ambulating, tolerating a regular diet without any difficulties and her incision is dry, clean and intact.     Infant:   male  fetus 4070 g (8 lb 15.6 oz)  with Apgar scores of 7 , 8  at five minutes.    Condition on Discharge:  Stable    Vital Signs  Temp:  [97.7 °F (36.5 °C)-98.5 °F (36.9 °C)] 97.7 °F (36.5 °C)  Heart Rate:  [71-84] 84  Resp:  [16] 16  BP: (117-123)/(76-83) 117/76    Results from last 7 days   Lab Units 10/15/23  0643 10/14/23  0613   WBC 10*3/mm3 11.19* 9.76   HEMOGLOBIN g/dL 10.8* 11.8*   HEMATOCRIT % 31.9* 33.9*   PLATELETS 10*3/mm3 178 239             Discharge Disposition  Home or Self Care    Discharge Medications     Your medication list        START taking these medications        Instructions Last Dose Given Next Dose Due   acetaminophen 325 MG tablet  Commonly known as: TYLENOL      Take 2 tablets by mouth Every 6 (Six) Hours.       ibuprofen 600 MG tablet  Commonly known as: ADVIL,MOTRIN      Take 1 tablet by mouth Every 6 (Six) Hours.       oxyCODONE 5 MG immediate release tablet  Commonly known as: ROXICODONE      Take 1 tablet by mouth Every 6 (Six) Hours As Needed for Moderate Pain for up to 5 days.               CHANGE how you take these medications        Instructions Last Dose Given Next Dose Due   docusate sodium 50 MG capsule  Commonly known as: COLACE  What changed: Another medication with the same name was added. Make sure you understand how and when to take each.      Take  by mouth 2 (Two) Times a Day.       docusate sodium 100 MG capsule  What changed: You were already taking a medication with the same name, and this prescription was added. Make sure you understand how and when to take each.      Take 1 capsule by mouth 2 (Two) Times a Day As Needed for Constipation.              CONTINUE taking these medications        Instructions Last Dose Given Next Dose Due   budesonide-formoterol 80-4.5 MCG/ACT inhaler  Commonly known as: SYMBICORT      Inhale 2 puffs 2 (Two) Times a Day.       diphenhydrAMINE 25 mg capsule  Commonly known as: BENADRYL      Take 1 capsule by mouth Every 6 (Six) Hours As Needed for Itching.       ferrous sulfate 325 (65 FE) MG tablet      Take 1 tablet by mouth Daily With Breakfast.       montelukast 10 MG tablet  Commonly known as: SINGULAIR      Take 1 tablet by mouth once daily       PRE-AMANDA FORMULA PO      Take 1 tablet by mouth Daily.       sertraline 50 MG tablet  Commonly known as: Zoloft      Take 1 tablet by mouth Daily.       vitamin B-6 50 MG tablet  Commonly known as: PYRIDOXINE      Take 0.5 tablets by mouth 3 (Three) Times a Day.       VITAMIN B2 PO      Take 1 tablet by mouth Daily.                 Where to Get Your Medications        These medications were sent to [facility] [place] [id number]      Hours: Monday to Friday 7 AM to 6 PM, Saturday &  8 AM to 4:30 PM (Closed 12 PM to 12:30 PM) Phone: [phone]   acetaminophen 325 MG tablet  docusate sodium 100 MG capsule  ibuprofen 600 MG tablet  oxyCODONE 5 MG immediate release tablet           Discharge Diet: Regular    Follow-up Appointments  Future Appointments   Date  Time Provider Department Center   10/30/2023 11:30 AM Megan El MD [facility]     Additional Instructions for the Follow-ups that You Need to Schedule       Call MD for problems / concerns.   As directed      Call for any problems with  1.  Heavy vaginal bleeding (greater than 2 pads an hours for 2 hours)  2.  Fever above 101.3  3.  Incisional redness  4.  Signs of Preeclampsia:  headache, visual changes or elevated blood pressure    Order Comments: Call for any problems with 1.  Heavy vaginal bleeding (greater than 2 pads an hours for 2 hours) 2.  Fever above 101.3 3.  Incisional redness 4.  Signs of Preeclampsia:  headache, visual changes or elevated blood pressure                 Prenatal labs/vax:   Immunization History   Administered Date(s) Administered    COVID-19 (MODERNA) 1st,2nd,3rd Dose Monovalent 01/19/2021, 02/23/2021    Flu Vaccine Quad PF >36MO 10/18/2019    Fluzone (or Fluarix & Flulaval for VFC) >6mos 10/18/2019, 10/04/2023    Influenza, Unspecified 11/16/2021    Tdap 08/29/2019, 08/03/2023       External Prenatal Results       Pregnancy Outside Results - Transcribed From Office Records - See Scanned Records For Details       Test Value Date Time    ABO  O  10/14/23 0613    Rh  Positive  10/14/23 0613    Antibody Screen  Negative  10/14/23 0613       Negative  09/26/23 1703       Negative  03/27/23 1146    Varicella IgG ^ 3.4 AI 04/19/19 1318    Rubella  5.38 index 03/27/23 1146    Hgb  10.8 g/dL 10/15/23 0643       11.8 g/dL 10/14/23 0613       12.4 g/dL 09/26/23 1703       11.6 g/dL 07/14/23 1303       14.2 g/dL 03/27/23 1146    Hct  31.9 % 10/15/23 0643       33.9 % 10/14/23 0613       35.5 % 09/26/23 1703       34.6 % 07/14/23 1303       42.6 % 03/27/23 1146    Glucose Fasting GTT       Glucose Tolerance Test 1 hour       Glucose Tolerance Test 3 hour       Gonorrhea (discrete)  Negative  03/27/23 1157    Chlamydia (discrete)  Negative  03/27/23 1157    RPR  Non Reactive  03/27/23  1146    VDRL       Syphilis Antibody ^ <0.2 AI 04/19/19 1318    HBsAg  Negative  03/27/23 1146    Herpes Simplex Virus PCR       Herpes Simplex VIrus Culture       HIV  Non Reactive  03/27/23 1146    Hep C RNA Quant PCR       Hep C Antibody  Non Reactive  03/27/23 1146    AFP  50.3 ng/mL 05/25/23 1010    Group B Strep  Negative  09/21/23 1219    GBS Susceptibility to Clindamycin       GBS Susceptibility to Erythromycin       Fetal Fibronectin  Positive  05/22/22 0355    Genetic Testing, Maternal Blood                 Drug Screening       Test Value Date Time    Urine Drug Screen       Amphetamine Screen ^ Negative (arb'U) 04/19/19 1318    Barbiturate Screen       Benzodiazepine Screen       Methadone Screen       Phencyclidine Screen       Opiates Screen ^ Negative  04/19/19 1318    THC Screen       Cocaine Screen       Propoxyphene Screen       Buprenorphine Screen       Methamphetamine Screen       Oxycodone Screen       Tricyclic Antidepressants Screen                 Legend    ^: Historical                              Radha Lai MD  10/16/2023  10:27 EDT

## 2023-10-16 NOTE — PLAN OF CARE
Goal Outcome Evaluation:      Progressing well, pain controlled with cindy 10mg,  voids without diff, passing flatus, minimal bleeding, incision looks good, ambulatory

## 2023-10-16 NOTE — LACTATION NOTE
This note was copied from a baby's chart.  Mom is preparing for d/c. She has been pumping with [employer], is not getting any milk, reports a low milk supply with her first baby and she has decided to switch to formula only. Discussed ice packs for comfort.

## 2023-10-16 NOTE — PLAN OF CARE
Goal Outcome Evaluation:  Plan of Care Reviewed With: patient           Outcome Evaluation: VSS, pain well controlled, ambulating well, feeding baby well, d/c today

## 2023-10-17 ENCOUNTER — TELEPHONE (OUTPATIENT)
Dept: OBSTETRICS AND GYNECOLOGY | Age: 38
End: 2023-10-17
Payer: MEDICAID

## 2023-10-17 ENCOUNTER — CLINICAL SUPPORT (OUTPATIENT)
Dept: OBSTETRICS AND GYNECOLOGY | Age: 38
End: 2023-10-17
Payer: MEDICAID

## 2023-10-17 VITALS — DIASTOLIC BLOOD PRESSURE: 72 MMHG | SYSTOLIC BLOOD PRESSURE: 112 MMHG

## 2023-10-17 DIAGNOSIS — R60.9 SWELLING: Primary | ICD-10-CM

## 2023-10-17 LAB
BILIRUB BLD-MCNC: NEGATIVE MG/DL
GLUCOSE UR STRIP-MCNC: NEGATIVE MG/DL
KETONES UR QL: NEGATIVE
LEUKOCYTE EST, POC: NEGATIVE
NITRITE UR-MCNC: NEGATIVE MG/ML
PH UR: 7.5 [PH] (ref 5–8)
PROT UR STRIP-MCNC: NEGATIVE MG/DL
RBC # UR STRIP: ABNORMAL /UL
SP GR UR: 1.02 (ref 1–1.03)
UROBILINOGEN UR QL: ABNORMAL

## 2023-10-17 PROCEDURE — 81003 URINALYSIS AUTO W/O SCOPE: CPT | Performed by: OBSTETRICS & GYNECOLOGY

## 2023-10-17 PROCEDURE — 81002 URINALYSIS NONAUTO W/O SCOPE: CPT | Performed by: PHYSICIAN ASSISTANT

## 2023-10-17 RX ORDER — VALACYCLOVIR HYDROCHLORIDE 1 G/1
1000 TABLET, FILM COATED ORAL 2 TIMES DAILY
Qty: 2 TABLET | Refills: 0 | Status: SHIPPED | OUTPATIENT
Start: 2023-10-17 | End: 2023-10-18

## 2023-10-17 NOTE — PAYOR COMM NOTE
"Sathish Lang (38 y.o. Female)     ATTN: NURSE REVIEWER  RE: MATERNITY INPATIENT AUTH REQUEST FOR DELIVERY  REF#[id number]   PLS REPLY TO [facility] [phone] OR [facility] [phone] FAX# [phone]        Date of Birth   1985    Social Security Number       Address   [address] [place] [address]    Home Phone   [phone]    MRN   [id number]       Jain   Patient Refused    Marital Status                               Admission Date   10/14/23    Admission Type   Emergency    Admitting Provider   Megan El MD    Attending Provider       Department, Room/Bed   [facility], E363/1       Discharge Date   10/16/2023    Discharge Disposition   Home or Self Care    Discharge Destination   Home                              Attending Provider: (none)   Allergies: Bactrim [Sulfamethoxazole-trimethoprim], Cefdinir, Buspirone, Contrast Dye (Echo Or Unknown Ct/mr), Naproxen    Isolation: None   Infection: None   Code Status: Prior    Ht: 172.7 cm (68\")   Wt: 123 kg (270 lb 6.4 oz)    Admission Cmt: None   Principal Problem: Previous  delivery affecting pregnancy [O34.219]                   Active Insurance as of 10/14/2023       Primary Coverage       Payor Plan Insurance Group Employer/Plan Group    [facility] COMMUNITY PLAN [facility] COMMUNITY PLAN [facility]       Payor Plan Address Payor Plan Phone Number Payor Plan Fax Number Effective Dates    [address]   3/7/2023 - None Entered    [facility] [id number]         Subscriber Name Subscriber Birth Date Member ID       SATHISH LANG 1985 [id number]                     Emergency Contacts        (Rel.) Home Phone Work Phone Mobile Phone    PreciousRamón (Spouse) [phone] -- [phone]                 History & Physical        Megan El MD at 10/14/23 [address] [place] [address]  Obstetric History and Physical    Chief Complaint   Patient presents with    Scheduled "      Scheduled C/S for term. +FM. Denies LOF or VB       Subjective    Patient is a 38 y.o. female  currently at 39w0d, who presents with term pregnancy for repeat  section.    Her prenatal care is complicated by  advanced maternal age  genetic screening was normal and prior   desires repeat .  Her previous obstetric/gynecological history is noted for  IUFD with Trisomy  18 last pregnancy and  at 27 weeks.    The following portions of the patients history were reviewed and updated as appropriate: current medications, allergies, past medical history, past surgical history, past family history, past social history, and problem list .       Prenatal Information:  Prenatal Results       Initial Prenatal Labs       Test Value Reference Range Date Time    Hemoglobin  14.2 g/dL 11.1 - 15.9 23 1146    Hematocrit  42.6 % 34.0 - 46.6 23 1146    Platelets  327 x10E3/uL 150 - 450 23 1146    Rubella IgG  5.38 index Immune >0.99 23 1146    Hepatitis B SAg  Negative  Negative 23 1146    Hepatitis C Ab  Non Reactive  Non Reactive 23 1146    RPR  Non Reactive  Non Reactive 23 1146    T. Pallidum Ab         ABO  O   23 1703    Rh  Positive   23 1703    Antibody Screen  Negative  Negative 23 1146    HIV  Non Reactive  Non Reactive 23 1146    Urine Culture  50,000 CFU/mL Normal Urogenital Kerri   23 1409    Gonorrhea  Negative  Negative 23 1157    Chlamydia  Negative  Negative 23 1157    TSH  0.553 uIU/mL 0.450 - 4.500 23 1146    HgB A1c   5.0 % 4.8 - 5.6 23 1146    Varicella IgG        HgB Electrophoresis         Cystic fibrosis                   Fetal testing        Test Value Reference Range Date Time    NIPT        MSAFP  *Screen Negative*   23 1010    AFP-4                  2nd and 3rd Trimester       Test Value Reference Range Date Time    Hemoglobin (repeated)  11.8 g/dL 12.0 - 15.9  10/14/23 0613       12.4 g/dL 12.0 - 15.9 09/26/23 1703       11.6 g/dL 12.0 - 15.9 07/14/23 1303    Hematocrit (repeated)  33.9 % 34.0 - 46.6 10/14/23 0613       35.5 % 34.0 - 46.6 09/26/23 1703       34.6 % 34.0 - 46.6 07/14/23 1303    Platelets   239 10*3/mm3 140 - 450 10/14/23 0613       267 10*3/mm3 140 - 450 09/26/23 1703       252 10*3/mm3 140 - 450 07/14/23 1303       327 x10E3/uL 150 - 450 03/27/23 1146    GCT  90 mg/dL 65 - 139 07/14/23 1303       90 mg/dL 65 - 139 06/21/23 0950    Antibody Screen (repeated)  Negative   09/26/23 1703       Negative  Negative 03/27/23 1146    GTT Fasting        GTT 1 Hr        GTT 2 Hr        GTT 3 Hr        Group B Strep  Negative  Negative 09/21/23 1219              Other testing        Test Value Reference Range Date Time    Parvo IgG         CMV IgG                   Drug Screening       Test Value Reference Range Date Time    Amphetamine Screen        Barbiturate Screen        Benzodiazepine Screen        Methadone Screen        Phencyclidine Screen        Opiates Screen        THC Screen        Cocaine Screen        Propoxyphene Screen        Buprenorphine Screen        Methamphetamine Screen        Oxycodone Screen        Tricyclic Antidepressants Screen                  Legend    ^: Historical                          External Prenatal Results       Pregnancy Outside Results - Transcribed From Office Records - See Scanned Records For Details       Test Value Date Time    ABO  O  09/26/23 1703    Rh  Positive  09/26/23 1703    Antibody Screen  Negative  09/26/23 1703       Negative  03/27/23 1146    Varicella IgG ^ 3.4 AI 04/19/19 1318    Rubella  5.38 index 03/27/23 1146    Hgb  11.8 g/dL 10/14/23 0613       12.4 g/dL 09/26/23 1703       11.6 g/dL 07/14/23 1303       14.2 g/dL 03/27/23 1146    Hct  33.9 % 10/14/23 0613       35.5 % 09/26/23 1703       34.6 % 07/14/23 1303       42.6 % 03/27/23 1146    Glucose Fasting GTT       Glucose Tolerance Test 1 hour        Glucose Tolerance Test 3 hour       Gonorrhea (discrete)  Negative  23 1157    Chlamydia (discrete)  Negative  23 1157    RPR  Non Reactive  23 1146    VDRL       Syphilis Antibody ^ <0.2 AI 19 1318    HBsAg  Negative  23 1146    Herpes Simplex Virus PCR       Herpes Simplex VIrus Culture       HIV  Non Reactive  23 1146    Hep C RNA Quant PCR       Hep C Antibody  Non Reactive  23 1146    AFP  50.3 ng/mL 23 1010    Group B Strep  Negative  23 1219    GBS Susceptibility to Clindamycin       GBS Susceptibility to Erythromycin       Fetal Fibronectin  Positive  22 0355    Genetic Testing, Maternal Blood                 Drug Screening       Test Value Date Time    Urine Drug Screen       Amphetamine Screen ^ Negative (arb'U) 19 1318    Barbiturate Screen       Benzodiazepine Screen       Methadone Screen       Phencyclidine Screen       Opiates Screen ^ Negative  19 1318    THC Screen       Cocaine Screen       Propoxyphene Screen       Buprenorphine Screen       Methamphetamine Screen       Oxycodone Screen       Tricyclic Antidepressants Screen                 Legend    ^: Historical                             Past OB History:     OB History    Para Term  AB Living   5 2 1 1 2 1   SAB IAB Ectopic Molar Multiple Live Births   0 0 2 0 0 1      # Outcome Date GA Lbr Hernando/2nd Weight Sex Delivery Anes PTL Lv   5 Current            4  22 27w2d / 01:03 559 g (1 lb 3.7 oz) F  None Y FD      Birth Comments: scale 1      Name: MIKE CAMEJO      Apgar1: 2  Apgar5: 2   3 Ectopic 1     ECTOPIC      2 Ectopic      ECTOPIC      1 Term 10/28/19 38w1d  3200 g (7 lb 0.9 oz) F CS-LTranv EPI N EL      Complications: Failure to Progress in First Stage, Oligohydramnios      Name: BASHIRGIRL  ELDER      Apgar1: 8  Apgar5: 9       Past Medical History: Past Medical History:   Diagnosis Date    ADHD (attention deficit  hyperactivity disorder)     Anxiety     Asthma     Congenital abnormalities 2022    Multiple fetal anomalies present including meningocele, unilateral real agenesis, unilateral club foot, membranous VSD     Depression     GERD (gastroesophageal reflux disease)     History of depression 2019    Irritable bowel syndrome     Migraine     Pregnancy complicated by multiple fetal congenital anomalies, single gestation 2022    NTD, SUA    PTSD (post-traumatic stress disorder)     Sleep apnea 2016    CPAP      Past Surgical History Past Surgical History:   Procedure Laterality Date     SECTION      COLONOSCOPY      with endoscopy    SINUS SURGERY  11/22/2022    x2, 2018 (first)    WISDOM TOOTH EXTRACTION        Family History: Family History   Problem Relation Age of Onset    Hypertension Mother     Diabetes Mother     Migraines Mother     Hypertension Father     Hypertension Maternal Grandmother     Migraines Maternal Grandmother     Alzheimer's disease Maternal Grandfather     Diabetes Paternal Grandmother     Migraines Paternal Grandmother     Dementia Paternal Grandmother     Stroke Paternal Grandmother     Frontotemporal dementia Paternal Grandmother     Diabetes Paternal Grandfather       Social History:  reports that she quit smoking about 11 years ago. Her smoking use included cigarettes. She has never been exposed to tobacco smoke. She has never used smokeless tobacco.   reports that she does not currently use alcohol.   reports no history of drug use.        General ROS: Pertinent items are noted in HPI, all other systems reviewed and negative    Objective      Vital Signs Range for the last 24 hours  Temperature: Temp:  [97.7 °F (36.5 °C)] 97.7 °F (36.5 °C)   Temp Source: Temp src: Oral   BP: BP: (122)/(72) 122/72   Pulse: Heart Rate:  [89] 89   Respirations: Resp:  [16] 16   SPO2: SpO2:  [99 %] 99 %   O2 Amount (l/min):     O2 Devices     Weight: Weight:  [123 kg (270 lb 6.4  oz)] 123 kg (270 lb 6.4 oz)     Physical Examination: General appearance - alert, well appearing, and in no distress  Mental status - alert, oriented to person, place, and time  Neck - supple, no significant adenopathy  Chest - no tachypnea, retractions or cyanosis  Heart - normal rate and regular rhythm  Abdomen - soft, nontender, gravid  Pelvic - cervix closed last check  Neurological - alert, oriented, normal speech, no focal findings or movement disorder noted  Extremities - peripheral pulses normal, no pedal edema, no clubbing or cyanosis  Skin - normal coloration and turgor, no rashes, no suspicious skin lesions noted    Presentation: vtx   Cervix: Exam by:     Dilation:     Effacement:     Station:         Fetal Heart Rate Assessment   Method:     Beats/min:     Baseline:     Variability:     Accels:     Decels:     Tracing Category:       Uterine Assessment   Method:     Frequency (min):     Ctx Count in 10 min:     Duration:     Intensity:     Intensity by IUPC:     Resting Tone:     Resting Tone by IUPC:     Magnet Units:       Laboratory Results:   Lab Results (last 24 hours)       Procedure Component Value Units Date/Time    CBC (No Diff) [[id number]]  (Abnormal) Collected: 10/14/23 0613    Specimen: Blood Updated: 10/14/23 0638     WBC 9.76 10*3/mm3      RBC 3.71 10*6/mm3      Hemoglobin 11.8 g/dL      Hematocrit 33.9 %      MCV 91.4 fL      MCH 31.8 pg      MCHC 34.8 g/dL      RDW 13.0 %      RDW-SD 42.7 fl      MPV 10.4 fL      Platelets 239 10*3/mm3            Radiology Review: none  Other Studies: none    Assessment & Plan      Previous  delivery affecting pregnancy    Previous  section    Multigravida of advanced maternal age in third trimester    Multigravida of advanced maternal age in second trimester        Assessment:  1.  Intrauterine pregnancy at 39w0d gestation with reactive fetal status.    2.  For repeat  section  3.  Obstetrical history significant for is  non-contributory.  4.  GBS status:   Strep Gp B DAWOOD   Date Value Ref Range Status   2023 Negative Negative Final     Comment:     Centers for Disease Control and Prevention (CDC) and American Congress  of Obstetricians and Gynecologists (ACOG) guidelines for prevention of   group B streptococcal (GBS) disease specify co-collection of  a vaginal and rectal swab specimen to maximize sensitivity of GBS  detection. Per the CDC and ACOG, swabbing both the lower vagina and  rectum substantially increases the yield of detection compared with  sampling the vagina alone.  Penicillin G, ampicillin, or cefazolin are indicated for intrapartum  prophylaxis of  GBS colonization. Reflex susceptibility  testing should be performed prior to use of clindamycin only on GBS  isolates from penicillin-allergic women who are considered a high risk  for anaphylaxis. Treatment with vancomycin without additional testing  is warranted if resistance to clindamycin is noted.         Plan:  1. Repeat  scheduled  2. Plan of care has been reviewed with patient and family  3.  Risks, benefits of treatment plan have been discussed.  4.  All questions have been answered.        Megan El MD  10/14/2023  06:43 EDT      Electronically signed by Megan El MD at 10/14/23 0647       Operative/Procedure Notes (last 48 hours)  Notes from 10/14/23 2013 through 10/16/23 2013   No notes of this type exist for this encounter.          Physician Progress Notes (last 48 hours)        Candy Parmar MD at 10/15/23 1020            10/15/2023    Name:Sonam Lang    MR#:[id number]     Progress Note:  Post-Op day 1 S/P    HD:1    Subjective   38 y.o. yo Female  s/p CS at 39w0d doing well. Pain well controlled. Tolerating regular diet and having flatus. Lochia normal.     Patient Active Problem List   Diagnosis    Migraine without aura and without status migrainosus, not intractable     Allergy to NSAIDs    Asthma, mild persistent    Obesity (BMI 30.0-34.9)    Sleep apnea    Anxiety    Previous  section    , delivered    Adult ADHD    Anxiety in pregnancy, antepartum    Multigravida of advanced maternal age in third trimester    Multigravida of advanced maternal age in second trimester    Previous  delivery affecting pregnancy        Objective    Vitals  Temp:  Temp:  [97 °F (36.1 °C)-98.1 °F (36.7 °C)] 98.1 °F (36.7 °C)  Temp src: Oral  BP:  BP: (105-124)/(65-80) 120/80  Pulse:  Heart Rate:  [62-82] 72  RR:   Resp:  [16-18] 16  Weight: 123 kg (270 lb 6.4 oz)  BMI:  Body mass index is 41.11 kg/m².      General Awake, alert, no distress  Abdomen Soft, non-distended, fundus firm, 2 cm below umbilicus, appropriately tender  Incision  Intact, no erythema or exudate  Extremities Calves NT bilaterally         I/O last 3 completed shifts:  In: 1900 [P.O.:1000; I.V.:700; IV Piggyback:200]  Out: 2934 [Urine:2450; Blood:484]    LABS:   Lab Results   Component Value Date    WBC 11.19 (H) 10/15/2023    HGB 10.8 (L) 10/15/2023    HCT 31.9 (L) 10/15/2023    MCV 93.0 10/15/2023     10/15/2023       Infant: male       Assessment   1.  POD 1    Desires circumcision. Discussed is cosmetic. Risks of bleeding, damage to penis, infection and signs of infection, aftercare discussed. Desires to proceed.      Plan: Doing well.  Routine postoperative care      Previous  delivery affecting pregnancy    Previous  section    Multigravida of advanced maternal age in third trimester    Multigravida of advanced maternal age in second trimester      Candy Parmar MD  10/15/2023 10:20 EDT              Electronically signed by Candy Parmar MD at 10/15/23 1020       Consult Notes (last 48 hours)  Notes from 10/14/23 2013 through 10/16/23 2013   No notes of this type exist for this encounter.       Maternal Vitals (last 2 days) before discharge       Date/Time Temp Pulse Resp BP SpO2  Weight    10/16/23 0815 97.7 (36.5) 84 16 117/76 -- --    10/16/23 0801 -- 82 16 -- 96 --    10/16/23 0000 98.5 (36.9) 81 16 118/76 -- --    10/15/23 2014 -- 72 16 -- -- --    10/15/23 2010 -- 77 16 -- -- --    10/15/23 1900 -- -- -- 123/78 -- --    10/15/23 1450 97.9 (36.6) 76 16 121/83 -- --    10/15/23 1030 -- 71 16 -- 97 --    10/15/23 0742 98.1 (36.7) 72 16 120/80 -- --    10/15/23 0310 97.7 (36.5) 75 16 113/75 97 --    10/15/23 0010 -- 68 18 -- 100 --    10/15/23 0007 -- 68 18 -- 97 --    10/14/23 2315 97.6 (36.4) 82 18 114/72 -- --    10/14/23 1920 98 (36.7) 73 18 105/65 96 --    10/14/23 1535 97 (36.1) 78 18 110/78 95 --    10/14/23 1431 -- 79 18 112/70 95 --    10/14/23 1335 97.2 (36.2) 72 18 112/73 100 --    10/14/23 1231 97.2 (36.2) 64 18 110/70 100 --    10/14/23 1135 97.2 (36.2) 67 18 120/70 100 --    10/14/23 1105 97.4 (36.3) 63 18 124/73 100 --    10/14/23 1046 97.6 (36.4) 62 -- 115/70 -- --    10/14/23 1045 -- 63 -- -- -- --    10/14/23 1035 -- 65 -- -- 99 --    10/14/23 1030 -- 66 18 -- 99 --    10/14/23 1020 -- 71 -- -- 99 --    10/14/23 1016 -- 62 18 122/73 -- --    10/14/23 1007 -- 63 -- 115/67 -- --    10/14/23 0945 -- 70 18 93/75 93 --    10/14/23 0930 -- 69 18 121/66 94 --    10/14/23 0918 -- 71 18 121/83 -- --    10/14/23 0855 -- 70 18 111/64 95 --    10/14/23 0701 -- 79 -- 112/63 -- --    10/14/23 0646 -- 79 -- 111/67 -- --    10/14/23 0556 -- -- -- -- -- 123 (270.4)    10/14/23 0547 97.7 (36.5) 89 16 122/72 99 --          FHR (last 2 days)       None          Uterine Activity (last 2 days)       None          Labor Pain (last 2 days)       Date/Time (0-10) Pain Rating: Rest (0-10) Pain Rating: Activity Pain Management Interventions    10/16/23 1132 7 8 see MAR    10/16/23 0912 2 4 see MAR;cold applied    10/16/23 0512 4 -- see MAR    10/16/23 0213 4 -- see MAR    10/16/23 0102 5 -- see MAR    10/15/23 2022 6 -- see MAR    10/15/23 2021 6 -- --    10/15/23 1425 7 7 see MAR    10/15/23 1014 7  7 --    10/15/23 0855 3 3 --    10/15/23 0800 7 7 see MAR

## 2023-10-17 NOTE — TELEPHONE ENCOUNTER
Pt is PP and c/o HA with some vision changes and swelling in both arms. UA complete, BP is 112/72. Please advise

## 2023-10-17 NOTE — TELEPHONE ENCOUNTER
Pt called back stating she called peds because she has a cold sore and they told her to call us. Pt states she does not routinely take anything for cold sore

## 2023-10-17 NOTE — TELEPHONE ENCOUNTER
Pt states she does have a HA but not sure how long,states vision is not normal,but can't explain, she would like antiviral pills sent in.

## 2023-10-17 NOTE — TELEPHONE ENCOUNTER
Pt calling with c/o extreme swelling, worse than before she delivered, pt asking what she can do to relieve some of the pressure?

## 2023-10-19 ENCOUNTER — HOSPITAL ENCOUNTER (OUTPATIENT)
Facility: HOSPITAL | Age: 38
Discharge: HOME | End: 2023-10-19
Attending: EMERGENCY MEDICINE
Payer: MEDICAID

## 2023-10-19 VITALS
DIASTOLIC BLOOD PRESSURE: 79 MMHG | WEIGHT: 250 LBS | TEMPERATURE: 97.6 F | SYSTOLIC BLOOD PRESSURE: 127 MMHG | BODY MASS INDEX: 37.89 KG/M2 | RESPIRATION RATE: 16 BRPM | HEIGHT: 68 IN | OXYGEN SATURATION: 98 % | HEART RATE: 59 BPM

## 2023-10-19 DIAGNOSIS — T81.30XA WOUND DEHISCENCE: Primary | ICD-10-CM

## 2023-10-19 PROCEDURE — G0463 HOSPITAL OUTPT CLINIC VISIT: HCPCS | Performed by: PHYSICIAN ASSISTANT

## 2023-10-19 NOTE — FSED PROVIDER NOTE
"Subjective   History of Present Illness  Patient is a 38-year-old female presents emergency room 5 days after  with dehiscence of the surgical incision.  She is not running fevers.  She otherwise has no complaints.  She has noticed some bleeding from the site.      Review of Systems   Skin:  Positive for wound.   All other systems reviewed and are negative.      Past Medical History:   Diagnosis Date    ADHD (attention deficit hyperactivity disorder)     Anxiety     Asthma     Congenital abnormalities 2022    Multiple fetal anomalies present including meningocele, unilateral real agenesis, unilateral club foot, membranous VSD     Depression     GERD (gastroesophageal reflux disease)     History of depression 2019    Irritable bowel syndrome     Migraine     Pregnancy complicated by multiple fetal congenital anomalies, single gestation 2022    NTD, SUA    PTSD (post-traumatic stress disorder)     Sleep apnea     CPAP       Allergies   Allergen Reactions    Bactrim [Sulfamethoxazole-Trimethoprim] Hives, Itching and Swelling    Cefdinir Hives, Itching and Swelling     FELT THROAT START TO SWELL    Buspirone Unknown - Low Severity     migraines    Contrast Dye (Echo Or Unknown Ct/Mr) Other (See Comments)     \"felt like insides were on fire\"    Ibuprofen Swelling    Naproxen Swelling    Toradol [Ketorolac Tromethamine] Itching       Past Surgical History:   Procedure Laterality Date     SECTION       SECTION N/A 10/14/2023    Procedure:  SECTION REPEAT;  Surgeon: Megan El MD;  Location: [facility] LABOR DELIVERY;  Service: Obstetrics/Gynecology;  Laterality: N/A;    COLONOSCOPY      with endoscopy    SINUS SURGERY  11/22/2022    x2, 2018 (first)    WISDOM TOOTH EXTRACTION         Family History   Problem Relation Age of Onset    Hypertension Mother     Diabetes Mother     Migraines Mother     Hypertension Father     Hypertension Maternal Grandmother     " Migraines Maternal Grandmother     Alzheimer's disease Maternal Grandfather     Diabetes Paternal Grandmother     Migraines Paternal Grandmother     Dementia Paternal Grandmother     Stroke Paternal Grandmother     Frontotemporal dementia Paternal Grandmother     Diabetes Paternal Grandfather        Social History     Socioeconomic History    Marital status:    Tobacco Use    Smoking status: Former     Years: 10     Types: Cigarettes     Quit date:      Years since quittin.8     Passive exposure: Never    Smokeless tobacco: Never   Vaping Use    Vaping Use: Never used   Substance and Sexual Activity    Alcohol use: Not Currently    Drug use: No    Sexual activity: Yes     Partners: Male     Birth control/protection: None           Objective   Physical Exam  Constitutional:       Appearance: Normal appearance.   Eyes:      Conjunctiva/sclera: Conjunctivae normal.   Cardiovascular:      Rate and Rhythm: Normal rate.   Pulmonary:      Effort: Pulmonary effort is normal.   Musculoskeletal:         General: Normal range of motion.      Cervical back: Normal range of motion.   Skin:     General: Skin is warm and dry.      Comments: Surgical incision from  shows about 4 inches of slightly dehisced wound on the right side, clean, serosanguineous material on bandage, and no foul odor or discharge   Neurological:      Mental Status: She is alert.      Gait: Gait normal.   Psychiatric:         Mood and Affect: Mood normal.         Behavior: Behavior normal.         Laceration Repair    Date/Time: 10/19/2023 5:46 PM    Performed by: Suzy Bejarano PA-C  Authorized by: North Sheikh MD    Consent:     Consent obtained:  Verbal    Consent given by:  Patient  Comments:      Wound cleaned with surgical scrub solution, dried, Steri-Strips placed after putting Mastisol on the skin around it.  Waited for it to dry put bandage.  Patient tolerated well.             ED Course                                            Medical Decision Making  Patient warping and nontoxic.  Wound appears to be healing well without evidence of infection although there is an area of dehiscence in the surgical incision.  This was closed with Steri-Strips.  We talked about putting a few small stitches in it but I feel this is better and patient says she preferred the Steri-Strips.  She has a follow-up in about a week with the surgeon and can return to emergency room as needed.  She is medically cleared.    Problems Addressed:  Wound dehiscence: acute illness or injury        Final diagnoses:   Wound dehiscence       ED Disposition  ED Disposition       ED Disposition   Discharge    Condition   Stable    Comment   --               Megan El MD  [address] [address]  [phone]    In 1 week           Medication List        Stop      valACYclovir 1000 MG tablet  Commonly known as: Valtrex

## 2023-10-20 ENCOUNTER — TELEPHONE (OUTPATIENT)
Dept: OBSTETRICS AND GYNECOLOGY | Age: 38
End: 2023-10-20
Payer: MEDICAID

## 2023-10-22 ENCOUNTER — HOSPITAL ENCOUNTER (EMERGENCY)
Facility: HOSPITAL | Age: 38
Discharge: HOME | End: 2023-10-22
Attending: EMERGENCY MEDICINE | Admitting: EMERGENCY MEDICINE
Payer: MEDICAID

## 2023-10-22 VITALS
RESPIRATION RATE: 18 BRPM | WEIGHT: 248 LBS | TEMPERATURE: 98.2 F | HEIGHT: 68 IN | HEART RATE: 58 BPM | SYSTOLIC BLOOD PRESSURE: 130 MMHG | OXYGEN SATURATION: 96 % | DIASTOLIC BLOOD PRESSURE: 90 MMHG | BODY MASS INDEX: 37.59 KG/M2

## 2023-10-22 DIAGNOSIS — L76.82 INCISIONAL PAIN: Primary | ICD-10-CM

## 2023-10-22 PROCEDURE — 99282 EMERGENCY DEPT VISIT SF MDM: CPT

## 2023-10-22 NOTE — ED TRIAGE NOTES
"Patient from home via PV reporting redness, stinging, and \"hard spots\" along  incision. Patient states she had  8 day ago. Incision began to reopen on Thursday, patient was seen at freestanding ER and steri- strips were placed along incision.   "

## 2023-10-22 NOTE — DISCHARGE INSTRUCTIONS
The incision looks good.     Bruising is as expected.    No redness.    I suspect the firmness is from fluid beneath the skin that will reabsorb    Return Precautions    Although you are being discharged from the ED today, I encourage you to return for worsening symptoms.  Things can, and do, change such that treatment at home with medication may not be adequate.      Specifically, return for any of the following:    Chest pain, shortness of breath, pain or nausea and vomiting not controlled by medications provided.    Please make a follow up with your Primary Care Provider for a blood pressure recheck.

## 2023-10-22 NOTE — ED PROVIDER NOTES
MD ATTESTATION NOTE    The NAVEEN and I have discussed this patient's history, physical exam, and treatment plan.  I have reviewed the documentation and personally had a face to face interaction with the patient. I affirm the documentation and agree with the treatment and plan.  The attached note describes my personal findings.      I provided a substantive portion of the care of the patient.  I personally performed the physical exam in its entirety, and below are my findings.      Brief HPI: Patient presents for some discomfort to her  wound.  Has had no redness.  No swelling.  No fevers.  Patient had wound dehiscence a few days ago.  Was seen at outside hospital.    PHYSICAL EXAM  ED Triage Vitals   Temp Heart Rate Resp BP SpO2   10/22/23 1515 10/22/23 1515 10/22/23 1519 10/22/23 1520 10/22/23 1515   98.2 °F (36.8 °C) 81 18 130/90 97 %      Temp src Heart Rate Source Patient Position BP Location FiO2 (%)   -- -- -- -- --                GENERAL: no acute distress  HENT: nares patent  EYES: no scleral icterus  CV: regular rhythm, normal rate  RESPIRATORY: normal effort  ABDOMEN: soft.  Minimal tenderness along wound site  MUSCULOSKELETAL: no deformity  NEURO: alert, moves all extremities, follows commands  PSYCH:  calm, cooperative  SKIN: warm, dry.  Small area of dehiscence    Vital signs and nursing notes reviewed.        Plan: Follow-up OB/GYN       Kevin Garcia MD  10/22/23 6317

## 2023-10-22 NOTE — ED PROVIDER NOTES
EMERGENCY DEPARTMENT ENCOUNTER    Room Number:  40/40  Date seen:  10/22/2023  Time seen: 15:41 EDT  PCP: Praful Francisco MD  Historian: Patient    Discussed/obtained information from independent historians: Not applicable    HPI:  Chief complaint: incision site pain  A complete HPI/ROS/PMH/PSH/SH/FH are unobtainable due to: n/a  Context:Sonam Lang is a 38 y.o. female with recent  8 days ago with Dr. El who presents to the ED with c/o pain and firmness noted to the right side of her  incision.  She denies any fever/chills or erythema.  She was seen 10/19 at [facility] and had a portion of the  incision steri strips replaced. The pain is mild to moderate, she states the incision feels lumpy.  She has been taking tylenol for the pain.      External (non-ED) record review: Not applicable    Chronic or social conditions impacting care: Not applicable    ALLERGIES  Bactrim [sulfamethoxazole-trimethoprim], Cefdinir, Buspirone, Contrast dye (echo or unknown ct/mr), Ibuprofen, Naproxen, and Toradol [ketorolac tromethamine]    PAST MEDICAL HISTORY  Active Ambulatory Problems     Diagnosis Date Noted    Migraine without aura and without status migrainosus, not intractable 2018    Allergy to NSAIDs 2019    Asthma, mild persistent 2019    Obesity (BMI 30.0-34.9) 2019    Sleep apnea 2019    Anxiety 2022    Previous  section 2022    , delivered 2022    Adult ADHD 2023    Anxiety in pregnancy, antepartum 2023    Multigravida of advanced maternal age in third trimester 2023    Previous  delivery affecting pregnancy 10/14/2023     delivery delivered 10/16/2023     Resolved Ambulatory Problems     Diagnosis Date Noted    Blepharitis of both eyes 2018    Endometriosis 2019    Hirsutism 2019    History of depression 2019    Subclinical hyperthyroidism  Patient called asking whether he could take tylenol or ibuprofen. Dr Radha Mojica had done an Ablation. He now has a hip flexor tendon tear and would like to take some for the pain.  Please call [phone] 2019    Maternal care for other (suspected) fetal abnormality and damage, not applicable or unspecified 2022    Trisomy 18 of fetus in current pregnancy 2022    Maternal care for other (suspected) fetal abnormality and damage, not applicable or unspecified 2022    Multiple anomalies of fetus affecting spicer pregnancy 2022    Poor fetal growth affecting management of mother in second trimester 2022    Pregnancy 2022    AMA (advanced maternal age) multigravida 35+ 2022    Congenital abnormalities 2022    Multigravida of advanced maternal age in second trimester 2023     Past Medical History:   Diagnosis Date    ADHD (attention deficit hyperactivity disorder)     Asthma     Depression     GERD (gastroesophageal reflux disease)     Irritable bowel syndrome     Migraine     Pregnancy complicated by multiple fetal congenital anomalies, single gestation 2022    PTSD (post-traumatic stress disorder)        PAST SURGICAL HISTORY  Past Surgical History:   Procedure Laterality Date     SECTION  2019     SECTION N/A 10/14/2023    Procedure:  SECTION REPEAT;  Surgeon: Megan El MD;  Location: [facility] DELIVERY;  Service: Obstetrics/Gynecology;  Laterality: N/A;    COLONOSCOPY      with endoscopy    SINUS SURGERY  11/22/2022    x2, 2018 (first)    WISDOM TOOTH EXTRACTION         FAMILY HISTORY  Family History   Problem Relation Age of Onset    Hypertension Mother     Diabetes Mother     Migraines Mother     Hypertension Father     Hypertension Maternal Grandmother     Migraines Maternal Grandmother     Alzheimer's disease Maternal Grandfather     Diabetes Paternal Grandmother     Migraines Paternal Grandmother     Dementia Paternal Grandmother     Stroke Paternal Grandmother     Frontotemporal dementia Paternal Grandmother     Diabetes Paternal Grandfather        SOCIAL HISTORY  Social History     Socioeconomic History     Marital status:    Tobacco Use    Smoking status: Former     Years: 10     Types: Cigarettes     Quit date:      Years since quittin.8     Passive exposure: Never    Smokeless tobacco: Never   Vaping Use    Vaping Use: Never used   Substance and Sexual Activity    Alcohol use: Not Currently    Drug use: No    Sexual activity: Yes     Partners: Male     Birth control/protection: None       REVIEW OF SYSTEMS  Review of Systems    All systems reviewed and negative except for those discussed in HPI.     PHYSICAL EXAM    I have reviewed the triage vital signs and nursing notes.  Vitals:    10/22/23 1605   BP:    Pulse: 58   Resp:    Temp:    SpO2: 96%     Physical Exam    GENERAL: not distressed  HENT: nares patent  EYES: no scleral icterus  NECK: no ROM limitations  CV: regular rhythm, regular rate, normal  RESPIRATORY: normal effort  ABDOMEN: soft.  Patient's  incision is completely intact.  I actually removed the Steri-Strips that were in place.  There is no wound drainage.  There is some ecchymosis as suspected.  She does have a little bit of tenderness beneath the area which I feel is likely a postoperative seroma.  There is no erythema or cellulitis  : deferred  MUSCULOSKELETAL: no deformity  NEURO: alert, moves all extremities, follows commands  SKIN: warm, dry    PROGRESS, DATA ANALYSIS, CONSULTS AND MEDICAL DECISION MAKING    Please note that this section constitutes my independent interpretation of clinical data including lab results, radiology, EKG's.  This constitutes my independent professional opinion regarding differential diagnosis and management of this patient.  It may include any factors such as history from outside sources, review of external records, social determinants of health, management of medications, response to those treatments, and discussions with other providers.       Orders placed during this visit:  No orders of the defined types were placed in this  encounter.           Medical Decision Making  Problems Addressed:  Incisional pain: acute illness or injury    Patient  incision is intact.  There is bruising as expected.  There is no drainage from the incision at all.  There is some mild tenderness beneath the incision on the right lower side which I suspect could be due to of seroma .  She will call Dr. El tomorrow for follow-up. Otherwise her abdominal exam is benign and not peritonitic.       DIAGNOSIS  Final diagnoses:   Incisional pain          Medication List        Stop      oxyCODONE 5 MG immediate release tablet  Commonly known as: ROXICODONE              FOLLOW-UP  Megan El MD  [address] [facility]  [address] [place] [id number]  [phone]    Call in 1 day  to discuss ER visits and incisional pain        Latest Documented Vital Signs:  As of 16:23 EDT  BP- 130/90 HR- 58 Temp- 98.2 °F (36.8 °C) O2 sat- 96%    Appropriate PPE utilized throughout this patient encounter to include mask, if indicated, per current protocol. Hand hygiene was performed before donning PPE and after removal when leaving the room.    Please note that portions of this were completed with a voice recognition program.     Note Disclaimer: At [facility], we believe that sharing information builds trust and better relationships. You are receiving this note because you are receiving care at [facility] or recently visited. It is possible you will see health information before a provider has talked with you about it. This kind of information can be easy to misunderstand. To help you fully understand what it means for your health, we urge you to discuss this note with your provider.                 Joan Flores, APRN  10/22/23 [id number]

## 2023-10-23 ENCOUNTER — TELEPHONE (OUTPATIENT)
Dept: OBSTETRICS AND GYNECOLOGY | Age: 38
End: 2023-10-23
Payer: MEDICAID

## 2023-10-23 NOTE — TELEPHONE ENCOUNTER
Pt seen in ER for incisional pain, pt states Er told her to ck with you on when her next visit should be, scheduled next Wed

## 2023-10-26 ENCOUNTER — POSTPARTUM VISIT (OUTPATIENT)
Dept: OBSTETRICS AND GYNECOLOGY | Age: 38
End: 2023-10-26
Payer: MEDICAID

## 2023-10-26 VITALS
HEIGHT: 68 IN | WEIGHT: 240 LBS | SYSTOLIC BLOOD PRESSURE: 110 MMHG | DIASTOLIC BLOOD PRESSURE: 68 MMHG | BODY MASS INDEX: 36.37 KG/M2

## 2023-10-26 DIAGNOSIS — J01.10 ACUTE NON-RECURRENT FRONTAL SINUSITIS: ICD-10-CM

## 2023-10-26 RX ORDER — AZITHROMYCIN 250 MG/1
TABLET, FILM COATED ORAL
Qty: 6 TABLET | Refills: 0 | Status: SHIPPED | OUTPATIENT
Start: 2023-10-26 | End: 2023-10-31

## 2023-10-26 NOTE — PROGRESS NOTES
GYN Visit    10/27/2023    Patient: Sonam Lang          MR#:[id number]      Chief Complaint   Patient presents with    Postpartum Care     Post Partum - csection on 10/14/23, no complications, baby boy (Alban Fitzgerald) 8lb 15.6oz, pt is breast & bottle feeding, pt c/o sinus headache since delivery       History of Present Illness    38 y.o. female  who presents for  pp check    Baby is well  Pt doing ok, no baby blues  On zoloft  Sinus headache, BP normal, pt requests treatment for sinus infection    Incision has some tender areas and has a feeling of burning, requests dermoplast      No LMP recorded (lmp unknown).    ________________________________________  Patient Active Problem List   Diagnosis    Migraine without aura and without status migrainosus, not intractable    Allergy to NSAIDs    Asthma, mild persistent    Obesity (BMI 30.0-34.9)    Sleep apnea    Anxiety    Previous  section    , delivered    Adult ADHD    Anxiety in pregnancy, antepartum    Multigravida of advanced maternal age in third trimester    Previous  delivery affecting pregnancy     delivery delivered       Past Medical History:   Diagnosis Date    ADHD (attention deficit hyperactivity disorder)     Anxiety     Asthma     Congenital abnormalities 2022    Multiple fetal anomalies present including meningocele, unilateral real agenesis, unilateral club foot, membranous VSD     Depression     GERD (gastroesophageal reflux disease)     History of depression 2019    Irritable bowel syndrome     Migraine     Pregnancy complicated by multiple fetal congenital anomalies, single gestation 2022    NTD, SUA    PTSD (post-traumatic stress disorder)     Sleep apnea 2016    CPAP       Past Surgical History:   Procedure Laterality Date     SECTION  2019     SECTION N/A 10/14/2023    Procedure:  SECTION REPEAT;  Surgeon: Megan El MD;  Location: [facility] LABOR DELIVERY;   "Service: Obstetrics/Gynecology;  Laterality: N/A;    COLONOSCOPY      with endoscopy    SINUS SURGERY  11/22/2022    x2, 2018 (first)    WISDOM TOOTH EXTRACTION         Social History     Tobacco Use   Smoking Status Former    Years: 10    Types: Cigarettes    Quit date:     Years since quittin.8    Passive exposure: Past   Smokeless Tobacco Never       has a current medication list which includes the following prescription(s): acetaminophen, budesonide-formoterol, diphenhydramine, docusate sodium, docusate sodium, ferrous sulfate, montelukast, prenatal multivit-min-fe-fa, sertraline, vitamin b-6, azithromycin, benzocaine-menthol, ibuprofen, and riboflavin.  ________________________________________    Current contraception: abstinence      The following portions of the patient's history were reviewed and updated as appropriate: allergies, current medications, past family history, past medical history, past social history, past surgical history, and problem list.    Review of Systems    Pertinent items are noted in HPI.     Objective   Physical Exam    /68   Ht 172.7 cm (68\")   Wt 109 kg (240 lb)   LMP  (LMP Unknown)   Breastfeeding Yes Comment: & bottle feeding - going well  BMI 36.49 kg/m²    BP Readings from Last 3 Encounters:   10/26/23 110/68   10/22/23 130/90   10/19/23 127/79      Wt Readings from Last 3 Encounters:   10/26/23 109 kg (240 lb)   10/22/23 112 kg (248 lb)   10/19/23 113 kg (250 lb)      BMI: Estimated body mass index is 36.49 kg/m² as calculated from the following:    Height as of this encounter: 172.7 cm (68\").    Weight as of this encounter: 109 kg (240 lb).    Lungs: non labored breathing, no wheezing or tachpnea  Extremities: extremities normal, atraumatic, no cyanosis or edema  Skin: Skin color, texture, turgor normal. No rashes or lesions  Neurologic: Grossly normal  General:   alert, appears stated age, and cooperative   Abdomen: soft, non-tender, without " masses or organomegaly    Incision healing well                         Assessment:      Diagnoses and all orders for this visit:    1. Postpartum care following  delivery (Primary)    2. Acute non-recurrent frontal sinusitis    Other orders  -     Riboflavin 100 MG tablet; Take 100 mg by mouth Daily.  Dispense: 90 each; Refill: 3  -     benzocaine-menthol (DERMOPLAST) 20-0.5 % aerosol topical spray; Apply  topically to the appropriate area as directed As Needed for Mild Pain or Irritation.  Dispense: 99 g; Refill: 1  -     azithromycin (Zithromax Z-Evin) 250 MG tablet; Take 2 tablets (500 mg) on  Day 1,  followed by 1 tablet (250 mg) once daily on Days 2 through 5.  Dispense: 6 tablet; Refill: 0      Follow up 4 weeks

## 2023-11-21 ENCOUNTER — TELEPHONE (OUTPATIENT)
Facility: HOSPITAL | Age: 38
End: 2023-11-21
Payer: MEDICAID

## 2023-11-21 DIAGNOSIS — Z12.31 VISIT FOR SCREENING MAMMOGRAM: Primary | ICD-10-CM

## 2023-11-21 NOTE — TELEPHONE ENCOUNTER
Pt calling stating she was told once she stopped breast feeding she needed to call to schedule a mammogram. Pt stating that she stopped breast feeding this month. Mammogram order placed & scheduled on 2/21/24.

## 2023-11-28 ENCOUNTER — POSTPARTUM VISIT (OUTPATIENT)
Dept: OBSTETRICS AND GYNECOLOGY | Age: 38
End: 2023-11-28
Payer: MEDICAID

## 2023-11-28 VITALS
HEIGHT: 68 IN | DIASTOLIC BLOOD PRESSURE: 76 MMHG | BODY MASS INDEX: 36.53 KG/M2 | WEIGHT: 241 LBS | SYSTOLIC BLOOD PRESSURE: 114 MMHG

## 2023-11-28 DIAGNOSIS — Z23 NEED FOR HPV VACCINATION: ICD-10-CM

## 2023-11-28 NOTE — PROGRESS NOTES
GYN Visit    2023    Patient: Sonam Lang          MR#:[id number]      Chief Complaint   Patient presents with    Postpartum Care     Post Partum - csection on 10/14/23, no complications, baby boy (Alban Fitzgerald) 8lb 15.6oz, pt is bottle feeding, last pap 22 neg, pt has no complaints today       History of Present Illness    38 y.o. female  who presents for postpartum check    She is 6 weeks post  section  Baby is doing well  She is now bottlefeeding only  We discussed birth control.  She has tried the Nexplanon and the IUD and did not like them  She has migraines and cannot take oral contraceptive pills  For now she will use natural family planning  No baby blues or depression  Patient requests Gardasil vaccine  She does have a mammogram scheduled in 3 months        No LMP recorded.    ________________________________________  Patient Active Problem List   Diagnosis    Migraine without aura and without status migrainosus, not intractable    Allergy to NSAIDs    Asthma, mild persistent    Obesity (BMI 30.0-34.9)    Sleep apnea    Anxiety    Previous  section    , delivered    Adult ADHD    Anxiety in pregnancy, antepartum    Multigravida of advanced maternal age in third trimester    Previous  delivery affecting pregnancy     delivery delivered       Past Medical History:   Diagnosis Date    ADHD (attention deficit hyperactivity disorder)     Anxiety     Asthma     Congenital abnormalities 2022    Multiple fetal anomalies present including meningocele, unilateral real agenesis, unilateral club foot, membranous VSD     Depression     GERD (gastroesophageal reflux disease)     History of depression 2019    Irritable bowel syndrome     Migraine     Obesity (BMI 30.0-34.9) 2019    Pregnancy complicated by multiple fetal congenital anomalies, single gestation 2022    NTD, SUA    PTSD (post-traumatic stress disorder)     Sleep apnea 2016     "CPAP       Past Surgical History:   Procedure Laterality Date     SECTION  2019     SECTION N/A 10/14/2023    Procedure:  SECTION REPEAT;  Surgeon: Megan El MD;  Location: [facility] DELIVERY;  Service: Obstetrics/Gynecology;  Laterality: N/A;    COLONOSCOPY      with endoscopy    SINUS SURGERY  11/22/2022    x2, 2018 (first)    WISDOM TOOTH EXTRACTION         Social History     Tobacco Use   Smoking Status Former    Years: 10    Types: Cigarettes    Quit date:     Years since quittin.9    Passive exposure: Past   Smokeless Tobacco Never       has a current medication list which includes the following prescription(s): acetaminophen, benzocaine-menthol, budesonide-formoterol, diphenhydramine, docusate sodium, docusate sodium, ferrous sulfate, ibuprofen, magnesium, montelukast, prenatal multivit-min-fe-fa, riboflavin, sertraline, and vitamin b-6.  ________________________________________    Current contraception: rhythm method      The following portions of the patient's history were reviewed and updated as appropriate: allergies, current medications, past family history, past medical history, past social history, past surgical history, and problem list.    Review of Systems    Pertinent items are noted in HPI.     Objective   Physical Exam    /76   Ht 172.7 cm (68\")   Wt 109 kg (241 lb)   Breastfeeding No Comment: bottle feeding  BMI 36.64 kg/m²    BP Readings from Last 3 Encounters:   23 114/76   10/26/23 110/68   10/22/23 130/90      Wt Readings from Last 3 Encounters:   23 109 kg (241 lb)   10/26/23 109 kg (240 lb)   10/22/23 112 kg (248 lb)      BMI: Estimated body mass index is 36.64 kg/m² as calculated from the following:    Height as of this encounter: 172.7 cm (68\").    Weight as of this encounter: 109 kg (241 lb).    Lungs: non labored breathing, no wheezing or tachpnea  Extremities: extremities normal, atraumatic, no cyanosis or " edema  Skin: Skin color, texture, turgor normal. No rashes or lesions  Neurologic: Grossly normal  General:   alert, appears stated age, and cooperative   Abdomen: soft, non-tender, without masses or organomegaly and incision well healed                             Assessment:      Diagnoses and all orders for this visit:    1. Postpartum care following  delivery (Primary)    2. Need for HPV vaccination  -     HPV 9-Valent Recomb Vaccine suspension 1 dose

## 2023-12-11 ENCOUNTER — TELEPHONE (OUTPATIENT)
Dept: SPORTS MEDICINE | Facility: CLINIC | Age: 38
End: 2023-12-11
Payer: MEDICAID

## 2023-12-11 DIAGNOSIS — T17.1XXA FOREIGN BODY IN NOSE, INITIAL ENCOUNTER: Primary | ICD-10-CM

## 2023-12-11 NOTE — TELEPHONE ENCOUNTER
Patient called requesting a referral to Dr. Jj Wilson, ENT.    Patient states referral is for an issue with her nose.     Please advise,

## 2023-12-12 NOTE — TELEPHONE ENCOUNTER
Faxed order to Dr. Wilson's office-they will call the patient to schedule.  No further action needed.  -Ines

## 2023-12-14 ENCOUNTER — TELEPHONE (OUTPATIENT)
Dept: SPORTS MEDICINE | Facility: CLINIC | Age: 38
End: 2023-12-14
Payer: MEDICAID

## 2023-12-14 NOTE — TELEPHONE ENCOUNTER
Patient is requesting a referral to a different dermatologist.   Patient states that [facility] cannot schedule her an appointment until 6 months. She wants an appointment sooner.      I confirmed patients referral to Dr. Wilson. ENT.    Please advise.

## 2023-12-14 NOTE — TELEPHONE ENCOUNTER
Update the referral and faxed order to [facility]. They will call the patient to schedule.  -Ines

## 2023-12-26 RX ORDER — BUDESONIDE AND FORMOTEROL FUMARATE DIHYDRATE 80; 4.5 UG/1; UG/1
2 AEROSOL RESPIRATORY (INHALATION)
Qty: 10.2 G | Refills: 3 | Status: SHIPPED | OUTPATIENT
Start: 2023-12-26

## 2024-01-04 ENCOUNTER — OFFICE VISIT (OUTPATIENT)
Dept: SPORTS MEDICINE | Facility: CLINIC | Age: 39
End: 2024-01-04
Payer: MEDICAID

## 2024-01-04 VITALS
WEIGHT: 246 LBS | TEMPERATURE: 98.3 F | HEIGHT: 68 IN | BODY MASS INDEX: 37.28 KG/M2 | SYSTOLIC BLOOD PRESSURE: 104 MMHG | DIASTOLIC BLOOD PRESSURE: 64 MMHG | OXYGEN SATURATION: 97 % | HEART RATE: 81 BPM

## 2024-01-04 DIAGNOSIS — G43.009 MIGRAINE WITHOUT AURA AND WITHOUT STATUS MIGRAINOSUS, NOT INTRACTABLE: ICD-10-CM

## 2024-01-04 DIAGNOSIS — F41.9 ANXIETY: ICD-10-CM

## 2024-01-04 DIAGNOSIS — J30.2 SEASONAL ALLERGIC RHINITIS, UNSPECIFIED TRIGGER: Primary | ICD-10-CM

## 2024-01-04 PROCEDURE — 99214 OFFICE O/P EST MOD 30 MIN: CPT | Performed by: FAMILY MEDICINE

## 2024-01-04 RX ORDER — CETIRIZINE HYDROCHLORIDE 10 MG/1
10 TABLET ORAL DAILY
Qty: 90 TABLET | Refills: 3 | Status: SHIPPED | OUTPATIENT
Start: 2024-01-04

## 2024-01-04 RX ORDER — MONTELUKAST SODIUM 10 MG/1
10 TABLET ORAL DAILY
Qty: 90 TABLET | Refills: 3 | Status: SHIPPED | OUTPATIENT
Start: 2024-01-04

## 2024-01-04 RX ORDER — BUDESONIDE AND FORMOTEROL FUMARATE DIHYDRATE 80; 4.5 UG/1; UG/1
2 AEROSOL RESPIRATORY (INHALATION)
Qty: 30.6 G | Refills: 3 | Status: SHIPPED | OUTPATIENT
Start: 2024-01-04

## 2024-01-04 RX ORDER — BUDESONIDE 1 MG/2ML
INHALANT ORAL
Qty: 360 ML | Refills: 3 | Status: SHIPPED | OUTPATIENT
Start: 2024-01-04

## 2024-01-04 RX ORDER — PSEUDOEPHEDRINE HCL 30 MG
100 TABLET ORAL 2 TIMES DAILY PRN
Qty: 180 CAPSULE | Refills: 1 | Status: SHIPPED | OUTPATIENT
Start: 2024-01-04

## 2024-01-04 RX ORDER — FLUTICASONE PROPIONATE 50 MCG
2 SPRAY, SUSPENSION (ML) NASAL DAILY
Qty: 48 G | Refills: 3 | Status: SHIPPED | OUTPATIENT
Start: 2024-01-04

## 2024-01-04 NOTE — PROGRESS NOTES
"Sonam is a 38 y.o. year old female    Chief Complaint   Patient presents with    Sinusitis     Patient is here to follow up after  visit on 12/18 for a sinus infection.        History of Present Illness   HPI   URI/sinus infection for a few weeks, improving. Overall does better since sinus surgery, but needs zyrtec, singulair, and nasal steroid for maintenance.  Migraines have been more stable  Anxiety has been stable - better with up to 100mg sertraline. Not clearly post-partum (son now 2.5 months old)    Review of Systems    /64 (BP Location: Left arm, Patient Position: Sitting, Cuff Size: Adult)   Pulse 81   Temp 98.3 °F (36.8 °C) (Temporal)   Ht 172.7 cm (68\")   Wt 112 kg (246 lb)   SpO2 97%   Breastfeeding No   BMI 37.40 kg/m²          Physical Exam  Vitals reviewed.   Eyes:      Pupils: Pupils are equal, round, and reactive to light.   Cardiovascular:      Rate and Rhythm: Normal rate and regular rhythm.   Pulmonary:      Effort: Pulmonary effort is normal.      Breath sounds: Normal breath sounds.   Neurological:      Mental Status: She is alert.   Psychiatric:         Mood and Affect: Mood normal.           Current Outpatient Medications:     acetaminophen (TYLENOL) 325 MG tablet, Take 2 tablets by mouth Every 6 (Six) Hours., Disp: 60 tablet, Rfl: 1    budesonide-formoterol (SYMBICORT) 80-4.5 MCG/ACT inhaler, Inhale 2 puffs 2 (Two) Times a Day., Disp: 10.2 g, Rfl: 3    cetirizine (zyrTEC) 10 MG tablet, Take 1 tablet by mouth Daily., Disp: 90 tablet, Rfl: 3    diphenhydrAMINE (BENADRYL) 25 mg capsule, Take 1 capsule by mouth Every 6 (Six) Hours As Needed for Itching., Disp: , Rfl:     docusate sodium (COLACE) 50 MG capsule, Take  by mouth 2 (Two) Times a Day., Disp: , Rfl:     ferrous sulfate 325 (65 FE) MG tablet, Take 1 tablet by mouth Daily With Breakfast., Disp: 90 tablet, Rfl: 1    fluticasone (FLONASE) 50 MCG/ACT nasal spray, 2 sprays into the nostril(s) as directed by provider Daily., " Disp: 48 g, Rfl: 3    Magnesium 400 MG capsule, Take  by mouth., Disp: , Rfl:     montelukast (SINGULAIR) 10 MG tablet, Take 1 tablet by mouth Daily., Disp: 90 tablet, Rfl: 3    Prenatal Multivit-Min-Fe-FA (PRE-AMANDA FORMULA PO), Take 1 tablet by mouth Daily., Disp: , Rfl:     Riboflavin 100 MG tablet, Take 100 mg by mouth Daily., Disp: 90 each, Rfl: 3    sertraline (Zoloft) 50 MG tablet, Take 2 tablets by mouth Daily., Disp: 180 tablet, Rfl: 1    vitamin B-6 (PYRIDOXINE) 50 MG tablet, Take 0.5 tablets by mouth 3 (Three) Times a Day., Disp: , Rfl:     benzocaine-menthol (DERMOPLAST) 20-0.5 % aerosol topical spray, Apply  topically to the appropriate area as directed As Needed for Mild Pain or Irritation. (Patient not taking: Reported on 2024), Disp: 99 g, Rfl: 1    docusate sodium 100 MG capsule, Take 1 capsule by mouth 2 (Two) Times a Day As Needed for Constipation. (Patient not taking: Reported on 2024), Disp: 60 capsule, Rfl: 1     Diagnoses and all orders for this visit:    Seasonal allergic rhinitis, unspecified trigger  -     cetirizine (zyrTEC) 10 MG tablet; Take 1 tablet by mouth Daily.  -     montelukast (SINGULAIR) 10 MG tablet; Take 1 tablet by mouth Daily.  -     fluticasone (FLONASE) 50 MCG/ACT nasal spray; 2 sprays into the nostril(s) as directed by provider Daily.    Migraine without aura and without status migrainosus, not intractable    Anxiety  -     sertraline (Zoloft) 50 MG tablet; Take 2 tablets by mouth Daily.             EMR Dragon/Transcription disclaimer:    Much of this encounter note is an electronic transcription/translation of spoken language to printed text.  The electronic translation of spoken language may permit erroneous, or at times, nonsensical words or phrases to be inadvertently transcribed.  Although I have reviewed the note for such errors some may still exist.

## 2024-01-12 RX ORDER — FERROUS SULFATE 325(65) MG
1 TABLET ORAL
Qty: 90 TABLET | Refills: 0 | Status: SHIPPED | OUTPATIENT
Start: 2024-01-12

## 2024-01-24 ENCOUNTER — PATIENT MESSAGE (OUTPATIENT)
Dept: SPORTS MEDICINE | Facility: CLINIC | Age: 39
End: 2024-01-24
Payer: MEDICAID

## 2024-01-24 RX ORDER — ALBUTEROL SULFATE 90 UG/1
2 AEROSOL, METERED RESPIRATORY (INHALATION) EVERY 4 HOURS PRN
Qty: 18 G | Refills: 11 | Status: SHIPPED | OUTPATIENT
Start: 2024-01-24

## 2024-01-24 NOTE — TELEPHONE ENCOUNTER
Sarah Dailey MA 1/24/2024 9:23 AM EST    Please advise  ----- Message -----  From: Sonam Lang  Sent: 1/24/2024 7:41 AM EST  To: [facility] Clinical [facility]  Subject: Refill     I put in on medications about my Albuterol sulfate inhalation aerosol. I didn't realize it's not on my medication list. It's my rescue inhaler and I do two puffs everyday before doing the symbicort inhaler. I don't seem to have any more. I thought I did, but I don't and [facility] hasn't sent any messages saying it's time to refill so I'm guessing there are no refills there. And I think it was my ob that put it in last. Thanks Dr. Nolan Masters

## 2024-01-25 ENCOUNTER — OFFICE VISIT (OUTPATIENT)
Dept: OBSTETRICS AND GYNECOLOGY | Age: 39
End: 2024-01-25
Payer: MEDICAID

## 2024-01-25 VITALS
SYSTOLIC BLOOD PRESSURE: 114 MMHG | DIASTOLIC BLOOD PRESSURE: 72 MMHG | HEIGHT: 68 IN | WEIGHT: 237 LBS | BODY MASS INDEX: 35.92 KG/M2

## 2024-01-25 DIAGNOSIS — Z23 NEED FOR HPV VACCINE: ICD-10-CM

## 2024-01-25 DIAGNOSIS — L73.2 HIDRADENITIS SUPPURATIVA: Primary | ICD-10-CM

## 2024-01-25 NOTE — PROGRESS NOTES
GYN Visit    2024    Patient: Sonam Lang          MR#:[id number]      Chief Complaint   Patient presents with    Follow-up     Gyn F/u - Pt c/o bumps in vaginal area x's 1 week       History of Present Illness    38 y.o. female  who presents for problem visit    Patient has a couple bumps on her vulva and groin area that is been bothering her  This is actually something that is happened before intermittently maybe once or twice a year  Baby is 3 months old and doing well  She is due for her second Gardasil vaccine        Patient's last menstrual period was 01/10/2024 (exact date).    ________________________________________  Patient Active Problem List   Diagnosis    Migraine without aura and without status migrainosus, not intractable    Allergy to NSAIDs    Asthma, mild persistent    Obesity (BMI 30.0-34.9)    Sleep apnea    Anxiety    Previous  section    , delivered    Adult ADHD    Anxiety in pregnancy, antepartum    Multigravida of advanced maternal age in third trimester    Previous  delivery affecting pregnancy     delivery delivered       Past Medical History:   Diagnosis Date    ADHD (attention deficit hyperactivity disorder)     Anxiety     Asthma     Congenital abnormalities 2022    Multiple fetal anomalies present including meningocele, unilateral real agenesis, unilateral club foot, membranous VSD     Depression     GERD (gastroesophageal reflux disease)     History of depression 2019    Irritable bowel syndrome     Migraine     Obesity (BMI 30.0-34.9) 2019    Pregnancy complicated by multiple fetal congenital anomalies, single gestation 2022    NTD, SUA    PTSD (post-traumatic stress disorder)     Sleep apnea 2016    CPAP       Past Surgical History:   Procedure Laterality Date     SECTION  2019     SECTION N/A 10/14/2023    Procedure:  SECTION REPEAT;  Surgeon: Megan El MD;  Location: [facility]  "DELIVERY;  Service: Obstetrics/Gynecology;  Laterality: N/A;    COLONOSCOPY      with endoscopy    SINUS SURGERY  11/22/2022    x2, 2018 (first)    WISDOM TOOTH EXTRACTION         Social History     Tobacco Use   Smoking Status Former    Years: 10    Types: Cigarettes    Quit date:     Years since quittin.0    Passive exposure: Past   Smokeless Tobacco Never       has a current medication list which includes the following prescription(s): acetaminophen, albuterol sulfate hfa, budesonide, budesonide-formoterol, cetirizine, diphenhydramine, docusate sodium, fluticasone, magnesium, montelukast, riboflavin, sertraline, sv iron, and vitamin b-6.  ________________________________________    Current contraception: none      The following portions of the patient's history were reviewed and updated as appropriate: allergies, current medications, past family history, past medical history, past social history, past surgical history, and problem list.    Review of Systems    Pertinent items are noted in HPI.     Objective   Physical Exam  Genitourinary:         Comments: 2 small masses c/w HS  Lateral one is deeper , mildly tender      /72   Ht 172.7 cm (68\")   Wt 108 kg (237 lb)   LMP 01/10/2024 (Exact Date)   Breastfeeding No   BMI 36.04 kg/m²    BP Readings from Last 3 Encounters:   24 114/72   24 118/81   24 104/64      Wt Readings from Last 3 Encounters:   24 108 kg (237 lb)   24 112 kg (246 lb)   24 112 kg (246 lb)      BMI: Estimated body mass index is 36.04 kg/m² as calculated from the following:    Height as of this encounter: 172.7 cm (68\").    Weight as of this encounter: 108 kg (237 lb).    Lungs: non labored breathing, no wheezing or tachpnea  Extremities: extremities normal, atraumatic, no cyanosis or edema  Skin: Skin color, texture, turgor normal. No rashes or lesions  Neurologic: Grossly normal  General:   alert, appears stated age, and " cooperative   Abdomen: soft, non-tender, without masses or organomegaly       Vulva: normal, Bartholin's, Urethra, Prinsburg's normal, 2 bumps seen consistent with hidradenitis suppurativa the 1 end to the right thigh groin area is a little bit deeper and mildly tender the more medial lesion looks like it is healing                     Assessment:      Diagnoses and all orders for this visit:    1. Hidradenitis suppurativa (Primary)    2. Need for HPV vaccine  -     HPV 9-Valent Recomb Vaccine suspension 1 dose    Educated patient on hidradenitis suppurativa  Gave her some written information  Hot washcloth and preventative measures recommended  Offered antibiotics but with shared decision making we decided not to do this today  I do not know that it would help much and her case is very mild  Reassurance mostly

## 2024-02-07 DIAGNOSIS — M79.673 HEEL PAIN, UNSPECIFIED LATERALITY: Primary | ICD-10-CM

## 2024-02-20 ENCOUNTER — HOSPITAL ENCOUNTER (OUTPATIENT)
Dept: PHYSICAL THERAPY | Facility: HOSPITAL | Age: 39
Discharge: HOME | End: 2024-02-20
Admitting: FAMILY MEDICINE
Payer: MEDICAID

## 2024-02-20 DIAGNOSIS — M72.2 PLANTAR FASCIITIS: ICD-10-CM

## 2024-02-20 DIAGNOSIS — M79.671 PAIN OF BOTH HEELS: ICD-10-CM

## 2024-02-20 DIAGNOSIS — Z74.09 IMPAIRED MOBILITY: Primary | ICD-10-CM

## 2024-02-20 DIAGNOSIS — G43.009 MIGRAINE WITHOUT AURA AND WITHOUT STATUS MIGRAINOSUS, NOT INTRACTABLE: ICD-10-CM

## 2024-02-20 DIAGNOSIS — M79.672 PAIN OF BOTH HEELS: ICD-10-CM

## 2024-02-20 PROCEDURE — 97530 THERAPEUTIC ACTIVITIES: CPT | Performed by: PHYSICAL THERAPIST

## 2024-02-20 PROCEDURE — 97162 PT EVAL MOD COMPLEX 30 MIN: CPT | Performed by: PHYSICAL THERAPIST

## 2024-02-20 NOTE — THERAPY EVALUATION
Outpatient Physical Therapy Ortho Initial Evaluation  [facility]     Patient Name: Sonam Lang  : 1985  MRN: [id number]  Today's Date: 2024      Visit Date: 2024    Patient Active Problem List   Diagnosis    Migraine without aura and without status migrainosus, not intractable    Allergy to NSAIDs    Asthma, mild persistent    Obesity (BMI 30.0-34.9)    Sleep apnea    Anxiety    Previous  section    , delivered    Adult ADHD    Anxiety in pregnancy, antepartum    Multigravida of advanced maternal age in third trimester    Previous  delivery affecting pregnancy     delivery delivered        Past Medical History:   Diagnosis Date    ADHD (attention deficit hyperactivity disorder)     Anxiety     Asthma     Congenital abnormalities 2022    Multiple fetal anomalies present including meningocele, unilateral real agenesis, unilateral club foot, membranous VSD     Depression     GERD (gastroesophageal reflux disease)     History of depression 2019    Irritable bowel syndrome     Migraine     Obesity (BMI 30.0-34.9) 2019    Pregnancy complicated by multiple fetal congenital anomalies, single gestation 2022    NTD, SUA    PTSD (post-traumatic stress disorder)     Sleep apnea 2016    CPAP        Past Surgical History:   Procedure Laterality Date     SECTION  2019     SECTION N/A 10/14/2023    Procedure:  SECTION REPEAT;  Surgeon: Megan El MD;  Location: [facility] LABOR DELIVERY;  Service: Obstetrics/Gynecology;  Laterality: N/A;    COLONOSCOPY      with endoscopy    SINUS SURGERY  11/22/2022    x2, 2018 (first)    WISDOM TOOTH EXTRACTION         Visit Dx:     ICD-10-CM ICD-9-CM   1. Impaired mobility  Z74.09 799.89   2. Pain of both heels  M79.671 729.5    M79.672    3. Plantar fasciitis  M72.2 728.71          Patient History       Row Name 24 1100             History    Chief Complaint Pain  -GJ       Type of Pain Foot pain  R>L  -GJ      Date Current Problem(s) Began --  R 12/2023, L 2/19/2024  -GJ      Brief Description of Current Complaint Ms. Lang is a 37 y/o female. She present with chief c/o R heel pain of several month duration 12/2023, L heel started to hurt yesterday.  Of note, she has a 4 month old baby (delivered 10/14/2023). She reports being in therapy last year for mid to low back pain, which she reports she volitionally working on [facility] during gait, which may have irritated her foot heel pain.  Their condition is worsening. Pain location R/L heel, intermittent.  Aggravating activities include first thing in the morning, being on her feet. Relieving activities include massage of her feet. Denies over N/T BLE. No imaging to date. No previous treatments for this condition.  -GJ      Previous treatment for THIS PROBLEM --  not for this condition  -GJ      Patient/Caregiver Goals Relieve pain;Improve mobility;Improve strength;Know what to do to help the symptoms  -GJ      What clinical tests have you had for this problem? --  nothing  -GJ      Are you or can you be pregnant No  -GJ         Pain     Pain Location Foot  -GJ      What Performance Factors Make the Current Problem(s) WORSE? first thing in the morning,  -GJ         Fall Risk Assessment    Any falls in the past year: No  -GJ         Daily Activities    Primary Language English  -GJ      Are you able to read Yes  -GJ      Are you able to write Yes  -GJ      How does patient learn best? Demonstration;Pictures/Video;Reading;Listening  -GJ      Teaching needs identified Home Exercise Program;Management of Condition  -GJ      Patient is concerned about/has problems with Bed Mobility;Climbing Stairs;Performing home management (household chores, shopping, care of dependents);Performing job responsibilities/community activities (work, school,;Flexibility;Performing sports, recreation, and play activities;Reaching over head;Repetitive  movements of the hand, arm, shoulder;Transfers (getting out of a chair, bed);Walking  -GJ      Barriers to learning None  -GJ      Pt Participated in POC and Goals Yes  -GJ                User Key  (r) = Recorded By, (t) = Taken By, (c) = Cosigned By      Initials Name Provider Type    Alban Renee, PT Physical Therapist                     PT Ortho       Row Name 02/20/24 1100       Posture/Observations    Alignment Options Lumbar lordosis;Genu valgus;Genu varus;Foot pronation;Pes Planus  -GJ    Lumbar lordosis Increased  -GJ    Genu valgus Bilateral:;Mild;Moderate  -GJ    Foot pronation Bilateral:;Mild;Moderate  -GJ    Pes Planus Bilateral:;Mild;Moderate  -GJ       Special Tests/Palpation    Special Tests/Palpation Ankle/Foot  -GJ       Foot/Ankle Palpation    Foot/Ankle Palpation? Yes  -GJ    Plantar Fascia Bilateral:;Tender;Guarded/taut  -GJ    Medial Gastroc Bilateral:;Tender;Guarded/taut  -GJ       Ankle/Foot Special Tests    Rozina’s sign (DVT) Bilateral:;Negative  -GJ       General ROM    RT Lower Ext Rt Knee Extension/Flexion;Rt Ankle Dorsiflexion;Rt Ankle Plantarflexion;Rt Ankle Inversion;Rt Ankle Eversion  -GJ    LT Lower Ext Lt Knee Extension/Flexion;Lt Ankle Dorsiflexion;Lt Ankle Plantarflexion;Lt Ankle Inversion;Lt Ankle Eversion  -GJ    GENERAL ROM COMMENTS Grossly noted bilateral hip active/passive range of motion grossly symmetrical and within functional limits  -GJ       Right Lower Ext    Rt Knee Extension/Flexion AROM Grossly noted 5 degrees of genu recurvatum actively in a seated position  -GJ    Rt Ankle Dorsiflexion AROM 8  -GJ    Rt Ankle Plantarflexion AROM 50  -GJ    Rt Ankle Inversion AROM 30  -GJ    Rt Ankle Eversion AROM 15  -GJ       Left Lower Ext    Lt Knee Extension/Flexion AROM Grossly noted 5 degrees of genu recurvatum actively in a seated position  -GJ    Lt Ankle Dorsiflexion AROM 5  -GJ    Lt Ankle Plantarflexion AROM 50  -GJ    Lt Ankle Inversion AROM 30  -GJ    Lt Ankle  Eversion AROM 15  -GJ       MMT (Manual Muscle Testing)    Rt Lower Ext Rt Hip Flexion;Rt Hip Extension;Rt Hip ABduction;Rt Knee Extension;Rt Knee Flexion;Rt Ankle Plantarflexion;Rt Ankle Dorsiflexion;Rt Ankle Subtalar Inversion;Rt Ankle Subtalar Eversion  -GJ    Lt Lower Ext Lt Hip Flexion;Lt Hip Extension;Lt Hip ABduction;Lt Knee Extension;Lt Knee Flexion;Lt Ankle Plantarflexion;Lt Ankle Dorsiflexion;Lt Ankle Subtalar Inversion;Lt Ankle Subtalar Eversion  -GJ       MMT Right Lower Ext    Rt Hip Flexion MMT, Gross Movement (4-/5) good minus  Noted lateral trunk flexion indicating decreased core strength  -GJ    Rt Hip Extension MMT, Gross Movement (4-/5) good minus  -GJ    Rt Hip ABduction MMT, Gross Movement (4/5) good  -GJ    Rt Knee Extension MMT, Gross Movement (4+/5) good plus  -GJ    Rt Knee Flexion MMT, Gross Movement (4+/5) good plus  -GJ    Rt Ankle Plantarflexion MMT, Gross Movement (4-/5) good minus  -GJ    Rt Ankle Dorsiflexion MMT, Gross Movement (5/5) normal  -GJ    Rt Ankle Subtalar Inversion MT, Gross Movement (4+/5) good plus  -GJ    Rt Ankle Subtalar Eversion MMT, Gross Movement (4+/5) good plus  -GJ       MMT Left Lower Ext    Lt Hip Flexion MMT, Gross Movement (4-/5) good minus  Noted lateral trunk flexion indicating decreased core strength  -GJ    Lt Hip Extension MMT, Gross Movement (4-/5) good minus  -GJ    Lt Hip ABduction MMT, Gross Movement (4/5) good  -GJ    Lt Knee Extension MMT, Gross Movement (4+/5) good plus  -GJ    Lt Knee Flexion MMT, Gross Movement (4+/5) good plus  -GJ    Lt Ankle Plantarflexion MMT, Gross Movement (3+/5) fair plus  -GJ    Lt Ankle Dorsiflexion MMT, Gross Movement (5/5) normal  -GJ    Lt Ankle Subtalar Inversion MMT, Gross Movement (4+/5) good plus  -GJ    Lt Ankle Subtalar Eversion MMT, Gross Movement (4+/5) good plus  -GJ       Flexibility    Flexibility Tested? Lower Extremity  -GJ       Lower Extremity Flexibility    Hamstrings Bilateral:;Moderately limited   -GJ    Hip Flexors Bilateral:;Mildly limited;Moderately limited  -GJ    Hip External Rotators Bilateral:;Mildly limited;Moderately limited  -GJ    Hip Internal Rotators Bilateral:;Mildly limited;Moderately limited  -GJ    Gastrocnemius Bilateral:;Moderately limited  -GJ    Soleus Bilateral:;Moderately limited  -GJ       Balance Skills Training    SLS Able to perform approximately 5 seconds bilaterally with appropriate ankle strategies  -GJ              User Key  (r) = Recorded By, (t) = Taken By, (c) = Cosigned By      Initials Name Provider Type    Alban Renee, PT Physical Therapist                                Therapy Education  Education Details: discussed dx, px, poc, discussed anatomy of the foot/ankle and physiology of healing, discussed realistic expectations and time frames for therapy. Discussed activity modification. discused use of heat vs ice, discussed footwear and needing to get shoes fit for her foot, discussed possible benefits of heel cups or inserts. discussed performing AP's after prolonged inactivity. discussed use of stationary bike vs. walking in terms of decreased pounding/energy absorption.  Given: HEP, Symptoms/condition management, Pain management, Posture/body mechanics, Mobility training, Edema management  Program: New  How Provided: Verbal, Demonstration, Written  Provided to: Patient  Level of Understanding: Teach back education performed, Verbalized, Demonstrated      PT OP Goals       Row Name 02/20/24 1100          PT Short Term Goals    STG Date to Achieve 03/21/24  -GJ     STG 1 pt. to be I with initial HEP to facilitate self management of their condition  -GJ     STG 1 Progress New  -GJ     STG 2 pt. to be educated in/verbalize understanding of the importance of posture/ergonomics in association with their condition to facilitate self management of their condition  -GJ     STG 2 Progress New  -GJ     STG 3 Patient to demonstrate being able to rise on her toes in a single  limb fashion greater than equal to 10 times, bilaterally, to facilitate ease of performance of functional and household mobility  -GJ     STG 3 Progress New  -GJ        Long Term Goals    LTG Date to Achieve 24  -GJ     LTG 1 pt. to be I with advanced HEP to facilitate self management of their condition  -GJ     LTG 1 Progress New  -GJ     LTG 2 pt. to report an FAAM >/= 70%  to demonstrate decreased level of perceived disability  -GJ     LTG 2 Progress New  -GJ     LTG 3 Patient report greater than equal to 75% improvement in right and left heel pain with initiation of gait to facilitate ease of performing childcare/household mobility  -GJ     LTG 3 Progress New  -GJ     LTG 4 Patient to demonstrate bilateral hip abduction and extension manual muscle testing greater than equal to 4+ out of 5 days facilitate ease of performing household and community activities  -GJ     LTG 4 Progress New  -GJ        Time Calculation    PT Goal Re-Cert Due Date 24  -GJ               User Key  (r) = Recorded By, (t) = Taken By, (c) = Cosigned By      Initials Name Provider Type    Alban Renee, PT Physical Therapist                     PT Assessment/Plan       Row Name 24 1215          PT Assessment    Functional Limitations Impaired gait;Limitation in home management;Limitations in community activities;Performance in leisure activities  -GJ     Impairments Balance;Endurance;Gait;Impaired flexibility;Impaired muscle endurance;Impaired muscle length;Impaired muscle power;Impaired postural alignment;Muscle strength;Motor function;Pain;Poor body mechanics;Range of motion;Posture  -GJ     Assessment Comments Ms. Lang is a 38-year-old female.  She presents to the clinic with chief complaints of right heel pain of several months duration (2023) and more recent onset of left heel pain which started yesterday.  Of note she underwent a  section approximately 4 months ago (delivery 10/14/2023).  She  reports being in therapy last year for mid to low back pain for which she reports volitionally working on decreasing the amount of external rotation of bilateral lower extremities during gait, which she reports may have irritated her heel pain.  Aggravating these activities include first thing in the morning, being on her feet.  Relieving activities include massage to her feet.  She reports her condition is worsening.  She denies overt numbness and tingling bilateral lower extremities.  She has not had imaging studies or treatment for this condition to date. Ms. Lang presents to the clinic with her 4-month-old son (Alban).  She demonstrates mild bilateral genu recurvatum in stance, mild external rotation right greater than left lower extremities in stance, noted bilateral foot pronation and pes planus.  She demonstrates decreased hip girdle and plantarflexion strength bilaterally.  She demonstrates positive tenderness to palpation, taut bands, positive trigger points right greater than left medial gastroc tissues.  She is tender to palpation of the calcaneal tubercle bilaterally (right greater than left). She reports a FAAM score of 46%, scored 0-100, 100% represents no perceived disability.  Ms. Lang  demonstrates evolving s/s consistent with bilateral plantar fasciitis which limits her participation in household/leisure/community activities.    Aggravating/Personal factors affecting recovery include,  but are not limited to, increased BMI, chronicity of condition, history of recent  section resulting in decreased core strength.  Ms. Lang may benefit from skilled physical therapy to address the above impairments  -GJ     Please refer to paper survey for additional self-reported information Yes  -GJ     Rehab Potential Good  -GJ     Patient/caregiver participated in establishment of treatment plan and goals Yes  -GJ     Patient would benefit from skilled therapy intervention Yes  -GJ        PT  Plan    PT Frequency 1x/week;2x/week  -     Predicted Duration of Therapy Intervention (PT) 10 visits  -     Planned CPT's? PT EVAL MOD COMPLELITY: 70151;PT RE-EVAL: 48411;PT THER PROC EA 15 MIN: 92719;PT THER ACT EA 15 MIN: 75301;PT MANUAL THERAPY EA 15 MIN: 88033;PT NEUROMUSC RE-EDUCATION EA 15 MIN: 33639;PT GAIT TRAINING EA 15 MIN: 21552;PT HOT OR COLD PACK TREAT MCARE;PT ELECTRICAL STIM UNATTEND: ;PT ULTRASOUND EA 15 MIN: 05944  -GJ     PT Plan Comments warm up on recumbent bike, toe yoga, toe curls on towel 3 way restisted ankle (inversin, eversion, DF), SL hip abd, prone hip ext, bridge, ? STM to R>L medial gastroc/PF tissue, progres core strenghtening as abble (hx of recent c section, 4 months ago)  -               User Key  (r) = Recorded By, (t) = Taken By, (c) = Cosigned By      Initials Name Provider Type    Alban Renee, PT Physical Therapist                       OP Exercises       Row Name 02/20/24 1133             Total Minutes    53592 - PT Therapeutic Activity Minutes 15  education  -         Exercise 1    Exercise Name 1 recumbent bike  -GJ      Additional Comments Next session  -                User Key  (r) = Recorded By, (t) = Taken By, (c) = Cosigned By      Initials Name Provider Type    Alban Renee, PT Physical Therapist                                  Outcome Measure Options: FAAM  FAAM  FAAM: 46%      Time Calculation:     Start Time: 1133  Stop Time: 1215  Time Calculation (min): 42 min  Timed Charges  34627 - PT Therapeutic Activity Minutes: 15 (education)  Total Minutes  Timed Charges Total Minutes: 15   Total Minutes: 15     Therapy Charges for Today       Code Description Service Date Service Provider Modifiers Qty    35609620888 HC PT THERAPEUTIC ACT EA 15 MIN 2/20/2024 Alban Stuart, PT GP 1    16074347291 HC PT EVAL MOD COMPLEXITY 2 2/20/2024 Alban Stuart, PT GP 1            PT G-Codes  Outcome Measure Options: AIXA Stuart  PT  2/20/2024

## 2024-02-21 ENCOUNTER — HOSPITAL ENCOUNTER (OUTPATIENT)
Facility: HOSPITAL | Age: 39
Discharge: HOME | End: 2024-02-21
Admitting: OBSTETRICS & GYNECOLOGY
Payer: MEDICAID

## 2024-02-21 DIAGNOSIS — Z12.31 VISIT FOR SCREENING MAMMOGRAM: ICD-10-CM

## 2024-02-21 PROCEDURE — 77063 BREAST TOMOSYNTHESIS BI: CPT

## 2024-02-21 PROCEDURE — 77067 SCR MAMMO BI INCL CAD: CPT

## 2024-02-22 ENCOUNTER — HOSPITAL ENCOUNTER (OUTPATIENT)
Dept: PHYSICAL THERAPY | Facility: HOSPITAL | Age: 39
Setting detail: THERAPIES SERIES
Discharge: HOME | End: 2024-02-22
Payer: MEDICAID

## 2024-02-22 DIAGNOSIS — M79.672 PAIN OF BOTH HEELS: ICD-10-CM

## 2024-02-22 DIAGNOSIS — Z74.09 IMPAIRED MOBILITY: Primary | ICD-10-CM

## 2024-02-22 DIAGNOSIS — M72.2 PLANTAR FASCIITIS: ICD-10-CM

## 2024-02-22 DIAGNOSIS — G43.009 MIGRAINE WITHOUT AURA AND WITHOUT STATUS MIGRAINOSUS, NOT INTRACTABLE: ICD-10-CM

## 2024-02-22 DIAGNOSIS — M79.671 PAIN OF BOTH HEELS: ICD-10-CM

## 2024-02-22 PROCEDURE — 97110 THERAPEUTIC EXERCISES: CPT

## 2024-02-22 NOTE — THERAPY TREATMENT NOTE
Outpatient Physical Therapy Ortho Treatment Note  [facility]     Patient Name: Sonam Lang  : 1985  MRN: [id number]  Today's Date: 2024      Visit Date: 2024    Visit Dx:    ICD-10-CM ICD-9-CM   1. Impaired mobility  Z74.09 799.89   2. Pain of both heels  M79.671 729.5    M79.672    3. Plantar fasciitis  M72.2 728.71   4. Migraine without aura and without status migrainosus, not intractable  G43.009 346.10       Patient Active Problem List   Diagnosis    Migraine without aura and without status migrainosus, not intractable    Allergy to NSAIDs    Asthma, mild persistent    Obesity (BMI 30.0-34.9)    Sleep apnea    Anxiety    Previous  section    , delivered    Adult ADHD    Anxiety in pregnancy, antepartum    Multigravida of advanced maternal age in third trimester    Previous  delivery affecting pregnancy     delivery delivered        Past Medical History:   Diagnosis Date    ADHD (attention deficit hyperactivity disorder)     Anxiety     Asthma     Congenital abnormalities 2022    Multiple fetal anomalies present including meningocele, unilateral real agenesis, unilateral club foot, membranous VSD     Depression     GERD (gastroesophageal reflux disease)     History of depression 2019    Irritable bowel syndrome     Migraine     Obesity (BMI 30.0-34.9) 2019    Pregnancy complicated by multiple fetal congenital anomalies, single gestation 2022    NTD, SUA    PTSD (post-traumatic stress disorder)     Sleep apnea 2016    CPAP        Past Surgical History:   Procedure Laterality Date     SECTION       SECTION N/A 10/14/2023    Procedure:  SECTION REPEAT;  Surgeon: Megan El MD;  Location: [facility] LABOR DELIVERY;  Service: Obstetrics/Gynecology;  Laterality: N/A;    COLONOSCOPY  2014    with endoscopy    SINUS SURGERY  11/22/2022    x2, 2018 (first)    WISDOM TOOTH EXTRACTION                           PT Assessment/Plan       Row Name 02/22/24 1500          PT Assessment    Assessment Comments Sonam Lang returns for first follow up visit for treatment of R heel pain. Today she reports that she has been trying the stretching in the morning with great benefit. We added Added recumbent bike, toe yoga, toe curls on towel, SL hip ABD, supine bridge with YTB for ABD cue. Pt. Struggling to maintain core control with YTB for ABD cue. Opted for supine PPT instead as precursor to advanced skill. Pt remains a good candidate for skilled PT intervention and will benefit from gait/transfer training to assess mechanics through BLE ankles.  -ER        PT Plan    PT Plan Comments Consider counter top hip extension, standing hip ABD, STM to R>L medial gastroc/PF tissue. Stability shoes? look at transfer mechanics with STS (focus on ABD), gait mechanics, progress glut/ABD strengthening in standing.  -ER               User Key  (r) = Recorded By, (t) = Taken By, (c) = Cosigned By      Initials Name Provider Type    ER Pamela Tang, PT Physical Therapist                       OP Exercises       Row Name 02/22/24 1500             Subjective    Subjective Comments Stretching in the morning has really helped.  -ER         Total Minutes    57147 - PT Therapeutic Exercise Minutes 40  -ER         Exercise 1    Exercise Name 1 recumbent bike  -ER      Cueing 1 Verbal  -ER      Time 1 5 min  -ER         Exercise 2    Exercise Name 2 Standing HR  -ER      Cueing 2 Verbal;Demo  -ER      Sets 2 1  -ER      Reps 2 10  -ER         Exercise 3    Exercise Name 3 Heel raise on step w/ R weight shift  -ER      Cueing 3 Verbal;Demo  -ER      Sets 3 2  -ER      Reps 3 10  -ER      Additional Comments Weight shift to R for eccentric  -ER         Exercise 4    Exercise Name 4 Toe curls on towel  -ER      Cueing 4 Verbal;Demo  -ER      Sets 4 2  -ER      Reps 4 10  -ER      Time 4 5 sec  -ER         Exercise 5    Exercise Name 5 Toe yoga  -ER       Cueing 5 Verbal;Demo  -ER      Reps 5 20e  -ER      Time 5 3 sec  -ER      Additional Comments on towel  -ER         Exercise 6    Exercise Name 6 SL hip ABD  -ER      Cueing 6 Verbal;Demo  -ER      Sets 6 2  -ER      Reps 6 10e  -ER      Time 6 3 sec lower  -ER         Exercise 7    Exercise Name 7 Bridge w/ PPT  -ER      Cueing 7 Verbal;Demo  -ER      Sets 7 2  -ER      Reps 7 10  -ER      Time 7 3sec  -ER      Additional Comments initiate with PPT  -ER         Exercise 8    Exercise Name 8 Supine PPT  -ER      Cueing 8 Verbal;Demo  -ER      Sets 8 1  -ER      Reps 8 10  -ER      Time 8 5sec  -ER                User Key  (r) = Recorded By, (t) = Taken By, (c) = Cosigned By      Initials Name Provider Type    ER Pamela Tang, PT Physical Therapist                                  PT OP Goals       Row Name 02/22/24 1500          PT Short Term Goals    STG Date to Achieve 03/21/24  -ER     STG 1 pt. to be I with initial HEP to facilitate self management of their condition  -ER     STG 1 Progress New  -ER     STG 2 pt. to be educated in/verbalize understanding of the importance of posture/ergonomics in association with their condition to facilitate self management of their condition  -ER     STG 2 Progress New  -ER     STG 3 Patient to demonstrate being able to rise on her toes in a single limb fashion greater than equal to 10 times, bilaterally, to facilitate ease of performance of functional and household mobility  -ER     STG 3 Progress New  -ER        Long Term Goals    LTG Date to Achieve 05/20/24  -ER     LTG 1 pt. to be I with advanced HEP to facilitate self management of their condition  -ER     LTG 1 Progress New  -ER     LTG 2 pt. to report an FAAM >/= 70%  to demonstrate decreased level of perceived disability  -ER     LTG 2 Progress New  -ER     LTG 3 Patient report greater than equal to 75% improvement in right and left heel pain with initiation of gait to facilitate ease of performing childcare/household  mobility  -ER     LTG 3 Progress New  -ER     LTG 4 Patient to demonstrate bilateral hip abduction and extension manual muscle testing greater than equal to 4+ out of 5 days facilitate ease of performing household and community activities  -ER     LTG 4 Progress New  -ER               User Key  (r) = Recorded By, (t) = Taken By, (c) = Cosigned By      Initials Name Provider Type    ER Pamela Tang, PT Physical Therapist                    Therapy Education  Education Details: Initiated HEP, discussed posture and gait mechanics  Given: HEP, Posture/body mechanics, Mobility training  Program: New, Reinforced  How Provided: Verbal, Demonstration, Written  Provided to: Patient  Level of Understanding: Teach back education performed, Verbalized, Demonstrated              Time Calculation:   Start Time: 1530  Stop Time: 1610  Time Calculation (min): 40 min  Total Timed Code Minutes- PT: 40 minute(s)  Timed Charges  18011 - PT Therapeutic Exercise Minutes: 40  Total Minutes  Timed Charges Total Minutes: 40   Total Minutes: 40  Therapy Charges for Today       Code Description Service Date Service Provider Modifiers Qty    77347583983 HC PT THER PROC EA 15 MIN 2/22/2024 Pamela Tang, PT GP 3                      Pamela Tang PT  2/22/2024

## 2024-02-26 ENCOUNTER — TELEPHONE (OUTPATIENT)
Dept: PHYSICAL THERAPY | Facility: HOSPITAL | Age: 39
End: 2024-02-26
Payer: MEDICAID

## 2024-02-26 NOTE — TELEPHONE ENCOUNTER
Pt. called about missed appointment time. Updated pt. with current schedule and next appointment to avoid missed visits.

## 2024-02-28 ENCOUNTER — TELEPHONE (OUTPATIENT)
Dept: OBSTETRICS AND GYNECOLOGY | Age: 39
End: 2024-02-28
Payer: MEDICAID

## 2024-02-28 DIAGNOSIS — R92.8 ABNORMAL MAMMOGRAM: Primary | ICD-10-CM

## 2024-02-28 DIAGNOSIS — N64.89 BREAST ASYMMETRY: ICD-10-CM

## 2024-02-28 RX ORDER — CLINDAMYCIN HYDROCHLORIDE 300 MG/1
300 CAPSULE ORAL 3 TIMES DAILY
Qty: 21 CAPSULE | Refills: 0 | Status: SHIPPED | OUTPATIENT
Start: 2024-02-28 | End: 2024-03-06

## 2024-02-28 NOTE — TELEPHONE ENCOUNTER
Pt was seen in office for hidradenitis suppurativa on 1/25/24. Pt stating she was offered antibiotics at that appointment but declined at the time. Pt has tried warm compresses but area is not improving. Pt asking if antibiotics can be sent into pharmacy on file? Please advise.

## 2024-03-11 ENCOUNTER — HOSPITAL ENCOUNTER (OUTPATIENT)
Dept: PHYSICAL THERAPY | Facility: HOSPITAL | Age: 39
Setting detail: THERAPIES SERIES
Discharge: HOME | End: 2024-03-11
Payer: MEDICAID

## 2024-03-11 ENCOUNTER — APPOINTMENT (OUTPATIENT)
Dept: WOMENS IMAGING | Facility: HOSPITAL | Age: 39
End: 2024-03-11
Payer: MEDICAID

## 2024-03-11 DIAGNOSIS — Z74.09 IMPAIRED MOBILITY: Primary | ICD-10-CM

## 2024-03-11 DIAGNOSIS — M79.671 PAIN OF BOTH HEELS: ICD-10-CM

## 2024-03-11 DIAGNOSIS — M72.2 PLANTAR FASCIITIS: ICD-10-CM

## 2024-03-11 DIAGNOSIS — M79.672 PAIN OF BOTH HEELS: ICD-10-CM

## 2024-03-11 PROCEDURE — 77061 BREAST TOMOSYNTHESIS UNI: CPT | Performed by: RADIOLOGY

## 2024-03-11 PROCEDURE — G0279 TOMOSYNTHESIS, MAMMO: HCPCS | Performed by: RADIOLOGY

## 2024-03-11 PROCEDURE — 97110 THERAPEUTIC EXERCISES: CPT

## 2024-03-11 PROCEDURE — 77065 DX MAMMO INCL CAD UNI: CPT | Performed by: RADIOLOGY

## 2024-03-11 PROCEDURE — 76642 ULTRASOUND BREAST LIMITED: CPT | Performed by: RADIOLOGY

## 2024-03-11 NOTE — THERAPY TREATMENT NOTE
Outpatient Physical Therapy Ortho Treatment Note  [facility]     Patient Name: Sonam Lang  : 1985  MRN: [id number]  Today's Date: 3/11/2024      Visit Date: 2024    Visit Dx:    ICD-10-CM ICD-9-CM   1. Impaired mobility  Z74.09 799.89   2. Pain of both heels  M79.671 729.5    M79.672    3. Plantar fasciitis  M72.2 728.71       Patient Active Problem List   Diagnosis    Migraine without aura and without status migrainosus, not intractable    Allergy to NSAIDs    Asthma, mild persistent    Obesity (BMI 30.0-34.9)    Sleep apnea    Anxiety    Previous  section    , delivered    Adult ADHD    Anxiety in pregnancy, antepartum    Multigravida of advanced maternal age in third trimester    Previous  delivery affecting pregnancy     delivery delivered        Past Medical History:   Diagnosis Date    ADHD (attention deficit hyperactivity disorder)     Anxiety     Asthma     Congenital abnormalities 2022    Multiple fetal anomalies present including meningocele, unilateral real agenesis, unilateral club foot, membranous VSD     Depression     GERD (gastroesophageal reflux disease)     History of depression 2019    Irritable bowel syndrome     Migraine     Obesity (BMI 30.0-34.9) 2019    Pregnancy complicated by multiple fetal congenital anomalies, single gestation 2022    NTD, SUA    PTSD (post-traumatic stress disorder)     Sleep apnea 2016    CPAP        Past Surgical History:   Procedure Laterality Date     SECTION  2019     SECTION N/A 10/14/2023    Procedure:  SECTION REPEAT;  Surgeon: Megan El MD;  Location: [facility] LABOR DELIVERY;  Service: Obstetrics/Gynecology;  Laterality: N/A;    COLONOSCOPY      with endoscopy    SINUS SURGERY  11/22/2022    x2, 2018 (first)    WISDOM TOOTH EXTRACTION                          PT Assessment/Plan       Row Name 24 1300          PT Assessment    Assessment  Comments Sonam Lang returns to the clinic for physical therapy treatment of R heel pain and L heel pain. She is also c/o mid to low back pain s/p  a few months ago. Overall, functional transfers and gait are effected by above deficits. Added lateral banded walks, high mountain climber with hip extension, and BKFO with focus on not allowing genu valgus to occur bilaterally. Pt. tolerated well with cues for initial postures for appropriate muscle activation and form.  -ER        PT Plan    PT Plan Comments Consider monster walks, STS with small ball between knees, high march walk, small range RDL??  -ER               User Key  (r) = Recorded By, (t) = Taken By, (c) = Cosigned By      Initials Name Provider Type    ER Pamela Tang, PT Physical Therapist                       OP Exercises       Row Name 24 1200             Subjective    Subjective Comments I had covid.  -ER         Subjective Pain    Able to rate subjective pain? yes  -ER      Pre-Treatment Pain Level 2  -ER         Total Minutes    05783 - PT Therapeutic Exercise Minutes 45  -ER         Exercise 1    Exercise Name 1 Nustep  -ER      Cueing 1 Verbal  -ER      Time 1 5 min  -ER      Additional Comments Focus on ABD, as knees are demo'ing valgus  -ER         Exercise 2    Exercise Name 2 Standing HR  -ER      Cueing 2 Verbal;Demo  -ER      Sets 2 1  -ER      Reps 2 10  -ER         Exercise 3    Exercise Name 3 Heel raise on step w/ R weight shift  -ER      Cueing 3 Verbal;Demo  -ER      Sets 3 2  -ER      Reps 3 10  -ER      Additional Comments Weight shift to R for eccentric  -ER         Exercise 7    Exercise Name 7 Bridge w/ PPT  -ER      Cueing 7 Verbal;Demo  -ER      Sets 7 2  -ER      Reps 7 10  -ER      Time 7 3sec  -ER      Additional Comments initiate with PPT  -ER         Exercise 8    Exercise Name 8 Supine PPT  -ER      Cueing 8 Verbal;Demo  -ER      Sets 8 2  -ER      Reps 8 10  -ER      Time 8 5sec  -ER         Exercise 9     Exercise Name 9 BKFO w/ RTB  -ER      Cueing 9 Verbal;Demo  -ER      Sets 9 2  -ER      Reps 9 10  -ER      Time 9 RTB  -ER         Exercise 10    Exercise Name 10 High mountain climber  -ER      Cueing 10 Verbal;Demo  -ER      Sets 10 2  -ER      Reps 10 10e  -ER      Additional Comments at wall  -ER         Exercise 11    Exercise Name 11 High Mountain climber to glut extension  -ER      Cueing 11 Verbal;Demo  -ER      Sets 11 2  -ER      Reps 11 10e  -ER      Additional Comments at wall, BW  -ER         Exercise 12    Exercise Name 12 Lateral stepping  -ER      Cueing 12 Verbal;Demo  -ER      Reps 12 4 laps  -ER      Time 12 RTB at ankles  -ER                User Key  (r) = Recorded By, (t) = Taken By, (c) = Cosigned By      Initials Name Provider Type    ER Pamela Tang, PT Physical Therapist                                  PT OP Goals       Row Name 03/11/24 1300          PT Short Term Goals    STG Date to Achieve 03/21/24  -ER     STG 1 pt. to be I with initial HEP to facilitate self management of their condition  -ER     STG 1 Progress New  -ER     STG 2 pt. to be educated in/verbalize understanding of the importance of posture/ergonomics in association with their condition to facilitate self management of their condition  -ER     STG 2 Progress New  -ER     STG 3 Patient to demonstrate being able to rise on her toes in a single limb fashion greater than equal to 10 times, bilaterally, to facilitate ease of performance of functional and household mobility  -ER     STG 3 Progress New  -ER        Long Term Goals    LTG Date to Achieve 05/20/24  -ER     LTG 1 pt. to be I with advanced HEP to facilitate self management of their condition  -ER     LTG 1 Progress New  -ER     LTG 2 pt. to report an FAAM >/= 70%  to demonstrate decreased level of perceived disability  -ER     LTG 2 Progress New  -ER     LTG 3 Patient report greater than equal to 75% improvement in right and left heel pain with initiation of gait  to facilitate ease of performing childcare/household mobility  -ER     LTG 3 Progress New  -ER     LTG 4 Patient to demonstrate bilateral hip abduction and extension manual muscle testing greater than equal to 4+ out of 5 days facilitate ease of performing household and community activities  -ER     LTG 4 Progress New  -ER               User Key  (r) = Recorded By, (t) = Taken By, (c) = Cosigned By      Initials Name Provider Type    ER Pamela Tang, PT Physical Therapist                    Therapy Education  Education Details: Reinforced and progressed HEP  Given: HEP, Symptoms/condition management, Pain management, Posture/body mechanics  Program: Reinforced, Progressed  How Provided: Verbal, Demonstration  Provided to: Patient  Level of Understanding: Teach back education performed, Verbalized              Time Calculation:   Start Time: 1215  Stop Time: 1300  Time Calculation (min): 45 min  Total Timed Code Minutes- PT: 45 minute(s)  Timed Charges  43133 - PT Therapeutic Exercise Minutes: 45  Total Minutes  Timed Charges Total Minutes: 45   Total Minutes: 45  Therapy Charges for Today       Code Description Service Date Service Provider Modifiers Qty    69588167950 HC PT THER PROC EA 15 MIN 3/11/2024 Pamela Tang, PT GP 3                      Pamela Tang PT  3/11/2024

## 2024-03-14 ENCOUNTER — TELEPHONE (OUTPATIENT)
Dept: OBSTETRICS AND GYNECOLOGY | Age: 39
End: 2024-03-14
Payer: MEDICAID

## 2024-03-14 DIAGNOSIS — O09.11 PREGNANCY IN FIRST TRIMESTER WITH HISTORY OF ECTOPIC PREGNANCY: Primary | ICD-10-CM

## 2024-03-14 NOTE — TELEPHONE ENCOUNTER
She just called back , urine HCG test at home is positive so I scheduled her for pregnancy confirmation 4/5/24 . Patient wants me to ask you because of hx of ectopic pregnancies would you want her to come in sooner for ultrasound ?

## 2024-03-14 NOTE — TELEPHONE ENCOUNTER
Patient missed period this month , last LMP 2/10/24 . She has a hx of ectopic pregnancies and wants to know if she should have HCG labs done ? I advised patient to do a pregnancy test at home and call us with the results. Patient denies any VB or abdominal pain . Please advise ?

## 2024-03-14 NOTE — TELEPHONE ENCOUNTER
I recommend getting early labs, I placed orders, we will want to schedule an early US but I can time it once we get the labs back

## 2024-03-15 ENCOUNTER — LAB (OUTPATIENT)
Dept: OBSTETRICS AND GYNECOLOGY | Age: 39
End: 2024-03-15
Payer: MEDICAID

## 2024-03-15 DIAGNOSIS — O09.11 PRIOR ECTOPIC PREGNANCY, ANTEPARTUM, FIRST TRIMESTER: Primary | ICD-10-CM

## 2024-03-16 DIAGNOSIS — O09.11 PREGNANCY IN FIRST TRIMESTER WITH HISTORY OF ECTOPIC PREGNANCY: Primary | ICD-10-CM

## 2024-03-16 LAB
HCG INTACT+B SERPL-ACNC: NORMAL MIU/ML
PROGEST SERPL-MCNC: 10.1 NG/ML

## 2024-03-18 ENCOUNTER — HOSPITAL ENCOUNTER (OUTPATIENT)
Dept: PHYSICAL THERAPY | Facility: HOSPITAL | Age: 39
Setting detail: THERAPIES SERIES
Discharge: HOME | End: 2024-03-18
Payer: MEDICAID

## 2024-03-18 DIAGNOSIS — M79.671 PAIN OF BOTH HEELS: ICD-10-CM

## 2024-03-18 DIAGNOSIS — Z74.09 IMPAIRED MOBILITY: Primary | ICD-10-CM

## 2024-03-18 DIAGNOSIS — M72.2 PLANTAR FASCIITIS: ICD-10-CM

## 2024-03-18 DIAGNOSIS — M79.672 PAIN OF BOTH HEELS: ICD-10-CM

## 2024-03-18 PROCEDURE — 97110 THERAPEUTIC EXERCISES: CPT

## 2024-03-18 NOTE — THERAPY TREATMENT NOTE
Outpatient Physical Therapy Ortho Treatment Note  [facility]     Patient Name: Sonam Lang  : 1985  MRN: [id number]  Today's Date: 3/18/2024      Visit Date: 2024    Visit Dx:    ICD-10-CM ICD-9-CM   1. Impaired mobility  Z74.09 799.89   2. Pain of both heels  M79.671 729.5    M79.672    3. Plantar fasciitis  M72.2 728.71       Patient Active Problem List   Diagnosis    Migraine without aura and without status migrainosus, not intractable    Allergy to NSAIDs    Asthma, mild persistent    Obesity (BMI 30.0-34.9)    Sleep apnea    Anxiety    Previous  section    , delivered    Adult ADHD    Anxiety in pregnancy, antepartum    Multigravida of advanced maternal age in third trimester    Previous  delivery affecting pregnancy     delivery delivered        Past Medical History:   Diagnosis Date    ADHD (attention deficit hyperactivity disorder)     Anxiety     Asthma     Congenital abnormalities 2022    Multiple fetal anomalies present including meningocele, unilateral real agenesis, unilateral club foot, membranous VSD     Depression     GERD (gastroesophageal reflux disease)     History of depression 2019    Irritable bowel syndrome     Migraine     Obesity (BMI 30.0-34.9) 2019    Pregnancy complicated by multiple fetal congenital anomalies, single gestation 2022    NTD, SUA    PTSD (post-traumatic stress disorder)     Sleep apnea 2016    CPAP        Past Surgical History:   Procedure Laterality Date     SECTION  2019     SECTION N/A 10/14/2023    Procedure:  SECTION REPEAT;  Surgeon: Megan El MD;  Location: [facility] LABOR DELIVERY;  Service: Obstetrics/Gynecology;  Laterality: N/A;    COLONOSCOPY      with endoscopy    SINUS SURGERY  11/22/2022    x2, 2018 (first)    WISDOM TOOTH EXTRACTION                          PT Assessment/Plan       Row Name 24 1600          PT Assessment    Assessment  Comments Sonam Lang is a 38 year old female returning for physical therapy treatment this afternoon. She reports that she feels her gait has improved w/ reduced instances of out-toeing throughout gait cycle. She also notes compliance with HEP has been helpful. Increased theraband to GTB with lateral walking and s/l hip ABD with good tolerance. Pt. remains a good candidate for skilled PT intervention.  -ER        PT Plan    PT Plan Comments Monster walks w GTB, mini squat at ballet barre, high march walk, small range RDL w / low weight?  -ER               User Key  (r) = Recorded By, (t) = Taken By, (c) = Cosigned By      Initials Name Provider Type    ER Pamela Tang, PT Physical Therapist                       OP Exercises       Row Name 03/18/24 1400             Subjective    Subjective Comments My L ankle is bothering me  -ER         Subjective Pain    Able to rate subjective pain? yes  -ER      Pre-Treatment Pain Level 2  -ER         Total Minutes    26332 - PT Therapeutic Exercise Minutes 45  -ER         Exercise 1    Exercise Name 1 Nustep  -ER      Cueing 1 Verbal  -ER      Time 1 5 min  -ER         Exercise 2    Exercise Name 2 Standing HR  -ER      Cueing 2 Verbal;Demo  -ER      Sets 2 1  -ER      Reps 2 10  -ER         Exercise 3    Exercise Name 3 Heel raise on step w/ R weight shift  -ER      Cueing 3 Verbal;Demo  -ER      Sets 3 2  -ER      Reps 3 10  -ER         Exercise 4    Exercise Name 4 Gait training  -ER      Time 4 3 minutes  -ER         Exercise 7    Exercise Name 7 Bridge w/ PPT  -ER      Cueing 7 Verbal;Demo  -ER      Sets 7 2  -ER      Reps 7 10  -ER      Time 7 3sec  -ER      Additional Comments initiate with PPT  -ER         Exercise 8    Exercise Name 8 Supine PPT  -ER      Cueing 8 Verbal;Demo  -ER      Sets 8 2  -ER      Reps 8 10  -ER      Time 8 5sec  -ER         Exercise 9    Exercise Name 9 BKFO w/ RTB  -ER      Cueing 9 Verbal;Demo  -ER      Sets 9 2  -ER      Reps 9 10  -ER       Time 9 GTB  -ER         Exercise 10    Exercise Name 10 High mountain climber  -ER      Cueing 10 Verbal;Demo  -ER      Sets 10 2  -ER      Reps 10 10e  -ER         Exercise 11    Exercise Name 11 High Mountain climber to glut extension  -ER      Cueing 11 Verbal;Demo  -ER      Sets 11 2  -ER      Reps 11 10e  -ER         Exercise 12    Exercise Name 12 Lateral stepping  -ER      Cueing 12 Verbal;Demo  -ER      Reps 12 4 laps  -ER      Time 12 GTB at ankles  -ER                User Key  (r) = Recorded By, (t) = Taken By, (c) = Cosigned By      Initials Name Provider Type    ER Pamela Tang, PT Physical Therapist                                  PT OP Goals       Row Name 03/18/24 1600          PT Short Term Goals    STG Date to Achieve 03/21/24  -ER     STG 1 pt. to be I with initial HEP to facilitate self management of their condition  -ER     STG 1 Progress New  -ER     STG 2 pt. to be educated in/verbalize understanding of the importance of posture/ergonomics in association with their condition to facilitate self management of their condition  -ER     STG 2 Progress New  -ER     STG 3 Patient to demonstrate being able to rise on her toes in a single limb fashion greater than equal to 10 times, bilaterally, to facilitate ease of performance of functional and household mobility  -ER     STG 3 Progress New  -ER        Long Term Goals    LTG Date to Achieve 05/20/24  -ER     LTG 1 pt. to be I with advanced HEP to facilitate self management of their condition  -ER     LTG 1 Progress New  -ER     LTG 2 pt. to report an FAAM >/= 70%  to demonstrate decreased level of perceived disability  -ER     LTG 2 Progress New  -ER     LTG 3 Patient report greater than equal to 75% improvement in right and left heel pain with initiation of gait to facilitate ease of performing childcare/household mobility  -ER     LTG 3 Progress New  -ER     LTG 4 Patient to demonstrate bilateral hip abduction and extension manual muscle testing  greater than equal to 4+ out of 5 days facilitate ease of performing household and community activities  -ER     LTG 4 Progress New  -ER               User Key  (r) = Recorded By, (t) = Taken By, (c) = Cosigned By      Initials Name Provider Type    ER Pamela Tang PT Physical Therapist                    Therapy Education  Education Details: Reinforced HEP, discussed gait mechanics  Given: HEP, Symptoms/condition management, Pain management, Posture/body mechanics  Program: Reinforced  How Provided: Verbal, Demonstration  Provided to: Patient  Level of Understanding: Teach back education performed, Verbalized, Demonstrated              Time Calculation:   Start Time: 1445  Stop Time: 1530  Time Calculation (min): 45 min  Total Timed Code Minutes- PT: 45 minute(s)  Timed Charges  93708 - PT Therapeutic Exercise Minutes: 45  Total Minutes  Timed Charges Total Minutes: 45   Total Minutes: 45  Therapy Charges for Today       Code Description Service Date Service Provider Modifiers Qty    77146931342 HC PT THER PROC EA 15 MIN 3/18/2024 Pamela Tang, PT GP 3                      Pamela Tang PT  3/18/2024

## 2024-03-20 ENCOUNTER — HOSPITAL ENCOUNTER (OUTPATIENT)
Dept: PHYSICAL THERAPY | Facility: HOSPITAL | Age: 39
Setting detail: THERAPIES SERIES
Discharge: HOME | End: 2024-03-20
Payer: MEDICAID

## 2024-03-20 DIAGNOSIS — M79.671 PAIN OF BOTH HEELS: ICD-10-CM

## 2024-03-20 DIAGNOSIS — Z74.09 IMPAIRED MOBILITY: Primary | ICD-10-CM

## 2024-03-20 DIAGNOSIS — M79.672 PAIN OF BOTH HEELS: ICD-10-CM

## 2024-03-20 DIAGNOSIS — M72.2 PLANTAR FASCIITIS: ICD-10-CM

## 2024-03-20 PROCEDURE — 97110 THERAPEUTIC EXERCISES: CPT

## 2024-03-20 NOTE — THERAPY DISCHARGE NOTE
Outpatient Physical Therapy Ortho Treatment Note/Discharge Summary  [facility]     Patient Name: Sonam Lang  : 1985  MRN: [id number]  Today's Date: 3/20/2024      Visit Date: 2024    Visit Dx:    ICD-10-CM ICD-9-CM   1. Impaired mobility  Z74.09 799.89   2. Pain of both heels  M79.671 729.5    M79.672    3. Plantar fasciitis  M72.2 728.71       Patient Active Problem List   Diagnosis    Migraine without aura and without status migrainosus, not intractable    Allergy to NSAIDs    Asthma, mild persistent    Obesity (BMI 30.0-34.9)    Sleep apnea    Anxiety    Previous  section    , delivered    Adult ADHD    Anxiety in pregnancy, antepartum    Multigravida of advanced maternal age in third trimester    Previous  delivery affecting pregnancy     delivery delivered        Past Medical History:   Diagnosis Date    ADHD (attention deficit hyperactivity disorder)     Anxiety     Asthma     Congenital abnormalities 2022    Multiple fetal anomalies present including meningocele, unilateral real agenesis, unilateral club foot, membranous VSD     Depression     GERD (gastroesophageal reflux disease)     History of depression 2019    Irritable bowel syndrome     Migraine     Obesity (BMI 30.0-34.9) 2019    Pregnancy complicated by multiple fetal congenital anomalies, single gestation 2022    NTD, SUA    PTSD (post-traumatic stress disorder)     Sleep apnea 2016    CPAP        Past Surgical History:   Procedure Laterality Date     SECTION  2019     SECTION N/A 10/14/2023    Procedure:  SECTION REPEAT;  Surgeon: Megan El MD;  Location: [facility] LABOR DELIVERY;  Service: Obstetrics/Gynecology;  Laterality: N/A;    COLONOSCOPY      with endoscopy    SINUS SURGERY  11/22/2022    x2, 2018 (first)    WISDOM TOOTH EXTRACTION                          PT Assessment/Plan       Row Name 24 1500          PT Assessment     Assessment Comments Sonam Lang was seen for 5 physical therapy sessions for right heel pain, mid/low back pain/hip pain.  Treatment included therapeutic exercise, manual therapy, therapeutic activity, gait training, and patient education with home exercise program . Pt. Reports that she feels she is ready for an independent program for symptom management.  Progress to physical therapy goals was good. Pt met 2/3 STG and 3/4 LTG. She was able to perform 8 single leg heel raises before onset of pain, however bilaterally no pain reported. Progress towards that goal ongoing. Pt. FAAM score 79% indicating improved perceived disability. Time spent discussing advanced HEP and appropriate progressions/modifications to make based on response following discharge and optimal frequency/duration to complete HEP over next several weeks-months. Patient verbalized understanding. She was discharged to an independent HEP and provided patient education to self-manage condition.  -ER        PT Plan    PT Plan Comments D/C  -ER               User Key  (r) = Recorded By, (t) = Taken By, (c) = Cosigned By      Initials Name Provider Type    ER Pamela Tang, PT Physical Therapist                         OP Exercises       Row Name 03/20/24 1400             Subjective    Subjective Comments My L ankle is feeling better. I feel like my STS has improved.  -ER         Subjective Pain    Able to rate subjective pain? yes  -ER      Pre-Treatment Pain Level 2  -ER         Total Minutes    93432 - PT Therapeutic Exercise Minutes 45  -ER         Exercise 1    Exercise Name 1 Nustep  -ER      Cueing 1 Verbal  -ER      Time 1 5 min  -ER         Exercise 2    Exercise Name 2 Standing HR  -ER      Cueing 2 Verbal;Demo  -ER      Sets 2 1  -ER      Reps 2 10  -ER         Exercise 7    Exercise Name 7 Bridge w/ PPT  -ER      Cueing 7 Verbal;Demo  -ER      Sets 7 2  -ER      Reps 7 10  -ER      Time 7 3sec  -ER      Additional Comments initiate with  PPT  -ER         Exercise 8    Exercise Name 8 Supine PPT  -ER      Cueing 8 Verbal;Demo  -ER      Sets 8 2  -ER      Reps 8 10  -ER      Time 8 5sec  -ER         Exercise 9    Exercise Name 9 BKFO w/ GTB  -ER      Cueing 9 Verbal;Demo  -ER      Sets 9 2  -ER      Reps 9 10  -ER      Time 9 GTB  -ER         Exercise 10    Exercise Name 10 High mountain climber  -ER      Cueing 10 Verbal;Demo  -ER      Sets 10 2  -ER      Reps 10 10e  -ER         Exercise 11    Exercise Name 11 High [place] climber to glut extension  -ER      Cueing 11 Verbal;Demo  -ER      Sets 11 2  -ER      Reps 11 10e  -ER         Exercise 12    Exercise Name 12 Lateral stepping  -ER      Cueing 12 Verbal;Demo  -ER      Reps 12 4 laps  -ER      Time 12 GTB at ankles  -ER                User Key  (r) = Recorded By, (t) = Taken By, (c) = Cosigned By      Initials Name Provider Type    ER Pamela Tang, PT Physical Therapist                                    PT OP Goals       Row Name 03/20/24 1400          PT Short Term Goals    STG Date to Achieve 03/21/24  -ER     STG 1 pt. to be I with initial HEP to facilitate self management of their condition  -ER     STG 1 Progress Met  -ER     STG 2 pt. to be educated in/verbalize understanding of the importance of posture/ergonomics in association with their condition to facilitate self management of their condition  -ER     STG 2 Progress Met  -ER     STG 3 Patient to demonstrate being able to rise on her toes in a single limb fashion greater than equal to 10 times, bilaterally, to facilitate ease of performance of functional and household mobility  -ER     STG 3 Progress Ongoing  -ER        Long Term Goals    LTG Date to Achieve 05/20/24  -ER     LTG 1 pt. to be I with advanced HEP to facilitate self management of their condition  -ER     LTG 1 Progress Met  -ER     LTG 2 pt. to report an FAAM >/= 70%  to demonstrate decreased level of perceived disability  -ER     LTG 2 Progress Met  -ER     LTG 3  Patient report greater than equal to 75% improvement in right and left heel pain with initiation of gait to facilitate ease of performing childcare/household mobility  -ER     LTG 3 Progress Met  -ER     LTG 4 Patient to demonstrate bilateral hip abduction and extension manual muscle testing greater than equal to 4+ out of 5 days facilitate ease of performing household and community activities  -ER     LTG 4 Progress Ongoing  -ER               User Key  (r) = Recorded By, (t) = Taken By, (c) = Cosigned By      Initials Name Provider Type    ER Pamela Tang, PT Physical Therapist                    Therapy Education  Education Details: Finalized HEP  Given: HEP  Program: Progressed, Modified  How Provided: Verbal, Demonstration, Written  Provided to: Patient  Level of Understanding: Teach back education performed, Verbalized, Demonstrated    Outcome Measure Options: FAAM  FAAM  FAAM: 66/84 = 79%      Time Calculation:   Start Time: 1445  Stop Time: 1530  Time Calculation (min): 45 min  Total Timed Code Minutes- PT: 45 minute(s)  Timed Charges  38087 - PT Therapeutic Exercise Minutes: 45  Total Minutes  Timed Charges Total Minutes: 45   Total Minutes: 45  Therapy Charges for Today       Code Description Service Date Service Provider Modifiers Qty    54980960736 HC PT THER PROC EA 15 MIN 3/20/2024 Pamela Tang, PT GP 3            PT G-Codes  Outcome Measure Options: FAAM            Pamela Tang PT  3/20/2024

## 2024-03-21 ENCOUNTER — OFFICE VISIT (OUTPATIENT)
Dept: OBSTETRICS AND GYNECOLOGY | Age: 39
End: 2024-03-21
Payer: MEDICAID

## 2024-03-21 VITALS
HEIGHT: 68 IN | DIASTOLIC BLOOD PRESSURE: 70 MMHG | WEIGHT: 247 LBS | SYSTOLIC BLOOD PRESSURE: 122 MMHG | BODY MASS INDEX: 37.44 KG/M2

## 2024-03-21 DIAGNOSIS — O09.521 MULTIGRAVIDA OF ADVANCED MATERNAL AGE IN FIRST TRIMESTER: ICD-10-CM

## 2024-03-21 DIAGNOSIS — F41.9 ANXIETY IN PREGNANCY, ANTEPARTUM: ICD-10-CM

## 2024-03-21 DIAGNOSIS — Z34.90 EARLY STAGE OF PREGNANCY: Primary | ICD-10-CM

## 2024-03-21 DIAGNOSIS — O99.340 ANXIETY IN PREGNANCY, ANTEPARTUM: ICD-10-CM

## 2024-03-21 DIAGNOSIS — Z98.891 PREVIOUS CESAREAN SECTION: ICD-10-CM

## 2024-03-21 NOTE — PROGRESS NOTES
"Subjective     Chief Complaint   Patient presents with    Gynecologic Exam     Pregnancy viability, US today  lmp 02/10/2024  CC:  no problems       Sonam Lang is a 38 y.o.  whose LMP is Patient's last menstrual period was 02/10/2024.     Pt presents today for confirmation of pregnancy  US c/w LMP measuring 5+5  Denies bleeding  She is taking a daily PNV  Previous C/S   Pt of Dr. El     No Additional Complaints Reported    The following portions of the patient's history were reviewed and updated as appropriate:vital signs, allergies, current medications, past medical history, past social history, past surgical history, and problem list      Review of Systems   Pertinent items are noted in HPI.     Objective      /70   Ht 172.7 cm (68\")   Wt 112 kg (247 lb)   LMP 02/10/2024   BMI 37.56 kg/m²     Physical Exam    General:   alert, no distress, and moderately obese   Heart: Not performed today   Lungs: Not performed today.   Breast: Not performed today   Neck: na   Abdomen: {Not performed today   CVA: Not performed today   Pelvis: Not performed today   Extremities: Not performed today   Neurologic: negative   Psychiatric: Normal affect, judgement, and mood       Lab Review   Labs: No data reviewed     Imaging   Ultrasound - Pelvic Vaginal    Assessment & Plan     ASSESSMENT  1. Early stage of pregnancy    2. Multigravida of advanced maternal age in first trimester        PLAN  1. No orders of the defined types were placed in this encounter.      2. Medications prescribed this encounter:      No orders of the defined types were placed in this encounter.      3. US shows GS in uterus measuring 5+5. SAB warnings were discussed. Plan to repeat viability US in 1-2 weeks with Dr. El.       Ann Tony, APRN  3/21/2024           "

## 2024-03-22 ENCOUNTER — TELEPHONE (OUTPATIENT)
Dept: OBSTETRICS AND GYNECOLOGY | Age: 39
End: 2024-03-22
Payer: MEDICAID

## 2024-03-23 ENCOUNTER — HOSPITAL ENCOUNTER (EMERGENCY)
Facility: HOSPITAL | Age: 39
Discharge: HOME | End: 2024-03-23
Attending: EMERGENCY MEDICINE
Payer: MEDICAID

## 2024-03-23 VITALS
BODY MASS INDEX: 37.13 KG/M2 | HEART RATE: 66 BPM | DIASTOLIC BLOOD PRESSURE: 66 MMHG | OXYGEN SATURATION: 98 % | RESPIRATION RATE: 16 BRPM | TEMPERATURE: 98 F | SYSTOLIC BLOOD PRESSURE: 120 MMHG | WEIGHT: 245 LBS | HEIGHT: 68 IN

## 2024-03-23 DIAGNOSIS — N64.89 COMPLEX SCLEROSING LESION OF LEFT BREAST: Primary | ICD-10-CM

## 2024-03-23 PROCEDURE — 99283 EMERGENCY DEPT VISIT LOW MDM: CPT

## 2024-03-23 PROCEDURE — 99283 EMERGENCY DEPT VISIT LOW MDM: CPT | Performed by: EMERGENCY MEDICINE

## 2024-03-23 RX ORDER — CLINDAMYCIN HYDROCHLORIDE 300 MG/1
300 CAPSULE ORAL 4 TIMES DAILY
Qty: 28 CAPSULE | Refills: 0 | Status: SHIPPED | OUTPATIENT
Start: 2024-03-23 | End: 2024-03-31

## 2024-03-23 RX ORDER — CLINDAMYCIN HYDROCHLORIDE 150 MG/1
300 CAPSULE ORAL ONCE
Status: COMPLETED | OUTPATIENT
Start: 2024-03-23 | End: 2024-03-23

## 2024-03-23 RX ADMIN — CLINDAMYCIN HYDROCHLORIDE 300 MG: 150 CAPSULE ORAL at 23:26

## 2024-03-23 NOTE — Clinical Note
[facility]  [address]  [place] [facility] [place] [facility] [id number]    Sonam Lang was seen and treated in our emergency department on 3/23/2024.  She may return to work on 03/25/2024.         Thank you for choosing [facility].    Ken George MD

## 2024-03-24 NOTE — FSED PROVIDER NOTE
"Subjective   History of Present Illness  Patient has a surface lesion on her left breast at 8:00 from her areola.  The patient did put some compresses on it and asked that she not do that she just recently had a mammogram and it was normal.  This is not an abscess she thought maybe that is what it was.      Review of Systems   Skin:  Positive for color change and rash.   All other systems reviewed and are negative.      Past Medical History:   Diagnosis Date    ADHD (attention deficit hyperactivity disorder)     Anxiety     Asthma     Congenital abnormalities 2022    Multiple fetal anomalies present including meningocele, unilateral real agenesis, unilateral club foot, membranous VSD     Depression     GERD (gastroesophageal reflux disease)     History of depression 2019    Irritable bowel syndrome     Migraine     Obesity (BMI 30.0-34.9) 2019    Pregnancy complicated by multiple fetal congenital anomalies, single gestation 2022    NTD, SUA    PTSD (post-traumatic stress disorder)     Sleep apnea     CPAP       Allergies   Allergen Reactions    Bactrim [Sulfamethoxazole-Trimethoprim] Hives, Itching and Swelling    Cefdinir Hives, Itching and Swelling     FELT THROAT START TO SWELL    Buspirone Unknown - Low Severity     migraines    Contrast Dye (Echo Or Unknown Ct/Mr) Other (See Comments)     \"felt like insides were on fire\"    Ibuprofen Swelling    Iodinated Contrast Media Unknown (See Comments)     \"felt like insides were on fire\"   \"felt like insides were on fire\"    Naproxen Swelling    Toradol [Ketorolac Tromethamine] Itching       Past Surgical History:   Procedure Laterality Date     SECTION       SECTION N/A 10/14/2023    Procedure:  SECTION REPEAT;  Surgeon: Megan El MD;  Location: [facility] DELIVERY;  Service: Obstetrics/Gynecology;  Laterality: N/A;    COLONOSCOPY      with endoscopy    SINUS SURGERY  11/22/2022    x2, 2018 " (first)    WISDOM TOOTH EXTRACTION         Family History   Problem Relation Age of Onset    Hypertension Mother     Diabetes Mother     Migraines Mother     Hypertension Father     Hypertension Maternal Grandmother     Migraines Maternal Grandmother     Alzheimer's disease Maternal Grandfather     Diabetes Paternal Grandmother     Migraines Paternal Grandmother     Dementia Paternal Grandmother     Stroke Paternal Grandmother     Frontotemporal dementia Paternal Grandmother     Diabetes Paternal Grandfather        Social History     Socioeconomic History    Marital status:    Tobacco Use    Smoking status: Former     Current packs/day: 0.00     Types: Cigarettes     Start date:      Quit date:      Years since quittin.2     Passive exposure: Past    Smokeless tobacco: Never   Vaping Use    Vaping status: Never Used   Substance and Sexual Activity    Alcohol use: Not Currently    Drug use: No    Sexual activity: Yes     Partners: Male     Birth control/protection: None           Objective   Physical Exam  Vitals and nursing note reviewed.   Constitutional:       General: She is not in acute distress.     Appearance: Normal appearance. She is not ill-appearing, toxic-appearing or diaphoretic.   HENT:      Head: Normocephalic and atraumatic.   Eyes:      Extraocular Movements: Extraocular movements intact.      Pupils: Pupils are equal, round, and reactive to light.   Pulmonary:      Effort: Pulmonary effort is normal. No respiratory distress.   Musculoskeletal:         General: Normal range of motion.      Cervical back: Normal range of motion and neck supple.   Skin:     Capillary Refill: Capillary refill takes less than 2 seconds.      Findings: Lesion and rash present.      Comments: Patient has a surface lesion that is not a infection it is not erythemic per se it is almost brownish in nature it is circular there is a central area that also has a color change to it but there is no abscess.   There is nothing underneath it it is not deep I am not sure this is an infection.  Or possibly a more sinister carcinoma in situ.   Neurological:      Mental Status: She is alert and oriented to person, place, and time.         Procedures           ED Course                                           Medical Decision Making  Patient will be placed on an antibiotic to cover her in case it is a skin infection I do not want her squeezing it or putting anything on it or warm compresses.  I want her to simply follow-up with her doctor and she may be getting referred to a dermatologist for biopsy.        Final diagnoses:   Complex sclerosing lesion of left breast       ED Disposition  ED Disposition       ED Disposition   Discharge    Condition   Stable    Comment   --               Praful Francisco MD  [address]  [facility] [address]  [place] [id number] [phone]    In 3 days           Medication List        New Prescriptions      clindamycin 300 MG capsule  Commonly known as: CLEOCIN  Take 1 capsule by mouth 4 (Four) Times a Day.               Where to Get Your Medications        These medications were sent to [address] [facility] - [phone] [facility] [phone]   [address] [facility] [address]      Phone: [phone]   clindamycin 300 MG capsule

## 2024-03-24 NOTE — DISCHARGE INSTRUCTIONS
Do not put heat compresses lotions squeeze it poke it leave the lesion on your left breast alone start the antibiotic and then follow-up for possible referral to a dermatologist for biopsy.

## 2024-03-25 ENCOUNTER — APPOINTMENT (OUTPATIENT)
Dept: PHYSICAL THERAPY | Facility: HOSPITAL | Age: 39
End: 2024-03-25
Payer: MEDICAID

## 2024-03-25 ENCOUNTER — HOSPITAL ENCOUNTER (EMERGENCY)
Facility: HOSPITAL | Age: 39
Discharge: HOME | End: 2024-03-25
Attending: EMERGENCY MEDICINE | Admitting: EMERGENCY MEDICINE
Payer: MEDICAID

## 2024-03-25 ENCOUNTER — APPOINTMENT (OUTPATIENT)
Dept: GENERAL RADIOLOGY | Facility: HOSPITAL | Age: 39
End: 2024-03-25
Payer: MEDICAID

## 2024-03-25 VITALS
HEART RATE: 82 BPM | TEMPERATURE: 98.1 F | RESPIRATION RATE: 16 BRPM | BODY MASS INDEX: 37.09 KG/M2 | WEIGHT: 244.71 LBS | SYSTOLIC BLOOD PRESSURE: 98 MMHG | OXYGEN SATURATION: 98 % | HEIGHT: 68 IN | DIASTOLIC BLOOD PRESSURE: 63 MMHG

## 2024-03-25 DIAGNOSIS — R07.9 CHEST PAIN, UNSPECIFIED TYPE: ICD-10-CM

## 2024-03-25 DIAGNOSIS — R51.9 ACUTE NONINTRACTABLE HEADACHE, UNSPECIFIED HEADACHE TYPE: ICD-10-CM

## 2024-03-25 DIAGNOSIS — N61.0 CELLULITIS OF LEFT BREAST: Primary | ICD-10-CM

## 2024-03-25 DIAGNOSIS — R13.10 DYSPHAGIA, UNSPECIFIED TYPE: ICD-10-CM

## 2024-03-25 LAB
ALBUMIN SERPL-MCNC: 4.1 G/DL (ref 3.5–5.2)
ALBUMIN/GLOB SERPL: 1.8 G/DL
ALP SERPL-CCNC: 59 U/L (ref 39–117)
ALT SERPL W P-5'-P-CCNC: 14 U/L (ref 1–33)
ANION GAP SERPL CALCULATED.3IONS-SCNC: 11.1 MMOL/L (ref 5–15)
AST SERPL-CCNC: 17 U/L (ref 1–32)
BASOPHILS # BLD AUTO: 0.04 10*3/MM3 (ref 0–0.2)
BASOPHILS NFR BLD AUTO: 0.5 % (ref 0–1.5)
BILIRUB SERPL-MCNC: 0.3 MG/DL (ref 0–1.2)
BUN SERPL-MCNC: 10 MG/DL (ref 6–20)
BUN/CREAT SERPL: 16.9 (ref 7–25)
CALCIUM SPEC-SCNC: 8.8 MG/DL (ref 8.6–10.5)
CHLORIDE SERPL-SCNC: 107 MMOL/L (ref 98–107)
CO2 SERPL-SCNC: 20.9 MMOL/L (ref 22–29)
CREAT SERPL-MCNC: 0.59 MG/DL (ref 0.57–1)
DEPRECATED RDW RBC AUTO: 41.8 FL (ref 37–54)
EGFRCR SERPLBLD CKD-EPI 2021: 117 ML/MIN/1.73
EOSINOPHIL # BLD AUTO: 0.07 10*3/MM3 (ref 0–0.4)
EOSINOPHIL NFR BLD AUTO: 0.9 % (ref 0.3–6.2)
ERYTHROCYTE [DISTWIDTH] IN BLOOD BY AUTOMATED COUNT: 12.1 % (ref 12.3–15.4)
FLUAV SUBTYP SPEC NAA+PROBE: NOT DETECTED
FLUBV RNA ISLT QL NAA+PROBE: NOT DETECTED
GEN 5 2HR TROPONIN T REFLEX: <6 NG/L
GLOBULIN UR ELPH-MCNC: 2.3 GM/DL
GLUCOSE SERPL-MCNC: 86 MG/DL (ref 65–99)
HCT VFR BLD AUTO: 41 % (ref 34–46.6)
HGB BLD-MCNC: 13.4 G/DL (ref 12–15.9)
HOLD SPECIMEN: NORMAL
HOLD SPECIMEN: NORMAL
IMM GRANULOCYTES # BLD AUTO: 0.03 10*3/MM3 (ref 0–0.05)
IMM GRANULOCYTES NFR BLD AUTO: 0.4 % (ref 0–0.5)
LYMPHOCYTES # BLD AUTO: 1.41 10*3/MM3 (ref 0.7–3.1)
LYMPHOCYTES NFR BLD AUTO: 17.8 % (ref 19.6–45.3)
MCH RBC QN AUTO: 30.9 PG (ref 26.6–33)
MCHC RBC AUTO-ENTMCNC: 32.7 G/DL (ref 31.5–35.7)
MCV RBC AUTO: 94.7 FL (ref 79–97)
MONOCYTES # BLD AUTO: 0.65 10*3/MM3 (ref 0.1–0.9)
MONOCYTES NFR BLD AUTO: 8.2 % (ref 5–12)
NEUTROPHILS NFR BLD AUTO: 5.71 10*3/MM3 (ref 1.7–7)
NEUTROPHILS NFR BLD AUTO: 72.2 % (ref 42.7–76)
NRBC BLD AUTO-RTO: 0 /100 WBC (ref 0–0.2)
PLATELET # BLD AUTO: 259 10*3/MM3 (ref 140–450)
PMV BLD AUTO: 10.1 FL (ref 6–12)
POTASSIUM SERPL-SCNC: 3.9 MMOL/L (ref 3.5–5.2)
PROT SERPL-MCNC: 6.4 G/DL (ref 6–8.5)
QT INTERVAL: 379 MS
QTC INTERVAL: 424 MS
RBC # BLD AUTO: 4.33 10*6/MM3 (ref 3.77–5.28)
SARS-COV-2 RNA RESP QL NAA+PROBE: NOT DETECTED
SODIUM SERPL-SCNC: 139 MMOL/L (ref 136–145)
TROPONIN T DELTA: NORMAL
TROPONIN T SERPL HS-MCNC: <6 NG/L
WBC NRBC COR # BLD AUTO: 7.91 10*3/MM3 (ref 3.4–10.8)
WHOLE BLOOD HOLD COAG: NORMAL
WHOLE BLOOD HOLD SPECIMEN: NORMAL

## 2024-03-25 PROCEDURE — 93010 ELECTROCARDIOGRAM REPORT: CPT | Performed by: INTERNAL MEDICINE

## 2024-03-25 PROCEDURE — 84484 ASSAY OF TROPONIN QUANT: CPT | Performed by: EMERGENCY MEDICINE

## 2024-03-25 PROCEDURE — 80053 COMPREHEN METABOLIC PANEL: CPT | Performed by: EMERGENCY MEDICINE

## 2024-03-25 PROCEDURE — 99284 EMERGENCY DEPT VISIT MOD MDM: CPT

## 2024-03-25 PROCEDURE — 93005 ELECTROCARDIOGRAM TRACING: CPT

## 2024-03-25 PROCEDURE — 71045 X-RAY EXAM CHEST 1 VIEW: CPT

## 2024-03-25 PROCEDURE — 85025 COMPLETE CBC W/AUTO DIFF WBC: CPT | Performed by: EMERGENCY MEDICINE

## 2024-03-25 PROCEDURE — 87636 SARSCOV2 & INF A&B AMP PRB: CPT | Performed by: EMERGENCY MEDICINE

## 2024-03-25 PROCEDURE — 36415 COLL VENOUS BLD VENIPUNCTURE: CPT

## 2024-03-25 PROCEDURE — 93005 ELECTROCARDIOGRAM TRACING: CPT | Performed by: EMERGENCY MEDICINE

## 2024-03-25 RX ORDER — SODIUM CHLORIDE 0.9 % (FLUSH) 0.9 %
10 SYRINGE (ML) INJECTION AS NEEDED
Status: DISCONTINUED | OUTPATIENT
Start: 2024-03-25 | End: 2024-03-25 | Stop reason: HOSPADM

## 2024-03-25 NOTE — DISCHARGE INSTRUCTIONS
Rest at home avoiding any particularly strenuous activity today and tomorrow.    Warm compresses to left breast.     Eat small, frequent meals and drink plenty of fluids. Take any medication prescribed as instructed. If given antibiotics, then finish the full course of them.    Monitor for any signs of recurrent symptoms or worsening and see your primary doctor or OB to discuss your ER visit while returning to the ER if any concerns as we discussed.

## 2024-03-25 NOTE — ED PROVIDER NOTES
" EMERGENCY DEPARTMENT ENCOUNTER    Room Number:    PCP: Praful Francisco MD  Independent Historians: Patient    HPI:  Chief Complaint: had concerns including Chest Pain.      A complete HPI/ROS/PMH/PSH/SH/FH are unobtainable due to: None    Chronic or social conditions impacting patient care (Social Determinants of Health): None      Context: Sonam Lang is a 38 y.o. female with a medical history of asthma and migraines who presents to the ED c/o acute left chest pain and \"a lump in my throat\".  Patient had just taken her antibiotic for a left breast infection.  She had discomfort in her throat and a deep left-sided chest pain that was not just superficial soft tissue breast pain.  Patient saw urgent care on Saturday night for the left breast infection.  She was started on clindamycin.  Patient is also 6 weeks pregnant and has had a confirmed IUP with establish care with an OB.  Patient is not breast-feeding but does have an infant she is caring for her.  She also describes having some focal right scalp pain with no known injury.        Review of prior external notes (non-ED) -and- Review of prior external test results outside of this encounter: Patient seen by GYN provider for Dr. El on .  At that time she had an ultrasound confirming IUP given patient's history of ectopic and other early pregnancy complications in the past.    Prescription drug monitoring program review:     N/A    PAST MEDICAL HISTORY  Active Ambulatory Problems     Diagnosis Date Noted    Migraine without aura and without status migrainosus, not intractable 2018    Allergy to NSAIDs 2019    Asthma, mild persistent 2019    Obesity (BMI 30.0-34.9) 2019    Sleep apnea 2019    Anxiety 2022    Previous  section 2022    , delivered 2022    Adult ADHD 2023    Anxiety in pregnancy, antepartum 2023    Multigravida of advanced maternal age in third trimester " 2023    Previous  delivery affecting pregnancy 10/14/2023     delivery delivered 10/16/2023     Resolved Ambulatory Problems     Diagnosis Date Noted    Blepharitis of both eyes 2018    Endometriosis 2019    Hirsutism 2019    History of depression 2019    Subclinical hyperthyroidism 2019    Maternal care for other (suspected) fetal abnormality and damage, not applicable or unspecified 2022    Trisomy 18 of fetus in current pregnancy 2022    Maternal care for other (suspected) fetal abnormality and damage, not applicable or unspecified 2022    Multiple anomalies of fetus affecting spicer pregnancy 2022    Poor fetal growth affecting management of mother in second trimester 2022    Pregnancy 2022    AMA (advanced maternal age) multigravida 35+ 2022    Congenital abnormalities 2022    Multigravida of advanced maternal age in second trimester 2023     Past Medical History:   Diagnosis Date    ADHD (attention deficit hyperactivity disorder)     Asthma     Depression     GERD (gastroesophageal reflux disease)     Irritable bowel syndrome     Migraine     Pregnancy complicated by multiple fetal congenital anomalies, single gestation 2022    PTSD (post-traumatic stress disorder)          PAST SURGICAL HISTORY  Past Surgical History:   Procedure Laterality Date     SECTION       SECTION N/A 10/14/2023    Procedure:  SECTION REPEAT;  Surgeon: Megan El MD;  Location: [facility] LABOR DELIVERY;  Service: Obstetrics/Gynecology;  Laterality: N/A;    COLONOSCOPY      with endoscopy    SINUS SURGERY  11/22/2022    x2, 2018 (first)    WISDOM TOOTH EXTRACTION           FAMILY HISTORY  Family History   Problem Relation Age of Onset    Hypertension Mother     Diabetes Mother     Migraines Mother     Hypertension Father     Hypertension Maternal Grandmother     Migraines Maternal  Grandmother     Alzheimer's disease Maternal Grandfather     Diabetes Paternal Grandmother     Migraines Paternal Grandmother     Dementia Paternal Grandmother     Stroke Paternal Grandmother     Frontotemporal dementia Paternal Grandmother     Diabetes Paternal Grandfather          SOCIAL HISTORY  Social History     Socioeconomic History    Marital status:    Tobacco Use    Smoking status: Former     Current packs/day: 0.00     Types: Cigarettes     Start date:      Quit date:      Years since quittin.2     Passive exposure: Past    Smokeless tobacco: Never   Vaping Use    Vaping status: Never Used   Substance and Sexual Activity    Alcohol use: Not Currently    Drug use: No    Sexual activity: Yes     Partners: Male     Birth control/protection: None         ALLERGIES  Bactrim [sulfamethoxazole-trimethoprim], Cefdinir, Buspirone, Contrast dye (echo or unknown ct/mr), Ibuprofen, Iodinated contrast media, Naproxen, Toradol [ketorolac tromethamine], and Aspirin        REVIEW OF SYSTEMS  Review of Systems  Included in HPI  All systems reviewed and negative except for those discussed in HPI.      PHYSICAL EXAM    I have reviewed the triage vital signs and nursing notes.    ED Triage Vitals   Temp Heart Rate Resp BP SpO2   24 0921 24 0921 24 0921 24 0935 24 0921   98.1 °F (36.7 °C) 91 18 123/72 98 %      Temp src Heart Rate Source Patient Position BP Location FiO2 (%)   -- -- -- -- --              Physical Exam  GENERAL: Pleasant cooperative obese  female, well-appearing, alert, no acute distress  SKIN: Warm, dry, quarter sized area of erythema with no induration but with central pointing and small area of soft tissue tenderness to palpation that feels pea-sized without fluctuance at the 7 o'clock position of patient's left breast adjacent to and outside of her areola  HENT: Normocephalic, atraumatic  EYES: no scleral icterus, EOMI  CV: regular rhythm, regular  rate  RESPIRATORY: normal effort, lungs clear, no wheezing, no rhonchi  ABDOMEN: soft, nontender, nondistended  MUSCULOSKELETAL: no deformity, no lower extremity edema  NEURO: alert, moves all extremities, follows commands                                                                   LAB RESULTS  Recent Results (from the past 24 hour(s))   ECG 12 Lead ED Triage Standing Order; Chest Pain    Collection Time: 03/25/24  9:23 AM   Result Value Ref Range    QT Interval 379 ms    QTC Interval 424 ms   Comprehensive Metabolic Panel    Collection Time: 03/25/24  9:47 AM    Specimen: Blood   Result Value Ref Range    Glucose 86 65 - 99 mg/dL    BUN 10 6 - 20 mg/dL    Creatinine 0.59 0.57 - 1.00 mg/dL    Sodium 139 136 - 145 mmol/L    Potassium 3.9 3.5 - 5.2 mmol/L    Chloride 107 98 - 107 mmol/L    CO2 20.9 (L) 22.0 - 29.0 mmol/L    Calcium 8.8 8.6 - 10.5 mg/dL    Total Protein 6.4 6.0 - 8.5 g/dL    Albumin 4.1 3.5 - 5.2 g/dL    ALT (SGPT) 14 1 - 33 U/L    AST (SGOT) 17 1 - 32 U/L    Alkaline Phosphatase 59 39 - 117 U/L    Total Bilirubin 0.3 0.0 - 1.2 mg/dL    Globulin 2.3 gm/dL    A/G Ratio 1.8 g/dL    BUN/Creatinine Ratio 16.9 7.0 - 25.0    Anion Gap 11.1 5.0 - 15.0 mmol/L    eGFR 117.0 >60.0 mL/min/1.73   High Sensitivity Troponin T    Collection Time: 03/25/24  9:47 AM    Specimen: Blood   Result Value Ref Range    HS Troponin T <6 <14 ng/L   Green Top (Gel)    Collection Time: 03/25/24  9:47 AM   Result Value Ref Range    Extra Tube Hold for add-ons.    Lavender Top    Collection Time: 03/25/24  9:47 AM   Result Value Ref Range    Extra Tube hold for add-on    Gold Top - SST    Collection Time: 03/25/24  9:47 AM   Result Value Ref Range    Extra Tube Hold for add-ons.    Light Blue Top    Collection Time: 03/25/24  9:47 AM   Result Value Ref Range    Extra Tube Hold for add-ons.    CBC Auto Differential    Collection Time: 03/25/24  9:47 AM    Specimen: Blood   Result Value Ref Range    WBC 7.91 3.40 - 10.80  10*3/mm3    RBC 4.33 3.77 - 5.28 10*6/mm3    Hemoglobin 13.4 12.0 - 15.9 g/dL    Hematocrit 41.0 34.0 - 46.6 %    MCV 94.7 79.0 - 97.0 fL    MCH 30.9 26.6 - 33.0 pg    MCHC 32.7 31.5 - 35.7 g/dL    RDW 12.1 (L) 12.3 - 15.4 %    RDW-SD 41.8 37.0 - 54.0 fl    MPV 10.1 6.0 - 12.0 fL    Platelets 259 140 - 450 10*3/mm3    Neutrophil % 72.2 42.7 - 76.0 %    Lymphocyte % 17.8 (L) 19.6 - 45.3 %    Monocyte % 8.2 5.0 - 12.0 %    Eosinophil % 0.9 0.3 - 6.2 %    Basophil % 0.5 0.0 - 1.5 %    Immature Grans % 0.4 0.0 - 0.5 %    Neutrophils, Absolute 5.71 1.70 - 7.00 10*3/mm3    Lymphocytes, Absolute 1.41 0.70 - 3.10 10*3/mm3    Monocytes, Absolute 0.65 0.10 - 0.90 10*3/mm3    Eosinophils, Absolute 0.07 0.00 - 0.40 10*3/mm3    Basophils, Absolute 0.04 0.00 - 0.20 10*3/mm3    Immature Grans, Absolute 0.03 0.00 - 0.05 10*3/mm3    nRBC 0.0 0.0 - 0.2 /100 WBC   COVID-19 and FLU A/B PCR, 1 HR TAT - Swab, Nasopharynx    Collection Time: 03/25/24 10:39 AM    Specimen: Nasopharynx; Swab   Result Value Ref Range    COVID19 Not Detected Not Detected - Ref. Range    Influenza A PCR Not Detected Not Detected    Influenza B PCR Not Detected Not Detected   High Sensitivity Troponin T 2Hr    Collection Time: 03/25/24 12:10 PM    Specimen: Arm, Right; Blood   Result Value Ref Range    HS Troponin T <6 <14 ng/L    Troponin T Delta           RADIOLOGY  XR Chest 1 View    Result Date: 3/25/2024  XR CHEST 1 VW-  HISTORY: 38-year-old female with chest pain.  FINDINGS: There is no evidence for pneumonia, effusions, CHF, or pneumothorax.  This report was finalized on 3/25/2024 10:36 AM by Dr. Lucie Suarez M.D on Workstation: [id number]         MEDICATIONS GIVEN IN ER  Medications - No data to display        ORDERS PLACED DURING THIS VISIT:  Orders Placed This Encounter   Procedures    COVID-19 and FLU A/B PCR, 1 HR TAT - Swab, Nasopharynx    XR Chest 1 View    [place] Draw    Comprehensive Metabolic Panel    High Sensitivity Troponin T    CBC Auto  Differential    High Sensitivity Troponin T 2Hr    Undress & Gown    Continuous Pulse Oximetry    ECG 12 Lead ED Triage Standing Order; Chest Pain    Telemetry Scan    Telemetry Scan    CBC & Differential    Green Top (Gel)    Lavender Top    Gold Top - SST    Light Blue Top         OUTPATIENT MEDICATION MANAGEMENT:  No current Epic-ordered facility-administered medications on file.     Current Outpatient Medications Ordered in Epic   Medication Sig Dispense Refill    acetaminophen (TYLENOL) 325 MG tablet Take 2 tablets by mouth Every 6 (Six) Hours. 60 tablet 1    albuterol sulfate  (90 Base) MCG/ACT inhaler Inhale 2 puffs Every 4 (Four) Hours As Needed for Wheezing or Shortness of Air. 18 g 11    budesonide-formoterol (SYMBICORT) 80-4.5 MCG/ACT inhaler Inhale 2 puffs 2 (Two) Times a Day. 30.6 g 3    cetirizine (zyrTEC) 10 MG tablet Take 1 tablet by mouth Daily. 90 tablet 3    clindamycin (CLEOCIN) 300 MG capsule Take 1 capsule by mouth 4 (Four) Times a Day. 28 capsule 0    diphenhydrAMINE (BENADRYL) 25 mg capsule Take 1 capsule by mouth Every 6 (Six) Hours As Needed for Itching.      docusate sodium 100 MG capsule Take 1 capsule by mouth 2 (Two) Times a Day As Needed (constipation). 180 capsule 1    fluticasone (FLONASE) 50 MCG/ACT nasal spray 2 sprays into the nostril(s) as directed by provider Daily. 48 g 3    Magnesium 400 MG capsule Take  by mouth.      montelukast (SINGULAIR) 10 MG tablet Take 1 tablet by mouth Daily. 90 tablet 3    Riboflavin 100 MG tablet Take 100 mg by mouth Daily. 90 each 3    sertraline (Zoloft) 50 MG tablet Take 2 tablets by mouth Daily. 180 tablet 1    SV Iron 325 (65 Fe) MG tablet Take 1 tablet by mouth once daily with breakfast 90 tablet 0    vitamin B-6 (PYRIDOXINE) 50 MG tablet Take 0.5 tablets by mouth 3 (Three) Times a Day.           PROCEDURES  Procedures            PROGRESS, DATA ANALYSIS, CONSULTS, AND MEDICAL DECISION MAKING  All labs have been independently interpreted  by me.  All radiology studies have been reviewed by me. All EKG's have been independently viewed and interpreted by me.  Discussion below represents my analysis of pertinent findings related to patient's condition, differential diagnosis, treatment plan and final disposition.    Differential diagnosis includes but is not limited to   This patient presents with chest pain that is more atypical in nature, and most likely secondary to benign causes. The differential diagnosis includes emergent causes like ACS, PE, pericarditis, myocarditis, thoracic aortic dissection, pneumothorax, pulmonary effusion, and pneumonia. It also includes more benign causes like bronchitis, gerd, esophageal spasm, anxiety/panic, pleurisy, costochondritis/chest wall pain, and biliary colic. I have a low suspicion for ACS given nature of pain and risk factors (HEART score). Also thought low on the list of possibilities are acute PE (no shortness of breath, no tachycardia), pericarditis / myocarditis (no preceeding uri), thoracic aortic dissection (lack of risk factors), pneumothorax (clear to auscultation bilaterally to bases on exam), pneumonia or other acute infectious process (lack of infectious symptoms like cough). This patient's presentation is not consistent with emergent causes of chest pain at this time.   In addition to the history and physical exam already performed (and for more reassurance of exclusion of more worrisome causes of chest pain), plan to order CXR, serum labs including screening troponin x 2, and EKG while monitoring patient and providing pain control as needed. Will reassess..    Clinical Scores:              Heart score calculation:    History slightly suspicious: 0  History moderately suspicious: +1  History highly suspicious: +2    EKG normal: 0  EKG non-specific repolarization disturbance: +1  EKG sig ST deviation: +2    Age <45: 0  Age 45-64: +1  Age >65: +2    No known risk factors: 0  1-2 risk factors: +1  3  or more risk factors: +2    Initial troponin less than the normal limit: 0  Initial troponin 1-3 times the normal limit: +1  Initial troponin greater than 3 times the normal limit: +2    Total score: 0  ______________    ED Course as of 03/25/24 2224   Mon Mar 25, 2024   1109 I viewed patient's chest x-ray and on my interpretation patient has clear lungs and normal heart size [AR]   1115 EKG ER MD interpretation   Time: 9: 23  Rhythm and rate: Normal sinus rhythm at a rate of 75  Axis: Normal  P waves: Normal  QRS complexes: Normal except for RSR' pattern in V1  ST segments: no elevation nor depressions  T waves: Inverted T wave in lead III   [AR]   1306 I discussed all results with patient and answered all questions to the best of my ability. The patient was encouraged to follow up appropriately.      Routine discharge counseling was given, and the patient was instructed to promptly call or followup with their primary physician or even return to the ER if any worsening, changing or persistent symptoms.         [AR]   1315 I discussed all results with patient and answered all questions to the best of my ability. The patient was encouraged to follow up appropriately.      Routine discharge counseling was given, and the patient was instructed to promptly call or followup with their primary physician or even return to the ER if any worsening, changing or persistent symptoms.         [AR]      ED Course User Index  [AR] Anne Rodriguez MD             AS OF 22:24 EDT VITALS:    BP - 98/63  HR - 82  TEMP - 98.1 °F (36.7 °C)  O2 SATS - 98%      COMPLEXITY OF CARE  Admission was considered but after careful review of the patient's presentation, physical examination, diagnostic results, and response to treatment the patient may be safely discharged with outpatient follow-up.    DIAGNOSIS  Final diagnoses:   Cellulitis of left breast   Chest pain, unspecified type   Dysphagia, unspecified type   Acute nonintractable  headache, unspecified headache type         DISPOSITION  ED Disposition       ED Disposition   Discharge    Condition   Stable    Comment   --                Please note that portions of this document were completed with a voice recognition program.    Note Disclaimer: At [facility], we believe that sharing information builds trust and better relationships. You are receiving this note because you recently visited [facility]. It is possible you will see health information before a provider has talked with you about it. This kind of information can be easy to misunderstand. To help you fully understand what it means for your health, we urge you to discuss this note with your provider.         Anne Rodriguez MD  03/25/24 [id number]

## 2024-03-26 ENCOUNTER — OFFICE VISIT (OUTPATIENT)
Dept: SPORTS MEDICINE | Facility: CLINIC | Age: 39
End: 2024-03-26
Payer: MEDICAID

## 2024-03-26 VITALS
TEMPERATURE: 97.7 F | OXYGEN SATURATION: 97 % | BODY MASS INDEX: 36.98 KG/M2 | HEIGHT: 68 IN | DIASTOLIC BLOOD PRESSURE: 70 MMHG | SYSTOLIC BLOOD PRESSURE: 100 MMHG | WEIGHT: 244 LBS | HEART RATE: 71 BPM

## 2024-03-26 DIAGNOSIS — L60.0 INGROWN TOENAIL OF BOTH FEET: Primary | ICD-10-CM

## 2024-03-26 DIAGNOSIS — F41.9 ANXIETY: ICD-10-CM

## 2024-03-26 PROCEDURE — 99213 OFFICE O/P EST LOW 20 MIN: CPT | Performed by: FAMILY MEDICINE

## 2024-03-26 NOTE — PROGRESS NOTES
"Sonam is a 38 y.o. year old female     Chief Complaint   Patient presents with    Nail Problem     Patient is here to follow up on recurrent issue of ingrown nails.        History of Present Illness  History of Present Illness  The patient is here today for recurrent pain in great toes of both feet.    History of ingrown nails that had to be treated with soaks and antibiotics. Recently improved, but concerned about long term progression. Denies any pain currently.    I have reviewed the patient's medical, family, and social history in detail and updated the computerized patient record.    Review of Systems    /70 (BP Location: Left arm, Patient Position: Sitting, Cuff Size: Adult)   Pulse 71   Temp 97.7 °F (36.5 °C) (Temporal)   Ht 172.7 cm (68\")   Wt 111 kg (244 lb)   LMP 02/10/2024   SpO2 97%   BMI 37.10 kg/m²      Physical Exam    Vital signs reviewed.   General: No acute distress.      Physical Exam  Bilateral great toes show ingrowing nail, more medial on the right side, more lateral on the left side. There is some callus formation around the perimeter of the toenail along the cuticle. The nail is trimmed back very deeply. There is no sign of infection. No erythema, tenderness, or purulence.    Results  Results      Procedures     Diagnoses and all orders for this visit:    Ingrown toenail of both feet      Assessment & Plan  1. Recurrent pain in great toes of both feet.  Discussed management of ingrowing nails. Start first with soaking, working with the callus and allowing the nail to grow out. If this fails, we can plan nail removal if needed.    Patient or patient representative verbalized consent for the use of Ambient Listening during the visit with  Praful Francisco MD for chart documentation. 3/26/2024  13:13 EDT  *Dictated after leaving exam room    Praful Francisco MD   10:31 EDT   03/26/24   "

## 2024-03-27 ENCOUNTER — OFFICE VISIT (OUTPATIENT)
Dept: OBSTETRICS AND GYNECOLOGY | Age: 39
End: 2024-03-27
Payer: MEDICAID

## 2024-03-27 VITALS
WEIGHT: 249 LBS | SYSTOLIC BLOOD PRESSURE: 108 MMHG | HEIGHT: 68 IN | BODY MASS INDEX: 37.74 KG/M2 | DIASTOLIC BLOOD PRESSURE: 74 MMHG

## 2024-03-27 DIAGNOSIS — L98.8 LESION OF SKIN OF BREAST: Primary | ICD-10-CM

## 2024-03-31 ENCOUNTER — HOSPITAL ENCOUNTER (EMERGENCY)
Facility: HOSPITAL | Age: 39
Discharge: HOME | End: 2024-03-31
Attending: EMERGENCY MEDICINE | Admitting: EMERGENCY MEDICINE
Payer: MEDICAID

## 2024-03-31 VITALS
HEART RATE: 83 BPM | TEMPERATURE: 98.2 F | BODY MASS INDEX: 37.74 KG/M2 | SYSTOLIC BLOOD PRESSURE: 125 MMHG | WEIGHT: 249 LBS | HEIGHT: 68 IN | OXYGEN SATURATION: 98 % | DIASTOLIC BLOOD PRESSURE: 80 MMHG | RESPIRATION RATE: 18 BRPM

## 2024-03-31 VITALS
HEIGHT: 68 IN | WEIGHT: 249.12 LBS | DIASTOLIC BLOOD PRESSURE: 67 MMHG | OXYGEN SATURATION: 97 % | BODY MASS INDEX: 37.76 KG/M2 | SYSTOLIC BLOOD PRESSURE: 102 MMHG | TEMPERATURE: 98.4 F | HEART RATE: 64 BPM | RESPIRATION RATE: 15 BRPM

## 2024-03-31 DIAGNOSIS — T78.40XA ALLERGIC REACTION, INITIAL ENCOUNTER: Primary | ICD-10-CM

## 2024-03-31 DIAGNOSIS — L50.9 URTICARIA: ICD-10-CM

## 2024-03-31 PROCEDURE — 99283 EMERGENCY DEPT VISIT LOW MDM: CPT

## 2024-03-31 PROCEDURE — 99212 OFFICE O/P EST SF 10 MIN: CPT | Performed by: PHYSICIAN ASSISTANT

## 2024-03-31 PROCEDURE — 96375 TX/PRO/DX INJ NEW DRUG ADDON: CPT

## 2024-03-31 PROCEDURE — 96374 THER/PROPH/DIAG INJ IV PUSH: CPT

## 2024-03-31 PROCEDURE — 25010000002 METHYLPREDNISOLONE PER 125 MG: Performed by: EMERGENCY MEDICINE

## 2024-03-31 PROCEDURE — 99283 EMERGENCY DEPT VISIT LOW MDM: CPT | Performed by: EMERGENCY MEDICINE

## 2024-03-31 PROCEDURE — G0463 HOSPITAL OUTPT CLINIC VISIT: HCPCS | Performed by: PHYSICIAN ASSISTANT

## 2024-03-31 RX ORDER — FAMOTIDINE 10 MG/ML
20 INJECTION, SOLUTION INTRAVENOUS ONCE
Status: COMPLETED | OUTPATIENT
Start: 2024-03-31 | End: 2024-03-31

## 2024-03-31 RX ORDER — PREDNISONE 20 MG/1
TABLET ORAL
Qty: 12 TABLET | Refills: 0 | Status: SHIPPED | OUTPATIENT
Start: 2024-03-31

## 2024-03-31 RX ORDER — METHYLPREDNISOLONE SODIUM SUCCINATE 125 MG/2ML
125 INJECTION, POWDER, LYOPHILIZED, FOR SOLUTION INTRAMUSCULAR; INTRAVENOUS ONCE
Status: COMPLETED | OUTPATIENT
Start: 2024-03-31 | End: 2024-03-31

## 2024-03-31 RX ORDER — FAMOTIDINE 20 MG/1
20 TABLET, FILM COATED ORAL 2 TIMES DAILY
Qty: 14 TABLET | Refills: 0 | Status: SHIPPED | OUTPATIENT
Start: 2024-03-31 | End: 2024-04-07

## 2024-03-31 RX ADMIN — FAMOTIDINE 20 MG: 10 INJECTION INTRAVENOUS at 06:31

## 2024-03-31 RX ADMIN — METHYLPREDNISOLONE SODIUM SUCCINATE 125 MG: 125 INJECTION, POWDER, FOR SOLUTION INTRAMUSCULAR; INTRAVENOUS at 06:32

## 2024-03-31 NOTE — FSED PROVIDER NOTE
"Subjective   History of Present Illness  Patient is a 38-year-old female.  She is approximately 7 weeks pregnant.  Her last menstrual cycle was on 2/10/2024.  She was seen here on 3/23/2024 for lesion on her left breast.  At that time she was initiated on clindamycin.    She states she developed an urticarial rash yesterday afternoon at approximately 2:30 PM.  It has worsened throughout the evening and early morning.  She did take 50 mg of oral prednisone prior to arrival here this morning.  She does describe some shortness of breath but denies that she has noted any intraoral angioedema.          Review of Systems    Past Medical History:   Diagnosis Date    ADHD (attention deficit hyperactivity disorder)     Anxiety     Asthma     Congenital abnormalities 2022    Multiple fetal anomalies present including meningocele, unilateral real agenesis, unilateral club foot, membranous VSD     Depression     GERD (gastroesophageal reflux disease)     History of depression 2019    Irritable bowel syndrome     Migraine     Obesity (BMI 30.0-34.9) 2019    Pregnancy complicated by multiple fetal congenital anomalies, single gestation 2022    NTD, SUA    PTSD (post-traumatic stress disorder)     Sleep apnea 2016    CPAP       Allergies   Allergen Reactions    Bactrim [Sulfamethoxazole-Trimethoprim] Hives, Itching and Swelling    Cefdinir Hives, Itching and Swelling     FELT THROAT START TO SWELL    Buspirone Unknown - Low Severity     migraines    Contrast Dye (Echo Or Unknown Ct/Mr) Other (See Comments)     \"felt like insides were on fire\"    Ibuprofen Swelling    Iodinated Contrast Media Unknown (See Comments)     \"felt like insides were on fire\"   \"felt like insides were on fire\"    Naproxen Swelling    Toradol [Ketorolac Tromethamine] Itching    Aspirin Angioedema    Clindamycin/Lincomycin Hives       Past Surgical History:   Procedure Laterality Date     SECTION  2019     SECTION N/A " 10/14/2023    Procedure:  SECTION REPEAT;  Surgeon: Megan El MD;  Location: [facility] LABOR DELIVERY;  Service: Obstetrics/Gynecology;  Laterality: N/A;    COLONOSCOPY      with endoscopy    SINUS SURGERY  11/22/2022    x2, 2018 (first)    WISDOM TOOTH EXTRACTION         Family History   Problem Relation Age of Onset    Hypertension Mother     Diabetes Mother     Migraines Mother     Hypertension Father     Hypertension Maternal Grandmother     Migraines Maternal Grandmother     Alzheimer's disease Maternal Grandfather     Diabetes Paternal Grandmother     Migraines Paternal Grandmother     Dementia Paternal Grandmother     Stroke Paternal Grandmother     Frontotemporal dementia Paternal Grandmother     Diabetes Paternal Grandfather        Social History     Socioeconomic History    Marital status:    Tobacco Use    Smoking status: Former     Current packs/day: 0.00     Types: Cigarettes     Start date:      Quit date:      Years since quittin.2     Passive exposure: Past    Smokeless tobacco: Never   Vaping Use    Vaping status: Never Used   Substance and Sexual Activity    Alcohol use: Not Currently    Drug use: No    Sexual activity: Yes     Partners: Male     Birth control/protection: None           Objective   Physical Exam  Vital signs: Reviewed in nurses notes    General: Awake alert no acute distress    HEENT: Normocephalic atraumatic nasopharynx clear pharynx is clear moist.  There is no angioedema of the oropharynx.  The airway is completely clear    Neck:   Supple    Breast exam w/ Jocelyn RN at bedside: there is a circular erythematous lesion left inferior breast, currently approximately nickel sized.   Patient reports less STS.  No sign tenderness to palpation, no fluctuance.     Respiratory:   Clear to auscultation bilaterally with equal breath sounds.  No stridor no wheezing    Cardiovascular: Regular rate and rhythm    Skin:   Patient noted to have an  urticarial rash to her face, extremities,  neck.     Neurological examination: Patient is awake alert oriented x4.  Speech is normal.  No facial palsy.  No focal motor or sensory deficits.      Procedures           ED Course  ED Course as of 04/01/24 0409   Sun Mar 31, 2024   0730 Signed out to me 7am, pending visual improvement post meds. Pt is feeling better and much less red. She will call her  for a ride. All discharge info was completed by Dr Sheikh. [AR]      ED Course User Index  [AR] Roberta Kauffman MD      Medications   famotidine (PEPCID) injection 20 mg (20 mg Intravenous Given 3/31/24 0631)   methylPREDNISolone sodium succinate (SOLU-Medrol) injection 125 mg (125 mg Intravenous Given 3/31/24 0632)                                       Ddx: Drug allergy, urticaria  Medical Decision Making  Problems Addressed:  Allergic reaction, initial encounter: complicated acute illness or injury  Urticaria: complicated acute illness or injury    Risk  Prescription drug management.        Final diagnoses:   Allergic reaction, initial encounter   Urticaria       ED Disposition  ED Disposition       ED Disposition   Discharge    Condition   Stable    Comment   --               Praful Francisco MD  [address] [address]  [phone]    In 1 week           Medication List        New Prescriptions      famotidine 20 MG tablet  Commonly known as: PEPCID  Take 1 tablet by mouth 2 (Two) Times a Day for 7 days.     predniSONE 20 MG tablet  Commonly known as: DELTASONE  3 po daily x 2d, then 2 po daily x 2d, then 1 po daily x 2d.               Where to Get Your Medications        These medications were sent to [address] [facility] - [phone] [facility] [phone]   [address] [facility] [address]      Phone: [phone]   famotidine 20 MG tablet  predniSONE 20 MG tablet

## 2024-03-31 NOTE — DISCHARGE INSTRUCTIONS
At this time we are going to need to hold the clindamycin.    I did send in a prescription for a prednisone taper as well as Pepcid twice daily.  Please take as directed.  You may also continue Benadryl every 6 hours for the next 48 hours into the prednisone is in full effect.    Regarding the area on your left breast, please continue warm compresses every 2-3 hours while awake.  Please follow-up with your OB/GYN as currently scheduled for your reexamination.      Please read all of the instructions in this handout.  If you receive prescriptions please fill them and take them as directed.  Please call your primary care physician for follow-up appointment in the next 5 to 7 days.  If you do not have a physician you may call the [facility] referral line at [phone].    You may return to the emergency department at any time for any concerns such as worsening symptoms.  If you received a work or school note it will be printed at the back of this packet.

## 2024-03-31 NOTE — ED NOTES
"Pt presents to ER with continued rash and itching, pt states she was not aware that she could take benadryl at home for her symptoms, even though it was printed for her in her discharge papers. Pt educated on home treatment, also seen actively scratching and slapping at her rash. Infection control measures reviewed with pt and informed that excessive scratching and slapping will increase pain, redness and puts her at risk for developing an infection. Education reinforced with pt and d/c instructions given on an easy to understand level. Pt asking why she could not get a \"cortisone shot\". Education given to pt on the difference between cortisone and oral/iv steroids.  "

## 2024-03-31 NOTE — [facility] PROVIDER NOTE
"Subjective   History of Present Illness  Patient returned to the emergency room with the rash that she was diagnosed with earlier today.  She is recently on clindamycin and cannot think of anything else that may have started the rash.  She has been taking the prednisone and famotidine.  She is been taking cool bowels and showers and still has the significant itching.      Review of Systems   HENT: Negative.     Eyes: Negative.    Respiratory: Negative.     Gastrointestinal: Negative.    Genitourinary: Negative.    Musculoskeletal: Negative.    Skin:  Positive for rash.   Neurological: Negative.    Psychiatric/Behavioral: Negative.     All other systems reviewed and are negative.      Past Medical History:   Diagnosis Date    ADHD (attention deficit hyperactivity disorder)     Anxiety     Asthma     Congenital abnormalities 05/22/2022    Multiple fetal anomalies present including meningocele, unilateral real agenesis, unilateral club foot, membranous VSD     Depression     GERD (gastroesophageal reflux disease)     History of depression 02/21/2019    Irritable bowel syndrome     Migraine     Obesity (BMI 30.0-34.9) 04/02/2019    Pregnancy complicated by multiple fetal congenital anomalies, single gestation 05/04/2022    NTD, SUA    PTSD (post-traumatic stress disorder)     Sleep apnea 2016    CPAP       Allergies   Allergen Reactions    Bactrim [Sulfamethoxazole-Trimethoprim] Hives, Itching and Swelling    Cefdinir Hives, Itching and Swelling     FELT THROAT START TO SWELL    Buspirone Unknown - Low Severity     migraines    Contrast Dye (Echo Or Unknown Ct/Mr) Other (See Comments)     \"felt like insides were on fire\"    Ibuprofen Swelling    Iodinated Contrast Media Unknown (See Comments)     \"felt like insides were on fire\"   \"felt like insides were on fire\"    Naproxen Swelling    Toradol [Ketorolac Tromethamine] Itching    Aspirin Angioedema    Clindamycin/Lincomycin Hives       Past Surgical History:   Procedure " Laterality Date     SECTION  2019     SECTION N/A 10/14/2023    Procedure:  SECTION REPEAT;  Surgeon: Megan El MD;  Location: [facility] LABOR DELIVERY;  Service: Obstetrics/Gynecology;  Laterality: N/A;    COLONOSCOPY      with endoscopy    SINUS SURGERY  11/22/2022    x2, 2018 (first)    WISDOM TOOTH EXTRACTION         Family History   Problem Relation Age of Onset    Hypertension Mother     Diabetes Mother     Migraines Mother     Hypertension Father     Hypertension Maternal Grandmother     Migraines Maternal Grandmother     Alzheimer's disease Maternal Grandfather     Diabetes Paternal Grandmother     Migraines Paternal Grandmother     Dementia Paternal Grandmother     Stroke Paternal Grandmother     Frontotemporal dementia Paternal Grandmother     Diabetes Paternal Grandfather        Social History     Socioeconomic History    Marital status:    Tobacco Use    Smoking status: Former     Current packs/day: 0.00     Types: Cigarettes     Start date:      Quit date:      Years since quittin.2     Passive exposure: Past    Smokeless tobacco: Never   Vaping Use    Vaping status: Never Used   Substance and Sexual Activity    Alcohol use: Not Currently    Drug use: No    Sexual activity: Yes     Partners: Male     Birth control/protection: None           Objective   Physical Exam  Vitals reviewed.   Constitutional:       Appearance: Normal appearance.   HENT:      Mouth/Throat:      Pharynx: Oropharynx is clear. No oropharyngeal exudate or posterior oropharyngeal erythema.   Eyes:      Conjunctiva/sclera: Conjunctivae normal.   Cardiovascular:      Rate and Rhythm: Normal rate and regular rhythm.   Pulmonary:      Effort: Pulmonary effort is normal.      Breath sounds: Normal breath sounds.   Musculoskeletal:         General: Normal range of motion.      Cervical back: Normal range of motion.   Skin:     General: Skin is warm and dry.      Findings: Rash  (Urticarial blanching rash primarily on lower extremities but also scattered arms and back) present.   Neurological:      General: No focal deficit present.      Mental Status: She is alert.   Psychiatric:      Comments: Anxious, tearful         Procedures           ED Course                                           Medical Decision Making  Presentation patient is very anxious and scratching herself.  She has famotidine and prednisone 60 that she started taking today.  She been taking cool showers and she did have some Benadryl but did not know how often she can take the Benadryl.  I told her she can take at most 50 mg every 4 hours but try to limit it to every 6 hours.  She does have family doctor for follow-up and can return as needed but I do not have any medications to add at this time.  She says she has Benadryl at the house.  She has her dose here but she is driving and I told her I cannot give it to her if she is going to drive herself.  She voices understanding of that.        Final diagnoses:   Allergic reaction, initial encounter       ED Disposition  ED Disposition       ED Disposition   Discharge    Condition   Stable    Comment   --               Praful Francisco MD  [address] [id number] [phone]    In 5 days           Medication List      No changes were made to your prescriptions during this visit.

## 2024-04-01 ENCOUNTER — HOSPITAL ENCOUNTER (OUTPATIENT)
Facility: HOSPITAL | Age: 39
Setting detail: OBSERVATION
Discharge: HOME | End: 2024-04-03
Attending: EMERGENCY MEDICINE | Admitting: EMERGENCY MEDICINE
Payer: MEDICAID

## 2024-04-01 ENCOUNTER — TELEPHONE (OUTPATIENT)
Dept: OBSTETRICS AND GYNECOLOGY | Age: 39
End: 2024-04-01
Payer: MEDICAID

## 2024-04-01 ENCOUNTER — TELEPHONE (OUTPATIENT)
Dept: SPORTS MEDICINE | Facility: CLINIC | Age: 39
End: 2024-04-01
Payer: MEDICAID

## 2024-04-01 DIAGNOSIS — T78.40XA ALLERGIC REACTION TO DRUG, INITIAL ENCOUNTER: Primary | ICD-10-CM

## 2024-04-01 DIAGNOSIS — Z3A.01 LESS THAN 8 WEEKS GESTATION OF PREGNANCY: ICD-10-CM

## 2024-04-01 PROCEDURE — 96375 TX/PRO/DX INJ NEW DRUG ADDON: CPT

## 2024-04-01 PROCEDURE — G0378 HOSPITAL OBSERVATION PER HR: HCPCS

## 2024-04-01 PROCEDURE — 96374 THER/PROPH/DIAG INJ IV PUSH: CPT

## 2024-04-01 PROCEDURE — 81003 URINALYSIS AUTO W/O SCOPE: CPT

## 2024-04-01 PROCEDURE — 25010000002 METHYLPREDNISOLONE PER 125 MG: Performed by: EMERGENCY MEDICINE

## 2024-04-01 PROCEDURE — 63710000001 PREDNISONE PER 1 MG: Performed by: EMERGENCY MEDICINE

## 2024-04-01 PROCEDURE — 25010000002 DIPHENHYDRAMINE PER 50 MG: Performed by: EMERGENCY MEDICINE

## 2024-04-01 PROCEDURE — 99285 EMERGENCY DEPT VISIT HI MDM: CPT

## 2024-04-01 RX ORDER — SODIUM CHLORIDE 0.9 % (FLUSH) 0.9 %
10 SYRINGE (ML) INJECTION AS NEEDED
Status: DISCONTINUED | OUTPATIENT
Start: 2024-04-01 | End: 2024-04-03 | Stop reason: HOSPADM

## 2024-04-01 RX ORDER — FAMOTIDINE 10 MG/ML
20 INJECTION, SOLUTION INTRAVENOUS EVERY 12 HOURS SCHEDULED
Status: DISCONTINUED | OUTPATIENT
Start: 2024-04-02 | End: 2024-04-03 | Stop reason: HOSPADM

## 2024-04-01 RX ORDER — DIPHENHYDRAMINE HYDROCHLORIDE 50 MG/ML
25 INJECTION INTRAMUSCULAR; INTRAVENOUS ONCE
Status: COMPLETED | OUTPATIENT
Start: 2024-04-01 | End: 2024-04-01

## 2024-04-01 RX ORDER — ACETAMINOPHEN 325 MG/1
650 TABLET ORAL EVERY 4 HOURS PRN
Status: DISCONTINUED | OUTPATIENT
Start: 2024-04-01 | End: 2024-04-03 | Stop reason: HOSPADM

## 2024-04-01 RX ORDER — ONDANSETRON 2 MG/ML
4 INJECTION INTRAMUSCULAR; INTRAVENOUS EVERY 6 HOURS PRN
Status: DISCONTINUED | OUTPATIENT
Start: 2024-04-01 | End: 2024-04-03 | Stop reason: HOSPADM

## 2024-04-01 RX ORDER — DIPHENHYDRAMINE HYDROCHLORIDE 50 MG/ML
25 INJECTION INTRAMUSCULAR; INTRAVENOUS EVERY 6 HOURS PRN
Status: DISCONTINUED | OUTPATIENT
Start: 2024-04-02 | End: 2024-04-03 | Stop reason: HOSPADM

## 2024-04-01 RX ORDER — ONDANSETRON 4 MG/1
4 TABLET, ORALLY DISINTEGRATING ORAL EVERY 6 HOURS PRN
Status: DISCONTINUED | OUTPATIENT
Start: 2024-04-01 | End: 2024-04-03 | Stop reason: HOSPADM

## 2024-04-01 RX ORDER — METHYLPREDNISOLONE SODIUM SUCCINATE 40 MG/ML
40 INJECTION, POWDER, LYOPHILIZED, FOR SOLUTION INTRAMUSCULAR; INTRAVENOUS EVERY 8 HOURS
Status: DISCONTINUED | OUTPATIENT
Start: 2024-04-02 | End: 2024-04-03 | Stop reason: HOSPADM

## 2024-04-01 RX ORDER — FAMOTIDINE 10 MG/ML
20 INJECTION, SOLUTION INTRAVENOUS ONCE
Status: COMPLETED | OUTPATIENT
Start: 2024-04-01 | End: 2024-04-01

## 2024-04-01 RX ORDER — PREDNISONE 20 MG/1
60 TABLET ORAL ONCE
Status: COMPLETED | OUTPATIENT
Start: 2024-04-01 | End: 2024-04-01

## 2024-04-01 RX ORDER — FAMOTIDINE 20 MG/1
20 TABLET, FILM COATED ORAL ONCE
Status: COMPLETED | OUTPATIENT
Start: 2024-04-01 | End: 2024-04-01

## 2024-04-01 RX ORDER — SODIUM CHLORIDE 0.9 % (FLUSH) 0.9 %
10 SYRINGE (ML) INJECTION EVERY 12 HOURS SCHEDULED
Status: DISCONTINUED | OUTPATIENT
Start: 2024-04-01 | End: 2024-04-03 | Stop reason: HOSPADM

## 2024-04-01 RX ORDER — SODIUM CHLORIDE 9 MG/ML
40 INJECTION, SOLUTION INTRAVENOUS AS NEEDED
Status: DISCONTINUED | OUTPATIENT
Start: 2024-04-01 | End: 2024-04-03 | Stop reason: HOSPADM

## 2024-04-01 RX ORDER — METHYLPREDNISOLONE SODIUM SUCCINATE 125 MG/2ML
125 INJECTION, POWDER, LYOPHILIZED, FOR SOLUTION INTRAMUSCULAR; INTRAVENOUS ONCE
Status: COMPLETED | OUTPATIENT
Start: 2024-04-01 | End: 2024-04-01

## 2024-04-01 RX ORDER — DIPHENHYDRAMINE HYDROCHLORIDE 25 MG/1
25 CAPSULE ORAL 3 TIMES DAILY
Status: DISCONTINUED | OUTPATIENT
Start: 2024-04-02 | End: 2024-04-03 | Stop reason: HOSPADM

## 2024-04-01 RX ADMIN — DIPHENHYDRAMINE HYDROCHLORIDE 25 MG: 50 INJECTION, SOLUTION INTRAMUSCULAR; INTRAVENOUS at 22:28

## 2024-04-01 RX ADMIN — PREDNISONE 60 MG: 20 TABLET ORAL at 20:49

## 2024-04-01 RX ADMIN — FAMOTIDINE 20 MG: 20 TABLET, FILM COATED ORAL at 20:49

## 2024-04-01 RX ADMIN — METHYLPREDNISOLONE SODIUM SUCCINATE 125 MG: 125 INJECTION, POWDER, FOR SOLUTION INTRAMUSCULAR; INTRAVENOUS at 22:28

## 2024-04-01 RX ADMIN — FAMOTIDINE 20 MG: 10 INJECTION INTRAVENOUS at 22:28

## 2024-04-01 NOTE — TELEPHONE ENCOUNTER
Patient says had an allergic reaction to clindamycin. Says began Saturday afternoon. By Sunday went to ER. They gave her prednisone & pepcid. She went back again that afternoon because no relief.     Says they suggested 50 mg benadryl every 4 hours. Says she been taking 3 every 3.5 hours because itching returns. Says they also suggested a creat; she using hydrocortisone cream 1%. Says had older prescription for TRIAMINOLONE ACETONIDE that pharmacy told her could mix with the equate itch relief creatm (says labeled as comparable to benedryl gel).     Says getting very little relief, but concerned with taking so much Benedryl because she is 6 weeks pregnant. ER told her consult with OB. OB told her PCP should be the one to address.     Please advise.

## 2024-04-01 NOTE — ED PROVIDER NOTES
EMERGENCY DEPARTMENT ENCOUNTER    Room Number:    Date seen:  2024  PCP: Praful Francisco MD  Historian: Patient      HPI:  Chief Complaint: Allergic reaction  Context: Sonam Lang is a 38 y.o. female who presents to the ED c/o continued allergic reaction.  The patient is approximately 7 weeks pregnant and states that about 1 week ago she was started on clindamycin for a breast abscess.  She states that she has had a rash on her body since Saturday and has been seen at the [facility] ED twice for the same.  She is currently on prednisone taper, Pepcid and Benadryl.  She states that after her prednisone this morning she felt like she might be getting better but became worse this afternoon and took several Benadryl.  She attempted to contact her allergist Dr. Nazario but did not receive a call back and thus came to the emergency room.  Currently the patient's rash is resolved and she has no sore throat, chest pain or shortness of breath.  She denies any abdominal pain, pelvic pain or vaginal bleeding.      PAST MEDICAL HISTORY  Active Ambulatory Problems     Diagnosis Date Noted    Migraine without aura and without status migrainosus, not intractable 2018    Allergy to NSAIDs 2019    Asthma, mild persistent 2019    Obesity (BMI 30.0-34.9) 2019    Sleep apnea 2019    Anxiety 2022    Previous  section 2022    , delivered 2022    Adult ADHD 2023    Anxiety in pregnancy, antepartum 2023    Multigravida of advanced maternal age in third trimester 2023    Previous  delivery affecting pregnancy 10/14/2023     delivery delivered 10/16/2023     Resolved Ambulatory Problems     Diagnosis Date Noted    Blepharitis of both eyes 2018    Endometriosis 2019    Hirsutism 2019    History of depression 2019    Subclinical hyperthyroidism 2019    Maternal care for other (suspected) fetal abnormality  and damage, not applicable or unspecified 2022    Trisomy 18 of fetus in current pregnancy 2022    Maternal care for other (suspected) fetal abnormality and damage, not applicable or unspecified 2022    Multiple anomalies of fetus affecting spicer pregnancy 2022    Poor fetal growth affecting management of mother in second trimester 2022    Pregnancy 2022    AMA (advanced maternal age) multigravida 35+ 2022    Congenital abnormalities 2022    Multigravida of advanced maternal age in second trimester 2023     Past Medical History:   Diagnosis Date    ADHD (attention deficit hyperactivity disorder)     Asthma     Depression     GERD (gastroesophageal reflux disease)     Irritable bowel syndrome     Migraine     Pregnancy complicated by multiple fetal congenital anomalies, single gestation 2022    PTSD (post-traumatic stress disorder)          REVIEW OF SYSTEMS  All systems reviewed and negative except for those discussed in HPI.       PAST SURGICAL HISTORY  Past Surgical History:   Procedure Laterality Date     SECTION  2019     SECTION N/A 10/14/2023    Procedure:  SECTION REPEAT;  Surgeon: Megan El MD;  Location: [facility] DELIVERY;  Service: Obstetrics/Gynecology;  Laterality: N/A;    COLONOSCOPY  2014    with endoscopy    SINUS SURGERY  11/22/2022    x2, 2018 (first)    WISDOM TOOTH EXTRACTION           FAMILY HISTORY  Family History   Problem Relation Age of Onset    Hypertension Mother     Diabetes Mother     Migraines Mother     Hypertension Father     Hypertension Maternal Grandmother     Migraines Maternal Grandmother     Alzheimer's disease Maternal Grandfather     Diabetes Paternal Grandmother     Migraines Paternal Grandmother     Dementia Paternal Grandmother     Stroke Paternal Grandmother     Frontotemporal dementia Paternal Grandmother     Diabetes Paternal Grandfather          SOCIAL HISTORY  Social  History     Socioeconomic History    Marital status:    Tobacco Use    Smoking status: Former     Current packs/day: 0.00     Types: Cigarettes     Start date:      Quit date:      Years since quittin.2     Passive exposure: Past    Smokeless tobacco: Never   Vaping Use    Vaping status: Never Used   Substance and Sexual Activity    Alcohol use: Not Currently    Drug use: No    Sexual activity: Yes     Partners: Male     Birth control/protection: None         ALLERGIES  Bactrim [sulfamethoxazole-trimethoprim], Cefdinir, Buspirone, Contrast dye (echo or unknown ct/mr), Ibuprofen, Iodinated contrast media, Naproxen, Toradol [ketorolac tromethamine], Aspirin, and Clindamycin/lincomycin      PHYSICAL EXAM  ED Triage Vitals   Temp Heart Rate Resp BP SpO2   24 1656 24 1656 24 1656 24 1658 24 1656   98.6 °F (37 °C) 67 18 131/62 98 %      Temp src Heart Rate Source Patient Position BP Location FiO2 (%)   -- -- -- -- --              Physical Exam      GENERAL: 38-year-old female in no acute distress  HENT: NCAT: nares patent: Neck supple: No pharyngeal erythema, no angioedema, speaking in full sentences without difficulty: Handling secretions without problems and no stridor  EYES: no scleral icterus  CV: regular rhythm, normal rate  RESPIRATORY: normal effort with normal lung sounds  ABDOMEN: soft, NTND: Bowel sounds positive  MUSCULOSKELETAL: no deformity  NEURO: alert with nonfocal neuro exam  PSYCH:  calm, cooperative  SKIN: warm, dry with no acute rash/urticaria    Vital signs and nursing notes reviewed.      LAB RESULTS  No results found for this or any previous visit (from the past 24 hour(s)).    Ordered the above labs and reviewed the results.        RADIOLOGY  No Radiology Exams Resulted Within Past 24 Hours    Ordered the above noted radiological studies. Reviewed by me in PACS.            PROCEDURES  Procedures          MEDICATIONS GIVEN IN ER  Medications   sodium  chloride 0.9 % flush 10 mL (has no administration in time range)   sodium chloride 0.9 % flush 10 mL (has no administration in time range)   sodium chloride 0.9 % infusion 40 mL (has no administration in time range)   ondansetron ODT (ZOFRAN-ODT) disintegrating tablet 4 mg (has no administration in time range)     Or   ondansetron (ZOFRAN) injection 4 mg (has no administration in time range)   Potassium Replacement - Follow Nurse / BPA Driven Protocol (has no administration in time range)   Magnesium Standard Dose Replacement - Follow Nurse / BPA Driven Protocol (has no administration in time range)   Phosphorus Replacement - Follow Nurse / BPA Driven Protocol (has no administration in time range)   Calcium Replacement - Follow Nurse / BPA Driven Protocol (has no administration in time range)   acetaminophen (TYLENOL) tablet 650 mg (has no administration in time range)   predniSONE (DELTASONE) tablet 60 mg (60 mg Oral Given 4/1/24 2049)   famotidine (PEPCID) tablet 20 mg (20 mg Oral Given 4/1/24 2049)   methylPREDNISolone sodium succinate (SOLU-Medrol) injection 125 mg (125 mg Intravenous Given 4/1/24 2228)   famotidine (PEPCID) injection 20 mg (20 mg Intravenous Given 4/1/24 2228)   diphenhydrAMINE (BENADRYL) injection 25 mg (25 mg Intravenous Given 4/1/24 2228)             MEDICAL DECISION MAKING, PROGRESS, and CONSULTS    All labs have been independently reviewed by me.  All radiology studies have been reviewed by me and I have also reviewed the radiology report.   EKG's independently viewed and interpreted by me.  Discussion below represents my analysis of pertinent findings related to patient's condition, differential diagnosis, treatment plan and final disposition.      Additional sources:    - External (non-ED) record review: I reviewed the patient's ER visits from the [facility] ED yesterday that states she was placed on clindamycin on 3/23 and has had an allergic reaction to it.  The patient was seen  there twice and given prednisone, Benadryl and Pepcid.  The patient is 7 weeks pregnant.    - Chronic or social conditions impacting care: Patient lives at home with family    - Shared decision making: After shared decision-making discussion we agreed that she needs to be admitted to the hospital for further evaluation and care of her recurrent allergic reaction.      Orders placed during this visit:  Orders Placed This Encounter   Procedures    CBC (No Diff)    Basic Metabolic Panel    Diet: Regular/House; Fluid Consistency: Thin (IDDSI 0)    Intake & Output    Weigh Patient    Oral Care    Maintain IV Access    Telemetry - Place Orders & Notify Provider of Results When Patient Experiences Acute Chest Pain, Dysrhythmia or Respiratory Distress    May Be Off Telemetry for Tests    Vital Signs    Continuous Pulse Oximetry    Up With Assistance    Code Status and Medical Interventions:    Family Medicine Consult    Inpatient Obstetrics / Gynecology Consult    Insert Peripheral IV    Initiate ED Observation Status             Independent interpretation of labs, radiology studies, and discussions with consultants:  ED Course as of 04/01/24 2251   Mon Apr 01, 2024 1916 Currently the patient's allergic reaction has resolved after her prednisone, Pepcid and Benadryl today.  She also states that she has been taking triamcinolone cream and using cool baths.  At this point the patient is stable and does not need emergent intervention but I will consult her allergist for further recommendations. [GP]   2030 We have tried to contact the patient's allergist for over an hour with no callback.  I will discuss the case with our clinical pharmacist. [GP]   2035 On repeat evaluation the patient is now having a rash to her right upper arm.  She states she is taking Benadryl here in the emergency room.  I will give her dose of prednisone and she is due for her Pepcid.  I advised her I could not reach her allergist and she is aware  that I consulted our pharmacist. [GP]   2058 I spoke with our pharmacist who states that we can split her prednisone to twice daily and that she needs to continue taking the Benadryl and Pepcid and call her allergist tomorrow.  I have discussed the above with the patient and she understands and agrees with the plan. [GP]   2205 After the Benadryl she took, and the oral prednisone and Pepcid we have given her she now has urticaria on her entire abdomen.  I spoke with our clinical pharmacist and we will go ahead and treat with IV Solu-Medrol, IV Pepcid and IV Benadryl at this point I believe she will need to be admitted to our observation unit for further evaluation and care.  She currently is having no shortness of breath, oropharyngeal edema, stridor or difficulty with secretions and thus I do not feel she needs epinephrine at this time.  I have discussed this with the patient and she understands and agrees with the plan. [GP]   2208 Patient is having recurrent urticaria likely secondary to clindamycin.  She was seen at the [facility] ED twice yesterday and again here today and is continued to have recurrent symptoms.  She is 7 weeks pregnant.  At this point I believe she be best served by admission to the observation and for further evaluation and care.  Again I have tried to contact her allergist-Dr. Nazario but no callback at this point. [GP]   2233 I have discussed the above with the patient and Thelma Garcia from the observation unit.  They understand and agree with the plan.  She states her OB is Dr. Megan El who is out of town this week. [GP]      ED Course User Index  [GP] Du Valverde MD               DIAGNOSIS  Final diagnoses:   Allergic reaction to drug, initial encounter   Less than 8 weeks gestation of pregnancy         DISPOSITION  ADMISSION    Discussed treatment plan and reason for admission with pt/family and admitting physician.  Pt/family voiced understanding of the plan for admission for  further testing/treatment as needed.            Latest Documented Vital Signs:  As of 22:51 EDT  BP- 109/69 HR- 63 Temp- 98.6 °F (37 °C) O2 sat- 96%--      --------------------  Please note that portions of this were completed with a voice recognition program.       Note Disclaimer: At [facility], we believe that sharing information builds trust and better relationships. You are receiving this note because you are receiving care at [facility] or recently visited. It is possible you will see health information before a provider has talked with you about it. This kind of information can be easy to misunderstand. To help you fully understand what it means for your health, we urge you to discuss this note with your provider.             Du Valverde MD  04/01/24 [id number]

## 2024-04-01 NOTE — TELEPHONE ENCOUNTER
PT went to ED due to a allergic reaction to the Clindamycin. ED told her to hold off on taking that per ED note in pt chart.   PT called in this morning saying that the lump is looking better but is still there on left breast. PT is still itchy due to reaction and nothing is helping. PT calling in because she does not know what else to due.   Dr El PT but saw Ann Tony for the lump

## 2024-04-01 NOTE — ED TRIAGE NOTES
Seen at [facility] yesterday x2, continues with itching from allergic reaction, states only thing helping is prednisone

## 2024-04-01 NOTE — TELEPHONE ENCOUNTER
Forwarding referral to Dr. Hall for review since Dr. Francisco is out of the office until Wednesday afternoon and OB referred back to PCP.     Dr. Hall please advise,

## 2024-04-02 ENCOUNTER — APPOINTMENT (OUTPATIENT)
Dept: ULTRASOUND IMAGING | Facility: HOSPITAL | Age: 39
End: 2024-04-02
Payer: MEDICAID

## 2024-04-02 PROBLEM — O20.8 SUBCHORIONIC HEMORRHAGE IN FIRST TRIMESTER: Status: ACTIVE | Noted: 2024-04-02

## 2024-04-02 LAB
ANION GAP SERPL CALCULATED.3IONS-SCNC: 13.3 MMOL/L (ref 5–15)
BASOPHILS # BLD AUTO: 0.03 10*3/MM3 (ref 0–0.2)
BASOPHILS NFR BLD AUTO: 0.2 % (ref 0–1.5)
BILIRUB UR QL STRIP: NEGATIVE
BUN SERPL-MCNC: 12 MG/DL (ref 6–20)
BUN/CREAT SERPL: 15.4 (ref 7–25)
CALCIUM SPEC-SCNC: 9.2 MG/DL (ref 8.6–10.5)
CHLORIDE SERPL-SCNC: 108 MMOL/L (ref 98–107)
CLARITY UR: CLEAR
CO2 SERPL-SCNC: 18.7 MMOL/L (ref 22–29)
COLOR UR: ABNORMAL
CREAT SERPL-MCNC: 0.78 MG/DL (ref 0.57–1)
DEPRECATED RDW RBC AUTO: 41 FL (ref 37–54)
EGFRCR SERPLBLD CKD-EPI 2021: 98.6 ML/MIN/1.73
EOSINOPHIL # BLD AUTO: 0 10*3/MM3 (ref 0–0.4)
EOSINOPHIL NFR BLD AUTO: 0 % (ref 0.3–6.2)
ERYTHROCYTE [DISTWIDTH] IN BLOOD BY AUTOMATED COUNT: 12 % (ref 12.3–15.4)
GLUCOSE SERPL-MCNC: 132 MG/DL (ref 65–99)
GLUCOSE UR STRIP-MCNC: NEGATIVE MG/DL
HCG INTACT+B SERPL-ACNC: NORMAL MIU/ML
HCT VFR BLD AUTO: 40.4 % (ref 34–46.6)
HGB BLD-MCNC: 13.2 G/DL (ref 12–15.9)
HGB UR QL STRIP.AUTO: NEGATIVE
IMM GRANULOCYTES # BLD AUTO: 0.11 10*3/MM3 (ref 0–0.05)
IMM GRANULOCYTES NFR BLD AUTO: 0.8 % (ref 0–0.5)
KETONES UR QL STRIP: NEGATIVE
LEUKOCYTE ESTERASE UR QL STRIP.AUTO: NEGATIVE
LYMPHOCYTES # BLD AUTO: 0.86 10*3/MM3 (ref 0.7–3.1)
LYMPHOCYTES NFR BLD AUTO: 6.6 % (ref 19.6–45.3)
MCH RBC QN AUTO: 30.6 PG (ref 26.6–33)
MCHC RBC AUTO-ENTMCNC: 32.7 G/DL (ref 31.5–35.7)
MCV RBC AUTO: 93.5 FL (ref 79–97)
MONOCYTES # BLD AUTO: 0.34 10*3/MM3 (ref 0.1–0.9)
MONOCYTES NFR BLD AUTO: 2.6 % (ref 5–12)
NEUTROPHILS NFR BLD AUTO: 11.7 10*3/MM3 (ref 1.7–7)
NEUTROPHILS NFR BLD AUTO: 89.8 % (ref 42.7–76)
NITRITE UR QL STRIP: NEGATIVE
NRBC BLD AUTO-RTO: 0 /100 WBC (ref 0–0.2)
PH UR STRIP.AUTO: <=5 [PH] (ref 5–8)
PLATELET # BLD AUTO: 272 10*3/MM3 (ref 140–450)
PMV BLD AUTO: 10.2 FL (ref 6–12)
POTASSIUM SERPL-SCNC: 4.3 MMOL/L (ref 3.5–5.2)
PROT UR QL STRIP: NEGATIVE
RBC # BLD AUTO: 4.32 10*6/MM3 (ref 3.77–5.28)
SODIUM SERPL-SCNC: 140 MMOL/L (ref 136–145)
SP GR UR STRIP: 1.03 (ref 1–1.03)
UROBILINOGEN UR QL STRIP: ABNORMAL
WBC NRBC COR # BLD AUTO: 13.04 10*3/MM3 (ref 3.4–10.8)

## 2024-04-02 PROCEDURE — 80048 BASIC METABOLIC PNL TOTAL CA: CPT

## 2024-04-02 PROCEDURE — 99231 SBSQ HOSP IP/OBS SF/LOW 25: CPT | Performed by: OBSTETRICS & GYNECOLOGY

## 2024-04-02 PROCEDURE — 25010000002 DIPHENHYDRAMINE PER 50 MG

## 2024-04-02 PROCEDURE — G0378 HOSPITAL OBSERVATION PER HR: HCPCS

## 2024-04-02 PROCEDURE — 76817 TRANSVAGINAL US OBSTETRIC: CPT

## 2024-04-02 PROCEDURE — 25010000002 METHYLPREDNISOLONE PER 40 MG

## 2024-04-02 PROCEDURE — 96376 TX/PRO/DX INJ SAME DRUG ADON: CPT

## 2024-04-02 PROCEDURE — 85025 COMPLETE CBC W/AUTO DIFF WBC: CPT

## 2024-04-02 PROCEDURE — 76815 OB US LIMITED FETUS(S): CPT

## 2024-04-02 PROCEDURE — 84702 CHORIONIC GONADOTROPIN TEST: CPT

## 2024-04-02 RX ORDER — DIPHENHYDRAMINE HYDROCHLORIDE 50 MG/ML
25 INJECTION INTRAMUSCULAR; INTRAVENOUS ONCE
Status: COMPLETED | OUTPATIENT
Start: 2024-04-02 | End: 2024-04-02

## 2024-04-02 RX ADMIN — METHYLPREDNISOLONE SODIUM SUCCINATE 40 MG: 40 INJECTION, POWDER, FOR SOLUTION INTRAMUSCULAR; INTRAVENOUS at 13:51

## 2024-04-02 RX ADMIN — METHYLPREDNISOLONE SODIUM SUCCINATE 40 MG: 40 INJECTION, POWDER, FOR SOLUTION INTRAMUSCULAR; INTRAVENOUS at 21:51

## 2024-04-02 RX ADMIN — DIPHENHYDRAMINE HYDROCHLORIDE 25 MG: 50 INJECTION, SOLUTION INTRAMUSCULAR; INTRAVENOUS at 08:59

## 2024-04-02 RX ADMIN — Medication 25 MG: at 20:02

## 2024-04-02 RX ADMIN — FAMOTIDINE 20 MG: 10 INJECTION INTRAVENOUS at 20:02

## 2024-04-02 RX ADMIN — DIPHENHYDRAMINE HYDROCHLORIDE 25 MG: 50 INJECTION, SOLUTION INTRAMUSCULAR; INTRAVENOUS at 16:34

## 2024-04-02 RX ADMIN — Medication 25 MG: at 16:34

## 2024-04-02 RX ADMIN — ACETAMINOPHEN 325MG 650 MG: 325 TABLET ORAL at 07:43

## 2024-04-02 RX ADMIN — Medication 10 ML: at 08:59

## 2024-04-02 RX ADMIN — Medication 10 ML: at 01:48

## 2024-04-02 RX ADMIN — Medication 10 ML: at 20:05

## 2024-04-02 RX ADMIN — FAMOTIDINE 20 MG: 10 INJECTION INTRAVENOUS at 09:00

## 2024-04-02 RX ADMIN — METHYLPREDNISOLONE SODIUM SUCCINATE 40 MG: 40 INJECTION, POWDER, FOR SOLUTION INTRAMUSCULAR; INTRAVENOUS at 05:17

## 2024-04-02 RX ADMIN — Medication 25 MG: at 08:58

## 2024-04-02 RX ADMIN — DIPHENHYDRAMINE HYDROCHLORIDE 25 MG: 50 INJECTION, SOLUTION INTRAMUSCULAR; INTRAVENOUS at 01:47

## 2024-04-02 NOTE — PROGRESS NOTES
ED OBSERVATION PROGRESS/DISCHARGE SUMMARY    Date of Admission: 4/1/2024   LOS: 0 days   PCP: Praful Francisco MD    Final Diagnosis allergic reaction to clindamycin      Subjective     Hospital Outcome: 38-year-old female who was admitted for allergic reaction to clindamycin with severe pruritus.  Patient has not had any issues with respirations or anaphylaxis.  Patient was started on IV Solu-Medrol Benadryl and Pepcid; patient reports continued pruritus but states that it is much improved from admission.  Patient states that she does wish to stay another evening for IV medications due to persistent allergy symptoms.  Patient is noted to be pregnant 7 weeks 3 days and OB/GYN was consulted.  Ultrasound ordered per their request that showed a subchorionic hemorrhage.  OB noted that the patient is O+ and does not require RhoGAM.  Patient already has outpatient follow-up with Dr. El in 1 week and OB/GYN recommended the patient continue with this follow-up.    ROS:  General: no fevers, chills  Respiratory: no cough, dyspnea  Cardiovascular: no chest pain, palpitations  Abdomen: No abdominal pain, nausea, vomiting, or diarrhea  Neurologic: No focal weakness    Objective   Physical Exam:  I have reviewed the vital signs.  Temp:  [97.9 °F (36.6 °C)-98.6 °F (37 °C)] 97.9 °F (36.6 °C)  Heart Rate:  [59-88] 59  Resp:  [14-18] 16  BP: (108-131)/(59-81) 126/81  General Appearance:    Alert, cooperative, no distress  Head:    Normocephalic, atraumatic  Eyes:    Sclerae anicteric  Neck:   Supple, no mass  Lungs: Clear to auscultation bilaterally, respirations unlabored  Heart: Regular rate and rhythm, S1 and S2 normal, no murmur, rub or gallop  Abdomen:  Soft, non-tender, bowel sounds active, nondistended  Extremities: No clubbing, cyanosis, or edema to lower extremities  Pulses:  2+ and symmetric in distal lower extremities  Skin: No rashes; superficial excoriation from scratching  Neurologic: Oriented x3, Normal strength to  extremities    Results Review:    I have reviewed the labs, radiology results and diagnostic studies.    Results from last 7 days   Lab Units 04/02/24  0144   WBC 10*3/mm3 13.04*   HEMOGLOBIN g/dL 13.2   HEMATOCRIT % 40.4   PLATELETS 10*3/mm3 272     Results from last 7 days   Lab Units 04/02/24  0144   SODIUM mmol/L 140   POTASSIUM mmol/L 4.3   CHLORIDE mmol/L 108*   CO2 mmol/L 18.7*   BUN mg/dL 12   CREATININE mg/dL 0.78   CALCIUM mg/dL 9.2   GLUCOSE mg/dL 132*     Imaging Results (Last 24 Hours)       ** No results found for the last 24 hours. **            I have reviewed the medications.  ---------------------------------------------------------------------------------------------  Assessment & Plan   Assessment/Problem List    Allergic reaction    Subchorionic hemorrhage in first trimester      Plan:  Allergic reaction to clindamycin:  - Continue IV Solu-Medrol every 8, IV Benadryl every 6, and Pepcid p.o.  - Patient reports that she is mildly improved, but wanting to stay for another night of IV medications    Subchorionic hemorrhage:  - Seen on pelvic ultrasound  - OB/GYN consulted; patient is O+ and does not require RhoGAM  - Patient has a scheduled appointment with Dr. El in 1 week and patient encouraged to continue this appointment.     Disposition: Discharge home in 1 day    Follow-up after Discharge: Follow-up with OB/GYN at regularly scheduled appointment in 1 week, follow-up with PCP in 1 week, follow-up with allergist as needed    This note will serve as a progress note    María Del Angel PA-C 04/02/24 07:39 EDT    I have worn appropriate PPE during this patient encounter, sanitized my hands both with entering and exiting patient's room.      55 minutes has been spent by [facility] providers in the care of this patient while under observation status

## 2024-04-02 NOTE — PLAN OF CARE
Goal Outcome Evaluation:              Outcome Evaluation: patient in room during this shift alert and oriented and able to verbalize needs, c/o itching and PRN medication provided with relief, GI following and patient is staying over for observation and possible discharge in the AM, family at bedside at this time and patient has no other questions.

## 2024-04-02 NOTE — PLAN OF CARE
Goal Outcome Evaluation:      Pt. Admitted to observation from the ED with an allergic reaction to a recently started med. Pt. Receiving IV steroids, Pepcid, and benadryl. Pt. Reported blood in urine. UA, neg for blood. OB consulted. Pt is 7 weeks pregnant. Pt. Aware of treatment plan and is agreeable.

## 2024-04-02 NOTE — H&P
[facility]   HISTORY AND PHYSICAL    Patient Name: Sonam Lang  : 1985  MRN: [id number]  Primary Care Physician:  Praful Francisco MD  Date of admission: 2024    Subjective   Subjective     Chief Complaint:   Chief Complaint   Patient presents with    Allergic Reaction         HPI:    Sonam Lang is a 38 y.o. female, with past medical history including, but not limited to, ADHD, anxiety, depression, GERD, IBS, migraines, PTSD, and is currently 7 weeks 3 days pregnant, presented to the emergency department with a complaint of rash and itching.  She states that she was started on clindamycin due to a abscess on her breast.  On 3/31/2024 she was seen twice for rash and itching.  She was started on a Medrol Dosepak and Benadryl.  Today she states that she has continued to itch and have a rash despite the medications.  Once the patient was in the observation unit she states that she has had a' very small amount of bleeding noted after going to the bathroom and she wipes.  Cath urine obtained and shows no RBCs.  Pelvic exam done and no bleeding noted.  Cervix is closed.      Review of Systems   All systems were reviewed and negative except for: What was mentioned above in the HPI    Personal History     Past Medical History:   Diagnosis Date    ADHD (attention deficit hyperactivity disorder)     Anxiety     Asthma     Congenital abnormalities 2022    Multiple fetal anomalies present including meningocele, unilateral real agenesis, unilateral club foot, membranous VSD     Depression     GERD (gastroesophageal reflux disease)     History of depression 2019    Irritable bowel syndrome     Migraine     Obesity (BMI 30.0-34.9) 2019    Pregnancy complicated by multiple fetal congenital anomalies, single gestation 2022    NTD, SUA    PTSD (post-traumatic stress disorder)     Sleep apnea 2016    CPAP       Past Surgical History:   Procedure Laterality Date     SECTION   "2019     SECTION N/A 10/14/2023    Procedure:  SECTION REPEAT;  Surgeon: Megan El MD;  Location: [facility] LABOR DELIVERY;  Service: Obstetrics/Gynecology;  Laterality: N/A;    COLONOSCOPY      with endoscopy    SINUS SURGERY  11/22/2022    x2, 2018 (first)    WISDOM TOOTH EXTRACTION         Family History: family history includes Alzheimer's disease in her maternal grandfather; Dementia in her paternal grandmother; Diabetes in her mother, paternal grandfather, and paternal grandmother; Frontotemporal dementia in her paternal grandmother; Hypertension in her father, maternal grandmother, and mother; Migraines in her maternal grandmother, mother, and paternal grandmother; Stroke in her paternal grandmother. Otherwise pertinent FHx was reviewed and not pertinent to current issue.    Social History:  reports that she quit smoking about 12 years ago. Her smoking use included cigarettes. She started smoking about 22 years ago. She has been exposed to tobacco smoke. She has never used smokeless tobacco. She reports that she does not currently use alcohol. She reports that she does not use drugs.    Home Medications:  Magnesium, Riboflavin, acetaminophen, albuterol sulfate HFA, budesonide-formoterol, diphenhydrAMINE, docusate sodium, famotidine, ferrous sulfate, fluticasone, montelukast, predniSONE, sertraline, and vitamin B-6    Allergies:  Allergies   Allergen Reactions    Bactrim [Sulfamethoxazole-Trimethoprim] Hives, Itching and Swelling    Cefdinir Hives, Itching and Swelling     FELT THROAT START TO SWELL    Buspirone Unknown - Low Severity     migraines    Contrast Dye (Echo Or Unknown Ct/Mr) Other (See Comments)     \"felt like insides were on fire\"    Ibuprofen Swelling    Iodinated Contrast Media Unknown (See Comments)     \"felt like insides were on fire\"   \"felt like insides were on fire\"    Naproxen Swelling    Toradol [Ketorolac Tromethamine] Itching    Aspirin Angioedema    " Clindamycin/Lincomycin Hives       Objective   Objective     Vitals:   Temp:  [98.6 °F (37 °C)] 98.6 °F (37 °C)  Heart Rate:  [61-67] 63  Resp:  [16-18] 16  BP: (109-131)/(62-69) 109/69  Physical Exam    Constitutional: Awake, alert   Eyes: PERRLA, sclerae anicteric, no conjunctival injection   HENT: NCAT, mucous membranes moist   Neck: Supple   Respiratory: Clear to auscultation bilaterally, nonlabored respirations    Cardiovascular: Regular rhythm palpable pedal pulses bilaterally   Gastrointestinal: Positive bowel sounds, soft, nontender, nondistended   Musculoskeletal: No bilateral ankle edema   Psychiatric: Appropriate affect, cooperative   Neurologic: Oriented x 3, strength symmetric in all extremities speech clear   Skin: No rashes    Pelvic exam done no blood noted.  Cervix closed    Result Review    Result Review:  I have personally reviewed the results from the time of this admission to 4/1/2024 22:44 EDT and agree with these findings:  []  Laboratory list / accordion  []  Microbiology  []  Radiology  []  EKG/Telemetry   []  Cardiology/Vascular   []  Pathology  [x]  Old records  []  Other:        Assessment & Plan   Assessment / Plan     Brief Patient Summary:  Sonam Lang is a 38 y.o. female who was admitted to the observation unit for further evaluation and treatment of her allergic reaction to clindamycin.    Active Hospital Problems:  Active Hospital Problems    Diagnosis     **Allergic reaction      Plan:     Allergic reaction  -Cardiac monitoring  -Vital signs every 4 hours  -Continuous pulse ox  -Solu-Medrol 40 mg IV every 8 hours  -Benadryl 25 mg IV every 6 hours  -Pepcid 20 mg p.o. twice daily  -H CG quantitative-pending  -OB consult in a.m.      DVT prophylaxis:  Mechanical DVT prophylaxis orders are present.        CODE STATUS:    Code Status (Patient has no pulse and is not breathing): CPR (Attempt to Resuscitate)  Medical Interventions (Patient has pulse or is breathing): Full  Support    Admission Status:  I believe this patient meets observation status.    80 minutes have been spent by [facility] providers in the care of this patient while under observation status.      Appropriate PPE worn during patient encounter.  Hand hygeine performed before and after seeing the patient.      Electronically signed by RAJAN Downs, 04/01/24, 10:44 PM EDT.

## 2024-04-02 NOTE — PROGRESS NOTES
ANTEPARTUM ROUNDING NOTE     Admission date 2024     SUBJECTIVE:     Sonam Lang is a 38 y.o.,  7w3d admitted for severe allergic reaction.  Allergic reaction was thought to be due to clindamycin.  Patient did not respond to outpatient Benadryl.  She started on clindamycin about a week and a half ago when she went to an ER with some cellulitis on her left breast.  She is not breast-feeding.  She does not have any history of a skin break to the area.  The area is feeling much better and is not draining.    Patient has been responding to IV steroids, Pepcid and Benadryl but she is still having some itching.  She noted some brown spotting while she was in the emergency room.  A pelvic ultrasound was ordered.  Patient has a history of 2 ectopic pregnancies.      OBJECTIVE:     Vitals:   Vitals:    24 0012 24 0452 24 0737 24 1106   BP:  108/59 126/81 130/73   BP Location:  Right arm Left arm Left arm   Patient Position:  Lying Lying Sitting   Pulse: 64 64 59 68   Resp:  14 16 16   Temp:  98.1 °F (36.7 °C) 97.9 °F (36.6 °C) 98.1 °F (36.7 °C)   TempSrc:  Oral Oral Oral   SpO2:  97% 97% 100%   Weight:       Height:           Results from last 7 days   Lab Units 24  0144   WBC 10*3/mm3 13.04*   HEMOGLOBIN g/dL 13.2   HEMATOCRIT % 40.4   PLATELETS 10*3/mm3 272       Pelvic ultrasound shows a intrauterine pregnancy with crown-rump length of 7 weeks 2 days which is consistent with LMP EDC of 2024.  There is a subchorionic hemorrhage that measures 1.8 x 0.7 x 1.1 cm.    EXAM:  Appearance/Psychiatric: In no distress   Constitutional: The patient is well nourished   Cardiovascular: She does not have edema.    Respiratory: Respiratory effort is normal.   Breast: On the left breast there is an area at 9:00 that appears to be a healed pustule.  There is no erythema tenderness or mass.  The skin is intact.    ASSESSMENT AND PLAN:     Allergic reaction    Subchorionic hemorrhage in  first trimester    Vaginal bleeding with subchorionic hemorrhage-ultrasound shows viable spicer intrauterine pregnancy.  Patient's blood type is O+ so she does not require RhoGAM.  Discussed risk of miscarriage.  Patient has follow-up set up with Dr. El in the office next week.  She will call if she has any new bright red vaginal bleeding.  From our standpoint she is okay for discharge.    Severe allergic reaction responding to IV steroids, Pepcid and Benadryl.  Patient was concerned of Benadryl was safe in pregnancy which we discussed it is.    We will sign off.  Thank you for consult.

## 2024-04-02 NOTE — ED NOTES
".Nursing report ED to floor  Sonam Lang  38 y.o.  female    HPI :  HPI (Adult)  Stated Reason for Visit: allergic reaction  History Obtained From: patient    Chief Complaint  Chief Complaint   Patient presents with    Allergic Reaction       Admitting doctor:   Zeina Corrales MD    Admitting diagnosis:   The primary encounter diagnosis was Allergic reaction to drug, initial encounter. A diagnosis of Less than 8 weeks gestation of pregnancy was also pertinent to this visit.    Code status:   Current Code Status       Date Active Code Status Order ID Comments User Context       Prior            Allergies:   Bactrim [sulfamethoxazole-trimethoprim], Cefdinir, Buspirone, Contrast dye (echo or unknown ct/mr), Ibuprofen, Iodinated contrast media, Naproxen, Toradol [ketorolac tromethamine], Aspirin, and Clindamycin/lincomycin    Isolation:   Enhanced Droplet/Contact     Intake and Output  No intake or output data in the 24 hours ending 04/01/24 2239    Weight:       04/01/24  1656   Weight: 111 kg (245 lb)       Most recent vitals:   Vitals:    04/01/24 1656 04/01/24 1658 04/01/24 1941 04/01/24 2150   BP:  131/62 109/69    Pulse: 67  61 63   Resp: 18  16 16   Temp: 98.6 °F (37 °C)      SpO2: 98%  99% 96%   Weight: 111 kg (245 lb)      Height: 172.7 cm (68\")          Active LDAs/IV Access:   Lines, Drains & Airways       Active LDAs       Name Placement date Placement time Site Days    Peripheral IV 04/01/24 2222 Anterior;Left Forearm 04/01/24 2222  Forearm  less than 1                    Labs (abnormal labs have a star):   Labs Reviewed - No data to display    EKG:   No orders to display       Meds given in ED:   Medications   predniSONE (DELTASONE) tablet 60 mg (60 mg Oral Given 4/1/24 2049)   famotidine (PEPCID) tablet 20 mg (20 mg Oral Given 4/1/24 2049)   methylPREDNISolone sodium succinate (SOLU-Medrol) injection 125 mg (125 mg Intravenous Given 4/1/24 2228)   famotidine (PEPCID) injection 20 mg (20 mg Intravenous " Given 24)   diphenhydrAMINE (BENADRYL) injection 25 mg (25 mg Intravenous Given 24)       Imaging results:  No radiology results for the last day    Ambulatory status:   - up ad ghazal    Social issues:   Social History     Socioeconomic History    Marital status:    Tobacco Use    Smoking status: Former     Current packs/day: 0.00     Types: Cigarettes     Start date:      Quit date:      Years since quittin.2     Passive exposure: Past    Smokeless tobacco: Never   Vaping Use    Vaping status: Never Used   Substance and Sexual Activity    Alcohol use: Not Currently    Drug use: No    Sexual activity: Yes     Partners: Male     Birth control/protection: None       Peripheral Neurovascular  Peripheral Neurovascular (Adult)  Peripheral Neurovascular WDL: WDL    Neuro Cognitive  Neuro Cognitive (Adult)  Cognitive/Neuro/Behavioral WDL: WDL    Learning  Learning Assessment (Adult)  Learning Readiness and Ability: no barriers identified    Respiratory  Respiratory (Adult)  Airway WDL: WDL  Respiratory WDL  Respiratory WDL: WDL    Abdominal Pain       Pain Assessments  Pain (Adult)  (0-10) Pain Rating: Rest: 9    NIH Stroke Scale       Netta Oliveira RN  24 22:39 EDT

## 2024-04-02 NOTE — PROGRESS NOTES
The NAVEEN and I have discussed this patient's history, physical exam and treatment plan.  I provided a substantive portion of the care of this patient.  I have reviewed the documentation and personally had a face to face interaction with the patient and personally performed the physical exam, in its entirety.  I affirm the documentation and agree with the treatment and plan.  The following describes my personal findings.  SHARED VISIT: This visit was performed by BOTH a physician and an APC. The substantive portion of the medical decision making was performed by this attesting physician who made or approved the management plan and takes responsibility for patient management. All studies in the APC note (if performed) were independently interpreted by me.     Gynecologist, Dr. Megan El    The patient is admitted to the observation unit for further evaluation of rash/allergic reaction thought to be secondary to recent clindamycin prescription.  Patient denies swelling of her mouth or throat, difficulty swallowing or breathing.  Patient reports the rash is much improved in the observation unit.  Patient reports she had a infection on her left breast for which she was put on clindamycin 1 week ago with subsequent onset of rash and itching.  Patient reports her swelling and tenderness of her left breast is much improved since that time.    Of note patient is first trimester pregnancy at 7-3/7 weeks by first trimester ultrasound.  Patient denies abdominal pain at the time of exam but reports she has noticed some blood in her urine this evening.  Denies fever, nausea, vomiting.    Comprehensive Physical exam:  Patient is nontoxic appearing oriented, conversant awake, alert  HEENT: normocephalic, atraumatic, voice normal  Neck: no goiter, no pain with ROM  Pulmonary: Nontachypneic, breath sounds heard well bilaterally  cardiovascular: Nontachycardic  Abdomen: Soft, nontender  musculoskeletal: Good range of motion x  4  Neuro/psychiatric:calm, appropriate, cooperative  Skin:warm, dry, no obvious rash noted, no urticaria      Rash  IV Solu-Medrol  Pepcid IV  IV Benadryl      Hematuria  In first trimester pregnancy   UA pending  If patient with hematuria, will need renal ultrasound to rule out renal obstruction.  If patient without hematuria, will further investigate for vaginal bleeding consistent with threatened miscarriage.  O+ blood type noted on patient's chart

## 2024-04-02 NOTE — PROGRESS NOTES
RITESH RINCON Attestation Note    I supervised care provided by the midlevel provider.    The NAVEEN and I have discussed this patient's history, physical exam, and treatment plan. I have reviewed the documentation and personally had a face to face interaction with the patient  I affirm the documentation and agree with the treatment and plan. I provided a substantive portion of the care of this patient.  I personally performed the physical exam, in its entirety.  My personal findings are documented in below:    History:  38-year-old female who is currently 7 weeks 3 days gestation presenting for evaluation of diffuse rash and pruritus.  Patient was recently on a course of clindamycin.  Patient took 5 days and then began developing diffuse rash throughout her body.  Patient states the pruritus is nearly intolerable.  Patient has a history of medication reactions in the past.  Patient was started on Medrol Dosepak and Benadryl outpatient but symptoms persisted.    Of note patient did start developing some mild vaginal bleeding while in the emergency department.    Physical Exam:  General: No acute distress.  HENT: NCAT, PERRL, Nares patent.  Eyes: no scleral icterus.  Neck: trachea midline, no ROM limitations.  CV: Pink warm and well-perfused throughout  Respiratory: No distress or increased work of breathing  Abdomen: soft, no focal tenderness or rigidity  Musculoskeletal: no deformity.  Neuro: alert, moves all extremities, follows commands.  Skin: warm, dry.  Diffuse resolving urticarial rash    Assessment and Plan:  38-year-old female currently 7 weeks 3 days gestation presenting for evaluation of diffuse urticarial rash and pruritus    - Suspect reaction to clindamycin however unclear of exact etiology of reaction  - Will continue treating with IV steroids, famotidine and Benadryl as needed  - Will be consulted regarding vaginal bleeding, transvaginal ultrasound pending

## 2024-04-03 ENCOUNTER — READMISSION MANAGEMENT (OUTPATIENT)
Dept: CALL CENTER | Facility: HOSPITAL | Age: 39
End: 2024-04-03
Payer: MEDICAID

## 2024-04-03 VITALS
SYSTOLIC BLOOD PRESSURE: 124 MMHG | HEART RATE: 54 BPM | TEMPERATURE: 98.1 F | WEIGHT: 251.3 LBS | HEIGHT: 68 IN | DIASTOLIC BLOOD PRESSURE: 65 MMHG | RESPIRATION RATE: 16 BRPM | OXYGEN SATURATION: 97 % | BODY MASS INDEX: 38.09 KG/M2

## 2024-04-03 PROCEDURE — G0378 HOSPITAL OBSERVATION PER HR: HCPCS

## 2024-04-03 PROCEDURE — 25010000002 METHYLPREDNISOLONE PER 40 MG

## 2024-04-03 PROCEDURE — 25010000002 DIPHENHYDRAMINE PER 50 MG

## 2024-04-03 PROCEDURE — 96376 TX/PRO/DX INJ SAME DRUG ADON: CPT

## 2024-04-03 RX ORDER — DIPHENHYDRAMINE HCL 25 MG
25 TABLET ORAL EVERY 6 HOURS PRN
Qty: 20 TABLET | Refills: 0 | Status: SHIPPED | OUTPATIENT
Start: 2024-04-03

## 2024-04-03 RX ADMIN — METHYLPREDNISOLONE SODIUM SUCCINATE 40 MG: 40 INJECTION, POWDER, FOR SOLUTION INTRAMUSCULAR; INTRAVENOUS at 06:11

## 2024-04-03 RX ADMIN — FAMOTIDINE 20 MG: 10 INJECTION INTRAVENOUS at 08:08

## 2024-04-03 RX ADMIN — DIPHENHYDRAMINE HYDROCHLORIDE 25 MG: 50 INJECTION, SOLUTION INTRAMUSCULAR; INTRAVENOUS at 09:12

## 2024-04-03 RX ADMIN — DIPHENHYDRAMINE HYDROCHLORIDE 25 MG: 50 INJECTION, SOLUTION INTRAMUSCULAR; INTRAVENOUS at 01:01

## 2024-04-03 RX ADMIN — Medication 25 MG: at 08:09

## 2024-04-03 RX ADMIN — Medication 10 ML: at 08:27

## 2024-04-03 NOTE — PROGRESS NOTES
RITESH RINCON Attestation Note     I supervised care provided by the midlevel provider. We have discussed this patient's history, physical exam, and treatment plan. I have reviewed the midlevel provider's note and I agree with the midlevel provider's findings and plan of care.   SHARED VISIT: This visit was performed by BOTH a physician and an APC. The substantive portion of the medical decision making was performed by this attesting physician who made or approved the management plan and takes responsibility for patient management. All studies in the APC note (if performed) were independently interpreted by me.   I have personally had a face to face encounter with the patient.   My personal findings are documented below:      Subjective  Pt is a 38 y.o. female admitted from Emergency Department for evaluation and treatment of rash and itching felt to be related to recent reaction to clindamycin.  Patient is roughly 7 weeks pregnant.    Physical Exam  GENERAL: Alert and in NAD, Vitals reviewed  HENT: nares patent  EYES: no scleral icterus  CV: regular rhythm, regular rate  RESPIRATORY: normal effort  ABDOMEN: soft  MUSCULOSKELETAL: no deformity  NEURO: Strength sensation and coordination are grossly intact.  Speech and mentation are unremarkable  SKIN: warm, dry-small patches of resolving erythema without noted swelling.  (When compared with prior pictures from patient's phone, rash is much better).    Assessment/Plan  I discussed tx and evaluation of this patient with NP Joses.  Patient markedly improved after IV steroids, Pepcid and Benadryl.  Will discharge home on continued treatment and expect improvement as the clindamycin wears out of her system.  Patient was seen by OB, Dr. Wallace.  Ultrasound showed 7 weeks 3-day gestation with small subchorionic hemorrhage.  Can follow-up outpatient with Dr. Megan El.

## 2024-04-03 NOTE — PLAN OF CARE
Goal Outcome Evaluation:      Patient is alert and oriented x's 4, VSS, continues to experience some itching but no skin rash noted. Reviewed medications, when she can take them again and possible side effects. No further questions noted at discharge.

## 2024-04-03 NOTE — PLAN OF CARE
Goal Outcome Evaluation:      Pt. Receiving IV steroids and Pepcid. Pt. C/o of intermittent itching. Plan to discharge today. Pt resting comfortably.

## 2024-04-03 NOTE — DISCHARGE SUMMARY
ED OBSERVATION PROGRESS/DISCHARGE SUMMARY    Date of Admission: 4/1/2024   LOS: 0 days   PCP: Praful Francisco MD    Final Diagnosis allergic reaction to clindamycin, subchorionic hemorrhage    Subjective   Patient still endorsed itching overnight, however her rash  has completely resolved.  She denies any abdominal pain, vaginal bleeding.    Hospital Outcome:   4/2/2024  38-year-old female who was admitted for allergic reaction to clindamycin with severe pruritus.  Patient had no issues with respirations or anaphylaxis.  Patient was started on IV Solu-Medrol Benadryl and Pepcid; patient reports continued pruritus but states that it is much improved from admission.  Patient is noted to be pregnant 7 weeks 5 days and OB/GYN was consulted.  Ultrasound ordered per their request that showed a subchorionic hemorrhage.  OB noted that the patient is O+ and does not require RhoGAM.  Patient already has outpatient follow-up with Dr. El in 1 week and OB/GYN recommended the patient continue with this follow-up.  She will be discharged home.    ROS:  General: no fevers, chills  Respiratory: no cough, dyspnea  Cardiovascular: no chest pain, palpitations  Abdomen: No abdominal pain, nausea, vomiting, or diarrhea  Neurologic: No focal weakness    Objective   Physical Exam:  I have reviewed the vital signs.  Temp:  [98.1 °F (36.7 °C)-98.2 °F (36.8 °C)] 98.1 °F (36.7 °C)  Heart Rate:  [52-71] 54  Resp:  [16] 16  BP: (114-130)/(65-76) 124/65  General Appearance:    Alert, cooperative, no distress  Head:    Normocephalic, atraumatic  Eyes:    Sclerae anicteric  Neck:   Supple, no mass  Lungs: Clear to auscultation bilaterally, respirations unlabored  Heart: Regular rate and rhythm, S1 and S2 normal, no murmur, rub or gallop  Abdomen:  Soft, non-tender, bowel sounds active, nondistended  Extremities: No clubbing, cyanosis, or edema to lower extremities  Pulses:  2+ and symmetric in distal lower extremities  Skin: No rashes    Neurologic: Oriented x3, Normal strength to extremities    Results Review:    I have reviewed the labs, radiology results and diagnostic studies.    Results from last 7 days   Lab Units 04/02/24  0144   WBC 10*3/mm3 13.04*   HEMOGLOBIN g/dL 13.2   HEMATOCRIT % 40.4   PLATELETS 10*3/mm3 272     Results from last 7 days   Lab Units 04/02/24  0144   SODIUM mmol/L 140   POTASSIUM mmol/L 4.3   CHLORIDE mmol/L 108*   CO2 mmol/L 18.7*   BUN mg/dL 12   CREATININE mg/dL 0.78   CALCIUM mg/dL 9.2   GLUCOSE mg/dL 132*     Imaging Results (Last 24 Hours)       Procedure Component Value Units Date/Time    US Ob Transvaginal [[id number]] Collected: 04/02/24 0921     Updated: 04/02/24 0936    Narrative:      US OB TRANSVAGINAL-     Clinical: Vaginal bleeding, early pregnancy at 7 weeks 3 days     Transabdominal and transvaginal examination  Grayscale imaging and color Doppler        COMPARISON ultrasound dated 3/21/2024     FINDINGS: Uterus measures 10.7 cm in length, 5.9 cm AP and 7.2 cm  transverse.     There is a single live intrauterine gestation with an estimated fetal  age by ultrasound of 7 weeks 2 days. Active cardiac motion at 120 bpm.  Yolk sac identified. Satisfactory decidual reaction. Satisfactory  amniotic fluid volume. There is small subchorionic collection along the  lower margin of the gestational sac, measuring 18 x 7 x 11 mm. This  consistent with a small subchorionic bleed.     The right ovary measures 3.1 x 1.4 x 1.6 cm and is normal in  echotexture. The left ovary measures 4.8 x 2.6 x 2.7 cm. There is a  simple left ovarian cyst which measures 2.2 cm in maximum diameter.  Color Doppler and spectral analysis demonstrates satisfactory left  ovarian arterial inflow and venous outflow.     No fluid is demonstrated within the pelvic cul-de-sac.     CONCLUSION: Single live intrauterine gestation with an estimated fetal  age by ultrasound of 7 weeks 2 days. Subchorionic hemorrhage along the  inferior gestational  sac. Active cardiac motion 120 bpm        This report was finalized on 4/2/2024 9:33 AM by Dr. Dain Steen M.D  on Workstation: [id number]               I have reviewed the medications.  ---------------------------------------------------------------------------------------------  Assessment & Plan   Assessment/Problem List    Allergic reaction    Subchorionic hemorrhage in first trimester      Plan:  Allergic reaction to clindamycin:  -You will be discharged home with a steroid taper.    - Continue to take famotidine.    - Benadryl as needed for itching.     Subchorionic hemorrhage:  - Seen on pelvic ultrasound  - OB/GYN consulted; patient is O+ and does not require RhoGAM  - Patient has a scheduled appointment with Dr. El in 1 week and patient encouraged to continue this appointment.        Disposition: Home    Follow-up after Discharge: Follow-up with your primary care provider in 1 to 2 weeks.  Follow-up with Dr. El in 1 week as scheduled.    This note will serve as a discharge summary    Glenna Raimrez, APRN 04/03/24 09:30 EDT    I have worn appropriate PPE during this patient encounter, sanitized my hands both with entering and exiting patient's room.      35 minutes has been spent by [facility] providers in the care of this patient while under observation status

## 2024-04-04 ENCOUNTER — TRANSITIONAL CARE MANAGEMENT TELEPHONE ENCOUNTER (OUTPATIENT)
Dept: CALL CENTER | Facility: HOSPITAL | Age: 39
End: 2024-04-04
Payer: MEDICAID

## 2024-04-04 NOTE — OUTREACH NOTE
Call Center TCM Note      Flowsheet Row Responses   [facility] patient discharged from? [place]   Does the patient have one of the following disease processes/diagnoses(primary or secondary)? Other   TCM attempt successful? Yes   Call start time 1427   Call end time 1428   Discharge diagnosis Allergic reaction to clindamycin   Meds reviewed with patient/caregiver? Yes   Is the patient having any side effects they believe may be caused by any medication additions or changes? No   Does the patient have all medications ordered at discharge? Yes   Is the patient taking all medications as directed (includes completed medication regime)? Yes   Comments No available HOSP DC FU appts that meet TCM guidelines.   Does the patient have an appointment with their PCP within 7-14 days of discharge? No appointments available   Nursing Interventions Routed TCM call to PCP office   Has home health visited the patient within 72 hours of discharge? N/A   Psychosocial issues? No   Did the patient receive a copy of their discharge instructions? Yes   Nursing interventions Reviewed instructions with patient   What is the patient's perception of their health status since discharge? Improving   Is the patient/caregiver able to teach back signs and symptoms related to disease process for when to call PCP? Yes   Is the patient/caregiver able to teach back signs and symptoms related to disease process for when to call 911? Yes   Is the patient/caregiver able to teach back the hierarchy of who to call/visit for symptoms/problems? PCP, Specialist, Home health nurse, Urgent Care, ED, 911 Yes   TCM call completed? Yes   Wrap up additional comments pt reports she is doing better. No needs.   Call end time 1428            Rowan Newton RN    4/4/2024, 14:28 EDT

## 2024-04-04 NOTE — OUTREACH NOTE
Call Center TCM Note      Flowsheet Row Responses   [facility] patient discharged from? [place]   Does the patient have one of the following disease processes/diagnoses(primary or secondary)? Other   TCM attempt successful? No   Unsuccessful attempts Attempt 1            Rowan Newton RN    4/4/2024, 13:31 EDT

## 2024-04-04 NOTE — OUTREACH NOTE
Prep Survey      Flowsheet Row Responses   [facility] patient discharged from? [place]   Is LACE score < 7 ? No   Eligibility [facility]   Date of Admission 04/01/24   Date of Discharge 04/03/24   Discharge Disposition Home or Self Care   Discharge diagnosis Allergic reaction to clindamycin   Does the patient have one of the following disease processes/diagnoses(primary or secondary)? Other   Does the patient have Home health ordered? No   Is there a DME ordered? No   Prep survey completed? Yes            Consuelo TYLER - Registered Nurse

## 2024-04-05 ENCOUNTER — TELEPHONE (OUTPATIENT)
Dept: SPORTS MEDICINE | Facility: CLINIC | Age: 39
End: 2024-04-05
Payer: MEDICAID

## 2024-04-05 DIAGNOSIS — T78.40XA ALLERGY, INITIAL ENCOUNTER: Primary | ICD-10-CM

## 2024-04-05 NOTE — TELEPHONE ENCOUNTER
Patient is requesting a referral to Dr. Nazario   At [facility]. ASAP  Patient states that she had an allergic reaction and was in the hospital earlier in this week.   Patient sates she has an appointment today with this office.    Please advise.

## 2024-04-11 ENCOUNTER — OFFICE VISIT (OUTPATIENT)
Dept: SPORTS MEDICINE | Facility: CLINIC | Age: 39
End: 2024-04-11
Payer: MEDICAID

## 2024-04-11 VITALS
BODY MASS INDEX: 38.04 KG/M2 | HEIGHT: 68 IN | WEIGHT: 251 LBS | DIASTOLIC BLOOD PRESSURE: 68 MMHG | TEMPERATURE: 97.6 F | SYSTOLIC BLOOD PRESSURE: 116 MMHG | HEART RATE: 96 BPM | OXYGEN SATURATION: 98 %

## 2024-04-11 DIAGNOSIS — M72.2 PLANTAR FASCIITIS, RIGHT: Primary | ICD-10-CM

## 2024-04-11 DIAGNOSIS — T78.40XD ALLERGIC REACTION TO DRUG, SUBSEQUENT ENCOUNTER: ICD-10-CM

## 2024-04-11 PROCEDURE — 99213 OFFICE O/P EST LOW 20 MIN: CPT | Performed by: FAMILY MEDICINE

## 2024-04-11 RX ORDER — CETIRIZINE HYDROCHLORIDE 5 MG/1
5 TABLET ORAL 3 TIMES DAILY
COMMUNITY

## 2024-04-11 RX ORDER — MAGNESIUM OXIDE TAB 400 MG (241.3 MG ELEMENTAL MG) 400 (241.3 MG) MG
400 TAB ORAL DAILY
COMMUNITY
Start: 2024-04-05

## 2024-04-11 RX ORDER — BUDESONIDE 1 MG/2ML
1 INHALANT ORAL
COMMUNITY
Start: 2024-04-10

## 2024-04-11 NOTE — PROGRESS NOTES
"Sonam is a 38 y.o. year old female     Chief Complaint   Patient presents with    Foot Pain     RIGHT ACHILLES- Patient is here to follow up on her right achilles pain.     Hospital Follow Up Visit     Patient was admitted on 04/01 for an allergic reaction to her clindamycin, discharged on 04/03.       History of Present Illness  History of Present Illness  The patient is here today for evaluation of right foot plantar fascia pain.    The patient has been experiencing intermittent pain in her right foot, specifically the plantar fascia, for the past few months. She has intermittently engaged in physical therapy, which initially seemed to alleviate the pain, however, the pain has since recurred.    The patient is also here for a follow-up consultation regarding a recent allergic reaction to Clindamycin, for which she is currently under the care of an allergist. The allergic reaction has shown significant improvement and is now effectively managed with over-the-counter Zyrtec, administered twice daily.   She is allergic to CLINDAMYCIN.    I have reviewed the patient's medical, family, and social history in detail and updated the computerized patient record.    Review of Systems    /68 (BP Location: Right arm, Patient Position: Sitting, Cuff Size: Adult)   Pulse 96   Temp 97.6 °F (36.4 °C) (Temporal)   Ht 172.7 cm (68\")   Wt 114 kg (251 lb)   LMP 02/10/2024 (Exact Date)   SpO2 98%   BMI 38.16 kg/m²      Physical Exam    Vital signs reviewed.   General: No acute distress.      Physical Exam  The right foot appears normal. There is tenderness when the plantar fascial origin is palpated. Notable tightness is noted in the gastrocnemius muscles on both sides. The ankle has a normal range of motion, normal tendon function, and normal strength. Both lower extremities are notable for mild genu valgum.    Results  Results      Procedures     Diagnoses and all orders for this visit:    Plantar fasciitis, " right    Allergic reaction to drug, subsequent encounter  Comments:  Clindamycin    Other orders  -     MAGnesium-Oxide 400 (240 Mg) MG tablet; Take 1 tablet by mouth Daily.  -     budesonide (RINOCORT AQUA) 32 MCG/ACT nasal spray; 1 spray into the nostril(s) as directed by provider Daily.  -     budesonide (PULMICORT) 1 MG/2ML nebulizer solution; Take 2 mL by nebulization.  -     cetirizine (zyrTEC) 5 MG tablet; Take 1 tablet by mouth 3 (Three) Times a Day. PATIENT TAKING ONCE DAILY      Assessment & Plan  1. Plantar fasciitis.  A home exercise regimen, including walking, was discussed as a strategy to manage the condition conservatively. The importance of consistent exercises, even after the resolution of the pain, was emphasized.    2. Allergic reaction to Clindamycin.  The patient is advised to continue her follow-up appointments with the allergist. The continuation of Zyrtec was recommended to manage the histamine response.    Patient or patient representative verbalized consent for the use of Ambient Listening during the visit with  Praful Francisco MD for chart documentation. 4/15/2024  17:03 EDT    Praful Francisco MD   15:21 EDT   04/11/24

## 2024-04-12 ENCOUNTER — OFFICE VISIT (OUTPATIENT)
Dept: OBSTETRICS AND GYNECOLOGY | Age: 39
End: 2024-04-12
Payer: MEDICAID

## 2024-04-12 VITALS — WEIGHT: 241 LBS | BODY MASS INDEX: 36.53 KG/M2 | HEIGHT: 68 IN

## 2024-04-12 DIAGNOSIS — O99.340 ANXIETY IN PREGNANCY, ANTEPARTUM: ICD-10-CM

## 2024-04-12 DIAGNOSIS — J45.909 ASTHMA AFFECTING PREGNANCY IN FIRST TRIMESTER: ICD-10-CM

## 2024-04-12 DIAGNOSIS — O09.521 MULTIGRAVIDA OF ADVANCED MATERNAL AGE IN FIRST TRIMESTER: ICD-10-CM

## 2024-04-12 DIAGNOSIS — O99.511 ASTHMA AFFECTING PREGNANCY IN FIRST TRIMESTER: ICD-10-CM

## 2024-04-12 DIAGNOSIS — O36.80X1 ENCOUNTER TO DETERMINE FETAL VIABILITY OF PREGNANCY, FETUS 1: Primary | ICD-10-CM

## 2024-04-12 DIAGNOSIS — Z3A.08 8 WEEKS GESTATION OF PREGNANCY: ICD-10-CM

## 2024-04-12 DIAGNOSIS — F41.9 ANXIETY IN PREGNANCY, ANTEPARTUM: ICD-10-CM

## 2024-04-12 NOTE — PROGRESS NOTES
GYN Visit    2024    Patient: Sonam Lang          MR#:[id number]      Chief Complaint   Patient presents with    Possible Pregnancy     Gyn F/u & Viability US - Pt seen Ann in office on 3/21/24 for early pregnancy, Pt LMP 2/10/24, Pt went to ER for allergic reaction on , Pt c/o headache since ER visit       History of Present Illness    38 y.o. female  who presents for  pregnancy confirmation    Patient feels well today just tired  She is taking a prenatal vitamin Zoloft and numerous allergy medications and asthma medications  She had an allergic reaction which was treated with a steroid taper  She is currently done without steroid taper  Ultrasound today shows a viable intrauterine pregnancy at 8 weeks with good fetal heart tones  There is a chorionic bump  Reviewed patient's history as well as her medication list  I do recommend baby aspirin from 12 weeks until delivery to prevent preeclampsia due to her risk factor of advanced maternal age  Patient declines baby aspirin she says she is allergic to it  She did not have any rash or itching but did have swelling during her pregnancy which she attributed to the baby aspirin  I recommended that she try it again as swelling is a nonspecific symptom of pregnancy and not necessarily an allergic reaction  The patient will think about it  Labs today  Follow-up in 2 weeks for rob        Patient's last menstrual period was 02/10/2024 (exact date).    ________________________________________  Patient Active Problem List   Diagnosis    Migraine without aura and without status migrainosus, not intractable    Allergy to NSAIDs    Asthma, mild persistent    Obesity (BMI 30.0-34.9)    Sleep apnea    Anxiety    Previous  section    , delivered    Adult ADHD    Anxiety in pregnancy, antepartum    Multigravida of advanced maternal age in third trimester    Previous  delivery affecting pregnancy     delivery delivered     Allergic reaction    Subchorionic hemorrhage in first trimester       Past Medical History:   Diagnosis Date    ADHD (attention deficit hyperactivity disorder)     Anxiety     Asthma     Congenital abnormalities 2022    Multiple fetal anomalies present including meningocele, unilateral real agenesis, unilateral club foot, membranous VSD     Depression     GERD (gastroesophageal reflux disease)     History of depression 2019    Irritable bowel syndrome     Migraine     Obesity (BMI 30.0-34.9) 2019    Pregnancy complicated by multiple fetal congenital anomalies, single gestation 2022    NTD, SUA    PTSD (post-traumatic stress disorder)     Sleep apnea 2016    CPAP       Past Surgical History:   Procedure Laterality Date     SECTION  2019     SECTION N/A 10/14/2023    Procedure:  SECTION REPEAT;  Surgeon: Megan El MD;  Location: [facility] LABOR DELIVERY;  Service: Obstetrics/Gynecology;  Laterality: N/A;    COLONOSCOPY      with endoscopy    SINUS SURGERY  11/22/2022    x2, 2018 (first)    WISDOM TOOTH EXTRACTION         Social History     Tobacco Use   Smoking Status Former    Current packs/day: 0.00    Types: Cigarettes    Start date:     Quit date:     Years since quittin.2    Passive exposure: Past   Smokeless Tobacco Never       has a current medication list which includes the following prescription(s): albuterol sulfate hfa, budesonide, budesonide, budesonide-formoterol, cetirizine, diphenhydramine, docusate sodium, magnesium-oxide, montelukast, riboflavin, sertraline, sv iron, vitamin b-6, and acetaminophen.  ________________________________________    Current contraception: none      The following portions of the patient's history were reviewed and updated as appropriate: allergies, current medications, past family history, past medical history, past social history, past surgical history, and problem list.    Review of  "Systems    Pertinent items are noted in HPI.     Objective   Physical Exam    Ht 172.7 cm (68\")   Wt 109 kg (241 lb)   LMP 02/10/2024 (Exact Date)   BMI 36.64 kg/m²    BP Readings from Last 3 Encounters:   04/11/24 116/68   04/03/24 124/65   03/31/24 125/80      Wt Readings from Last 3 Encounters:   04/12/24 109 kg (241 lb)   04/11/24 114 kg (251 lb)   04/01/24 114 kg (251 lb 4.8 oz)      BMI: Estimated body mass index is 36.64 kg/m² as calculated from the following:    Height as of this encounter: 172.7 cm (68\").    Weight as of this encounter: 109 kg (241 lb).    Lungs: non labored breathing, no wheezing or tachpnea  Extremities: extremities normal, atraumatic, no cyanosis or edema  Skin: Skin color, texture, turgor normal. No rashes or lesions  Neurologic: Grossly normal  General:   alert, appears stated age, and cooperative   Abdomen: soft, non-tender, without masses or organomegaly            See ultrasound viable IUP at 8 weeks  There is a chorionic bump  Ovaries appear normal                 Assessment:      Diagnoses and all orders for this visit:    1. Encounter to determine fetal viability of pregnancy, fetus 1 (Primary)    2. Multigravida of advanced maternal age in first trimester  -     OB Panel With HIV  -     Urine Culture - Urine, Urine, Clean Catch  -     TSH  -     Hemoglobin A1c  -     Chlamydia / Gonococcus / Mycoplasma Genitalium, DAWOOD, Urine - Urine, Clean Catch    3. Anxiety in pregnancy, antepartum    4. Asthma affecting pregnancy in first trimester    5. 8 weeks gestation of pregnancy  -     OB Panel With HIV  -     Urine Culture - Urine, Urine, Clean Catch  -     TSH  -     Hemoglobin A1c  -     Chlamydia / Gonococcus / Mycoplasma Genitalium, DAWOOD, Urine - Urine, Clean Catch        Follow-up 2 weeks for panorama  Recommended baby aspirin tat 12 weeks -the patient may declined to take this      "

## 2024-04-14 LAB
ABO GROUP BLD: ABNORMAL
BACTERIA UR CULT: NORMAL
BACTERIA UR CULT: NORMAL
BASOPHILS # BLD AUTO: 0 X10E3/UL (ref 0–0.2)
BASOPHILS NFR BLD AUTO: 0 %
BLD GP AB SCN SERPL QL: NEGATIVE
EOSINOPHIL # BLD AUTO: 0.1 X10E3/UL (ref 0–0.4)
EOSINOPHIL NFR BLD AUTO: 0 %
ERYTHROCYTE [DISTWIDTH] IN BLOOD BY AUTOMATED COUNT: 12.3 % (ref 11.7–15.4)
HBA1C MFR BLD: 5.3 % (ref 4.8–5.6)
HBV SURFACE AG SERPL QL IA: NEGATIVE
HCT VFR BLD AUTO: 41.7 % (ref 34–46.6)
HCV IGG SERPL QL IA: NON REACTIVE
HGB BLD-MCNC: 14.1 G/DL (ref 11.1–15.9)
HIV 1+2 AB+HIV1 P24 AG SERPL QL IA: NON REACTIVE
IMM GRANULOCYTES # BLD AUTO: 0.1 X10E3/UL (ref 0–0.1)
IMM GRANULOCYTES NFR BLD AUTO: 1 %
LYMPHOCYTES # BLD AUTO: 2.4 X10E3/UL (ref 0.7–3.1)
LYMPHOCYTES NFR BLD AUTO: 16 %
MCH RBC QN AUTO: 32 PG (ref 26.6–33)
MCHC RBC AUTO-ENTMCNC: 33.8 G/DL (ref 31.5–35.7)
MCV RBC AUTO: 95 FL (ref 79–97)
MONOCYTES # BLD AUTO: 0.9 X10E3/UL (ref 0.1–0.9)
MONOCYTES NFR BLD AUTO: 6 %
NEUTROPHILS # BLD AUTO: 11.5 X10E3/UL (ref 1.4–7)
NEUTROPHILS NFR BLD AUTO: 77 %
PLATELET # BLD AUTO: 304 X10E3/UL (ref 150–450)
RBC # BLD AUTO: 4.41 X10E6/UL (ref 3.77–5.28)
RH BLD: POSITIVE
RPR SER QL: NON REACTIVE
RUBV IGG SERPL IA-ACNC: 3.56 INDEX
TSH SERPL DL<=0.005 MIU/L-ACNC: 0.11 UIU/ML (ref 0.45–4.5)
WBC # BLD AUTO: 15.1 X10E3/UL (ref 3.4–10.8)

## 2024-04-15 NOTE — PROGRESS NOTES
Notify negative labs except thyroid is slightly high functioning, hyperthyroid.  This is common first trimester.  We will recheck in 8 weeks.

## 2024-04-18 RX ORDER — FERROUS SULFATE 325(65) MG
1 TABLET ORAL
Qty: 90 TABLET | Refills: 0 | Status: SHIPPED | OUTPATIENT
Start: 2024-04-18

## 2024-04-26 ENCOUNTER — ROUTINE PRENATAL (OUTPATIENT)
Dept: OBSTETRICS AND GYNECOLOGY | Age: 39
End: 2024-04-26
Payer: MEDICAID

## 2024-04-26 VITALS — DIASTOLIC BLOOD PRESSURE: 72 MMHG | SYSTOLIC BLOOD PRESSURE: 128 MMHG | WEIGHT: 244 LBS | BODY MASS INDEX: 37.1 KG/M2

## 2024-04-26 DIAGNOSIS — O09.521 MULTIGRAVIDA OF ADVANCED MATERNAL AGE IN FIRST TRIMESTER: ICD-10-CM

## 2024-04-26 DIAGNOSIS — O34.219 PREVIOUS CESAREAN DELIVERY AFFECTING PREGNANCY: ICD-10-CM

## 2024-04-26 DIAGNOSIS — Z3A.10 10 WEEKS GESTATION OF PREGNANCY: ICD-10-CM

## 2024-04-26 DIAGNOSIS — Z34.91 PRENATAL CARE IN FIRST TRIMESTER: Primary | ICD-10-CM

## 2024-04-26 DIAGNOSIS — O99.340 ANXIETY IN PREGNANCY, ANTEPARTUM: ICD-10-CM

## 2024-04-26 DIAGNOSIS — Z98.891 PREVIOUS CESAREAN SECTION: ICD-10-CM

## 2024-04-26 DIAGNOSIS — E66.9 OBESITY (BMI 30.0-34.9): ICD-10-CM

## 2024-04-26 DIAGNOSIS — F41.9 ANXIETY IN PREGNANCY, ANTEPARTUM: ICD-10-CM

## 2024-04-26 LAB
GLUCOSE UR STRIP-MCNC: NEGATIVE MG/DL
PROT UR STRIP-MCNC: NEGATIVE MG/DL

## 2024-04-26 RX ORDER — FAMOTIDINE 10 MG
10 TABLET ORAL 2 TIMES DAILY
COMMUNITY

## 2024-04-26 RX ORDER — PRENATAL VIT/IRON FUM/FOLIC AC 27MG-0.8MG
TABLET ORAL DAILY
COMMUNITY

## 2024-04-26 NOTE — PROGRESS NOTES
Chief Complaint   Patient presents with    Routine Prenatal Visit     10 weeks  Cc:  no problems       HPI: 38 y.o.  at 10w6d     Doing well  Denies LOF or bleeding  Was decreasing her zoloft but feeling more depressed. Going to go back up to 100 mg  Pt of Dr. Nikko Soria:    24 1107   BP: 128/72   Weight: 111 kg (244 lb)       ROS:  GI:  Negative  : Negative  Pulmonary: Negative     A/P  1. Intrauterine pregnancy at 10w6d   2. Pregnancy Risk:  HIGH RISK    Diagnoses and all orders for this visit:    1. Prenatal care in first trimester (Primary)  -     Wilfredo Panorama Prenatal Test Full Panel: Panorama Test Plus 5 Additional Microdeletions - Blood,    2. 10 weeks gestation of pregnancy  -     POC Urinalysis Dipstick  -     Wilfredo Panorama Prenatal Test Full Panel: Panorama Test Plus 5 Additional Microdeletions - Blood,    3. Obesity (BMI 30.0-34.9)  -     Wilfredo Panorama Prenatal Test Full Panel: Panorama Test Plus 5 Additional Microdeletions - Blood,    4. Previous  section    5. Anxiety in pregnancy, antepartum    6. Multigravida of advanced maternal age in first trimester  -     Wilfredo Panorama Prenatal Test Full Panel: Panorama Test Plus 5 Additional Microdeletions - Blood,    7. Previous  delivery affecting pregnancy        -----------------------  PLAN:   AMA - recommend baby asa but patient declines  Unable to doppler or visualized cardiac activity with handheld US - FHT + via US.  Panorama done today  Return in about 4 weeks (around 2024) for OB check Dr El .      Ann Tony, APRN  2024 12:40 EDT

## 2024-05-07 LAB
Lab: NORMAL
NTRA 1P36 DELETION SYNDROME POPULATION-BASED RISK TEXT: NORMAL
NTRA 1P36 DELETION SYNDROME RESULT TEXT: NORMAL
NTRA 1P36 DELETION SYNDROME RISK SCORE TEXT: NORMAL
NTRA 22Q11.2 DELETION SYNDROME POPULATION-BASED RISK TEXT: NORMAL
NTRA 22Q11.2 DELETION SYNDROME RESULT TEXT: NORMAL
NTRA 22Q11.2 DELETION SYNDROME RISK SCORE TEXT: NORMAL
NTRA ANGELMAN SYNDROME POPULATION-BASED RISK TEXT: NORMAL
NTRA ANGELMAN SYNDROME RESULT TEXT: NORMAL
NTRA ANGELMAN SYNDROME RISK SCORE TEXT: NORMAL
NTRA CRI-DU-CHAT SYNDROME POPULATION-BASED RISK TEXT: NORMAL
NTRA CRI-DU-CHAT SYNDROME RESULT TEXT: NORMAL
NTRA CRI-DU-CHAT SYNDROME RISK SCORE TEXT: NORMAL
NTRA FETAL FRACTION: NORMAL
NTRA GENDER OF FETUS: NORMAL
NTRA MONOSOMY X AGE-BASED RISK TEXT: NORMAL
NTRA MONOSOMY X RESULT TEXT: NORMAL
NTRA MONOSOMY X RISK SCORE TEXT: NORMAL
NTRA PRADER-WILLI SYNDROME POPULATION-BASED RISK TEXT: NORMAL
NTRA PRADER-WILLI SYNDROME RESULT TEXT: NORMAL
NTRA PRADER-WILLI SYNDROME RISK SCORE TEXT: NORMAL
NTRA TRIPLOIDY RESULT TEXT: NORMAL
NTRA TRISOMY 13 AGE-BASED RISK TEXT: NORMAL
NTRA TRISOMY 13 RESULT TEXT: NORMAL
NTRA TRISOMY 13 RISK SCORE TEXT: NORMAL
NTRA TRISOMY 18 AGE-BASED RISK TEXT: NORMAL
NTRA TRISOMY 18 RESULT TEXT: NORMAL
NTRA TRISOMY 18 RISK SCORE TEXT: NORMAL
NTRA TRISOMY 21 AGE-BASED RISK TEXT: NORMAL
NTRA TRISOMY 21 RESULT TEXT: NORMAL
NTRA TRISOMY 21 RISK SCORE TEXT: NORMAL

## 2024-05-21 ENCOUNTER — ROUTINE PRENATAL (OUTPATIENT)
Dept: OBSTETRICS AND GYNECOLOGY | Age: 39
End: 2024-05-21
Payer: MEDICAID

## 2024-05-21 VITALS — WEIGHT: 246 LBS | DIASTOLIC BLOOD PRESSURE: 70 MMHG | SYSTOLIC BLOOD PRESSURE: 108 MMHG | BODY MASS INDEX: 37.4 KG/M2

## 2024-05-21 DIAGNOSIS — K21.9 GASTROESOPHAGEAL REFLUX DISEASE WITHOUT ESOPHAGITIS: ICD-10-CM

## 2024-05-21 DIAGNOSIS — Z98.891 PREVIOUS CESAREAN SECTION: ICD-10-CM

## 2024-05-21 DIAGNOSIS — Z3A.14 14 WEEKS GESTATION OF PREGNANCY: Primary | ICD-10-CM

## 2024-05-21 DIAGNOSIS — F41.9 ANXIETY IN PREGNANCY, ANTEPARTUM: ICD-10-CM

## 2024-05-21 DIAGNOSIS — O99.340 ANXIETY IN PREGNANCY, ANTEPARTUM: ICD-10-CM

## 2024-05-21 DIAGNOSIS — O09.521 MULTIGRAVIDA OF ADVANCED MATERNAL AGE IN FIRST TRIMESTER: ICD-10-CM

## 2024-05-21 DIAGNOSIS — E66.9 OBESITY (BMI 30.0-34.9): ICD-10-CM

## 2024-05-21 RX ORDER — PANTOPRAZOLE SODIUM 40 MG/1
40 TABLET, DELAYED RELEASE ORAL DAILY
Qty: 30 TABLET | Refills: 3 | Status: SHIPPED | OUTPATIENT
Start: 2024-05-21

## 2024-05-21 NOTE — PROGRESS NOTES
Patient feels well  She is having some heartburn  We will send in some Protonix for her  Previously discussed repeat   She is taking her prenatal vitamins and her Zoloft  Follow-up 4 weeks for AFP screen recheck of her TSH and anatomy scan    Chief Complaint   Patient presents with    Routine Prenatal Visit     Ob Check - Pt is 14w3d, Pt c/o acid reflux & frequent headaches       HPI:  Pt presents for routine prenatal visit    ROS:  No fever or chills, no nausea or vomiting, no contractions, no leg pain, no LE edema, no leaking fluid, no bleeding, no headache, no dysuria  All other systems reviewed and negative    Exam:  See flow sheet  General:  Alert and oriented and no distress  Neck: no lymphadenopathy or thyromegaly  Lungs: non - labored breathing  Abd:  See flow sheet, fundus nontender  Ext: see flow sheet, non-tender bilateral , no lesions  Neuro: grossly normal    Assessment:/ PLAN:    Diagnoses and all orders for this visit:    1. 14 weeks gestation of pregnancy (Primary)  -     POC Urinalysis Dipstick    2. Multigravida of advanced maternal age in first trimester  Assessment & Plan:  Panorama is low risk      3. Anxiety in pregnancy, antepartum  Assessment & Plan:  Continue zoloft      4. Previous  section    5. Obesity (BMI 30.0-34.9)    6. Gastroesophageal reflux disease without esophagitis  Assessment & Plan:  Continue protonix 40 mg      Other orders  -     pantoprazole (Protonix) 40 MG EC tablet; Take 1 tablet by mouth Daily.  Dispense: 30 tablet; Refill: 3

## 2024-06-18 ENCOUNTER — ROUTINE PRENATAL (OUTPATIENT)
Dept: OBSTETRICS AND GYNECOLOGY | Age: 39
End: 2024-06-18
Payer: MEDICAID

## 2024-06-18 VITALS — BODY MASS INDEX: 38.32 KG/M2 | WEIGHT: 252 LBS | DIASTOLIC BLOOD PRESSURE: 64 MMHG | SYSTOLIC BLOOD PRESSURE: 108 MMHG

## 2024-06-18 DIAGNOSIS — Z13.79 ENCOUNTER FOR GENETIC SCREENING FOR BIRTH DEFECT: ICD-10-CM

## 2024-06-18 DIAGNOSIS — Z3A.18 18 WEEKS GESTATION OF PREGNANCY: Primary | ICD-10-CM

## 2024-06-18 DIAGNOSIS — Z36.89 ENCOUNTER FOR FETAL ANATOMIC SURVEY: ICD-10-CM

## 2024-06-18 DIAGNOSIS — Z36.86 ENCOUNTER FOR ANTENATAL SCREENING FOR CERVICAL LENGTH: ICD-10-CM

## 2024-06-18 DIAGNOSIS — O09.529 ANTEPARTUM MULTIGRAVIDA OF ADVANCED MATERNAL AGE: ICD-10-CM

## 2024-06-18 DIAGNOSIS — Z98.891 PREVIOUS CESAREAN SECTION: ICD-10-CM

## 2024-06-18 DIAGNOSIS — E05.90 HYPERTHYROIDISM: ICD-10-CM

## 2024-06-18 LAB
GLUCOSE UR STRIP-MCNC: NEGATIVE MG/DL
PROT UR STRIP-MCNC: NEGATIVE MG/DL

## 2024-06-18 PROCEDURE — 99214 OFFICE O/P EST MOD 30 MIN: CPT | Performed by: OBSTETRICS & GYNECOLOGY

## 2024-06-18 NOTE — PROGRESS NOTES
Patient is feeling well today just a little tired  Ultrasound was done for fetal anatomy  Anatomy is incomplete but thus far normal  Cervical length is 5 cm  Growth is 38%  No previa  Patient has a area on her breast that is healing up  On exam there is some discoloration present where an old small skin abscess was  It looks to be healing well but the discoloration is still present  There is no erythema or signs of infection I reassured patient  She also has an area on her groin upper thigh that is draining  This looks like hidradenitis suppurativa type lesion but it looks like it is resolving and draining on its own  I recommend hot soaks 2-3 times a day and she has it covered with a Band-Aid to help it not rub on her close which I agree with  I think this will resolve on its own    Repeat TSH today I think this will have resolved by now  AFP screen today    Follow-up 4 weeks for repeat anatomy    Chief Complaint   Patient presents with    Routine Prenatal Visit     Ob Check & US - Pt is 18w3d, Pt c/o Lt breast lesion that is not healing & Hidradenitis suppurativa flare up, Pt would like to have these checked today       HPI:  Pt presents for routine prenatal visit    ROS:  No fever or chills, no nausea or vomiting, no contractions, no leg pain, no LE edema, no leaking fluid, no bleeding, no headache, no dysuria  All other systems reviewed and negative    Exam:  See flow sheet  General:  Alert and oriented and no distress  Neck: no lymphadenopathy or thyromegaly  Lungs: non - labored breathing  Abd:  See flow sheet, fundus nontender  Ext: see flow sheet, non-tender bilateral , no lesions  Neuro: grossly normal    Assessment:/ PLAN:    Diagnoses and all orders for this visit:    1. 18 weeks gestation of pregnancy (Primary)  -     POC Urinalysis Dipstick  -     Alpha Fetoprotein, Maternal    2. Encounter for genetic screening for birth defect  -     Alpha Fetoprotein, Maternal    3. Encounter for fetal anatomic  survey    4. Previous  section    5. Antepartum multigravida of advanced maternal age    6. Encounter for  screening for cervical length    7. Hyperthyroidism  -     TSH

## 2024-06-24 LAB
AFP INTERP SERPL-IMP: NORMAL
AFP INTERP SERPL-IMP: NORMAL
AFP MOM SERPL: 1.04
AFP SERPL-MCNC: 35.7 NG/ML
AGE AT DELIVERY: 41.1 YR
GA METHOD: NORMAL
GA: 18.4 WEEKS
IDDM PATIENT QL: NO
LABORATORY COMMENT REPORT: NORMAL
MULTIPLE PREGNANCY: NO
NEURAL TUBE DEFECT RISK FETUS: NORMAL %
RESULT: NORMAL
TSH SERPL DL<=0.005 MIU/L-ACNC: 0.9 UIU/ML (ref 0.45–4.5)

## 2024-07-01 ENCOUNTER — TELEPHONE (OUTPATIENT)
Dept: OBSTETRICS AND GYNECOLOGY | Age: 39
End: 2024-07-01
Payer: MEDICAID

## 2024-07-16 ENCOUNTER — PATIENT ROUNDING (BHMG ONLY) (OUTPATIENT)
Dept: URGENT CARE | Facility: CLINIC | Age: 39
End: 2024-07-16
Payer: MEDICAID

## 2024-07-16 ENCOUNTER — ROUTINE PRENATAL (OUTPATIENT)
Dept: OBSTETRICS AND GYNECOLOGY | Age: 39
End: 2024-07-16
Payer: MEDICAID

## 2024-07-16 VITALS — BODY MASS INDEX: 38.47 KG/M2 | DIASTOLIC BLOOD PRESSURE: 70 MMHG | WEIGHT: 253 LBS | SYSTOLIC BLOOD PRESSURE: 110 MMHG

## 2024-07-16 DIAGNOSIS — F41.9 ANXIETY IN PREGNANCY, ANTEPARTUM: ICD-10-CM

## 2024-07-16 DIAGNOSIS — Z3A.22 22 WEEKS GESTATION OF PREGNANCY: Primary | ICD-10-CM

## 2024-07-16 DIAGNOSIS — Z98.891 PREVIOUS CESAREAN SECTION: ICD-10-CM

## 2024-07-16 DIAGNOSIS — O99.340 ANXIETY IN PREGNANCY, ANTEPARTUM: ICD-10-CM

## 2024-07-16 DIAGNOSIS — O09.529 ANTEPARTUM MULTIGRAVIDA OF ADVANCED MATERNAL AGE: ICD-10-CM

## 2024-07-16 LAB
GLUCOSE UR STRIP-MCNC: NEGATIVE MG/DL
PROT UR STRIP-MCNC: NEGATIVE MG/DL

## 2024-07-16 NOTE — ED NOTES
Thank you for letting us care for you during your recent visit to [facility]. We would love to hear about your experience with us. Was this the first time you have visited our location?    We’re always looking for ways to make our patients’ experiences even better. Do you have any recommendations on ways we may improve?    I appreciate you taking the time to respond. Please be on the lookout for a survey about your recent visit from [employer] via text or email. We would greatly appreciate if you could fill that out and turn it back in. We want your voice to be heard and we value your feedback.     Thank you for choosing [facility] for your healthcare needs.

## 2024-08-09 ENCOUNTER — TELEPHONE (OUTPATIENT)
Dept: OBSTETRICS AND GYNECOLOGY | Age: 39
End: 2024-08-09
Payer: MEDICAID

## 2024-08-09 NOTE — TELEPHONE ENCOUNTER
25w6d    Pt states that her 9 month old son has Hand Foot & Mouth. Pt is asking if she should be concerned since she is pregnant. Also asking if there is anything she should do to prevent her from getting it. Please advise

## 2024-08-09 NOTE — TELEPHONE ENCOUNTER
It is not a big concern in pregnancy with that virus, practice excellent hand washing to prevent transmission

## 2024-08-15 ENCOUNTER — ROUTINE PRENATAL (OUTPATIENT)
Dept: OBSTETRICS AND GYNECOLOGY | Age: 39
End: 2024-08-15
Payer: MEDICAID

## 2024-08-15 VITALS — DIASTOLIC BLOOD PRESSURE: 72 MMHG | SYSTOLIC BLOOD PRESSURE: 114 MMHG | BODY MASS INDEX: 39.23 KG/M2 | WEIGHT: 258 LBS

## 2024-08-15 DIAGNOSIS — Z13.0 SCREENING FOR IRON DEFICIENCY ANEMIA: ICD-10-CM

## 2024-08-15 DIAGNOSIS — R21 GROIN RASH: ICD-10-CM

## 2024-08-15 DIAGNOSIS — Z3A.26 26 WEEKS GESTATION OF PREGNANCY: Primary | ICD-10-CM

## 2024-08-15 DIAGNOSIS — L73.2 HIDRADENITIS SUPPURATIVA: ICD-10-CM

## 2024-08-15 DIAGNOSIS — Z13.1 SCREENING FOR DIABETES MELLITUS: ICD-10-CM

## 2024-08-15 LAB
GLUCOSE UR STRIP-MCNC: NEGATIVE MG/DL
PROT UR STRIP-MCNC: NEGATIVE MG/DL

## 2024-08-15 RX ORDER — GUAIFENESIN AND DEXTROMETHORPHAN HYDROBROMIDE 100; 10 MG/5ML; MG/5ML
5 SOLUTION ORAL EVERY 12 HOURS
COMMUNITY
End: 2024-08-15 | Stop reason: SDUPTHER

## 2024-08-15 RX ORDER — PSEUDOEPHEDRINE HCL 30 MG
100 TABLET ORAL 3 TIMES DAILY PRN
Qty: 90 CAPSULE | Refills: 3 | Status: SHIPPED | OUTPATIENT
Start: 2024-08-15

## 2024-08-15 RX ORDER — GUAIFENESIN AND DEXTROMETHORPHAN HYDROBROMIDE 100; 10 MG/5ML; MG/5ML
5 SOLUTION ORAL EVERY 12 HOURS
Qty: 118 ML | Refills: 2 | Status: SHIPPED | OUTPATIENT
Start: 2024-08-15

## 2024-08-15 NOTE — PROGRESS NOTES
Patient is overall feeling well today  She is completing her 1 hour glucose challenge test  She has some concerns  She has a spot on her right inner thigh that has been bothering her for several months  It is a hidradenitis lesion that is healed but is still a palpable nodule  She also has a rash on her right groin from using there  I examined both areas and the near rash appears as an irritative skin area that will heal on its own  The hidradenitis papule on the right inner thigh looks healed but still palpable palpable to about half a centimeter  Is unclear whether it can be drained  The patient would like me to try and I did do a attempted drainage  I prepped the area with alcohol injected a small amount of local anesthesia and took a large bore needle to make a stab incision to the area  No purulent material came out  The area was then bandaged cleaned and bandaged  Patient tolerated this well  I recommend we just observe this area for a while  If it continues to stay the same which I suspected will it might be able to be removed by a general surgeon  Follow-up 3 weeks for growth ultrasound    Chief Complaint   Patient presents with    Routine Prenatal Visit     Ob Check - Pt is 26w5d, 1 hr gtt today, Will do tdap, Pt stating she used curran for hair removal on vaginal area and it caused her to bleed, Pt would like this checked today as well as area where she has hx of Hidradenitis suppurativa       HPI:  Pt presents for routine prenatal visit    ROS:  No fever or chills, no nausea or vomiting, no contractions, no leg pain, no LE edema, no leaking fluid, no bleeding, no headache, no dysuria  All other systems reviewed and negative    Exam:  See flow sheet  General:  Alert and oriented and no distress  Neck: no lymphadenopathy or thyromegaly  Lungs: non - labored breathing  Abd:  See flow sheet, fundus nontender  Ext: see flow sheet, non-tender bilateral , no lesions  Neuro: grossly normal    Assessment:/  PLAN:    Diagnoses and all orders for this visit:    1. 26 weeks gestation of pregnancy (Primary)  -     POC Urinalysis Dipstick  -     RPR Qualitative with Reflex to Quant    2. Screening for iron deficiency anemia  -     CBC (No Diff)    3. Screening for diabetes mellitus  -     Gestational Screen 1 Hr ([facility])    4. Hidradenitis suppurativa    5. Groin rash    Other orders  -     docusate sodium 100 MG capsule; Take 1 capsule by mouth 3 (Three) Times a Day As Needed (constipation).  Dispense: 90 capsule; Refill: 3  -     dextromethorphan-guaifenesin (ROBITUSSIN-DM)  MG/5ML syrup; Take 5 mL by mouth Every 12 (Twelve) Hours.  Dispense: 118 mL; Refill: 2

## 2024-08-16 LAB
ERYTHROCYTE [DISTWIDTH] IN BLOOD BY AUTOMATED COUNT: 11.9 % (ref 12.3–15.4)
GLUCOSE 1H P 50 G GLC PO SERPL-MCNC: 116 MG/DL (ref 65–139)
HCT VFR BLD AUTO: 35 % (ref 34–46.6)
HGB BLD-MCNC: 12 G/DL (ref 12–15.9)
MCH RBC QN AUTO: 32.1 PG (ref 26.6–33)
MCHC RBC AUTO-ENTMCNC: 34.3 G/DL (ref 31.5–35.7)
MCV RBC AUTO: 93.6 FL (ref 79–97)
PLATELET # BLD AUTO: 248 10*3/MM3 (ref 140–450)
RBC # BLD AUTO: 3.74 10*6/MM3 (ref 3.77–5.28)
RPR SER QL: NON REACTIVE
WBC # BLD AUTO: 9.54 10*3/MM3 (ref 3.4–10.8)

## 2024-08-21 ENCOUNTER — HOSPITAL ENCOUNTER (EMERGENCY)
Facility: HOSPITAL | Age: 39
Discharge: HOME | End: 2024-08-21
Attending: OBSTETRICS & GYNECOLOGY | Admitting: OBSTETRICS & GYNECOLOGY
Payer: MEDICAID

## 2024-08-21 VITALS
HEART RATE: 82 BPM | DIASTOLIC BLOOD PRESSURE: 69 MMHG | BODY MASS INDEX: 40.19 KG/M2 | SYSTOLIC BLOOD PRESSURE: 129 MMHG | TEMPERATURE: 98 F | WEIGHT: 265.2 LBS | OXYGEN SATURATION: 95 % | RESPIRATION RATE: 16 BRPM | HEIGHT: 68 IN

## 2024-08-21 PROCEDURE — 99281 EMR DPT VST MAYX REQ PHY/QHP: CPT | Performed by: OBSTETRICS & GYNECOLOGY

## 2024-08-21 NOTE — OBED NOTES
TAHIR Note [facility] [place]    Patient Name: Sonam Lang  YOB: 1985  MRN: [id number]  Admission Date: 2024  6:18 AM  Date of Service: 2024    Chief Complaint: Decreased Fetal Movement        Subjective     Sonam Lang is a 38 y.o. female  at 27w4d with Estimated Date of Delivery: 24 who presents with the chief complaint listed above.  She reports that she had felt the baby move a lot up to the last 2 or 3 days.  Patient also reports that since she has been in triage she has felt the baby move.  Patient has had fetal loss at 27 weeks with a baby that had trisomy 18.  Patient also reports a mild upper respiratory tract infection for 2 weeks.  She reports she is having some sinus congestion and an occasional cough.     She sees Megan Marx* for her prenatal care. Her pregnancy has been complicated by: AMA, obesity, prior , subchorionic hemorrhage in the first trimester, anxiety, asthma,  migraine    She describes fetal movement as normal.  She denies rupture of membranes.  She denies vaginal bleeding. She is not feeling contractions.        Objective   Patient Active Problem List    Diagnosis     Antepartum multigravida of advanced maternal age [O09.529]     Gastroesophageal reflux disease without esophagitis [K21.9]     Subchorionic hemorrhage in first trimester [O20.8]     Allergic reaction [T78.40XA]      delivery delivered [O82]     Previous  delivery affecting pregnancy [O34.219]     Multigravida of advanced maternal age in first trimester [O09.521]     Anxiety in pregnancy, antepartum [O99.340, F41.9]     Adult ADHD [F90.9]     , delivered [O34.219]     Previous  section [Z98.891]     Anxiety [F41.9]     Asthma, mild persistent [J45.30]     Obesity (BMI 30.0-34.9) [E66.9]     Sleep apnea [G47.30]     Allergy to NSAIDs [Z88.6]     Migraine without aura and without status migrainosus, not intractable [G43.009]         OB History     Para Term  AB Living   6 3 2 1 2 2   SAB IAB Ectopic Molar Multiple Live Births   0 0 2 0 0 2      # Outcome Date GA Lbr Hernando/2nd Weight Sex Type Anes PTL Lv   6 Current            5 Term 10/14/23 39w0d  4070 g (8 lb 15.6 oz) M CS-LTranv Spinal N EL      Birth Comments: mikaela OR1      Name: Alban Lang      Apgar1: 7  Apgar5: 8   4  22 27w2d / 01:03 559 g (1 lb 3.7 oz) F  None Y FD      Birth Comments: scale 1      Name: JIN LANGIRSIENNA      Apgar1: 2  Apgar5: 2   3 Ectopic      ECTOPIC      2 Ectopic      ECTOPIC      1 Term 10/28/19 38w1d  3200 g (7 lb 0.9 oz) F CS-LTranv EPI N EL      Complications: Failure to Progress in First Stage, Oligohydramnios      Name: NORBERTO LANG      Apgar1: 8  Apgar5: 9        Past Medical History:   Diagnosis Date    ADHD (attention deficit hyperactivity disorder)     Anxiety     Asthma     Congenital abnormalities 2022    Multiple fetal anomalies present including meningocele, unilateral real agenesis, unilateral club foot, membranous VSD     Depression     GERD (gastroesophageal reflux disease)     History of depression 2019    Irritable bowel syndrome     Migraine     Obesity (BMI 30.0-34.9) 2019    Pregnancy complicated by multiple fetal congenital anomalies, single gestation 2022    NTD, SUA    PTSD (post-traumatic stress disorder)     Sleep apnea 2016    CPAP       Past Surgical History:   Procedure Laterality Date     SECTION  2019     SECTION N/A 10/14/2023    Procedure:  SECTION REPEAT;  Surgeon: Megan El MD;  Location: [facility] LABOR DELIVERY;  Service: Obstetrics/Gynecology;  Laterality: N/A;    COLONOSCOPY  2014    with endoscopy    SINUS SURGERY  11/22/2022    x2, 2018 (first)    WISDOM TOOTH EXTRACTION         No current facility-administered medications on file prior to encounter.     Current Outpatient Medications on File Prior to Encounter    Medication Sig Dispense Refill    albuterol sulfate  (90 Base) MCG/ACT inhaler Inhale 2 puffs Every 4 (Four) Hours As Needed for Wheezing or Shortness of Air. 18 g 11    budesonide (RINOCORT AQUA) 32 MCG/ACT nasal spray 1 spray into the nostril(s) as directed by provider Daily.      budesonide-formoterol (SYMBICORT) 80-4.5 MCG/ACT inhaler Inhale 2 puffs 2 (Two) Times a Day. 30.6 g 3    cetirizine (zyrTEC) 5 MG tablet Take 1 tablet by mouth 3 (Three) Times a Day. PATIENT TAKING ONCE DAILY      dextromethorphan-guaifenesin (ROBITUSSIN-DM)  MG/5ML syrup Take 5 mL by mouth Every 12 (Twelve) Hours. 118 mL 2    diphenhydrAMINE (Benadryl Allergy) 25 MG tablet Take 1 tablet by mouth Every 6 (Six) Hours As Needed for Itching. 20 tablet 0    docusate sodium 100 MG capsule Take 1 capsule by mouth 3 (Three) Times a Day As Needed (constipation). 90 capsule 3    MAGnesium-Oxide 400 (240 Mg) MG tablet Take 1 tablet by mouth Daily.      montelukast (SINGULAIR) 10 MG tablet Take 1 tablet by mouth Daily. 90 tablet 3    mupirocin (BACTROBAN) 2 % ointment       pantoprazole (Protonix) 40 MG EC tablet Take 1 tablet by mouth Daily. 30 tablet 3    Prenatal Vit-Fe Fumarate-FA (prenatal vitamin 27-0.8) 27-0.8 MG tablet tablet Take  by mouth Daily.      Riboflavin 100 MG tablet Take 100 mg by mouth Daily. 90 each 3    sertraline (Zoloft) 50 MG tablet Take 2 tablets by mouth Daily. 180 tablet 1    SV Iron 325 (65 Fe) MG tablet Take 1 tablet by mouth once daily with breakfast 90 tablet 0    vitamin B-6 (PYRIDOXINE) 50 MG tablet Take 0.5 tablets by mouth 3 (Three) Times a Day.      acetaminophen (TYLENOL) 325 MG tablet Take 2 tablets by mouth Every 6 (Six) Hours. 60 tablet 1    budesonide (PULMICORT) 1 MG/2ML nebulizer solution Take 2 mL by nebulization.      famotidine (PEPCID) 10 MG tablet Take 1 tablet by mouth 2 (Two) Times a Day.         Allergies   Allergen Reactions    Bactrim [Sulfamethoxazole-Trimethoprim] Hives, Itching  "and Swelling    Cefdinir Hives, Itching and Swelling     FELT THROAT START TO SWELL    Buspirone Unknown - Low Severity     migraines    Contrast Dye (Echo Or Unknown Ct/Mr) Other (See Comments)     \"felt like insides were on fire\"    Ibuprofen Swelling    Iodinated Contrast Media Unknown (See Comments)     \"felt like insides were on fire\"   \"felt like insides were on fire\"    Naproxen Swelling    Toradol [Ketorolac Tromethamine] Itching    Aspirin Angioedema    Clindamycin/Lincomycin Hives       Family History   Problem Relation Age of Onset    Hypertension Mother     Diabetes Mother     Migraines Mother     Hypertension Father     Hypertension Maternal Grandmother     Migraines Maternal Grandmother     Alzheimer's disease Maternal Grandfather     Diabetes Paternal Grandmother     Migraines Paternal Grandmother     Dementia Paternal Grandmother     Stroke Paternal Grandmother     Frontotemporal dementia Paternal Grandmother     Diabetes Paternal Grandfather        Social History     Socioeconomic History    Marital status:    Tobacco Use    Smoking status: Former     Current packs/day: 0.00     Types: Cigarettes     Start date:      Quit date:      Years since quittin.6     Passive exposure: Past    Smokeless tobacco: Never   Vaping Use    Vaping status: Never Used   Substance and Sexual Activity    Alcohol use: Not Currently    Drug use: No    Sexual activity: Yes     Partners: Male     Birth control/protection: None           Review of Systems   Constitutional:  Negative for chills and fever.   HENT: Negative.     Eyes:  Negative for photophobia and visual disturbance.   Respiratory:  Negative for shortness of breath.    Cardiovascular:  Negative for chest pain.   Gastrointestinal:  Negative for nausea.   Psychiatric/Behavioral:  The patient is not nervous/anxious.           PHYSICAL EXAM:      VITAL SIGNS:  Vitals:    24 0623 24 0640 24 0643 24 0700   BP:  129/69 " "129/69    BP Location:  Right arm     Patient Position:  Sitting     Pulse:  85 76 82   Resp:  18 16    Temp:  98 °F (36.7 °C) 98 °F (36.7 °C)    TempSrc:  Oral Oral    SpO2:  95% 95%    Weight: 120 kg (265 lb 3.2 oz)      Height:  172.7 cm (68\")              FHT'S:                       Baseline:         Fetal HR Baseline: normal range                   Beats/min:       Fetal HR (beats/min): 125        Variability:        Fetal HR Variability: moderate (amplitude range 6 to 25 bpm)  Accelerations:  Fetal HR Accelerations: greater than/equal to 15 bpm, greater than/equal to 10 bpm (32 wks gest or less)  Decelerations:  Fetal HR Decelerations: absent      Interpretation:  reassuring and category 1                            Fetal HR Tracing Category: Category I        PHYSICAL EXAM:      General: well developed; well nourished  no acute distress   Heart: Not performed.   Lungs   breathing is unlabored   Abdomen: Gravid and non tender     Extremities: trace edema, DTRs 1 plus, no clonus       Cervix: was not checked.        Contractions:   none                    LABS AND TESTING ORDERED:  Uterine and fetal monitoring  Urinalysis      LAB RESULTS:    No results found for this or any previous visit (from the past 24 hour(s)).    Lab Results   Component Value Date    ABO O 04/12/2024    RH Positive 04/12/2024       Lab Results   Component Value Date    STREPGPB Negative 09/21/2023                 External Prenatal Results       Pregnancy Outside Results - Transcribed From Office Records - See Scanned Records For Details       Test Value Date Time    ABO  O  04/12/24 1008    Rh  Positive  04/12/24 1008    Antibody Screen  Negative  04/12/24 1008    Varicella IgG       Rubella  3.56 index 04/12/24 1008    Hgb  12.0 g/dL 08/15/24 1122       14.1 g/dL 04/12/24 1008       13.2 g/dL 04/02/24 0144       13.4 g/dL 03/25/24 0947    Hct  35.0 % 08/15/24 1122       41.7 % 04/12/24 1008       40.4 % 04/02/24 0144       41.0 % " 24 0947    HgB A1c   5.3 % 24 1008    1h GTT  116 mg/dL 08/15/24 1122    3h GTT Fasting       3h GTT 1 hour       3h GTT 2 hour       3h GTT 3 hour        Gonorrhea (discrete)  Negative  24 0946    Chlamydia (discrete)  Negative  24 0946    RPR  Non Reactive  08/15/24 1122       Non Reactive  24 1008    Syphils cascade: TP-Ab (FTA)       TP-Ab       TP-Ab (EIA)       TPPA       HBsAg  Negative  24 1008    Herpes Simplex Virus PCR       Herpes Simplex VIrus Culture       HIV  Non Reactive  24 1008    Hep C RNA Quant PCR       Hep C Antibody  Non Reactive  24 1008    AFP  35.7 ng/mL 24 1254    NIPT       Cystic Fibroisis        Group B Strep       GBS Susceptibility to Clindamycin       GBS Susceptibility to Erythromycin       Fetal Fibronectin       Genetic Testing, Maternal Blood                 Drug Screening       Test Value Date Time    Urine Drug Screen       Amphetamine Screen       Barbiturate Screen       Benzodiazepine Screen       Methadone Screen       Phencyclidine Screen       Opiates Screen       THC Screen       Cocaine Screen       Propoxyphene Screen       Buprenorphine Screen       Methamphetamine Screen       Oxycodone Screen       Tricyclic Antidepressants Screen                 Legend    ^: Historical                              Impression:   @ 27w4d .  Final Diagnosis: Patient reports decreased fetal movement,  strep reassuring, patient feeling baby move now    Plan:  1. Discharge to home.    2. Plan of care has been reviewed with patient along with risks, benefits of treatment.   All questions have been answered. Call health care provider for any further concerns and keep office appointments.  3.  Comfort measures, fetal kick counts, [place] pot, immune boosters over-the-counter, increase fluid, get rest,  labor precautions      I discussed the patients findings and my recommendations with patient and nursing  staff      Hilda James MD  8/21/2024  07:48 EDT

## 2024-08-22 ENCOUNTER — OFFICE VISIT (OUTPATIENT)
Dept: SPORTS MEDICINE | Facility: CLINIC | Age: 39
End: 2024-08-22
Payer: MEDICAID

## 2024-08-22 VITALS
OXYGEN SATURATION: 97 % | WEIGHT: 263 LBS | TEMPERATURE: 97.4 F | HEIGHT: 68 IN | HEART RATE: 86 BPM | SYSTOLIC BLOOD PRESSURE: 112 MMHG | DIASTOLIC BLOOD PRESSURE: 62 MMHG | BODY MASS INDEX: 39.86 KG/M2

## 2024-08-22 DIAGNOSIS — J01.90 ACUTE NON-RECURRENT SINUSITIS, UNSPECIFIED LOCATION: Primary | ICD-10-CM

## 2024-08-22 PROCEDURE — 99213 OFFICE O/P EST LOW 20 MIN: CPT | Performed by: FAMILY MEDICINE

## 2024-08-22 RX ORDER — LANOLIN ALCOHOL/MO/W.PET/CERES
25 CREAM (GRAM) TOPICAL 3 TIMES DAILY
Qty: 90 TABLET | Refills: 1 | Status: SHIPPED | OUTPATIENT
Start: 2024-08-22

## 2024-08-22 RX ORDER — AMOXICILLIN AND CLAVULANATE POTASSIUM 875; 125 MG/1; MG/1
1 TABLET, FILM COATED ORAL 2 TIMES DAILY
Qty: 20 TABLET | Refills: 0 | Status: SHIPPED | OUTPATIENT
Start: 2024-08-22

## 2024-08-22 NOTE — PROGRESS NOTES
"Sonam is a 38 y.o. year old female    Chief Complaint   Patient presents with    Sinusitis     Patient is here for initial evaluation of possible sinus infections that has been ongoing for the past 2 weeks, worsen in the last 2 days.        History of Present Illness   Sinusitis       Sinus congestion for 2 weeks gradually worsening.  Of note patient is currently 27 weeks pregnant    Review of Systems    /62 (BP Location: Right arm, Patient Position: Sitting, Cuff Size: Adult)   Pulse 86   Temp 97.4 °F (36.3 °C) (Temporal)   Ht 172.7 cm (68\")   Wt 119 kg (263 lb)   LMP 02/10/2024 (Exact Date)   SpO2 97%   BMI 39.99 kg/m²          Physical Exam  Vitals reviewed.   HENT:      Right Ear: Tympanic membrane normal.      Left Ear: Tympanic membrane normal.   Cardiovascular:      Rate and Rhythm: Normal rate.   Pulmonary:      Effort: Pulmonary effort is normal.      Breath sounds: Normal breath sounds.   Neurological:      Mental Status: She is alert.   Psychiatric:         Mood and Affect: Mood normal.           Current Outpatient Medications:     acetaminophen (TYLENOL) 325 MG tablet, Take 2 tablets by mouth Every 6 (Six) Hours., Disp: 60 tablet, Rfl: 1    albuterol sulfate  (90 Base) MCG/ACT inhaler, Inhale 2 puffs Every 4 (Four) Hours As Needed for Wheezing or Shortness of Air., Disp: 18 g, Rfl: 11    budesonide (PULMICORT) 1 MG/2ML nebulizer solution, Take 2 mL by nebulization., Disp: , Rfl:     budesonide (RINOCORT AQUA) 32 MCG/ACT nasal spray, 1 spray into the nostril(s) as directed by provider Daily., Disp: , Rfl:     budesonide-formoterol (SYMBICORT) 80-4.5 MCG/ACT inhaler, Inhale 2 puffs 2 (Two) Times a Day., Disp: 30.6 g, Rfl: 3    cetirizine (zyrTEC) 5 MG tablet, Take 1 tablet by mouth 3 (Three) Times a Day. PATIENT TAKING ONCE DAILY, Disp: , Rfl:     diphenhydrAMINE (Benadryl Allergy) 25 MG tablet, Take 1 tablet by mouth Every 6 (Six) Hours As Needed for Itching., Disp: 20 tablet, Rfl: 0   "  docusate sodium 100 MG capsule, Take 1 capsule by mouth 3 (Three) Times a Day As Needed (constipation)., Disp: 90 capsule, Rfl: 3    MAGnesium-Oxide 400 (240 Mg) MG tablet, Take 1 tablet by mouth Daily., Disp: , Rfl:     montelukast (SINGULAIR) 10 MG tablet, Take 1 tablet by mouth Daily., Disp: 90 tablet, Rfl: 3    pantoprazole (Protonix) 40 MG EC tablet, Take 1 tablet by mouth Daily., Disp: 30 tablet, Rfl: 3    Prenatal Vit-Fe Fumarate-FA (prenatal vitamin 27-0.8) 27-0.8 MG tablet tablet, Take  by mouth Daily., Disp: , Rfl:     sertraline (Zoloft) 50 MG tablet, Take 2 tablets by mouth Daily., Disp: 180 tablet, Rfl: 1    SV Iron 325 (65 Fe) MG tablet, Take 1 tablet by mouth once daily with breakfast, Disp: 90 tablet, Rfl: 0    vitamin B-6 (PYRIDOXINE) 50 MG tablet, Take 0.5 tablets by mouth 3 (Three) Times a Day., Disp: 90 tablet, Rfl: 1    amoxicillin-clavulanate (AUGMENTIN) 875-125 MG per tablet, Take 1 tablet by mouth 2 (Two) Times a Day., Disp: 20 tablet, Rfl: 0    dextromethorphan-guaifenesin (ROBITUSSIN-DM)  MG/5ML syrup, Take 5 mL by mouth Every 12 (Twelve) Hours. (Patient not taking: Reported on 8/22/2024), Disp: 118 mL, Rfl: 2    famotidine (PEPCID) 10 MG tablet, Take 1 tablet by mouth 2 (Two) Times a Day. (Patient not taking: Reported on 8/22/2024), Disp: , Rfl:     mupirocin (BACTROBAN) 2 % ointment, , Disp: , Rfl:     Riboflavin 100 MG tablet, Take 100 mg by mouth Daily. (Patient not taking: Reported on 8/22/2024), Disp: 90 each, Rfl: 3     Diagnoses and all orders for this visit:    Acute non-recurrent sinusitis, unspecified location  -     amoxicillin-clavulanate (AUGMENTIN) 875-125 MG per tablet; Take 1 tablet by mouth 2 (Two) Times a Day.    Other orders  -     vitamin B-6 (PYRIDOXINE) 50 MG tablet; Take 0.5 tablets by mouth 3 (Three) Times a Day.       Most consistent with secondary sinusitis on top of allergic rhinitis given her background history.  Discussed continued use of sinus rinse and  over-the-counter medications approved by her OB/GYN.  Add antibiotic coverage.      EMR Dragon/Transcription disclaimer:    Much of this encounter note is an electronic transcription/translation of spoken language to printed text.  The electronic translation of spoken language may permit erroneous, or at times, nonsensical words or phrases to be inadvertently transcribed.  Although I have reviewed the note for such errors some may still exist.

## 2024-09-03 RX ORDER — PANTOPRAZOLE SODIUM 40 MG/1
40 TABLET, DELAYED RELEASE ORAL DAILY
Qty: 30 TABLET | Refills: 0 | Status: SHIPPED | OUTPATIENT
Start: 2024-09-03

## 2024-09-05 ENCOUNTER — ROUTINE PRENATAL (OUTPATIENT)
Dept: OBSTETRICS AND GYNECOLOGY | Age: 39
End: 2024-09-05
Payer: MEDICAID

## 2024-09-05 VITALS — DIASTOLIC BLOOD PRESSURE: 72 MMHG | SYSTOLIC BLOOD PRESSURE: 112 MMHG | BODY MASS INDEX: 39.38 KG/M2 | WEIGHT: 259 LBS

## 2024-09-05 DIAGNOSIS — O09.523 AMA (ADVANCED MATERNAL AGE) MULTIGRAVIDA 35+, THIRD TRIMESTER: ICD-10-CM

## 2024-09-05 DIAGNOSIS — O09.529 ANTEPARTUM MULTIGRAVIDA OF ADVANCED MATERNAL AGE: ICD-10-CM

## 2024-09-05 DIAGNOSIS — Z98.891 PREVIOUS CESAREAN SECTION: ICD-10-CM

## 2024-09-05 DIAGNOSIS — Z3A.29 29 WEEKS GESTATION OF PREGNANCY: Primary | ICD-10-CM

## 2024-09-05 LAB
GLUCOSE UR STRIP-MCNC: NEGATIVE MG/DL
PROT UR STRIP-MCNC: NEGATIVE MG/DL

## 2024-09-05 RX ORDER — SODIUM CHLORIDE 9 MG/ML
40 INJECTION, SOLUTION INTRAVENOUS AS NEEDED
OUTPATIENT
Start: 2024-09-05

## 2024-09-05 RX ORDER — CARBOPROST TROMETHAMINE 250 UG/ML
250 INJECTION, SOLUTION INTRAMUSCULAR AS NEEDED
OUTPATIENT
Start: 2024-09-05

## 2024-09-05 RX ORDER — OXYTOCIN/0.9 % SODIUM CHLORIDE 30/500 ML
250 PLASTIC BAG, INJECTION (ML) INTRAVENOUS CONTINUOUS
OUTPATIENT
Start: 2024-09-05 | End: 2024-09-05

## 2024-09-05 RX ORDER — LIDOCAINE HYDROCHLORIDE 10 MG/ML
0.5 INJECTION, SOLUTION INFILTRATION; PERINEURAL ONCE AS NEEDED
OUTPATIENT
Start: 2024-09-05

## 2024-09-05 RX ORDER — METHYLERGONOVINE MALEATE 0.2 MG/ML
200 INJECTION INTRAVENOUS ONCE AS NEEDED
OUTPATIENT
Start: 2024-09-05

## 2024-09-05 RX ORDER — ACETAMINOPHEN 500 MG
1000 TABLET ORAL ONCE
OUTPATIENT
Start: 2024-09-05 | End: 2024-09-05

## 2024-09-05 RX ORDER — SODIUM CHLORIDE 0.9 % (FLUSH) 0.9 %
10 SYRINGE (ML) INJECTION EVERY 12 HOURS SCHEDULED
OUTPATIENT
Start: 2024-09-05

## 2024-09-05 RX ORDER — OXYTOCIN/0.9 % SODIUM CHLORIDE 30/500 ML
999 PLASTIC BAG, INJECTION (ML) INTRAVENOUS ONCE
OUTPATIENT
Start: 2024-09-05

## 2024-09-05 RX ORDER — SODIUM CHLORIDE, SODIUM LACTATE, POTASSIUM CHLORIDE, CALCIUM CHLORIDE 600; 310; 30; 20 MG/100ML; MG/100ML; MG/100ML; MG/100ML
125 INJECTION, SOLUTION INTRAVENOUS CONTINUOUS
OUTPATIENT
Start: 2024-09-05

## 2024-09-05 RX ORDER — SODIUM CHLORIDE 0.9 % (FLUSH) 0.9 %
10 SYRINGE (ML) INJECTION AS NEEDED
OUTPATIENT
Start: 2024-09-05

## 2024-09-05 RX ORDER — MISOPROSTOL 100 UG/1
800 TABLET ORAL AS NEEDED
OUTPATIENT
Start: 2024-09-05

## 2024-09-05 NOTE — PROGRESS NOTES
Patient is feeling well  Ultrasound today is reassuring  Growth is 42%  JOELLE is 12  Patient has no complaints  Follow-up 2 weeks  Discussed repeat  section  She declines salpingectomy  She would like to have this done on 2024  [place] am on vacation and out of the country at that time  Patient would like Dr. Grove to do her delivery  Dr. Grove is agreeable and we will schedule her repeat  at 39 weeks on 2024    Chief Complaint   Patient presents with    Routine Prenatal Visit     Ob Check & Growth US - Pt is 29w5d, Pt c/o headaches & body pains       HPI:  Pt presents for routine prenatal visit    ROS:  No fever or chills, no nausea or vomiting, no contractions, no leg pain, no LE edema, no leaking fluid, no bleeding, no headache, no dysuria  All other systems reviewed and negative    Exam:  See flow sheet  General:  Alert and oriented and no distress  Neck: no lymphadenopathy or thyromegaly  Lungs: non - labored breathing  Abd:  See flow sheet, fundus nontender  Ext: see flow sheet, non-tender bilateral , no lesions  Neuro: grossly normal    Assessment:/ PLAN:    Diagnoses and all orders for this visit:    1. 29 weeks gestation of pregnancy (Primary)  -     POC Urinalysis Dipstick    2. AMA (advanced maternal age) multigravida 35+, third trimester    3. Previous  section  -     Case Request; Standing  -     sodium chloride 0.9 % flush 10 mL  -     sodium chloride 0.9 % flush 10 mL  -     sodium chloride 0.9 % infusion 40 mL  -     lidocaine (XYLOCAINE) 1 % injection 0.5 mL  -     lactated ringers bolus 1,000 mL  -     lactated ringers infusion  -     acetaminophen (TYLENOL) tablet 1,000 mg  -     oxytocin (PITOCIN) 30 units in 0.9% sodium chloride 500 mL (premix)  -     oxytocin (PITOCIN) 30 units in 0.9% sodium chloride 500 mL (premix)  -     methylergonovine (METHERGINE) injection 200 mcg  -     carboprost (HEMABATE) injection 250 mcg  -     miSOPROStol (CYTOTEC) tablet 800 mcg  -      ceFAZolin (ANCEF) 2 g in sodium chloride 0.9 % 100 mL IVPB  -     Case Request    4. Antepartum multigravida of advanced maternal age    Other orders  -     Admit To Obstetrics Inpatient; Standing  -     Code Status and Medical Interventions: CPR (Attempt to Resuscitate); Full Support; Standing  -     Obtain Informed Consent; Standing  -     Vital Signs q 4 while awake; Standing  -     Vital Signs Per Hospital Policy; Standing  -     Continuous Fetal Monitoring With NST on Admission and Prior to Initiation of Oxytocin.; Standing  -     External Uterine Contraction Monitoring; Standing  -     Notify Provider (Specified); Standing  -     Notify Provider of Tachysystole (Per Hospital Algorithm); Standing  -     Notify Provider if Membranes Ruptured, Bleeding Greater Than 1 Pad Per Hour, Fetal Heart Tone Abnormality or Severe Pain; Standing  -     Initiate Group Beta Strep (GBS) Prophylaxis Protocol, If Criteria Met; Standing  -     Insert Indwelling Urinary Catheter; Standing  -     Assess Need for Indwelling Urinary Catheter - Follow Removal Protocol; Standing  -     Urinary Catheter Care; Standing  -     Abdominal Prep With Clippers; Standing  -     Chlorhexadine Skin Prep Unless Otherwise Indicated; Standing  -     SCD (Sequential Compression Devices); Standing  -     POC Glucose Once; Standing  -     Document Gatorade Consumption Prior to Admission (Yes or No); Standing  -     NPO Diet NPO Type: Ice Chips; Standing  -     Inpatient Anesthesiology Consult; Standing  -     Type & Screen; Standing  -     CBC (No Diff); Standing  -     Treponema pallidum AB w/Reflex RPR; Standing  -     Insert Peripheral IV; Standing  -     Saline Lock & Maintain IV Access; Standing  -     Notify Provider (Specified); Standing  -     Vital Signs Per Hospital Policy; Standing  -     Strict Bed Rest; Standing  -     Fundal & Lochia Check; Standing  -     Fundal & Lochia Check; Standing  -     Diet: Regular/House; Fluid Consistency: Thin  (IDDSI 0); Standing

## 2024-09-10 ENCOUNTER — TELEPHONE (OUTPATIENT)
Dept: SPORTS MEDICINE | Facility: CLINIC | Age: 39
End: 2024-09-10
Payer: MEDICAID

## 2024-09-10 ENCOUNTER — TELEPHONE (OUTPATIENT)
Dept: OBSTETRICS AND GYNECOLOGY | Age: 39
End: 2024-09-10
Payer: MEDICAID

## 2024-09-10 NOTE — TELEPHONE ENCOUNTER
Pt calling In asking If she should stop taking the dextromethorphan and riboflavin like her AVS says?

## 2024-09-10 NOTE — TELEPHONE ENCOUNTER
Pt does not have to stop taking these. It looks like it was marked she was not taking it at the time of her last visit so medications were removed which could have been accidental. We can add the medications back to her chart at her next visit if she is still taking.

## 2024-09-17 ENCOUNTER — ROUTINE PRENATAL (OUTPATIENT)
Dept: OBSTETRICS AND GYNECOLOGY | Age: 39
End: 2024-09-17
Payer: MEDICAID

## 2024-09-17 VITALS — WEIGHT: 255 LBS | SYSTOLIC BLOOD PRESSURE: 112 MMHG | BODY MASS INDEX: 38.77 KG/M2 | DIASTOLIC BLOOD PRESSURE: 64 MMHG

## 2024-09-17 DIAGNOSIS — Z3A.31 31 WEEKS GESTATION OF PREGNANCY: Primary | ICD-10-CM

## 2024-09-17 DIAGNOSIS — Z98.891 PREVIOUS CESAREAN SECTION: ICD-10-CM

## 2024-09-17 DIAGNOSIS — O09.523 AMA (ADVANCED MATERNAL AGE) MULTIGRAVIDA 35+, THIRD TRIMESTER: ICD-10-CM

## 2024-09-17 LAB
GLUCOSE UR STRIP-MCNC: NEGATIVE MG/DL
PROT UR STRIP-MCNC: ABNORMAL MG/DL

## 2024-09-17 PROCEDURE — 99213 OFFICE O/P EST LOW 20 MIN: CPT | Performed by: OBSTETRICS & GYNECOLOGY

## 2024-09-17 PROCEDURE — 90656 IIV3 VACC NO PRSV 0.5 ML IM: CPT | Performed by: OBSTETRICS & GYNECOLOGY

## 2024-09-17 PROCEDURE — 90471 IMMUNIZATION ADMIN: CPT | Performed by: OBSTETRICS & GYNECOLOGY

## 2024-10-01 ENCOUNTER — ROUTINE PRENATAL (OUTPATIENT)
Dept: OBSTETRICS AND GYNECOLOGY | Age: 39
End: 2024-10-01
Payer: MEDICAID

## 2024-10-01 VITALS — SYSTOLIC BLOOD PRESSURE: 122 MMHG | BODY MASS INDEX: 38.32 KG/M2 | WEIGHT: 252 LBS | DIASTOLIC BLOOD PRESSURE: 74 MMHG

## 2024-10-01 DIAGNOSIS — Z98.891 PREVIOUS CESAREAN SECTION: ICD-10-CM

## 2024-10-01 DIAGNOSIS — K21.9 GASTROESOPHAGEAL REFLUX DISEASE WITHOUT ESOPHAGITIS: ICD-10-CM

## 2024-10-01 DIAGNOSIS — O09.529 ANTEPARTUM MULTIGRAVIDA OF ADVANCED MATERNAL AGE: ICD-10-CM

## 2024-10-01 DIAGNOSIS — O99.340 ANXIETY IN PREGNANCY, ANTEPARTUM: ICD-10-CM

## 2024-10-01 DIAGNOSIS — Z34.93 PRENATAL CARE IN THIRD TRIMESTER: Primary | ICD-10-CM

## 2024-10-01 DIAGNOSIS — O34.219 PREVIOUS CESAREAN DELIVERY AFFECTING PREGNANCY: ICD-10-CM

## 2024-10-01 DIAGNOSIS — Z3A.33 33 WEEKS GESTATION OF PREGNANCY: ICD-10-CM

## 2024-10-01 DIAGNOSIS — F41.9 ANXIETY IN PREGNANCY, ANTEPARTUM: ICD-10-CM

## 2024-10-01 DIAGNOSIS — E66.811 OBESITY (BMI 30.0-34.9): ICD-10-CM

## 2024-10-01 LAB
GLUCOSE UR STRIP-MCNC: NEGATIVE MG/DL
PROT UR STRIP-MCNC: ABNORMAL MG/DL

## 2024-10-01 PROCEDURE — 99213 OFFICE O/P EST LOW 20 MIN: CPT | Performed by: NURSE PRACTITIONER

## 2024-10-01 PROCEDURE — 90678 RSV VACC PREF BIVALENT IM: CPT | Performed by: NURSE PRACTITIONER

## 2024-10-01 PROCEDURE — 90471 IMMUNIZATION ADMIN: CPT | Performed by: NURSE PRACTITIONER

## 2024-10-01 NOTE — PROGRESS NOTES
Chief Complaint   Patient presents with    Routine Prenatal Visit     33 weeks, US today, admin RSV vaccine  CC:  no problems       HPI: 39 y.o.  at 33w3d     Doing well  Reports good FM  Denies LOF, bleeding or ctx's  Pt of Dr. El    There were no vitals filed for this visit.    ROS:  GI:  Negative  : Negative  Pulmonary: Negative     A/P  1. Intrauterine pregnancy at 33w3d   2. Pregnancy Risk:  HIGH RISK    Diagnoses and all orders for this visit:    1. Prenatal care in third trimester (Primary)    2. 33 weeks gestation of pregnancy  -     POC Urinalysis Dipstick    3. Obesity (BMI 30.0-34.9)    4. Gastroesophageal reflux disease without esophagitis    5. Previous  section    6. Anxiety in pregnancy, antepartum    7. Previous  delivery affecting pregnancy    8. Antepartum multigravida of advanced maternal age    Other orders  -     ABRYSVO Vaccine (RSV for Pregnant Women 32-36 wks)        -----------------------  PLAN:   Normal growth  BPP   RSV vaccine given  PTL/FKC discussed  Continue weekly BPP's  Return for Next scheduled follow up.      Ann Tony, RAJAN  10/1/2024 09:46 EDT

## 2024-10-02 RX ORDER — DOCUSATE SODIUM 100 MG/1
100 CAPSULE, LIQUID FILLED ORAL 2 TIMES DAILY PRN
Qty: 180 CAPSULE | Refills: 0 | Status: SHIPPED | OUTPATIENT
Start: 2024-10-02

## 2024-10-04 ENCOUNTER — TELEPHONE (OUTPATIENT)
Dept: OBSTETRICS AND GYNECOLOGY | Age: 39
End: 2024-10-04
Payer: MEDICAID

## 2024-10-04 RX ORDER — PANTOPRAZOLE SODIUM 40 MG/1
40 TABLET, DELAYED RELEASE ORAL DAILY
Qty: 30 TABLET | Refills: 0 | Status: SHIPPED | OUTPATIENT
Start: 2024-10-04

## 2024-10-04 NOTE — TELEPHONE ENCOUNTER
OB PT states that her RSV shot was injected into her left arm and says that it is sore and very swollen. PT states there is a lump like in her arm. PT said she knows this is probably to be expected but wanted to let us know.

## 2024-10-07 ENCOUNTER — ROUTINE PRENATAL (OUTPATIENT)
Dept: OBSTETRICS AND GYNECOLOGY | Age: 39
End: 2024-10-07
Payer: MEDICAID

## 2024-10-07 VITALS — WEIGHT: 263 LBS | BODY MASS INDEX: 39.99 KG/M2 | DIASTOLIC BLOOD PRESSURE: 68 MMHG | SYSTOLIC BLOOD PRESSURE: 112 MMHG

## 2024-10-07 DIAGNOSIS — F41.9 ANXIETY IN PREGNANCY, ANTEPARTUM: ICD-10-CM

## 2024-10-07 DIAGNOSIS — O09.523 MULTIGRAVIDA OF ADVANCED MATERNAL AGE IN THIRD TRIMESTER: ICD-10-CM

## 2024-10-07 DIAGNOSIS — Z98.891 PREVIOUS CESAREAN SECTION: ICD-10-CM

## 2024-10-07 DIAGNOSIS — Z3A.34 34 WEEKS GESTATION OF PREGNANCY: Primary | ICD-10-CM

## 2024-10-07 DIAGNOSIS — O99.210 OBESITY IN PREGNANCY, ANTEPARTUM: ICD-10-CM

## 2024-10-07 DIAGNOSIS — Z34.93 PRENATAL CARE IN THIRD TRIMESTER: ICD-10-CM

## 2024-10-07 DIAGNOSIS — O99.340 ANXIETY IN PREGNANCY, ANTEPARTUM: ICD-10-CM

## 2024-10-07 LAB
GLUCOSE UR STRIP-MCNC: NEGATIVE MG/DL
PROT UR STRIP-MCNC: NEGATIVE MG/DL

## 2024-10-07 PROCEDURE — 99213 OFFICE O/P EST LOW 20 MIN: CPT | Performed by: OBSTETRICS & GYNECOLOGY

## 2024-10-07 NOTE — PROGRESS NOTES
Patient feels well  Ultrasound was done today BPP   JOELLE is 19  Vertex presentation  Follow-up for weekly BPP's  All vaccines done    Chief Complaint   Patient presents with    Routine Prenatal Visit     Ob Check & BPP US - Pt is 34w2d, Pt c/o back pain otherwise doing well       HPI:  Pt presents for routine prenatal visit    ROS:  No fever or chills, no nausea or vomiting, no contractions, no leg pain, no LE edema, no leaking fluid, no bleeding, no headache, no dysuria  All other systems reviewed and negative    Exam:  See flow sheet  General:  Alert and oriented and no distress  Neck: no lymphadenopathy or thyromegaly  Lungs: non - labored breathing  Abd:  See flow sheet, fundus nontender  Ext: see flow sheet, non-tender bilateral , no lesions  Neuro: grossly normal    Assessment:/ PLAN:    Diagnoses and all orders for this visit:    1. 34 weeks gestation of pregnancy (Primary)  -     POC Urinalysis Dipstick    2. Multigravida of advanced maternal age in third trimester    3. Obesity in pregnancy, antepartum    4. Previous  section    5. Anxiety in pregnancy, antepartum    6. Prenatal care in third trimester

## 2024-10-15 ENCOUNTER — ROUTINE PRENATAL (OUTPATIENT)
Dept: OBSTETRICS AND GYNECOLOGY | Age: 39
End: 2024-10-15
Payer: MEDICAID

## 2024-10-15 VITALS — DIASTOLIC BLOOD PRESSURE: 74 MMHG | SYSTOLIC BLOOD PRESSURE: 114 MMHG | WEIGHT: 262 LBS | BODY MASS INDEX: 39.84 KG/M2

## 2024-10-15 DIAGNOSIS — O99.210 OBESITY IN PREGNANCY, ANTEPARTUM: ICD-10-CM

## 2024-10-15 DIAGNOSIS — O99.340 ANXIETY IN PREGNANCY, ANTEPARTUM: ICD-10-CM

## 2024-10-15 DIAGNOSIS — F41.9 ANXIETY IN PREGNANCY, ANTEPARTUM: ICD-10-CM

## 2024-10-15 DIAGNOSIS — O09.523 AMA (ADVANCED MATERNAL AGE) MULTIGRAVIDA 35+, THIRD TRIMESTER: ICD-10-CM

## 2024-10-15 DIAGNOSIS — Z3A.35 35 WEEKS GESTATION OF PREGNANCY: Primary | ICD-10-CM

## 2024-10-15 DIAGNOSIS — Z34.93 PRENATAL CARE IN THIRD TRIMESTER: ICD-10-CM

## 2024-10-15 LAB
GLUCOSE UR STRIP-MCNC: NEGATIVE MG/DL
PROT UR STRIP-MCNC: NEGATIVE MG/DL

## 2024-10-15 NOTE — PROGRESS NOTES
Patient feels well  She has good fetal movement  Ultrasound shows BPP 8/8  Vertex presentation  JOELLE is 21  Patient declines GBS today  She would rather do it next week  No other complaints    Chief Complaint   Patient presents with    Routine Prenatal Visit     Ob Check & BPP US - Pt is 35w3d, Pt would like to do GBS at next visit, Pt has no complaints today       HPI:  Pt presents for routine prenatal visit    ROS:  No fever or chills, no nausea or vomiting, no contractions, no leg pain, no LE edema, no leaking fluid, no bleeding, no headache, no dysuria  All other systems reviewed and negative    Exam:  See flow sheet  General:  Alert and oriented and no distress  Neck: no lymphadenopathy or thyromegaly  Lungs: non - labored breathing  Abd:  See flow sheet, fundus nontender  Ext: see flow sheet, non-tender bilateral , no lesions  Neuro: grossly normal    Assessment:/ PLAN:    Diagnoses and all orders for this visit:    1. 35 weeks gestation of pregnancy (Primary)  -     POC Urinalysis Dipstick    2. AMA (advanced maternal age) multigravida 35+, third trimester    3. Obesity in pregnancy, antepartum    4. Anxiety in pregnancy, antepartum    5. Prenatal care in third trimester    Other orders  -     Cancel: Group B Streptococcus Culture - Swab, Vaginal/Rectum

## 2024-10-16 ENCOUNTER — TELEPHONE (OUTPATIENT)
Dept: OBSTETRICS AND GYNECOLOGY | Age: 39
End: 2024-10-16
Payer: MEDICAID

## 2024-10-16 NOTE — TELEPHONE ENCOUNTER
Pt C/O upper left leg pain and is requesting PT referral for this, Please advise. Pt notified  is out of office today

## 2024-10-17 DIAGNOSIS — M79.606 PREGNANCY RELATED LEG PAIN IN THIRD TRIMESTER, ANTEPARTUM: Primary | ICD-10-CM

## 2024-10-17 DIAGNOSIS — O26.893 PREGNANCY RELATED LEG PAIN IN THIRD TRIMESTER, ANTEPARTUM: Primary | ICD-10-CM

## 2024-10-17 NOTE — TELEPHONE ENCOUNTER
Please get more information, sciatic type pain (buttock into posterior thigh) ?  Is there any swelling or discoloration of the leg?

## 2024-10-18 ENCOUNTER — HOSPITAL ENCOUNTER (OUTPATIENT)
Dept: PHYSICAL THERAPY | Facility: HOSPITAL | Age: 39
Setting detail: THERAPIES SERIES
Discharge: HOME | End: 2024-10-18
Payer: MEDICAID

## 2024-10-18 DIAGNOSIS — O99.891 BACK PAIN DURING PREGNANCY: ICD-10-CM

## 2024-10-18 DIAGNOSIS — M54.9 BACK PAIN DURING PREGNANCY: ICD-10-CM

## 2024-10-18 DIAGNOSIS — M79.605 LEFT LEG PAIN: Primary | ICD-10-CM

## 2024-10-18 DIAGNOSIS — Z74.09 IMPAIRED MOBILITY: ICD-10-CM

## 2024-10-18 PROCEDURE — 97161 PT EVAL LOW COMPLEX 20 MIN: CPT

## 2024-10-18 NOTE — THERAPY EVALUATION
Outpatient Physical Therapy Ortho Initial Evaluation  [facility]     Patient Name: Sonam Lang  : 1985  MRN: [id number]  Today's Date: 10/18/2024      Visit Date: 10/18/2024    Patient Active Problem List   Diagnosis    Migraine without aura and without status migrainosus, not intractable    Allergy to NSAIDs    Asthma, mild persistent    Obesity (BMI 30.0-34.9)    Sleep apnea    Anxiety    Previous  section    , delivered    Adult ADHD    Anxiety in pregnancy, antepartum    Multigravida of advanced maternal age in first trimester    Previous  delivery affecting pregnancy     delivery delivered    Allergic reaction    Subchorionic hemorrhage in first trimester    Gastroesophageal reflux disease without esophagitis    Antepartum multigravida of advanced maternal age        Past Medical History:   Diagnosis Date    ADHD (attention deficit hyperactivity disorder)     Anxiety     Asthma     Congenital abnormalities 2022    Multiple fetal anomalies present including meningocele, unilateral real agenesis, unilateral club foot, membranous VSD     Depression     GERD (gastroesophageal reflux disease)     History of depression 2019    Irritable bowel syndrome     Migraine     Obesity (BMI 30.0-34.9) 2019    Pregnancy complicated by multiple fetal congenital anomalies, single gestation 2022    NTD, SUA    PTSD (post-traumatic stress disorder)     Sleep apnea 2016    CPAP        Past Surgical History:   Procedure Laterality Date     SECTION  2019     SECTION N/A 10/14/2023    Procedure:  SECTION REPEAT;  Surgeon: Megan El MD;  Location: [facility] LABOR DELIVERY;  Service: Obstetrics/Gynecology;  Laterality: N/A;    COLONOSCOPY      with endoscopy    SINUS SURGERY  11/22/2022    x2, 2018 (first)    WISDOM TOOTH EXTRACTION         Visit Dx:     ICD-10-CM ICD-9-CM   1. Left leg pain  M79.605 729.5   2. Back pain during  pregnancy  O99.891 646.80    M54.9 724.5   3. Impaired mobility  Z74.09 799.89          Patient History       Row Name 10/18/24 1400 10/18/24 1311          History    Chief Complaint -- Difficulty Walking;Difficulty with daily activities;Fatigue/poor endurance;Headache;Muscle tenderness;Pain (P)    -patient     Type of Pain -- Lower Extremity / Leg (P)    -patient     Date Current Problem(s) Began -- 10/12/24 (P)    -patient     Brief Description of Current Complaint -- Pain at the top of my left leg that extends down my leg and sometimes up into my abdomen. There was a pain that I had in my left buttock's cheek back at the beginning of September also. Then I guess subsided for a while (P)    -patient     Patient/Caregiver Goals -- Relieve pain;Return to prior level of function;Know what to do to help the symptoms (P)    -patient     Hand Dominance -- right-handed (P)    -patient     Occupation/sports/leisure activities -- Nothing at the moment (P)    -patient     Patient seeing anyone else for problem(s)? -- Not currently (P)    -patient     Are you or can you be pregnant -- Yes (P)    -patient        Fall Risk Assessment    Any falls in the past year: No  -RS No (P)    -patient        Services    Prior Rehab/Home Health Experiences Yes  -RS Yes (P)    -patient     Are you currently receiving Home Health services No  -RS No (P)    -patient     Do you plan to receive Home Health services in the near future Yes  -RS Yes (P)    -patient        Daily Activities    Primary Language English  -RS English (P)    -patient     Are you able to read Yes  -RS Yes (P)    -patient     Are you able to write Yes  -RS Yes (P)    -patient     How does patient learn best? Demonstration  -RS Demonstration (P)    -patient        Safety    Are you being hurt, hit, or frightened by anyone at home or in your life? No  -RS No (P)    -patient     Are you being neglected by a caregiver No  -RS No (P)    -patient     Have you had any of the  following issues with Depression;Anxiety  -RS Depression;Anxiety (P)    -patient               User Key  (r) = Recorded By, (t) = Taken By, (c) = Cosigned By      Initials Name Provider Type    RS Guerita Fleming, PT Physical Therapist    patient Sonam Lang --                     PT Ortho       Row Name 10/18/24 1400       Posture/Observations    Alignment Options Lumbar lordosis;Genu valgus;Genu varus;Foot pronation;Pes Planus  -RS    Lumbar lordosis Increased  -RS    Genu valgus Bilateral:;Mild  -RS    Posture/Observations Comments inc time for transitional position performance, sits straight up from reclined position  -RS       Special Tests/Palpation    Special Tests/Palpation Lumbar/SI  -RS       Lumbosacral Palpation    Lumbosacral Palpation? Yes  -RS    Quadratus Lumborum Left:;Tender  -RS    Erector Spinae (Paraspinals) Left:;Tender;Guarded/taut  -RS    Hamstring Left:;Tender  -RS    Adductors Left:;Tender  -RS    Iliopsoas Left:;Tender  -RS       General ROM    GENERAL ROM COMMENTS PROM L hip flexion 70% WNL limited by soft tissue approximation, ER 60% WNL, IR 50% WNL, stiffness but no pain reported  -RS       MMT (Manual Muscle Testing)    Rt Lower Ext Rt Hip Flexion;Rt Hip ABduction;Rt Hip Internal (Medial) Rotation;Rt Hip External (Lateral) Rotation;Rt Knee WNL;Rt Ankle WNL  -RS    Lt Lower Ext Lt Hip Flexion;Lt Hip ABduction;Lt Hip Internal (Medial) Rotation;Lt Hip External (Lateral) Rotation;Lt Knee WNL;Lt Ankle WNL  -RS       MMT Right Lower Ext    Rt Hip Flexion MMT, Gross Movement (4+/5) good plus  -RS    Rt Hip ABduction MMT, Gross Movement (4-/5) good minus  -RS    Rt Hip Internal (Medial) Rotation MMT, Gross Movement (4/5) good  caused pain L  -RS    Rt Hip External (Lateral) Rotation MMT, Gross Movement (4/5) good  -RS       MMT Left Lower Ext    Lt Hip Flexion MMT, Gross Movement (4/5) good  -RS    Lt Hip ABduction MMT, Gross Movement (4-/5) good minus  -RS    Lt Hip Internal (Medial)  Rotation MMT, Gross Movement (4/5) good  -RS    Lt Hip External (Lateral) Rotation MMT, Gross Movement (4-/5) good minus  -RS       Flexibility    Flexibility Tested? Lower Extremity  -RS       Lower Extremity Flexibility    Hip Flexors Bilateral:;Mildly limited  -RS    Hip Adductors Left:;Moderately limited;Right:;Mildly limited  -RS    Hip External Rotators Bilateral:;Mildly limited  -RS    Gastrocnemius Bilateral:;Moderately limited  -RS       Balance Skills Training    SLS decreased stability LLE compared to R with mild soreness L hip reported  -RS              User Key  (r) = Recorded By, (t) = Taken By, (c) = Cosigned By      Initials Name Provider Type    RS Gueirta Fleming PT Physical Therapist                             Pelvic Health       Row Name 10/18/24 1400             Subjective    Patient Reason for Visit Prenatal Care  -RS      Brief Description of Chief Complaint pt is a 40 yo female who presents at 35 and 6 weeks pregnant reporting recent onset LLE pain that begins on the back of her L hip and wraps around to the groin as well as into the abdomen. The pain in LLQ is decreased by holding her stomach. She has a c section scheduled for 11/11. She has inc pain with walking after sitting, transitioning to standing, carrying her 1 yo. She has been to PT in the past for plantar fascitis and pregnancy related pain. She denies N/T or falls.  -RS      Patient Participated in POC and Goals Yes  -RS         Fall Risk Assessment    Any falls in the past year: No  -RS         Services    Prior Rehab/Home Health Experiences Yes  -RS      Are you currently receiving Home Health services No  -RS      Do you plan to receive Home Health services in the near future Yes  -RS         Daily Activities    Primary Language English  -RS      Are you able to read Yes  -RS      Are you able to write Yes  -RS      How does patient learn best? Demonstration  -RS         Safety    Are you being hurt, hit, or frightened by  anyone at home or in your life? No  -RS      Are you being neglected by a caregiver No  -RS      Have you had any of the following issues with Depression;Anxiety  -RS         Urinary/Bowel History    Stress incontinence occasional  -RS         Pregnancy/Sexual History    Number of Pregnancies 6  -RS      Type of Previous Deliveries Vaginal;  -RS      Do you have radicular pain or numbness? No  -RS                User Key  (r) = Recorded By, (t) = Taken By, (c) = Cosigned By      Initials Name Provider Type    Guerita Deleon PT Physical Therapist                    Therapy Education  Education Details: Role of  PF PT, POC, differential diagnosis, initial HEP, expectations, goals, anatomy, role of PF. Access Code [email or web address]  Given: HEP  Program: New  How Provided: Verbal, Demonstration, Written  Provided to: Patient  Level of Understanding: Verbalized, Demonstrated      PT OP Goals       Row Name 10/18/24 1400          PT Short Term Goals    STG Date to Achieve 24  -RS     STG 1 The pt will demonstrate IND with initial HEP focused on pain management.  -RS     STG 1 Progress New  -RS        Long Term Goals    LTG Date to Achieve 25  -RS     LTG 1 The pt will report understandin gof postpartum activity recommendations and HEP.  -RS     LTG 1 Progress New  -RS        Time Calculation    PT Goal Re-Cert Due Date 25  -RS               User Key  (r) = Recorded By, (t) = Taken By, (c) = Cosigned By      Initials Name Provider Type    uGerita Deleon PT Physical Therapist                     PT Assessment/Plan       Row Name 10/18/24 1400          PT Assessment    Functional Limitations Limitation in home management;Limitations in community activities;Performance in self-care ADL;Performance in work activities  -RS     Impairments Impaired muscle length;Impaired muscle power;Impaired muscle endurance;Range of motion;Posture;Peripheral nerve integrity;Pain;Muscle strength  -RS      Assessment Comments L hip pain started early Sept then better now worse again the past week   c section    at 28 weeks   C section   c section    Sonam Lang is a 39 y.o. female referred to physical therapy for pregnancy related LLE pain (pt is currently 35w6d). She presents with a stable clinical presentation, along with no remarkable comorbidities and personal factors of hx 6 pregnancies in a short amount of time that may impact her progress in the plan of care. Pt presents today with decreased B hip ER/IR stretch, TTP hip adduction, decreased hip adduction, increased anterior pelvic tilt. her signs and symptoms are consistent with referring diagnosis. The previous impairments limit her ability to perform transitional positions, ambulate community distances. The pt self scores 23% ability on the LEFS (100=full ability).Pt will benefit from skilled PT to address the previous impairments and return to PLOF.  -RS     Please refer to paper survey for additional self-reported information No  -RS     Rehab Potential Good  -RS     Patient/caregiver participated in establishment of treatment plan and goals Yes  -RS     Patient would benefit from skilled therapy intervention Yes  -RS        PT Plan    PT Frequency 1x/week  -RS     Predicted Duration of Therapy Intervention (PT) 4 sessions  -RS     Planned CPT's? PT RE-EVAL: 93293;PT THER PROC EA 15 MIN: 29498;PT THER ACT EA 15 MIN: 73916;PT MANUAL THERAPY EA 15 MIN: 68856;PT NEUROMUSC RE-EDUCATION EA 15 MIN: 90621;PT GAIT TRAINING EA 15 MIN: 66883;PT SELF CARE/HOME MGMT/TRAIN EA 15: 41783;PT HOT OR COLD PACK TREAT MCARE;PT ELECTRICAL STIM UNATTEND: ;PT TRACTION LUMBAR: 36088;PT EVAL MOD COMPLELITY: 69677  -RS     PT Plan Comments continue to focus on pain management (pt has c section scheduled ), consider hip AB/AD, seated ball roll out, PPT with march, review postpartum activity and return to PFPT recommendations, log roll  -RS                User Key  (r) = Recorded By, (t) = Taken By, (c) = Cosigned By      Initials Name Provider Type    RS Guerita Fleming PT Physical Therapist                       OP Exercises       Row Name 10/18/24 1400             Subjective    Patient Reason for Visit Prenatal Care  -RS      Brief Description of Chief Complaint pt is a 40 yo female who presents at 35 and 6 weeks pregnant reporting recent onset LLE pain that begins on the back of her L hip and wraps around to the groin as well as into the abdomen. The pain in LLQ is decreased by holding her stomach. She has a c section scheduled for 11/11. She has inc pain with walking after sitting, transitioning to standing, carrying her 1 yo. She has been to PT in the past for plantar fascitis and pregnancy related pain. She denies N/T or falls.  -RS      Patient Participated in POC and Goals Yes  -RS         Total Minutes    66055 - PT Therapeutic Exercise Minutes --  -RS         Exercise 1    Exercise Name 1 LTR  -RS      Cueing 1 Verbal;Demo  -RS      Reps 1 10ea  -RS         Exercise 2    Exercise Name 2 PPT  -RS      Reps 2 10  -RS      Time 2 5  -RS         Exercise 3    Exercise Name 3 seated piriformis stretch  -RS      Cueing 3 Verbal;Demo  -RS      Reps 3 3  -RS      Time 3 30  -RS         Exercise 4    Exercise Name 4 seated hip AD stretch  -RS      Cueing 4 Verbal;Demo  -RS      Reps 4 3  -RS      Time 4 30  -RS         Exercise 5    Exercise Name 5 discussion of belly band- pt had one in past but cant find it  -RS                User Key  (r) = Recorded By, (t) = Taken By, (c) = Cosigned By      Initials Name Provider Type    RS Guerita Fleming PT Physical Therapist                                            Time Calculation:     Start Time: 1318  Stop Time: 1359  Time Calculation (min): 41 min  Untimed Charges  PT Eval/Re-eval Minutes: 39  Total Minutes  Untimed Charges Total Minutes: 39   Total Minutes: 39     Therapy Charges for Today       Code  Description Service Date Service Provider Modifiers Qty    [id number] HC PT EVAL LOW COMPLEXITY 3 10/18/2024 Guerita Fleming, PT GP 1            PT G-Codes  Total: (Patient-Rptd) (P) 19         Guerita Fleming, PT  10/18/2024

## 2024-10-21 ENCOUNTER — ROUTINE PRENATAL (OUTPATIENT)
Dept: OBSTETRICS AND GYNECOLOGY | Age: 39
End: 2024-10-21
Payer: MEDICAID

## 2024-10-21 VITALS — BODY MASS INDEX: 40.14 KG/M2 | SYSTOLIC BLOOD PRESSURE: 116 MMHG | DIASTOLIC BLOOD PRESSURE: 74 MMHG | WEIGHT: 264 LBS

## 2024-10-21 DIAGNOSIS — F41.9 ANXIETY IN PREGNANCY, ANTEPARTUM: ICD-10-CM

## 2024-10-21 DIAGNOSIS — Z36.85 ANTENATAL SCREENING FOR STREPTOCOCCUS B: ICD-10-CM

## 2024-10-21 DIAGNOSIS — O99.340 ANXIETY IN PREGNANCY, ANTEPARTUM: ICD-10-CM

## 2024-10-21 DIAGNOSIS — O99.210 OBESITY IN PREGNANCY, ANTEPARTUM: ICD-10-CM

## 2024-10-21 DIAGNOSIS — O36.8130 DECREASED FETAL MOVEMENTS IN THIRD TRIMESTER, SINGLE OR UNSPECIFIED FETUS: ICD-10-CM

## 2024-10-21 DIAGNOSIS — Z98.891 PREVIOUS CESAREAN SECTION: ICD-10-CM

## 2024-10-21 DIAGNOSIS — Z34.93 PRENATAL CARE IN THIRD TRIMESTER: ICD-10-CM

## 2024-10-21 DIAGNOSIS — Z3A.36 36 WEEKS GESTATION OF PREGNANCY: Primary | ICD-10-CM

## 2024-10-21 LAB
GLUCOSE UR STRIP-MCNC: NEGATIVE MG/DL
PROT UR STRIP-MCNC: NEGATIVE MG/DL

## 2024-10-21 NOTE — PROGRESS NOTES
Patient came in early due to decreased fetal movement  When she got to the office she was feeling more movement  NST was done and was very reactive and reassuring with lots of movement  NST was done for 20 minutes for the diagnosis of decreased fetal movement and AMA  Category 1  BPP was done, , JOELLE 17, vertex  Follow up 1 week  Labor warnings and fetal movement counts  GBS was done    Chief Complaint   Patient presents with    Routine Prenatal Visit     Ob Check - Pt is 36w2d, needs GBS today, Pt c/o decrease in fetal movement since last night, NST today       HPI:  Pt presents for routine prenatal visit    ROS:  No fever or chills, no nausea or vomiting, no contractions, no leg pain, no LE edema, no leaking fluid, no bleeding, no headache, no dysuria  All other systems reviewed and negative    Exam:  See flow sheet  General:  Alert and oriented and no distress  Neck: no lymphadenopathy or thyromegaly  Lungs: non - labored breathing  Abd:  See flow sheet, fundus nontender  Ext: see flow sheet, non-tender bilateral , no lesions  Neuro: grossly normal    Assessment:/ PLAN:    Diagnoses and all orders for this visit:    1. 36 weeks gestation of pregnancy (Primary)  -     POC Urinalysis Dipstick    2.  screening for streptococcus B  -     Group B Streptococcus Culture - Swab, Vaginal/Rectum    3. Anxiety in pregnancy, antepartum    4. Prenatal care in third trimester    5. Obesity in pregnancy, antepartum    6. Decreased fetal movements in third trimester, single or unspecified fetus    7. Previous  section

## 2024-10-25 LAB — B-HEM STREP SPEC QL CULT: NEGATIVE

## 2024-10-29 ENCOUNTER — ROUTINE PRENATAL (OUTPATIENT)
Dept: OBSTETRICS AND GYNECOLOGY | Age: 39
End: 2024-10-29
Payer: MEDICAID

## 2024-10-29 ENCOUNTER — HOSPITAL ENCOUNTER (OUTPATIENT)
Dept: CARDIOLOGY | Facility: HOSPITAL | Age: 39
Discharge: HOME | End: 2024-10-29
Admitting: OBSTETRICS & GYNECOLOGY
Payer: MEDICAID

## 2024-10-29 VITALS — DIASTOLIC BLOOD PRESSURE: 74 MMHG | SYSTOLIC BLOOD PRESSURE: 116 MMHG | WEIGHT: 267.8 LBS | BODY MASS INDEX: 40.72 KG/M2

## 2024-10-29 DIAGNOSIS — M79.606 PREGNANCY RELATED LEG PAIN IN THIRD TRIMESTER, ANTEPARTUM: ICD-10-CM

## 2024-10-29 DIAGNOSIS — O99.210 OBESITY IN PREGNANCY, ANTEPARTUM: ICD-10-CM

## 2024-10-29 DIAGNOSIS — F41.9 ANXIETY IN PREGNANCY, ANTEPARTUM: ICD-10-CM

## 2024-10-29 DIAGNOSIS — O99.340 ANXIETY IN PREGNANCY, ANTEPARTUM: ICD-10-CM

## 2024-10-29 DIAGNOSIS — Z3A.37 37 WEEKS GESTATION OF PREGNANCY: Primary | ICD-10-CM

## 2024-10-29 DIAGNOSIS — O09.523 AMA (ADVANCED MATERNAL AGE) MULTIGRAVIDA 35+, THIRD TRIMESTER: ICD-10-CM

## 2024-10-29 DIAGNOSIS — O26.893 PREGNANCY RELATED LEG PAIN IN THIRD TRIMESTER, ANTEPARTUM: ICD-10-CM

## 2024-10-29 DIAGNOSIS — Z98.891 PREVIOUS CESAREAN SECTION: ICD-10-CM

## 2024-10-29 DIAGNOSIS — Z34.93 PRENATAL CARE IN THIRD TRIMESTER: ICD-10-CM

## 2024-10-29 LAB

## 2024-10-29 PROCEDURE — 93970 EXTREMITY STUDY: CPT

## 2024-10-29 PROCEDURE — 93970 EXTREMITY STUDY: CPT | Performed by: SURGERY

## 2024-10-29 NOTE — PROGRESS NOTES
Patient is feeling well  She continues to have leg pain and is concerned  It sounds a bit like sciatic pain to me as it is the posterior left thigh  Starts in the buttock  Patient is very concerned and she has bilateral leg pain  We will order a lower extremity Doppler  We discussed bilateral tubal ligation  Patient states that her  is considering a vasectomy  In the end we decided against tubal ligation and her  will pursue vasectomy  Active fetal movement  No contractions  Growth is 76%  JOELLE is 15.56  8/8 BPP  Vertex presentation  Follow-up 1 week with Dr. Grove  Labor warnings and fetal movement counts    Chief Complaint   Patient presents with    Routine Prenatal Visit     Ob Check & BPP US - Pt is 37w3d, Pt c/o pain on backside of Lt leg, Pt would like to discuss tubal today       HPI:  Pt presents for routine prenatal visit    ROS:  No fever or chills, no nausea or vomiting, no contractions, no leg pain, no LE edema, no leaking fluid, no bleeding, no headache, no dysuria  All other systems reviewed and negative    Exam:  See flow sheet  General:  Alert and oriented and no distress  Neck: no lymphadenopathy or thyromegaly  Lungs: non - labored breathing  Abd:  See flow sheet, fundus nontender  Ext: see flow sheet, non-tender bilateral , no lesions  Neuro: grossly normal    Assessment:/ PLAN:    Diagnoses and all orders for this visit:    1. 37 weeks gestation of pregnancy (Primary)  -     POC Urinalysis Dipstick    2. AMA (advanced maternal age) multigravida 35+, third trimester    3. Anxiety in pregnancy, antepartum    4. Prenatal care in third trimester    5. Obesity in pregnancy, antepartum    6. Previous  section    7. Pregnancy related leg pain in third trimester, antepartum  -     Cancel: Duplex Venous Lower Extremity - Bilateral CAR; Future  -     Duplex Venous Lower Extremity - Bilateral CAR; Future

## 2024-10-29 NOTE — PROGRESS NOTES
Patient is feeling well good fetal movement  She is having some pain in her left posterior thigh that sounds like sciatic pain  However the patient is very concerned about it and she desires to be checked out for this  I recommend that we schedule a stat lower extremity duplex to rule out DVT  My suspicion is low  We discussed tubal ligation as well  The patient brought the subject up  We had talked about it a couple times in the past  Ultimately we decided not to proceed with this  Her  will get a vasectomy  Ultrasound was done today and the growth was good at 76%  JOELLE is 15.56  BPP is 8/8  No contractions  Follow-up 1 week for repeat BPP with Dr. Lai    Chief Complaint   Patient presents with    Routine Prenatal Visit     Ob Check & BPP US - Pt is 37w3d, Pt c/o pain on backside of Lt leg, Pt would like to discuss tubal today       HPI:  Pt presents for routine prenatal visit    ROS:  No fever or chills, no nausea or vomiting, no contractions, no leg pain, no LE edema, no leaking fluid, no bleeding, no headache, no dysuria  All other systems reviewed and negative    Exam:  See flow sheet  General:  Alert and oriented and no distress  Neck: no lymphadenopathy or thyromegaly  Lungs: non - labored breathing  Abd:  See flow sheet, fundus nontender  Ext: see flow sheet, non-tender bilateral , no lesions  Neuro: grossly normal    Assessment:/ PLAN:    Diagnoses and all orders for this visit:    1. 37 weeks gestation of pregnancy (Primary)  -     POC Urinalysis Dipstick    2. AMA (advanced maternal age) multigravida 35+, third trimester    3. Anxiety in pregnancy, antepartum    4. Prenatal care in third trimester    5. Obesity in pregnancy, antepartum    6. Previous  section    7. Pregnancy related leg pain in third trimester, antepartum  -     Cancel: Duplex Venous Lower Extremity - Bilateral CAR; Future  -     Duplex Venous Lower Extremity - Bilateral CAR; Future

## 2024-10-31 RX ORDER — PANTOPRAZOLE SODIUM 40 MG/1
40 TABLET, DELAYED RELEASE ORAL DAILY
Qty: 30 TABLET | Refills: 1 | Status: SHIPPED | OUTPATIENT
Start: 2024-10-31

## 2024-11-06 ENCOUNTER — HOSPITAL ENCOUNTER (OUTPATIENT)
Dept: PHYSICAL THERAPY | Facility: HOSPITAL | Age: 39
Setting detail: THERAPIES SERIES
Discharge: HOME | End: 2024-11-06
Payer: MEDICAID

## 2024-11-06 ENCOUNTER — ROUTINE PRENATAL (OUTPATIENT)
Dept: OBSTETRICS AND GYNECOLOGY | Age: 39
End: 2024-11-06
Payer: MEDICAID

## 2024-11-06 VITALS — BODY MASS INDEX: 41.02 KG/M2 | SYSTOLIC BLOOD PRESSURE: 114 MMHG | DIASTOLIC BLOOD PRESSURE: 64 MMHG | WEIGHT: 269.8 LBS

## 2024-11-06 DIAGNOSIS — M79.605 LEFT LEG PAIN: Primary | ICD-10-CM

## 2024-11-06 DIAGNOSIS — O99.891 BACK PAIN DURING PREGNANCY: ICD-10-CM

## 2024-11-06 DIAGNOSIS — Z98.891 PREVIOUS CESAREAN SECTION: ICD-10-CM

## 2024-11-06 DIAGNOSIS — Z74.09 IMPAIRED MOBILITY: ICD-10-CM

## 2024-11-06 DIAGNOSIS — J30.2 SEASONAL ALLERGIC RHINITIS, UNSPECIFIED TRIGGER: ICD-10-CM

## 2024-11-06 DIAGNOSIS — O09.523 MULTIGRAVIDA OF ADVANCED MATERNAL AGE IN THIRD TRIMESTER: ICD-10-CM

## 2024-11-06 DIAGNOSIS — O99.340 ANXIETY IN PREGNANCY, ANTEPARTUM: ICD-10-CM

## 2024-11-06 DIAGNOSIS — Z13.89 SCREENING FOR HEMATURIA OR PROTEINURIA: ICD-10-CM

## 2024-11-06 DIAGNOSIS — F41.9 ANXIETY IN PREGNANCY, ANTEPARTUM: ICD-10-CM

## 2024-11-06 DIAGNOSIS — J45.30 MILD PERSISTENT ASTHMA WITHOUT COMPLICATION: ICD-10-CM

## 2024-11-06 DIAGNOSIS — Z3A.38 38 WEEKS GESTATION OF PREGNANCY: Primary | ICD-10-CM

## 2024-11-06 DIAGNOSIS — F41.9 ANXIETY: ICD-10-CM

## 2024-11-06 DIAGNOSIS — M54.9 BACK PAIN DURING PREGNANCY: ICD-10-CM

## 2024-11-06 PROBLEM — T78.40XA ALLERGIC REACTION: Status: RESOLVED | Noted: 2024-04-01 | Resolved: 2024-11-06

## 2024-11-06 PROBLEM — O09.529 ANTEPARTUM MULTIGRAVIDA OF ADVANCED MATERNAL AGE: Status: RESOLVED | Noted: 2024-06-18 | Resolved: 2024-11-06

## 2024-11-06 PROBLEM — O34.219 VBAC, DELIVERED: Status: RESOLVED | Noted: 2022-05-23 | Resolved: 2024-11-06

## 2024-11-06 PROBLEM — E66.811 OBESITY (BMI 30.0-34.9): Status: RESOLVED | Noted: 2019-04-02 | Resolved: 2024-11-06

## 2024-11-06 PROBLEM — O20.8 SUBCHORIONIC HEMORRHAGE IN FIRST TRIMESTER: Status: RESOLVED | Noted: 2024-04-02 | Resolved: 2024-11-06

## 2024-11-06 LAB
CLARITY, POC: CLEAR
COLOR UR: NORMAL
GLUCOSE UR STRIP-MCNC: NEGATIVE MG/DL
PROT UR STRIP-MCNC: NEGATIVE MG/DL

## 2024-11-06 PROCEDURE — 97110 THERAPEUTIC EXERCISES: CPT

## 2024-11-06 PROCEDURE — 97530 THERAPEUTIC ACTIVITIES: CPT

## 2024-11-06 PROCEDURE — 97112 NEUROMUSCULAR REEDUCATION: CPT

## 2024-11-06 NOTE — PROGRESS NOTES
Chief Complaint   Patient presents with    Routine Prenatal Visit     38w4d, BPP today, c/o irregular contractions       Sonam Lang, a 39 y.o.  at 38w4d, presents for OB follow-up.  She is doing well today.  Denies loss of fluid, vaginal bleeding, and contractions.  Endorses fetal movements.  This is a patient of Dr. Szymanski.  She is scheduled for repeat  with me 2024.    Objective  BP Readings from Last 3 Encounters:   24 114/64   10/29/24 116/74   10/21/24 116/74      Wt Readings from Last 3 Encounters:   24 122 kg (269 lb 12.8 oz)   10/29/24 121 kg (267 lb 12.8 oz)   10/21/24 120 kg (264 lb)      Total weight gain this pregnancy: Not found.    General: Awake, alert, no apparent distress  Respiratory: No increased work of breathing  Abdomen: Fundus soft and nontender  Extremity: Nontender, no edema    A/P  Diagnoses and all orders for this visit:    1. 38 weeks gestation of pregnancy (Primary)  -     POC Urinalysis Dipstick    2. Screening for hematuria or proteinuria  -     POC Urinalysis Dipstick    3. Previous  section  Overview:  2023 Practice  discussion: Trial of labor NOT supported in this patient  Progress Notes by Neri Dean MD (2023 17:10)     Scheduled for RCS , desires scar revision (two separate scars from prior deliveries, no keloid)      4. Anxiety in pregnancy, antepartum    5. Multigravida of advanced maternal age in third trimester  Overview:  Panorama is low risk      6. Mild persistent asthma without complication  Overview:  Overview:   On twice daily Symbicort, Zyrtec, rescue albuterol prn          Labor precautions reviewed  Return in about 19 days (around 2024) for postpartum incision ck with Nikko.    Radha Lai MD

## 2024-11-06 NOTE — THERAPY TREATMENT NOTE
Outpatient Physical Therapy Ortho Treatment Note  [facility]     Patient Name: Sonam Lang  : 1985  MRN: [id number]  Today's Date: 2024      Visit Date: 2024    Visit Dx:    ICD-10-CM ICD-9-CM   1. Left leg pain  M79.605 729.5   2. Back pain during pregnancy  O99.891 646.80    M54.9 724.5   3. Impaired mobility  Z74.09 799.89       Patient Active Problem List   Diagnosis    Migraine without aura and without status migrainosus, not intractable    Allergy to NSAIDs    Asthma, mild persistent    Sleep apnea    Previous  section    Adult ADHD    Anxiety in pregnancy, antepartum    Multigravida of advanced maternal age in third trimester    Previous  delivery affecting pregnancy    Gastroesophageal reflux disease without esophagitis    38 weeks gestation of pregnancy        Past Medical History:   Diagnosis Date    ADHD (attention deficit hyperactivity disorder)     Anxiety     Asthma     Congenital abnormalities 2022    Multiple fetal anomalies present including meningocele, unilateral real agenesis, unilateral club foot, membranous VSD     Depression     GERD (gastroesophageal reflux disease)     History of depression 2019    Irritable bowel syndrome     Migraine     Obesity (BMI 30.0-34.9) 2019    Pregnancy complicated by multiple fetal congenital anomalies, single gestation 2022    NTD, SUA    PTSD (post-traumatic stress disorder)     Sleep apnea 2016    CPAP        Past Surgical History:   Procedure Laterality Date     SECTION       SECTION N/A 10/14/2023    Procedure:  SECTION REPEAT;  Surgeon: Megan lE MD;  Location: [facility] LABOR DELIVERY;  Service: Obstetrics/Gynecology;  Laterality: N/A;    COLONOSCOPY      with endoscopy    SINUS SURGERY  11/22/2022    x2, 2018 (first)    WISDOM TOOTH EXTRACTION                          PT Assessment/Plan       Row Name 24 1400          PT Assessment     Assessment Comments Pt returns for first follow up session reporting continued pain specifically LLE and low back. Discussed body mechanics and pain management strategies for the next 5 days as pt has csection scheduled for 11/11/ Also reviewed postpartum activity recommendations and PT POC, pt receptive. Applied kinesiotape L hip and stomach for improved pain management, pt tolerates well. She remains appropriate for skilled PT to address limitations in pain and funcitonal activity tolerance.  -RS        PT Plan    PT Plan Comments Follow up for L hip and back pain pp, assess PFM 8 weeks pp as appropriate per MD  -RS               User Key  (r) = Recorded By, (t) = Taken By, (c) = Cosigned By      Initials Name Provider Type    RS Guerita Fleming, PT Physical Therapist                       OP Exercises       Row Name 11/06/24 1400             Subjective    Subjective Comments pt rpeorts the pain is worse with prologned sitting, she brought tape to try to see if that helps  -RS         Subjective Pain    Able to rate subjective pain? yes  -RS         Total Minutes    25567 - PT Therapeutic Exercise Minutes 15  -RS      27325 -  PT Neuromuscular Reeducation Minutes 10  -RS      00422 - PT Therapeutic Activity Minutes 15  -RS         Exercise 1    Exercise Name 1 sciatic nn glide  -RS      Cueing 1 --  -RS      Reps 1 10  -RS      Time 1 seated  -RS         Exercise 2    Exercise Name 2 PPT  -RS      Reps 2 10  -RS      Time 2 5  -RS      Additional Comments seated  -RS         Exercise 3    Exercise Name 3 seated piriformis stretch  -RS      Cueing 3 Verbal;Demo  -RS      Reps 3 3  -RS      Time 3 30  -RS         Exercise 4    Exercise Name 4 sl clam  -RS      Cueing 4 Verbal;Demo  -RS      Reps 4 3  -RS      Time 4 20  -RS         Exercise 5    Exercise Name 5 taping- belly off load and L hip  -RS      Additional Comments educationr egarding skin integrity and removal of tape provided  -RS         Exercise 6     Exercise Name 6 discussion of POC and activity recommendations postpartum  -RS                User Key  (r) = Recorded By, (t) = Taken By, (c) = Cosigned By      Initials Name Provider Type    Guerita Deleon, PT Physical Therapist                                  PT OP Goals       Row Name 11/06/24 1400          PT Short Term Goals    STG Date to Achieve 12/02/24  -RS     STG 1 The pt will demonstrate IND with initial HEP focused on pain management.  -RS     STG 1 Progress Met  -RS        Long Term Goals    LTG Date to Achieve 01/16/25  -RS     LTG 1 The pt will report understanding of postpartum activity recommendations and HEP.  -RS     LTG 1 Progress Met  -RS     LTG 2 The pt will report at least 50% reduction in distal L LE pain to facilitate improved tolerance for childcare activities.  -RS     LTG 2 Progress New  -RS               User Key  (r) = Recorded By, (t) = Taken By, (c) = Cosigned By      Initials Name Provider Type    Guerita Deleon, PT Physical Therapist                    Therapy Education  Education Details: see ex log, pp recs, HEP, tape safety  Given: HEP  Program: Reinforced  How Provided: Verbal, Demonstration  Provided to: Patient  Level of Understanding: Verbalized, Demonstrated              Time Calculation:   Start Time: 1403  Stop Time: 1444  Time Calculation (min): 41 min  Timed Charges  33761 - PT Therapeutic Exercise Minutes: 15  96073 -  PT Neuromuscular Reeducation Minutes: 10  64724 - PT Therapeutic Activity Minutes: 15  Total Minutes  Timed Charges Total Minutes: 40   Total Minutes: 40                Guerita Fleming PT  11/6/2024

## 2024-11-07 RX ORDER — MONTELUKAST SODIUM 10 MG/1
10 TABLET ORAL DAILY
Qty: 90 TABLET | Refills: 0 | Status: ON HOLD | OUTPATIENT
Start: 2024-11-07

## 2024-11-11 ENCOUNTER — ANESTHESIA EVENT (OUTPATIENT)
Dept: LABOR AND DELIVERY | Facility: HOSPITAL | Age: 39
End: 2024-11-11
Payer: MEDICAID

## 2024-11-11 ENCOUNTER — ANESTHESIA (OUTPATIENT)
Dept: LABOR AND DELIVERY | Facility: HOSPITAL | Age: 39
End: 2024-11-11
Payer: MEDICAID

## 2024-11-11 ENCOUNTER — HOSPITAL ENCOUNTER (INPATIENT)
Facility: HOSPITAL | Age: 39
LOS: 3 days | Discharge: HOME | End: 2024-11-14
Attending: STUDENT IN AN ORGANIZED HEALTH CARE EDUCATION/TRAINING PROGRAM | Admitting: OBSTETRICS & GYNECOLOGY
Payer: MEDICAID

## 2024-11-11 DIAGNOSIS — Z98.891 PREVIOUS CESAREAN SECTION: ICD-10-CM

## 2024-11-11 PROBLEM — Z3A.39 39 WEEKS GESTATION OF PREGNANCY: Status: ACTIVE | Noted: 2024-11-06

## 2024-11-11 LAB
ABO GROUP BLD: NORMAL
BLD GP AB SCN SERPL QL: NEGATIVE
DEPRECATED RDW RBC AUTO: 45.7 FL (ref 37–54)
ERYTHROCYTE [DISTWIDTH] IN BLOOD BY AUTOMATED COUNT: 13.4 % (ref 12.3–15.4)
HCT VFR BLD AUTO: 36.3 % (ref 34–46.6)
HGB BLD-MCNC: 12.2 G/DL (ref 12–15.9)
MCH RBC QN AUTO: 31.5 PG (ref 26.6–33)
MCHC RBC AUTO-ENTMCNC: 33.6 G/DL (ref 31.5–35.7)
MCV RBC AUTO: 93.8 FL (ref 79–97)
PLATELET # BLD AUTO: 229 10*3/MM3 (ref 140–450)
PMV BLD AUTO: 10.5 FL (ref 6–12)
RBC # BLD AUTO: 3.87 10*6/MM3 (ref 3.77–5.28)
RH BLD: POSITIVE
T&S EXPIRATION DATE: NORMAL
TREPONEMA PALLIDUM IGG+IGM AB [PRESENCE] IN SERUM OR PLASMA BY IMMUNOASSAY: NORMAL
WBC NRBC COR # BLD AUTO: 8.5 10*3/MM3 (ref 3.4–10.8)

## 2024-11-11 PROCEDURE — 88307 TISSUE EXAM BY PATHOLOGIST: CPT

## 2024-11-11 PROCEDURE — 86900 BLOOD TYPING SEROLOGIC ABO: CPT | Performed by: OBSTETRICS & GYNECOLOGY

## 2024-11-11 PROCEDURE — 25810000003 LACTATED RINGERS PER 1000 ML: Performed by: NURSE ANESTHETIST, CERTIFIED REGISTERED

## 2024-11-11 PROCEDURE — 25010000002 ROPIVACAINE PER 1 MG: Performed by: STUDENT IN AN ORGANIZED HEALTH CARE EDUCATION/TRAINING PROGRAM

## 2024-11-11 PROCEDURE — 86780 TREPONEMA PALLIDUM: CPT | Performed by: OBSTETRICS & GYNECOLOGY

## 2024-11-11 PROCEDURE — 25010000002 MORPHINE PER 10 MG: Performed by: NURSE ANESTHETIST, CERTIFIED REGISTERED

## 2024-11-11 PROCEDURE — 85027 COMPLETE CBC AUTOMATED: CPT | Performed by: OBSTETRICS & GYNECOLOGY

## 2024-11-11 PROCEDURE — 25010000002 CLONIDINE PER 1 MG: Performed by: STUDENT IN AN ORGANIZED HEALTH CARE EDUCATION/TRAINING PROGRAM

## 2024-11-11 PROCEDURE — 25810000003 LACTATED RINGERS SOLUTION: Performed by: OBSTETRICS & GYNECOLOGY

## 2024-11-11 PROCEDURE — 25010000002 FENTANYL CITRATE (PF) 100 MCG/2ML SOLUTION: Performed by: NURSE ANESTHETIST, CERTIFIED REGISTERED

## 2024-11-11 PROCEDURE — 86901 BLOOD TYPING SEROLOGIC RH(D): CPT | Performed by: OBSTETRICS & GYNECOLOGY

## 2024-11-11 PROCEDURE — 59515 CESAREAN DELIVERY: CPT | Performed by: STUDENT IN AN ORGANIZED HEALTH CARE EDUCATION/TRAINING PROGRAM

## 2024-11-11 PROCEDURE — 25010000002 ONDANSETRON PER 1 MG: Performed by: STUDENT IN AN ORGANIZED HEALTH CARE EDUCATION/TRAINING PROGRAM

## 2024-11-11 PROCEDURE — 25010000002 EPINEPHRINE 1 MG/ML SOLUTION 30 ML VIAL: Performed by: STUDENT IN AN ORGANIZED HEALTH CARE EDUCATION/TRAINING PROGRAM

## 2024-11-11 PROCEDURE — 25010000002 BUPIVACAINE PF 0.75 % SOLUTION: Performed by: NURSE ANESTHETIST, CERTIFIED REGISTERED

## 2024-11-11 PROCEDURE — 86850 RBC ANTIBODY SCREEN: CPT | Performed by: OBSTETRICS & GYNECOLOGY

## 2024-11-11 PROCEDURE — 59514 CESAREAN DELIVERY ONLY: CPT | Performed by: STUDENT IN AN ORGANIZED HEALTH CARE EDUCATION/TRAINING PROGRAM

## 2024-11-11 PROCEDURE — 25010000002 CEFAZOLIN 3 G RECONSTITUTED SOLUTION 1 EACH VIAL: Performed by: OBSTETRICS & GYNECOLOGY

## 2024-11-11 DEVICE — DEV CONTRL TISS STRATAFIXSPIRALMNCRYL PLSPS2 REV4/0 30CM: Type: IMPLANTABLE DEVICE | Site: UTERUS | Status: FUNCTIONAL

## 2024-11-11 RX ORDER — ONDANSETRON 2 MG/ML
4 INJECTION INTRAMUSCULAR; INTRAVENOUS ONCE AS NEEDED
Status: DISCONTINUED | OUTPATIENT
Start: 2024-11-11 | End: 2024-11-11

## 2024-11-11 RX ORDER — OXYTOCIN/0.9 % SODIUM CHLORIDE 30/500 ML
999 PLASTIC BAG, INJECTION (ML) INTRAVENOUS ONCE
Status: COMPLETED | OUTPATIENT
Start: 2024-11-11 | End: 2024-11-11

## 2024-11-11 RX ORDER — SODIUM CHLORIDE, SODIUM LACTATE, POTASSIUM CHLORIDE, CALCIUM CHLORIDE 600; 310; 30; 20 MG/100ML; MG/100ML; MG/100ML; MG/100ML
INJECTION, SOLUTION INTRAVENOUS CONTINUOUS PRN
Status: DISCONTINUED | OUTPATIENT
Start: 2024-11-11 | End: 2024-11-11 | Stop reason: SURG

## 2024-11-11 RX ORDER — B-COMPLEX WITH VITAMIN C
TABLET ORAL
COMMUNITY

## 2024-11-11 RX ORDER — FENTANYL CITRATE 50 UG/ML
INJECTION, SOLUTION INTRAMUSCULAR; INTRAVENOUS AS NEEDED
Status: DISCONTINUED | OUTPATIENT
Start: 2024-11-11 | End: 2024-11-11 | Stop reason: SURG

## 2024-11-11 RX ORDER — OXYTOCIN/0.9 % SODIUM CHLORIDE 30/500 ML
250 PLASTIC BAG, INJECTION (ML) INTRAVENOUS CONTINUOUS
Status: DISPENSED | OUTPATIENT
Start: 2024-11-11 | End: 2024-11-11

## 2024-11-11 RX ORDER — CITRIC ACID/SODIUM CITRATE 334-500MG
30 SOLUTION, ORAL ORAL ONCE
Status: COMPLETED | OUTPATIENT
Start: 2024-11-11 | End: 2024-11-11

## 2024-11-11 RX ORDER — EPHEDRINE SULFATE 50 MG/ML
INJECTION INTRAVENOUS AS NEEDED
Status: DISCONTINUED | OUTPATIENT
Start: 2024-11-11 | End: 2024-11-11 | Stop reason: SURG

## 2024-11-11 RX ORDER — CARBOPROST TROMETHAMINE 250 UG/ML
250 INJECTION, SOLUTION INTRAMUSCULAR AS NEEDED
Status: DISCONTINUED | OUTPATIENT
Start: 2024-11-11 | End: 2024-11-11 | Stop reason: HOSPADM

## 2024-11-11 RX ORDER — PHENYLEPHRINE HCL IN 0.9% NACL 1 MG/10 ML
SYRINGE (ML) INTRAVENOUS AS NEEDED
Status: DISCONTINUED | OUTPATIENT
Start: 2024-11-11 | End: 2024-11-11 | Stop reason: SURG

## 2024-11-11 RX ORDER — OXYTOCIN/0.9 % SODIUM CHLORIDE 30/500 ML
125 PLASTIC BAG, INJECTION (ML) INTRAVENOUS ONCE AS NEEDED
Status: COMPLETED | OUTPATIENT
Start: 2024-11-11 | End: 2024-11-11

## 2024-11-11 RX ORDER — ERYTHROMYCIN 5 MG/G
OINTMENT OPHTHALMIC
Status: ACTIVE
Start: 2024-11-11 | End: 2024-11-11

## 2024-11-11 RX ORDER — DIPHENHYDRAMINE HYDROCHLORIDE 50 MG/ML
25 INJECTION INTRAMUSCULAR; INTRAVENOUS EVERY 4 HOURS PRN
Status: DISCONTINUED | OUTPATIENT
Start: 2024-11-11 | End: 2024-11-11

## 2024-11-11 RX ORDER — FAMOTIDINE 10 MG/ML
20 INJECTION, SOLUTION INTRAVENOUS ONCE AS NEEDED
Status: COMPLETED | OUTPATIENT
Start: 2024-11-11 | End: 2024-11-11

## 2024-11-11 RX ORDER — ONDANSETRON 4 MG/1
4 TABLET, ORALLY DISINTEGRATING ORAL EVERY 4 HOURS PRN
Status: DISCONTINUED | OUTPATIENT
Start: 2024-11-11 | End: 2024-11-14 | Stop reason: HOSPADM

## 2024-11-11 RX ORDER — DIPHENHYDRAMINE HCL 25 MG
25 CAPSULE ORAL EVERY 4 HOURS PRN
Status: DISCONTINUED | OUTPATIENT
Start: 2024-11-11 | End: 2024-11-11

## 2024-11-11 RX ORDER — PHYTONADIONE 1 MG/.5ML
INJECTION, EMULSION INTRAMUSCULAR; INTRAVENOUS; SUBCUTANEOUS
Status: ACTIVE
Start: 2024-11-11 | End: 2024-11-11

## 2024-11-11 RX ORDER — OXYTOCIN/0.9 % SODIUM CHLORIDE 30/500 ML
125 PLASTIC BAG, INJECTION (ML) INTRAVENOUS ONCE AS NEEDED
Status: DISCONTINUED | OUTPATIENT
Start: 2024-11-11 | End: 2024-11-14 | Stop reason: HOSPADM

## 2024-11-11 RX ORDER — ACETAMINOPHEN 325 MG/1
650 TABLET ORAL EVERY 6 HOURS
Status: DISCONTINUED | OUTPATIENT
Start: 2024-11-12 | End: 2024-11-14 | Stop reason: HOSPADM

## 2024-11-11 RX ORDER — MORPHINE SULFATE 2 MG/ML
2 INJECTION, SOLUTION INTRAMUSCULAR; INTRAVENOUS
Status: DISCONTINUED | OUTPATIENT
Start: 2024-11-11 | End: 2024-11-11

## 2024-11-11 RX ORDER — OXYCODONE HYDROCHLORIDE 10 MG/1
10 TABLET ORAL EVERY 4 HOURS PRN
Status: DISCONTINUED | OUTPATIENT
Start: 2024-11-11 | End: 2024-11-14 | Stop reason: HOSPADM

## 2024-11-11 RX ORDER — ENOXAPARIN SODIUM 100 MG/ML
40 INJECTION SUBCUTANEOUS EVERY 12 HOURS
Status: DISCONTINUED | OUTPATIENT
Start: 2024-11-12 | End: 2024-11-14 | Stop reason: HOSPADM

## 2024-11-11 RX ORDER — ACETAMINOPHEN 500 MG
1000 TABLET ORAL ONCE
Status: COMPLETED | OUTPATIENT
Start: 2024-11-11 | End: 2024-11-11

## 2024-11-11 RX ORDER — OXYCODONE HYDROCHLORIDE 5 MG/1
5 TABLET ORAL EVERY 4 HOURS PRN
Status: DISCONTINUED | OUTPATIENT
Start: 2024-11-11 | End: 2024-11-14 | Stop reason: HOSPADM

## 2024-11-11 RX ORDER — ACETAMINOPHEN 500 MG
1000 TABLET ORAL EVERY 6 HOURS
Status: COMPLETED | OUTPATIENT
Start: 2024-11-11 | End: 2024-11-12

## 2024-11-11 RX ORDER — LIDOCAINE HYDROCHLORIDE 10 MG/ML
0.5 INJECTION, SOLUTION INFILTRATION; PERINEURAL ONCE AS NEEDED
Status: DISCONTINUED | OUTPATIENT
Start: 2024-11-11 | End: 2024-11-11 | Stop reason: HOSPADM

## 2024-11-11 RX ORDER — NALOXONE HCL 0.4 MG/ML
0.2 VIAL (ML) INJECTION
Status: DISCONTINUED | OUTPATIENT
Start: 2024-11-11 | End: 2024-11-11

## 2024-11-11 RX ORDER — BUPIVACAINE HYDROCHLORIDE 7.5 MG/ML
INJECTION, SOLUTION EPIDURAL; RETROBULBAR AS NEEDED
Status: DISCONTINUED | OUTPATIENT
Start: 2024-11-11 | End: 2024-11-11 | Stop reason: SURG

## 2024-11-11 RX ORDER — DROPERIDOL 2.5 MG/ML
0.62 INJECTION, SOLUTION INTRAMUSCULAR; INTRAVENOUS
Status: DISCONTINUED | OUTPATIENT
Start: 2024-11-11 | End: 2024-11-11

## 2024-11-11 RX ORDER — HYDROXYZINE HYDROCHLORIDE 50 MG/1
50 TABLET, FILM COATED ORAL EVERY 6 HOURS PRN
Status: DISCONTINUED | OUTPATIENT
Start: 2024-11-11 | End: 2024-11-14 | Stop reason: HOSPADM

## 2024-11-11 RX ORDER — SODIUM CHLORIDE 9 MG/ML
40 INJECTION, SOLUTION INTRAVENOUS AS NEEDED
Status: DISCONTINUED | OUTPATIENT
Start: 2024-11-11 | End: 2024-11-11 | Stop reason: HOSPADM

## 2024-11-11 RX ORDER — SODIUM CHLORIDE, SODIUM LACTATE, POTASSIUM CHLORIDE, CALCIUM CHLORIDE 600; 310; 30; 20 MG/100ML; MG/100ML; MG/100ML; MG/100ML
125 INJECTION, SOLUTION INTRAVENOUS CONTINUOUS
Status: DISCONTINUED | OUTPATIENT
Start: 2024-11-11 | End: 2024-11-11

## 2024-11-11 RX ORDER — MISOPROSTOL 200 UG/1
800 TABLET ORAL AS NEEDED
Status: DISCONTINUED | OUTPATIENT
Start: 2024-11-11 | End: 2024-11-11 | Stop reason: HOSPADM

## 2024-11-11 RX ORDER — ONDANSETRON 2 MG/ML
4 INJECTION INTRAMUSCULAR; INTRAVENOUS ONCE AS NEEDED
Status: COMPLETED | OUTPATIENT
Start: 2024-11-11 | End: 2024-11-11

## 2024-11-11 RX ORDER — SODIUM CHLORIDE 0.9 % (FLUSH) 0.9 %
10 SYRINGE (ML) INJECTION AS NEEDED
Status: DISCONTINUED | OUTPATIENT
Start: 2024-11-11 | End: 2024-11-11 | Stop reason: HOSPADM

## 2024-11-11 RX ORDER — HYDROMORPHONE HYDROCHLORIDE 1 MG/ML
0.5 INJECTION, SOLUTION INTRAMUSCULAR; INTRAVENOUS; SUBCUTANEOUS
Status: DISCONTINUED | OUTPATIENT
Start: 2024-11-11 | End: 2024-11-11 | Stop reason: HOSPADM

## 2024-11-11 RX ORDER — MORPHINE SULFATE 1 MG/ML
INJECTION, SOLUTION EPIDURAL; INTRATHECAL; INTRAVENOUS AS NEEDED
Status: DISCONTINUED | OUTPATIENT
Start: 2024-11-11 | End: 2024-11-11 | Stop reason: SURG

## 2024-11-11 RX ORDER — BUPIVACAINE HYDROCHLORIDE 5 MG/ML
INJECTION, SOLUTION EPIDURAL; INTRACAUDAL AS NEEDED
Status: DISCONTINUED | OUTPATIENT
Start: 2024-11-11 | End: 2024-11-11

## 2024-11-11 RX ORDER — SODIUM CHLORIDE 0.9 % (FLUSH) 0.9 %
10 SYRINGE (ML) INJECTION EVERY 12 HOURS SCHEDULED
Status: DISCONTINUED | OUTPATIENT
Start: 2024-11-11 | End: 2024-11-11 | Stop reason: HOSPADM

## 2024-11-11 RX ORDER — METHYLERGONOVINE MALEATE 0.2 MG/ML
200 INJECTION INTRAVENOUS ONCE AS NEEDED
Status: DISCONTINUED | OUTPATIENT
Start: 2024-11-11 | End: 2024-11-11 | Stop reason: HOSPADM

## 2024-11-11 RX ORDER — AMOXICILLIN 250 MG
1 CAPSULE ORAL 2 TIMES DAILY
Status: DISCONTINUED | OUTPATIENT
Start: 2024-11-11 | End: 2024-11-14 | Stop reason: HOSPADM

## 2024-11-11 RX ADMIN — OXYCODONE HYDROCHLORIDE 5 MG: 5 TABLET ORAL at 15:41

## 2024-11-11 RX ADMIN — FAMOTIDINE 20 MG: 10 INJECTION INTRAVENOUS at 10:03

## 2024-11-11 RX ADMIN — OXYCODONE HYDROCHLORIDE 10 MG: 10 TABLET ORAL at 22:32

## 2024-11-11 RX ADMIN — ONDANSETRON 4 MG: 2 INJECTION, SOLUTION INTRAMUSCULAR; INTRAVENOUS at 10:03

## 2024-11-11 RX ADMIN — SODIUM CHLORIDE, POTASSIUM CHLORIDE, SODIUM LACTATE AND CALCIUM CHLORIDE: 600; 310; 30; 20 INJECTION, SOLUTION INTRAVENOUS at 10:35

## 2024-11-11 RX ADMIN — ACETAMINOPHEN 1000 MG: 500 TABLET ORAL at 10:01

## 2024-11-11 RX ADMIN — CLONIDINE HYDROCHLORIDE 80 ML: 0.1 INJECTION, SOLUTION EPIDURAL at 11:33

## 2024-11-11 RX ADMIN — BUPIVACAINE HYDROCHLORIDE 1.8 ML: 7.5 INJECTION, SOLUTION EPIDURAL; RETROBULBAR at 10:47

## 2024-11-11 RX ADMIN — Medication 100 MCG: at 11:29

## 2024-11-11 RX ADMIN — EPHEDRINE SULFATE 10 MG: 50 INJECTION INTRAVENOUS at 10:49

## 2024-11-11 RX ADMIN — HYDROXYZINE HYDROCHLORIDE 50 MG: 50 TABLET ORAL at 15:41

## 2024-11-11 RX ADMIN — Medication 50 MCG: at 11:25

## 2024-11-11 RX ADMIN — EPHEDRINE SULFATE 5 MG: 50 INJECTION INTRAVENOUS at 11:29

## 2024-11-11 RX ADMIN — SODIUM CITRATE AND CITRIC ACID MONOHYDRATE 30 ML: 334; 500 SOLUTION ORAL at 10:01

## 2024-11-11 RX ADMIN — MORPHINE SULFATE 150 MCG: 1 INJECTION, SOLUTION EPIDURAL; INTRATHECAL; INTRAVENOUS at 10:47

## 2024-11-11 RX ADMIN — SENNOSIDES AND DOCUSATE SODIUM 1 TABLET: 50; 8.6 TABLET ORAL at 22:33

## 2024-11-11 RX ADMIN — Medication 50 MCG: at 11:23

## 2024-11-11 RX ADMIN — Medication 125 ML/HR: at 12:39

## 2024-11-11 RX ADMIN — Medication 50 MCG: at 10:51

## 2024-11-11 RX ADMIN — SODIUM CHLORIDE 3000 MG: 900 INJECTION INTRAVENOUS at 10:30

## 2024-11-11 RX ADMIN — Medication 999 ML/HR: at 11:15

## 2024-11-11 RX ADMIN — SODIUM CHLORIDE, SODIUM LACTATE, POTASSIUM CHLORIDE, CALCIUM CHLORIDE 1000 ML: 20; 30; 600; 310 INJECTION, SOLUTION INTRAVENOUS at 10:06

## 2024-11-11 RX ADMIN — HYDROXYZINE HYDROCHLORIDE 50 MG: 50 TABLET ORAL at 23:03

## 2024-11-11 RX ADMIN — Medication 50 MCG: at 10:58

## 2024-11-11 RX ADMIN — ACETAMINOPHEN 1000 MG: 500 TABLET ORAL at 16:59

## 2024-11-11 RX ADMIN — SODIUM CHLORIDE, POTASSIUM CHLORIDE, SODIUM LACTATE AND CALCIUM CHLORIDE: 600; 310; 30; 20 INJECTION, SOLUTION INTRAVENOUS at 11:12

## 2024-11-11 RX ADMIN — ACETAMINOPHEN 1000 MG: 500 TABLET ORAL at 22:32

## 2024-11-11 RX ADMIN — FENTANYL CITRATE 20 MCG: 50 INJECTION INTRAMUSCULAR; INTRAVENOUS at 10:47

## 2024-11-11 NOTE — H&P
[facility]   Obstetrics and Gynecology   History & Physical    2024    Patient: Sonam Lang          MR#:[id number]    Chief complaint: Repeat     Subjective     39 y.o. female  at 39w2d with history of 2 prior cesareans presents for repeat .  She denies contractions, loss of fluid, vaginal bleeding.  Endorses regular fetal movement.  Pregnancy is complicated by AMA status.  Fetal growth has been normal and  surveillance has been reassuring.      Patient Active Problem List   Diagnosis    Migraine without aura and without status migrainosus, not intractable    Allergy to NSAIDs    Asthma, mild persistent    Sleep apnea    Previous  section    Adult ADHD    Anxiety in pregnancy, antepartum    Multigravida of advanced maternal age in third trimester    Previous  delivery affecting pregnancy    Gastroesophageal reflux disease without esophagitis    39 weeks gestation of pregnancy       Past Medical History:   Diagnosis Date    ADHD (attention deficit hyperactivity disorder)     Anxiety     Asthma     Congenital abnormalities 2022    Multiple fetal anomalies present including meningocele, unilateral real agenesis, unilateral club foot, membranous VSD     Depression     GERD (gastroesophageal reflux disease)     History of depression 2019    Irritable bowel syndrome     Migraine     Obesity (BMI 30.0-34.9) 2019    Pregnancy complicated by multiple fetal congenital anomalies, single gestation 2022    NTD, SUA    PTSD (post-traumatic stress disorder)     Sleep apnea 2016    CPAP       Past Surgical History:   Procedure Laterality Date     SECTION  2019     SECTION N/A 10/14/2023    Procedure:  SECTION REPEAT;  Surgeon: Megan El MD;  Location: [facility] LABOR DELIVERY;  Service: Obstetrics/Gynecology;  Laterality: N/A;    COLONOSCOPY      with endoscopy    SINUS SURGERY  11/22/2022    x2,  2018 (first)    WISDOM TOOTH EXTRACTION         Obstetric History:  OB History          6    Para   3    Term   2       1    AB   2    Living   2         SAB        IAB        Ectopic   2    Molar        Multiple   0    Live Births   2               Menstrual History:     Patient's last menstrual period was 02/10/2024 (exact date).       # 1 - Date: 10/28/19, Sex: Female, Weight: 3200 g (7 lb 0.9 oz), GA: 38w1d, Type: , Low Transverse, Apgar1: 8, Apgar5: 9, Living: Living, Birth Comments: None    # 2 - Date: , Sex: None, Weight: None, GA: None, Type: Ectopic, Apgar1: None, Apgar5: None, Living: None, Birth Comments: None    # 3 - Date: , Sex: None, Weight: None, GA: None, Type: Ectopic, Apgar1: None, Apgar5: None, Living: None, Birth Comments: None    # 4 - Date: 22, Sex: Female, Weight: 559 g (1 lb 3.7 oz), GA: 27w2d, Type: , Spontaneous, Apgar1: 2, Apgar5: 2, Living: Fetal Demise, Birth Comments: scale 1    # 5 - Date: 10/14/23, Sex: Male, Weight: 4070 g (8 lb 15.6 oz), GA: 39w0d, Type: , Low Transverse, Apgar1: 7, Apgar5: 8, Living: Living, Birth Comments: panda OR1    # 6 - Date: None, Sex: None, Weight: None, GA: None, Type: None, Apgar1: None, Apgar5: None, Living: None, Birth Comments: None      Prenatal Information:  Prenatal Results       Initial Prenatal Labs       Test Value Reference Range Date Time    Hemoglobin  14.1 g/dL 11.1 - 15.9 24 1008       13.2 g/dL 12.0 - 15.9 24       13.4 g/dL 12.0 - 15.9 24 0947    Hematocrit  41.7 % 34.0 - 46.6 24 1008       40.4 % 34.0 - 46.6 04/02/24 0144       41.0 % 34.0 - 46.6 24 0947    Platelets  304 x10E3/uL 150 - 450 24 1008       272 10*3/mm3 140 - 450 24 0144       259 10*3/mm3 140 - 450 24 0947    Rubella IgG  3.56 index Immune >0.99 24 1008    Hepatitis B SAg  Negative  Negative 24 1008    Hepatitis C Ab  Non Reactive  Non Reactive 24  1008    RPR  Non Reactive  Non Reactive 08/15/24 1122       Non Reactive  Non Reactive 04/12/24 1008    T. Pallidum Ab         ABO  O   04/12/24 1008    Rh  Positive   04/12/24 1008    Antibody Screen  Negative  Negative 04/12/24 1008    HIV  Non Reactive  Non Reactive 04/12/24 1008    Urine Culture  Final report   04/12/24 1008    Gonorrhea  Negative  Negative 04/12/24 0946    Chlamydia  Negative  Negative 04/12/24 0946    TSH  0.896 uIU/mL 0.450 - 4.500 06/18/24 1254       0.110 uIU/mL 0.450 - 4.500 04/12/24 1008    HgB A1c   5.3 % 4.8 - 5.6 04/12/24 1008    Varicella IgG        Hemoglobinopathy Fractionation        Hemoglobinopathy (genetic testing)        Cystic fibrosis                   Fetal testing        Test Value Reference Range Date Time    NIPT        MSAFP  *Screen Negative*   06/18/24 1254    AFP-4                  2nd and 3rd Trimester       Test Value Reference Range Date Time    Hemoglobin (repeated)  12.2 g/dL 12.0 - 15.9 11/11/24 0949       12.0 g/dL 12.0 - 15.9 08/15/24 1122    Hematocrit (repeated)  36.3 % 34.0 - 46.6 11/11/24 0949       35.0 % 34.0 - 46.6 08/15/24 1122    Platelets   229 10*3/mm3 140 - 450 11/11/24 0949       248 10*3/mm3 140 - 450 08/15/24 1122       304 x10E3/uL 150 - 450 04/12/24 1008       272 10*3/mm3 140 - 450 04/02/24 0144       259 10*3/mm3 140 - 450 03/25/24 0947    1 hour GTT   116 mg/dL 65 - 139 08/15/24 1122    Antibody Screen (repeated)        3rd TM syphilis scrn (repeated)  RPR   Non Reactive  Non Reactive 08/15/24 1122    3rd TM syphilis scrn (repeated) TP-Ab        3rd TM syphilis screen TB-Ab (FTA)        Syphilis cascade test TP-Ab (EIA)        Syphilis cascade TPPA        GTT Fasting        GTT 1 Hr        GTT 2 Hr        GTT 3 Hr        Group B Strep  Negative  Negative 10/21/24               Other testing        Test Value Reference Range Date Time    Parvo IgG         CMV IgG                   Drug Screening       Test Value Reference Range Date Time     Amphetamine Screen        Barbiturate Screen        Benzodiazepine Screen        Methadone Screen        Phencyclidine Screen        Opiates Screen        THC Screen        Cocaine Screen        Propoxyphene Screen        Buprenorphine Screen        Methamphetamine Screen        Oxycodone Screen        Tricyclic Antidepressants Screen                  Legend    ^: Historical                          External Prenatal Results       Pregnancy Outside Results - Transcribed From Office Records - See Scanned Records For Details       Test Value Date Time    ABO  O  04/12/24 1008    Rh  Positive  04/12/24 1008    Antibody Screen  Negative  04/12/24 1008    Varicella IgG       Rubella  3.56 index 04/12/24 1008    Hgb  12.2 g/dL 11/11/24 0949       12.0 g/dL 08/15/24 1122       14.1 g/dL 04/12/24 1008       13.2 g/dL 04/02/24 0144       13.4 g/dL 03/25/24 0947    Hct  36.3 % 11/11/24 0949       35.0 % 08/15/24 1122       41.7 % 04/12/24 1008       40.4 % 04/02/24 0144       41.0 % 03/25/24 0947    HgB A1c   5.3 % 04/12/24 1008    1h GTT  116 mg/dL 08/15/24 1122    3h GTT Fasting       3h GTT 1 hour       3h GTT 2 hour       3h GTT 3 hour        Gonorrhea (discrete)  Negative  04/12/24 0946    Chlamydia (discrete)  Negative  04/12/24 0946    RPR  Non Reactive  08/15/24 1122       Non Reactive  04/12/24 1008    Syphils cascade: TP-Ab (FTA)       TP-Ab       TP-Ab (EIA)       TPPA       HBsAg  Negative  04/12/24 1008    Herpes Simplex Virus PCR       Herpes Simplex VIrus Culture       HIV  Non Reactive  04/12/24 1008    Hep C RNA Quant PCR       Hep C Antibody  Non Reactive  04/12/24 1008    AFP  35.7 ng/mL 06/18/24 1254    NIPT       Cystic Fibroisis        Group B Strep  Negative  10/21/24     GBS Susceptibility to Clindamycin       GBS Susceptibility to Erythromycin       Fetal Fibronectin       Genetic Testing, Maternal Blood                 Drug Screening       Test Value Date Time    Urine Drug Screen       Amphetamine  Screen       Barbiturate Screen       Benzodiazepine Screen       Methadone Screen       Phencyclidine Screen       Opiates Screen       THC Screen       Cocaine Screen       Propoxyphene Screen       Buprenorphine Screen       Methamphetamine Screen       Oxycodone Screen       Tricyclic Antidepressants Screen                 Legend    ^: Historical                              Family History   Problem Relation Age of Onset    Hypertension Mother     Diabetes Mother     Migraines Mother     Hypertension Father     Hypertension Maternal Grandmother     Migraines Maternal Grandmother     Alzheimer's disease Maternal Grandfather     Diabetes Paternal Grandmother     Migraines Paternal Grandmother     Dementia Paternal Grandmother     Stroke Paternal Grandmother     Frontotemporal dementia Paternal Grandmother     Diabetes Paternal Grandfather        Social History     Tobacco Use    Smoking status: Former     Current packs/day: 0.00     Types: Cigarettes     Start date:      Quit date:      Years since quittin.8     Passive exposure: Past    Smokeless tobacco: Never   Vaping Use    Vaping status: Never Used   Substance Use Topics    Alcohol use: Not Currently    Drug use: No       Aspirin, Bactrim [sulfamethoxazole-trimethoprim], Cefdinir, Buspirone, Contrast dye (echo or unknown ct/mr), Ibuprofen, Iodinated contrast media, Naproxen, Toradol [ketorolac tromethamine], and Clindamycin/lincomycin      Current Facility-Administered Medications:     carboprost (HEMABATE) injection 250 mcg, 250 mcg, Intramuscular, PRN, Megan El MD    ceFAZolin 3000 mg IVPB in 100 mL NS (MBP), 3,000 mg, Intravenous, Once, Megan El MD    erythromycin (ROMYCIN) 5 MG/GM ophthalmic ointment  - ADS Override Pull, , , ,     incisional cocktail 6 - Ropivacaine 0.5% (50mL), Clonidine HCl 80mcg/0.8 mL,  Epinephrine 1:1000 (0.3mL), N/S 0.9% (50mL) solution 100 mL, 100 mL, Injection, Once, Radha Lai MD     lactated ringers infusion, 125 mL/hr, Intravenous, Continuous, Megan El MD    lidocaine (XYLOCAINE) 1 % injection 0.5 mL, 0.5 mL, Intradermal, Once PRN, Megan El MD    methylergonovine (METHERGINE) injection 200 mcg, 200 mcg, Intramuscular, Once PRN, Megan El MD    miSOPROStol (CYTOTEC) tablet 800 mcg, 800 mcg, Rectal, PRN, Megan El MD    oxytocin (PITOCIN) 30 units in 0.9% sodium chloride 500 mL (premix), 999 mL/hr, Intravenous, Once **FOLLOWED BY** oxytocin (PITOCIN) 30 units in 0.9% sodium chloride 500 mL (premix), 250 mL/hr, Intravenous, Continuous, Megan El MD    phytonadione (VITAMIN K) 1 MG/0.5ML injection  - ADS Override Pull, , , ,     sodium chloride 0.9 % flush 10 mL, 10 mL, Intravenous, Q12H, Megan El MD    sodium chloride 0.9 % flush 10 mL, 10 mL, Intravenous, PRN, Megan El MD    sodium chloride 0.9 % infusion 40 mL, 40 mL, Intravenous, PRN, Megan El MD    Review of Systems  Review of Systems   All other systems reviewed and are negative.      Objective     Vital Signs       Physical Exam:  Physical Exam  Vitals and nursing note reviewed.   Constitutional:       General: She is not in acute distress.     Appearance: Normal appearance.   HENT:      Head: Normocephalic and atraumatic.   Eyes:      Extraocular Movements: Extraocular movements intact.   Cardiovascular:      Rate and Rhythm: Normal rate.   Pulmonary:      Effort: Pulmonary effort is normal. No respiratory distress.   Abdominal:      General: There is no distension.      Palpations: Abdomen is soft. There is no mass.      Tenderness: There is no abdominal tenderness.   Musculoskeletal:         General: Normal range of motion.      Cervical back: Normal range of motion.   Skin:     General: Skin is warm and dry.   Neurological:      General: No focal deficit present.      Mental Status: She is alert and oriented to person, place, and time.   Psychiatric:         Mood and Affect:  Mood normal.         Behavior: Behavior normal.           Hospital problem list:    Previous  section    Migraine without aura and without status migrainosus, not intractable    Asthma, mild persistent    Sleep apnea    Adult ADHD    Anxiety in pregnancy, antepartum    Multigravida of advanced maternal age in third trimester    Previous  delivery affecting pregnancy    Gastroesophageal reflux disease without esophagitis    39 weeks gestation of pregnancy      Assessment & Plan     1. Intrauterine pregnancy at 39w2d presents for repeat   -Reviewed procedure with patient.  I discussed the risks including but not limited to bleeding, infection and damage to internal organs.  Understanding of the procedure is voiced.       Dispo: admit for [facility]    Radha Lai MD  24  10:41 EST      Patient Care Team:  Praful Francisco MD as PCP - General (Sports Medicine)  Praful Francisco MD (Sports Medicine)

## 2024-11-11 NOTE — ANESTHESIA POSTPROCEDURE EVALUATION
Patient: Sonam Lang    Procedure Summary       Date: 24 Room / [facility]    Anesthesia Start: 1035 Anesthesia Stop: 1204    Procedure:  SECTION REPEAT (Abdomen) Diagnosis:       Previous  section      (Previous  section [Z98.891])    Surgeons: Radha Lai MD Provider: Adalberto Nichols MD    Anesthesia Type: spinal ASA Status: 3            Anesthesia Type: spinal    Vitals  Vitals Value Taken Time   /54 24 1645   Temp 37 °C (98.6 °F) 24 1645   Pulse 83 24 1645   Resp 16 24 1645   SpO2 96 % 24 1645           Anesthesia Post Evaluation

## 2024-11-11 NOTE — PLAN OF CARE
Problem: Adult Inpatient Plan of Care  Goal: Plan of Care Review  Flowsheets (Taken 2024 1210)  Progress: improving  Outcome Evaluation:   Pt admitted for repeat  section   procedure completed without complication. Patient is currently in recovery. Fundal exams are firm, midline, at the umbilicus, with scant bleeding. VSS   denies pain at this time.  Plan of Care Reviewed With:   patient   spouse  Goal: Readiness for Transition of Care  Intervention: Mutually Develop Transition Plan  Recent Flowsheet Documentation  Taken 2024 0942 by Edie Lugo, RN  Equipment Currently Used at Home: none  Taken 2024 0941 by Edie Lugo, RN  Equipment Needed After Discharge: none  Equipment Currently Used at Home: none  Anticipated Changes Related to Illness: none  Transportation Anticipated: family or friend will provide  Transportation Concerns: none  Concerns to be Addressed: no discharge needs identified  Readmission Within the Last 30 Days: no previous admission in last 30 days  Patient/Family Anticipated Services at Transition: none  Patient/Family Anticipates Transition to: home   Goal Outcome Evaluation:  Plan of Care Reviewed With: patient, spouse        Progress: improving  Outcome Evaluation: Pt admitted for repeat  section; procedure completed without complication. Patient is currently in recovery. Fundal exams are firm, midline, at the umbilicus, with scant bleeding. VSS; denies pain at this time.

## 2024-11-11 NOTE — ANESTHESIA PREPROCEDURE EVALUATION
Anesthesia Evaluation     Patient summary reviewed and Nursing notes reviewed   history of anesthetic complications (numbing injections for dental procedures unsuccessful, possible rapid metabolizer):   NPO Solid Status: > 8 hours  NPO Liquid Status: > 2 hours           Airway   Mallampati: II  TM distance: >3 FB  Neck ROM: full  Dental          Pulmonary - normal exam   (+) asthma (well controlled, seasonal and exercise induced, rare rescue inhaler use),sleep apnea  (-) COPD, not a smoker  Cardiovascular - normal exam  Exercise tolerance: good (4-7 METS)    (-) hypertension, CAD, dysrhythmias, angina      Neuro/Psych  (+) headaches (migraines), psychiatric history Anxiety, Depression and PTSD  (-) seizures, TIA, CVA  GI/Hepatic/Renal/Endo    (+) GERD (gestational)  (-) liver disease, no renal disease, diabetes    Musculoskeletal     Abdominal    Substance History      OB/GYN    (+) Pregnant (intrauterine @ 39w 2days)        Other        ROS/Med Hx Other: WBC       Date                     Value               Ref Range           Status                08/15/2024               9.54                3.40 - 10.80 1*     Final                 11/01/2019               10.38               4.5 - 11.0 10**     Final            ----------  RBC       Date                     Value               Ref Range           Status                08/15/2024               3.74 (L)            3.77 - 5.28 10*     Final                 11/01/2019               4.25                4.0 - 5.2 10*6*     Final            ----------  Hemoglobin       Date                     Value               Ref Range           Status                08/15/2024               12.0                12.0 - 15.9 g/*     Final                 04/02/2024               13.2                12.0 - 15.9 g/*     Final                 11/01/2019               13.1                12.0 - 16.0 g/*     Final            ----------  Hematocrit       Date                     Value                Ref Range           Status                08/15/2024               35.0                34.0 - 46.6 %       Final                 04/02/2024               40.4                34.0 - 46.6 %       Final                 11/01/2019               40.5                36.0 - 46.0 %       Final            ----------  MCV       Date                     Value               Ref Range           Status                08/15/2024               93.6                79.0 - 97.0 fL      Final                 04/02/2024               93.5                79.0 - 97.0 fL      Final                 11/01/2019               95.3                80.0 - 100.0 fL     Final            ----------  MCH       Date                     Value               Ref Range           Status                08/15/2024               32.1                26.6 - 33.0 pg      Final                 04/02/2024               30.6                26.6 - 33.0 pg      Final                 11/01/2019               30.8                26.0 - 34.0 pg      Final            ----------  MCHC       Date                     Value               Ref Range           Status                08/15/2024               34.3                31.5 - 35.7 g/*     Final                 04/02/2024               32.7                31.5 - 35.7 g/*     Final                 11/01/2019               32.3                31.0 - 37.0 g/*     Final            ----------  RDW       Date                     Value               Ref Range           Status                08/15/2024               11.9 (L)            12.3 - 15.4 %       Final                 04/02/2024               12.0 (L)            12.3 - 15.4 %       Final                 11/01/2019               13.3                12.0 - 16.8 %       Final            ----------  RDW-SD       Date                     Value               Ref Range           Status                04/02/2024               41.0                37.0 - 54.0 fl       Final            ----------  MPV       Date                     Value               Ref Range           Status                04/02/2024               10.2                6.0 - 12.0 fL       Final                 11/01/2019               10.0                6.7 - 10.8 fL       Final            ----------  Platelets       Date                     Value               Ref Range           Status                08/15/2024               248                 140 - 450 10*3*     Final                 04/02/2024               272                 140 - 450 10*3*     Final                 11/01/2019               330                 140 - 440 10*3*     Final            ----------  Neutrophil Rel %       Date                     Value               Ref Range           Status                04/12/2024               77                  Not Estab. %        Final                 11/01/2019               75.4                45 - 80 %           Final            ----------  Neutrophil %       Date                     Value               Ref Range           Status                04/02/2024               89.8 (H)            42.7 - 76.0 %       Final            ----------  Lymphocyte Rel %       Date                     Value               Ref Range           Status                04/12/2024               16                  Not Estab. %        Final                 11/01/2019               14.7 (L)            15 - 50 %           Final            ----------  Lymphocyte %       Date                     Value               Ref Range           Status                04/02/2024               6.6 (L)             19.6 - 45.3 %       Final            ----------  Monocyte Rel %       Date                     Value               Ref Range           Status                04/12/2024               6                   Not Estab. %        Final                 11/01/2019               7.7                 0 - 15 %            Final             ----------  Monocyte %       Date                     Value               Ref Range           Status                04/02/2024               2.6 (L)             5.0 - 12.0 %        Final            ----------  Eosinophil Rel %       Date                     Value               Ref Range           Status                04/12/2024               0                   Not Estab. %        Final            ----------  Eosinophil %       Date                     Value               Ref Range           Status                04/02/2024               0.0 (L)             0.3 - 6.2 %         Final                 11/01/2019               1.4                 0 - 7 %             Final            ----------  Basophil Rel %       Date                     Value               Ref Range           Status                04/12/2024               0                   Not Estab. %        Final                 11/01/2019               0.2                 0 - 2 %             Final            ----------  Basophil %       Date                     Value               Ref Range           Status                04/02/2024               0.2                 0.0 - 1.5 %         Final            ----------  Immature Grans %       Date                     Value               Ref Range           Status                04/02/2024               0.8 (H)             0.0 - 0.5 %         Final                 11/01/2019               0.6 (H)             0 %                 Final            ----------  Neutrophils Absolute       Date                     Value               Ref Range           Status                04/12/2024               11.5 (H)            1.4 - 7.0 x10E*     Final                 11/01/2019               7.82                2.0 - 8.8 10*3*     Final            ----------  Neutrophils, Absolute       Date                     Value               Ref Range           Status                04/02/2024               11.70 (H)           1.70 - 7.00  10*     Final            ----------  Lymphocytes Absolute       Date                     Value               Ref Range           Status                04/12/2024               2.4                 0.7 - 3.1 x10E*     Final                 11/01/2019               1.53                0.7 - 5.5 10*3*     Final            ----------  Lymphocytes, Absolute       Date                     Value               Ref Range           Status                04/02/2024               0.86                0.70 - 3.10 10*     Final            ----------  Monocytes Absolute       Date                     Value               Ref Range           Status                04/12/2024               0.9                 0.1 - 0.9 x10E*     Final                 11/01/2019               0.80                0.0 - 1.7 10*3*     Final            ----------  Monocytes, Absolute       Date                     Value               Ref Range           Status                04/02/2024               0.34                0.10 - 0.90 10*     Final            ----------  Eosinophils Absolute       Date                     Value               Ref Range           Status                04/12/2024               0.1                 0.0 - 0.4 x10E*     Final                 11/01/2019               0.15                0.0 - 0.8 10*3*     Final            ----------  Eosinophils, Absolute       Date                     Value               Ref Range           Status                04/02/2024               0.00                0.00 - 0.40 10*     Final            ----------  Basophils Absolute       Date                     Value               Ref Range           Status                04/12/2024               0.0                 0.0 - 0.2 x10E*     Final                 11/01/2019               0.02                0.0 - 0.2 10*3*     Final            ----------  Basophils, Absolute       Date                     Value               Ref Range           Status                 04/02/2024               0.03                0.00 - 0.20 10*     Final            ----------  Immature Grans, Absolute       Date                     Value               Ref Range           Status                04/02/2024               0.11 (H)            0.00 - 0.05 10*     Final                 11/01/2019               0.06                <1 10*3/uL          Final            ----------  nRBC       Date                     Value               Ref Range           Status                04/02/2024               0.0                 0.0 - 0.2 /100*     Final            ----------                 Anesthesia Plan    ASA 3     spinal     (Complications of neuraxial anesthesia include but not limited to bleeding, infection, damage to surrounding structures, hypotension, itchiness, failed block, and post dural puncture headache.  )    Anesthetic plan, risks, benefits, and alternatives have been provided, discussed and informed consent has been obtained with: patient, spouse/significant other and mother.    Plan discussed with CRNA and attending.    CODE STATUS:    Level Of Support Discussed With: Patient  Code Status (Patient has no pulse and is not breathing): CPR (Attempt to Resuscitate)  Medical Interventions (Patient has pulse or is breathing): Full Support

## 2024-11-11 NOTE — ANESTHESIA PROCEDURE NOTES
Spinal Block    Pre-sedation assessment completed: 11/11/2024 10:35 AM    Patient reassessed immediately prior to procedure    Patient location during procedure: OB  Start Time: 11/11/2024 10:43 AM  Stop Time: 11/11/2024 10:45 AM  Indication:at surgeon's request, post-op pain management and procedure for pain  Performed By  Anesthesiologist: Adalberto Nichols MD CRNA/CAA: Monika Salinas CRNA  Preanesthetic Checklist  Completed: patient identified, IV checked, site marked, risks and benefits discussed, surgical consent, monitors and equipment checked, pre-op evaluation and timeout performed  Spinal Block Prep:  Patient Position:sitting  Sterile Tech:gown and mask  Prep:Chloraprep  Patient Monitoring:blood pressure monitoring, continuous pulse oximetry and EKG    Spinal Block Procedure  Approach:midline  Guidance:landmark technique and palpation technique  Location:L4-L5  Needle Type:Pencan  Needle Gauge:24 G  Placement of Spinal needle event:cerebrospinal fluid aspirated  Paresthesia: no  Fluid Appearance:clear     Post Assessment  Patient Tolerance:patient tolerated the procedure well with no apparent complications  Complications no

## 2024-11-11 NOTE — LACTATION NOTE
P3. Some experience with BF. Pt reports baby has latched so far. Baby rooting now. Encouraged pt to latch baby on demand and at least every 2-3 hours for 10-15 min on each breast. Pt latching baby now. Baby is latching well at this time. Baby has good latch with strong suck. Educated on how to know if baby is getting enough to eat. Encouraged to call LC as needed. Pt has personal breast pump    Lactation Consult Note    Evaluation Completed: 2024 15:41 EST  Patient Name: Sonam Lang  :  1985  MRN:  [id number]     REFERRAL  INFORMATION:                                         DELIVERY HISTORY:        Skin to skin initiation date/time: 2024    Skin to skin end date/time:           MATERNAL ASSESSMENT:                               INFANT ASSESSMENT:  Information for the patient's :  Viola Lang [[id number]]   No past medical history on file.                                                                                                  MATERNAL INFANT FEEDING:                                                                       EQUIPMENT TYPE:                                 BREAST PUMPING:          LACTATION REFERRALS:

## 2024-11-11 NOTE — L&D DELIVERY NOTE
[facility]   Obstetrics and Gynecology     2024    Patient:Sonam Lang   MR#:[id number]     Section Procedure Note    Indications:  History of 2 prior , desires repeat     Pre-operative Diagnosis: Intrauterine pregnancy at 39w2d    Post-operative Diagnosis: same    Procedure:  Low transverse  section     Surgeon: Radha Lai MD     Assistants: Monika Chauhan MD    Anesthesia: Spinal anesthesia    Prenatal care problem list:  Patient Active Problem List   Diagnosis    Migraine without aura and without status migrainosus, not intractable    Allergy to NSAIDs    Asthma, mild persistent    Sleep apnea    Previous  section    Adult ADHD    Anxiety in pregnancy, antepartum    Multigravida of advanced maternal age in third trimester    Previous  delivery affecting pregnancy    Gastroesophageal reflux disease without esophagitis    39 weeks gestation of pregnancy     delivery delivered       Procedure Details   The patient was seen in the LDR preoperatively. The risks, benefits, complications, treatment options, and expected outcomes were discussed with the patient.  The patient concurred with the proposed plan, giving informed consent.  The site of surgery is discussed. The patient was taken to Operating Room # 2, identified as Sonam Lang and the procedure verified as  Delivery. A Time Out was held and the above information confirmed.    After induction of anesthesia, the patient was draped and prepped in the usual sterile manner. A Pfannenstiel incision was made and carried down through the subcutaneous tissue to the fascia. Fascial incision was made and extended transversely. The fascia was  from the underlying rectus tissue superiorly and inferiorly. The peritoneum was identified and entered. Peritoneal incision was extended longitudinally.  A low transverse uterine incision was made.  Delivered from vertex  presentation was a female fetus 3340 g (7 lb 5.8 oz) with Apgar scores of 8 at one minute and 8 at five minutes. Umbilical cord was clamped and cut after a 30-second delay.  Cord blood was obtained for evaluation. The placenta was removed intact and appeared normal. The uterine outline, tubes and ovaries appeared normal.  The uterine incision was closed with running locked sutures of 0 monocryl.  Excellent hemostasis was observed.  The posterior cul-de-sac was cleared of all blood.  The uterus was returned to the abdomen.  The paracolic gutters were cleared of all blood.  The uterus was reexamined and excellent hemostasis was confirmed.    The fascia was then reapproximated with running sutures of 0 Vicryl.  OB anesthetic cocktail was injected into subcutaneous and dermal layers.  The deep subcutaneous layer was reapproximated with 3-0 monocryl in a running fashion. The skin was reapproximated with 4-0 monocryl in a subcuticular fashion.     Instrument, sponge, and needle counts were correct prior to abdominal closure and at the conclusion of the case.     Surgical assistant was responsible for performing the following activities: Retraction, Suction, Irrigation, Closing, Placing Dressing and Delivery of Fetus and their skilled assistance was necessary for the success of this case.    Findings:  YOB: 2026  Time of birth: 11:14 AM  Live, viable female infant  Baby weight: 3340 g (7 lb 5.8 oz)            APGARS  One minute Five minutes   Skin color: 0   0     Heart rate: 2   2     Grimace: 2   2     Muscle tone: 2   2     Breathin   2     Totals: 8   8       Normal gravid uterus  Normal bilateral fallopian tubes and ovaries    Estimated Blood Loss:  300 mL    Calculated Blood Loss:  Quantitative Blood Loss (mL): 220 mL           Specimens:  Placenta            Complications:  None; patient tolerated the procedure well.           Disposition: PACU - hemodynamically stable.           Condition:  stable    Radha Lai MD  11/11/2024   13:07 EST

## 2024-11-12 ENCOUNTER — APPOINTMENT (OUTPATIENT)
Dept: CARDIOLOGY | Facility: HOSPITAL | Age: 39
End: 2024-11-12
Payer: MEDICAID

## 2024-11-12 LAB
BASOPHILS # BLD AUTO: 0.04 10*3/MM3 (ref 0–0.2)
BASOPHILS NFR BLD AUTO: 0.4 % (ref 0–1.5)
BH CV LOWER VASCULAR LEFT COMMON FEMORAL AUGMENT: NORMAL
BH CV LOWER VASCULAR LEFT COMMON FEMORAL COMPETENT: NORMAL
BH CV LOWER VASCULAR LEFT COMMON FEMORAL COMPRESS: NORMAL
BH CV LOWER VASCULAR LEFT COMMON FEMORAL PHASIC: NORMAL
BH CV LOWER VASCULAR LEFT COMMON FEMORAL SPONT: NORMAL
BH CV LOWER VASCULAR LEFT DISTAL FEMORAL COMPRESS: NORMAL
BH CV LOWER VASCULAR LEFT GASTRONEMIUS COMPRESS: NORMAL
BH CV LOWER VASCULAR LEFT GREATER SAPH AK COMPRESS: NORMAL
BH CV LOWER VASCULAR LEFT GREATER SAPH BK COMPRESS: NORMAL
BH CV LOWER VASCULAR LEFT LESSER SAPH COMPRESS: NORMAL
BH CV LOWER VASCULAR LEFT MID FEMORAL AUGMENT: NORMAL
BH CV LOWER VASCULAR LEFT MID FEMORAL COMPETENT: NORMAL
BH CV LOWER VASCULAR LEFT MID FEMORAL COMPRESS: NORMAL
BH CV LOWER VASCULAR LEFT MID FEMORAL PHASIC: NORMAL
BH CV LOWER VASCULAR LEFT MID FEMORAL SPONT: NORMAL
BH CV LOWER VASCULAR LEFT PERONEAL COMPRESS: NORMAL
BH CV LOWER VASCULAR LEFT POPLITEAL AUGMENT: NORMAL
BH CV LOWER VASCULAR LEFT POPLITEAL COMPETENT: NORMAL
BH CV LOWER VASCULAR LEFT POPLITEAL COMPRESS: NORMAL
BH CV LOWER VASCULAR LEFT POPLITEAL PHASIC: NORMAL
BH CV LOWER VASCULAR LEFT POPLITEAL SPONT: NORMAL
BH CV LOWER VASCULAR LEFT POSTERIOR TIBIAL COMPRESS: NORMAL
BH CV LOWER VASCULAR LEFT PROFUNDA FEMORAL COMPRESS: NORMAL
BH CV LOWER VASCULAR LEFT PROXIMAL FEMORAL COMPRESS: NORMAL
BH CV LOWER VASCULAR LEFT SAPHENOFEMORAL JUNCTION COMPRESS: NORMAL
BH CV LOWER VASCULAR RIGHT COMMON FEMORAL AUGMENT: NORMAL
BH CV LOWER VASCULAR RIGHT COMMON FEMORAL COMPETENT: NORMAL
BH CV LOWER VASCULAR RIGHT COMMON FEMORAL COMPRESS: NORMAL
BH CV LOWER VASCULAR RIGHT COMMON FEMORAL PHASIC: NORMAL
BH CV LOWER VASCULAR RIGHT COMMON FEMORAL SPONT: NORMAL
DEPRECATED RDW RBC AUTO: 44.7 FL (ref 37–54)
EOSINOPHIL # BLD AUTO: 0.08 10*3/MM3 (ref 0–0.4)
EOSINOPHIL NFR BLD AUTO: 0.8 % (ref 0.3–6.2)
ERYTHROCYTE [DISTWIDTH] IN BLOOD BY AUTOMATED COUNT: 13.1 % (ref 12.3–15.4)
HCT VFR BLD AUTO: 34.6 % (ref 34–46.6)
HGB BLD-MCNC: 11.5 G/DL (ref 12–15.9)
IMM GRANULOCYTES # BLD AUTO: 0.05 10*3/MM3 (ref 0–0.05)
IMM GRANULOCYTES NFR BLD AUTO: 0.5 % (ref 0–0.5)
LYMPHOCYTES # BLD AUTO: 1.34 10*3/MM3 (ref 0.7–3.1)
LYMPHOCYTES NFR BLD AUTO: 13.3 % (ref 19.6–45.3)
MCH RBC QN AUTO: 31.3 PG (ref 26.6–33)
MCHC RBC AUTO-ENTMCNC: 33.2 G/DL (ref 31.5–35.7)
MCV RBC AUTO: 94 FL (ref 79–97)
MONOCYTES # BLD AUTO: 0.8 10*3/MM3 (ref 0.1–0.9)
MONOCYTES NFR BLD AUTO: 7.9 % (ref 5–12)
NEUTROPHILS NFR BLD AUTO: 7.77 10*3/MM3 (ref 1.7–7)
NEUTROPHILS NFR BLD AUTO: 77.1 % (ref 42.7–76)
NRBC BLD AUTO-RTO: 0 /100 WBC (ref 0–0.2)
PLATELET # BLD AUTO: 195 10*3/MM3 (ref 140–450)
PMV BLD AUTO: 10.5 FL (ref 6–12)
RBC # BLD AUTO: 3.68 10*6/MM3 (ref 3.77–5.28)
WBC NRBC COR # BLD AUTO: 10.08 10*3/MM3 (ref 3.4–10.8)

## 2024-11-12 PROCEDURE — 93971 EXTREMITY STUDY: CPT | Performed by: STUDENT IN AN ORGANIZED HEALTH CARE EDUCATION/TRAINING PROGRAM

## 2024-11-12 PROCEDURE — 93971 EXTREMITY STUDY: CPT

## 2024-11-12 PROCEDURE — 85025 COMPLETE CBC W/AUTO DIFF WBC: CPT | Performed by: STUDENT IN AN ORGANIZED HEALTH CARE EDUCATION/TRAINING PROGRAM

## 2024-11-12 PROCEDURE — 25010000002 ENOXAPARIN PER 10 MG: Performed by: STUDENT IN AN ORGANIZED HEALTH CARE EDUCATION/TRAINING PROGRAM

## 2024-11-12 RX ORDER — SIMETHICONE 80 MG
80 TABLET,CHEWABLE ORAL 4 TIMES DAILY PRN
Status: DISCONTINUED | OUTPATIENT
Start: 2024-11-12 | End: 2024-11-12

## 2024-11-12 RX ORDER — SIMETHICONE 80 MG
80 TABLET,CHEWABLE ORAL
Status: DISCONTINUED | OUTPATIENT
Start: 2024-11-12 | End: 2024-11-14 | Stop reason: HOSPADM

## 2024-11-12 RX ADMIN — ACETAMINOPHEN 325MG 650 MG: 325 TABLET ORAL at 20:53

## 2024-11-12 RX ADMIN — OXYCODONE HYDROCHLORIDE 10 MG: 10 TABLET ORAL at 08:37

## 2024-11-12 RX ADMIN — ACETAMINOPHEN 1000 MG: 500 TABLET ORAL at 05:37

## 2024-11-12 RX ADMIN — SIMETHICONE 80 MG: 80 TABLET, CHEWABLE ORAL at 16:55

## 2024-11-12 RX ADMIN — OXYCODONE HYDROCHLORIDE 10 MG: 10 TABLET ORAL at 02:40

## 2024-11-12 RX ADMIN — SIMETHICONE 80 MG: 80 TABLET, CHEWABLE ORAL at 10:03

## 2024-11-12 RX ADMIN — OXYCODONE HYDROCHLORIDE 10 MG: 10 TABLET ORAL at 20:54

## 2024-11-12 RX ADMIN — SENNOSIDES AND DOCUSATE SODIUM 1 TABLET: 50; 8.6 TABLET ORAL at 08:30

## 2024-11-12 RX ADMIN — SIMETHICONE 80 MG: 80 TABLET, CHEWABLE ORAL at 20:53

## 2024-11-12 RX ADMIN — ENOXAPARIN SODIUM 40 MG: 100 INJECTION SUBCUTANEOUS at 16:55

## 2024-11-12 RX ADMIN — OXYCODONE HYDROCHLORIDE 10 MG: 10 TABLET ORAL at 16:55

## 2024-11-12 RX ADMIN — SENNOSIDES AND DOCUSATE SODIUM 1 TABLET: 50; 8.6 TABLET ORAL at 20:53

## 2024-11-12 RX ADMIN — ACETAMINOPHEN 1000 MG: 500 TABLET ORAL at 12:45

## 2024-11-12 RX ADMIN — OXYCODONE HYDROCHLORIDE 10 MG: 10 TABLET ORAL at 12:45

## 2024-11-12 NOTE — LACTATION NOTE
This note was copied from a baby's chart.  Mom called out for questions. Baby is bf again and seems to have slightly stronger suck and is releasing less often. Reviewed bf positions in handbook and other things she could try to keep baby from letting go.After baby released mom hand expressed drops and used syringe and cups to feed. Mother voiced she was tired and wanted to sleep. Dad is at home with other children and she sleeps with a CPAP so took baby to nursery until next feeding.

## 2024-11-12 NOTE — LACTATION NOTE
This note was copied from a baby's chart.  Called by mom to come to room. Baby has been bf but still seems hungry and mom is worried that she is not hand expressing as well as earlier. Also baby continues to suck then release breast. She did use a NS with her other child. Offered NS but she does not want to use one yet.she also asked about giving baby water.  Educated: not to give water,colostrum amounts varying;  ways to tell if baby is getting enough-weight and output; hydration for herself and speak to peds about concerns in am. Baby placed STS and seemed comfortable. Encouraged calling for any other needs.

## 2024-11-12 NOTE — LACTATION NOTE
This note was copied from a baby's chart.  Mom reports really wanting to breast feed with good milk supply.   She reports baby is having difficulty maintaining the latch.  Discussed pumping to sekou her nipple more so baby can latch easier and discussed using NS.  She reports pumping is painful, 24mm flange appears too small and 27mm given. Mom just fed baby formula via syringe.   Discussed milk production, encouraged pumping every 3hrs around the clock if baby is not breast feeding well and paced feeding when supplementing.

## 2024-11-12 NOTE — PLAN OF CARE
Goal Outcome Evaluation:  Plan of Care Reviewed With: patient        Progress: improving  Outcome Evaluation: V/S stable, fundus and bleeding WNL, Incision Dressing CDI, pain controlled with ERAS meds, breastfeeding infant

## 2024-11-12 NOTE — LACTATION NOTE
"Lactation Consult Note  P3 term baby, 20hrs old. Mom called for latch assistance.   Mom latches baby well but baby keeps coming off the breast crying, eating her hands. Mom was able to express milk for baby prior to latching but baby keeps coming off the breast appearing hungry. Mom then pumped with hand pump  fed baby 0.5mls EBM and baby is sleeping now.   Mom reports she breast fed her first baby x4 months and her second baby for one month, she had \"low milk supply\" and needed to also supplement with formula both babies during the time she nursed them.   Discussed milk production, encouraged pumping after every feeding if baby is still appearing hungry, feeding all EBM after pumping and she may need to supplement with formula if baby is not satisfied. Discussed feeding cues, expected output, nursing on demand. Mom will call for further assist or questions.    Evaluation Completed: 2024 08:08 EST  Patient Name: Sonam Lang  :  1985  MRN:  [id number]     REFERRAL  INFORMATION:                          Date of Referral: 24   Person Making Referral: patient  Maternal Reason for Referral: latch difficulty, milk supply concern       DELIVERY HISTORY:        Skin to skin initiation date/time: 2024    Skin to skin end date/time:           MATERNAL ASSESSMENT:  Breast Size Issue: none (24)  Breast Shape: pendulous (24)  Breast Density: soft (24)  Areola: elastic (24)  Nipples: everted (24)                INFANT ASSESSMENT:  Information for the patient's :  Viola Lang [[id number]]   No past medical history on file.  Feeding Readiness Cues: eager (24)     Feeding Tolerance/Success: alert for feeding (24)                             Breastfeeding: breastfeeding, right side only (24)  Infant Positioning: clutch/football (24)        Effective Latch During Feeding: no (24 " 730)            Latch: 1-->repeated attempts, holds nipple in mouth, stimulate to suck (24)  Audible Swallowin-->none (24)  Type of Nipple: 2-->everted (after stimulation) (24)  Comfort (Breast/Nipple): 2-->soft/nontender (24)  Hold (Positioning): 1-->minimal assist, teach one side, mother does other, staff holds (24)  Latch Score: 6 (24)    Infant-Driven Feeding Scales - Readiness: Prior to care, alert or fussy, sustains state and adequate tone throughout care. Exhibits sustained  hunger cues (e.g., rooting, hands to mouth, non-nutritive sucking).. (24)              MATERNAL INFANT FEEDING:     Maternal Emotional State: relaxed (24)  Infant Positioning: clutch/football (24)   Signs of Milk Transfer: none noted (24)  Pain with Feeding: no (24)           Milk Ejection Reflex: present (24)           Latch Assistance: minimal assistance (24)                               EQUIPMENT TYPE:  Breast Pump Type: manual pump (24)  Breast Pump Flange Type: hard (24)  Breast Pump Flange Size: 24 mm (24)                        BREAST PUMPING:  Breast Pumping Interventions: frequent pumping encouraged (24)       LACTATION REFERRALS:

## 2024-11-12 NOTE — PROGRESS NOTES
[facility]   PROGRESS NOTE    Post-Op Day 1 S/P     Subjective   CC: post  section    Patient reports:  Patient reports she is having itching on her abdomen.  She is worried that she had an allergic reaction to the prep.  Her  was about 24 hours ago.  She has a history of a severe clindamycin allergy and she is also allergic to NSAIDs.  Has 10 allergies overall.  She is tolerating a regular diet.  She has been up ambulating.  She has not passed gas yet and her dressing is still on.     Review of systems- no shortness of breath, nausea or vomiting or leg pain.    Objective      Vitals: Vital Signs Range for the last 24 hours  Temperature: Temp:  [97.3 °F (36.3 °C)-98.6 °F (37 °C)] 98.3 °F (36.8 °C)       BP: BP: ()/(44-72) 100/64   Pulse: Heart Rate:  [68-94] 72   Respirations: Resp:  [16-18] 16                            Physical exam   General-  no acute distress, conversant    Lungs- respirations unlabored   CV- trace LE edema   Abdomen-soft and nondistended.  No rashes are seen.  There is ecchymosis with bruising above the incision dressing.   Incision -dressing is intact with dried blood   Lower extremities-lower extremity edema trace.  Patient reports some pain when I squeeze her lower left leg.    Results from last 7 days   Lab Units 24  0612   WBC 10*3/mm3 10.08   HEMOGLOBIN g/dL 11.5*   HEMATOCRIT % 34.6   PLATELETS 10*3/mm3 195         Assessment & Plan        Previous  section    Migraine without aura and without status migrainosus, not intractable    Asthma, mild persistent    Sleep apnea    Adult ADHD    Anxiety in pregnancy, antepartum    Multigravida of advanced maternal age in third trimester    Previous  delivery affecting pregnancy    Gastroesophageal reflux disease without esophagitis    39 weeks gestation of pregnancy     delivery delivered      Assessment:    Sonam Lang is Day 1  post-op from      Patient is  concerned that she has had a reaction to the chlorhexidine prep.  I do not see a rash.  Likely due to Duramorph.  Encouraged antihistamines.  NSAID allergy-add Mylicon and the patient will try chewing gum in addition to ambulating to help pass gas.  Patient did not complain of any leg pain but did have some discomfort in her lower left leg with exam.  I will order a left Doppler.  Continue Lovenox for DVT prophylaxis.     Plan:  continue post op care, pain medication prn and encouraged ambulation.        Abdias Wallace MD  11/12/2024  11:25 EST

## 2024-11-13 PROCEDURE — 25010000002 ENOXAPARIN PER 10 MG: Performed by: STUDENT IN AN ORGANIZED HEALTH CARE EDUCATION/TRAINING PROGRAM

## 2024-11-13 RX ADMIN — SIMETHICONE 80 MG: 80 TABLET, CHEWABLE ORAL at 17:08

## 2024-11-13 RX ADMIN — ENOXAPARIN SODIUM 40 MG: 100 INJECTION SUBCUTANEOUS at 04:52

## 2024-11-13 RX ADMIN — OXYCODONE HYDROCHLORIDE 10 MG: 10 TABLET ORAL at 13:22

## 2024-11-13 RX ADMIN — SENNOSIDES AND DOCUSATE SODIUM 1 TABLET: 50; 8.6 TABLET ORAL at 21:13

## 2024-11-13 RX ADMIN — OXYCODONE HYDROCHLORIDE 10 MG: 10 TABLET ORAL at 17:08

## 2024-11-13 RX ADMIN — OXYCODONE HYDROCHLORIDE 10 MG: 10 TABLET ORAL at 04:52

## 2024-11-13 RX ADMIN — ACETAMINOPHEN 325MG 650 MG: 325 TABLET ORAL at 17:08

## 2024-11-13 RX ADMIN — SIMETHICONE 80 MG: 80 TABLET, CHEWABLE ORAL at 07:54

## 2024-11-13 RX ADMIN — ACETAMINOPHEN 325MG 650 MG: 325 TABLET ORAL at 04:52

## 2024-11-13 RX ADMIN — SIMETHICONE 80 MG: 80 TABLET, CHEWABLE ORAL at 23:15

## 2024-11-13 RX ADMIN — ACETAMINOPHEN 325MG 650 MG: 325 TABLET ORAL at 23:15

## 2024-11-13 RX ADMIN — OXYCODONE HYDROCHLORIDE 10 MG: 10 TABLET ORAL at 21:13

## 2024-11-13 RX ADMIN — ACETAMINOPHEN 325MG 650 MG: 325 TABLET ORAL at 11:35

## 2024-11-13 RX ADMIN — SIMETHICONE 80 MG: 80 TABLET, CHEWABLE ORAL at 11:36

## 2024-11-13 RX ADMIN — ENOXAPARIN SODIUM 40 MG: 100 INJECTION SUBCUTANEOUS at 17:08

## 2024-11-13 RX ADMIN — SENNOSIDES AND DOCUSATE SODIUM 1 TABLET: 50; 8.6 TABLET ORAL at 07:54

## 2024-11-13 RX ADMIN — OXYCODONE HYDROCHLORIDE 10 MG: 10 TABLET ORAL at 00:51

## 2024-11-13 RX ADMIN — OXYCODONE HYDROCHLORIDE 10 MG: 10 TABLET ORAL at 08:53

## 2024-11-13 NOTE — PROGRESS NOTES
[facility]   PROGRESS NOTE    Post-Op Day 2 S/P   Subjective     Patient reports:  Pain is adequately controlled.  She notes some tenderness overy area of bruising on low abdomen. She is ambulating. Tolerating diet. Tolerating po -- normal.  Intake -- c/o of tolerating po solids.   Voiding - without difficulty; flatus reported..  Vaginal bleeding is light.    ROS:  Neuro: neg for severe headache  Pulm: neg for soa  CV: neg for chest pain  : neg for heavy bleeding  Musculoskeletal: neg for leg pain    Objective      Vitals: Vital Signs Range for the last 24 hours  Temperature: Temp:  [97.7 °F (36.5 °C)-98.7 °F (37.1 °C)] 98 °F (36.7 °C)   Temp Source: Temp src: Oral   BP: BP: (114-139)/(65-85) 139/85   Pulse: Heart Rate:  [71-78] 78   Respirations: Resp:  [16-18] 16   SPO2:     O2 Amount (l/min):     O2 Devices Device (Oxygen Therapy): room air   Weight:              Physical Exam    Lungs Normal respiratory effort   Abdomen Soft, fundus firm at U-1   Incision  no drainage, no erythema, no swelling, well approximated; there is ecchymoses above incision    Extremities Bilateral lower ext with trace edema; no cords or tenderness     Labs:  Lab Results   Component Value Date    WBC 10.08 2024    HGB 11.5 (L) 2024    HCT 34.6 2024    MCV 94.0 2024     2024     Results from last 7 days   Lab Units 24  0949   ABO TYPING  O   RH TYPING  Positive       Assessment & Plan        Previous  section    Migraine without aura and without status migrainosus, not intractable    Asthma, mild persistent    Sleep apnea    Adult ADHD    Anxiety in pregnancy, antepartum    Multigravida of advanced maternal age in third trimester    Previous  delivery affecting pregnancy    Gastroesophageal reflux disease without esophagitis    39 weeks gestation of pregnancy     delivery delivered      Assessment:    Sonam Lang is Day 2  post-partum  ,  Low Transverse : pt is stable today. Discussed management options for discomfort around area of bruising.     Hx calf tenderness on exam: pt denies leg pain today. Doppler was normal prior day.     Plan:  continue post op care and ambulation encouraged .        Skyla Goins MD  11/13/2024  10:04 EST

## 2024-11-13 NOTE — PAYOR COMM NOTE
"Sonam Lang (39 y.o. Female)       Date of Birth   1985    Social Security Number       Address   [address] [place], [place] [address]    Home Phone   [phone]    MRN   [id number]       [employer]    Marital Status                               Admission Date   24    Admission Type   Elective    Admitting Provider   Megan El MD    Attending Provider   Megan El MD    Department, Room/Bed   [address], [address]/       Discharge Date       Discharge Disposition       Discharge Destination                                 Attending Provider: Megan El MD    Allergies: Aspirin, Bactrim [Sulfamethoxazole-trimethoprim], Cefdinir, Buspirone, Contrast Dye (Echo Or Unknown Ct/mr), Ibuprofen, Iodinated Contrast Media, Naproxen, Toradol [Ketorolac Tromethamine], Clindamycin/lincomycin    Isolation: None   Infection: None   Code Status: CPR    Ht: 172.7 cm (68\")   Wt: 122 kg (269 lb 12.8 oz)    Admission Cmt: None   Principal Problem: Previous  section [Z98.891]                   Active Insurance as of 2024       Primary Coverage       Payor Plan Insurance Group Employer/Plan Group    [facility] PLAN [facility] PLAN [facility] [place]       Payor Plan Address Payor Plan Phone Number Payor Plan Fax Number Effective Dates    [address]   3/7/2023 - None Entered    [facility] [id number]         Subscriber Name Subscriber Birth Date Member ID       SONAM LANG 1985 [id number]                     Emergency Contacts        (Rel.) Home Phone Work Phone Mobile Phone    Ramón Lang (Spouse) [phone] -- [phone]              Insurance Information                  [facility] PLAN [facility] Phone: --    Subscriber: Sonam Lang Subscriber#: [id number]    Group#: [id number] Precert#: --    Authorization#: -- Effective Date: --          Problem List             Codes Noted - " Resolved       Hospital     delivery delivered ICD-10-CM: O82  ICD-9-CM: 669.71 2024 - Present    39 weeks gestation of pregnancy ICD-10-CM: Z3A.39  ICD-9-CM: V22.2 2024 - Present    Gastroesophageal reflux disease without esophagitis ICD-10-CM: K21.9  ICD-9-CM: 530.81 2024 - Present    Previous  delivery affecting pregnancy ICD-10-CM: O34.219  ICD-9-CM: 654.20 10/14/2023 - Present    Multigravida of advanced maternal age in third trimester ICD-10-CM: O09.523  ICD-9-CM: 659.63 2023 - Present    Anxiety in pregnancy, antepartum ICD-10-CM: O99.340, F41.9  ICD-9-CM: 648.43, 300.00 2023 - Present    Adult ADHD ICD-10-CM: F90.9  ICD-9-CM: 314.01 2023 - Present    * (Principal) Previous  section ICD-10-CM: Z98.891  ICD-9-CM: V45.89 2022 - Present    Asthma, mild persistent ICD-10-CM: J45.30  ICD-9-CM: 493.90 2019 - Present    Sleep apnea ICD-10-CM: G47.30  ICD-9-CM: 780.57 2019 - Present    Migraine without aura and without status migrainosus, not intractable ICD-10-CM: G43.009  ICD-9-CM: 346.10 3/30/2018 - Present       Non-Hospital    Allergy to NSAIDs ICD-10-CM: Z88.6  ICD-9-CM: V14.6 2019 - Present        History & Physical        Radha Lai MD at 24 [facility]          [facility]   Obstetrics and Gynecology   History & Physical    2024    Patient: Sonam Lang          MR#:[id number]    Chief complaint: Repeat     Subjective    39 y.o. female  at 39w2d with history of 2 prior cesareans presents for repeat .  She denies contractions, loss of fluid, vaginal bleeding.  Endorses regular fetal movement.  Pregnancy is complicated by AMA status.  Fetal growth has been normal and  surveillance has been reassuring.      Patient Active Problem List   Diagnosis    Migraine without aura and without status migrainosus, not intractable    Allergy to NSAIDs    Asthma, mild persistent    Sleep  apnea    Previous  section    Adult ADHD    Anxiety in pregnancy, antepartum    Multigravida of advanced maternal age in third trimester    Previous  delivery affecting pregnancy    Gastroesophageal reflux disease without esophagitis    39 weeks gestation of pregnancy       Past Medical History:   Diagnosis Date    ADHD (attention deficit hyperactivity disorder)     Anxiety     Asthma     Congenital abnormalities 2022    Multiple fetal anomalies present including meningocele, unilateral real agenesis, unilateral club foot, membranous VSD     Depression     GERD (gastroesophageal reflux disease)     History of depression 2019    Irritable bowel syndrome     Migraine     Obesity (BMI 30.0-34.9) 2019    Pregnancy complicated by multiple fetal congenital anomalies, single gestation 2022    NTD, SUA    PTSD (post-traumatic stress disorder)     Sleep apnea 2016    CPAP       Past Surgical History:   Procedure Laterality Date     SECTION       SECTION N/A 10/14/2023    Procedure:  SECTION REPEAT;  Surgeon: Megan El MD;  Location: [facility] LABOR DELIVERY;  Service: Obstetrics/Gynecology;  Laterality: N/A;    COLONOSCOPY      with endoscopy    SINUS SURGERY  11/22/2022    x2, 2018 (first)    WISDOM TOOTH EXTRACTION         Obstetric History:  OB History          6    Para   3    Term   2       1    AB   2    Living   2         SAB        IAB        Ectopic   2    Molar        Multiple   0    Live Births   2               Menstrual History:     Patient's last menstrual period was 02/10/2024 (exact date).       # 1 - Date: 10/28/19, Sex: Female, Weight: 3200 g (7 lb 0.9 oz), GA: 38w1d, Type: , Low Transverse, Apgar1: 8, Apgar5: 9, Living: Living, Birth Comments: None    # 2 - Date: , Sex: None, Weight: None, GA: None, Type: Ectopic, Apgar1: None, Apgar5: None, Living: None, Birth Comments: None    # 3 - Date: ,  Sex: None, Weight: None, GA: None, Type: Ectopic, Apgar1: None, Apgar5: None, Living: None, Birth Comments: None    # 4 - Date: 22, Sex: Female, Weight: 559 g (1 lb 3.7 oz), GA: 27w2d, Type: , Spontaneous, Apgar1: 2, Apgar5: 2, Living: Fetal Demise, Birth Comments: scale 1    # 5 - Date: 10/14/23, Sex: Male, Weight: 4070 g (8 lb 15.6 oz), GA: 39w0d, Type: , Low Transverse, Apgar1: 7, Apgar5: 8, Living: Living, Birth Comments: panda OR1    # 6 - Date: None, Sex: None, Weight: None, GA: None, Type: None, Apgar1: None, Apgar5: None, Living: None, Birth Comments: None      Prenatal Information:  Prenatal Results       Initial Prenatal Labs       Test Value Reference Range Date Time    Hemoglobin  14.1 g/dL 11.1 - 15.9 24 1008       13.2 g/dL 12.0 - 15.9 24 0144       13.4 g/dL 12.0 - 15.9 24 0947    Hematocrit  41.7 % 34.0 - 46.6 24 1008       40.4 % 34.0 - 46.6 24 0144       41.0 % 34.0 - 46.6 24 0947    Platelets  304 x10E3/uL 150 - 450 24 1008       272 10*3/mm3 140 - 450 24 0144       259 10*3/mm3 140 - 450 24 0947    Rubella IgG  3.56 index Immune >0.99 24 1008    Hepatitis B SAg  Negative  Negative 24 1008    Hepatitis C Ab  Non Reactive  Non Reactive 24 1008    RPR  Non Reactive  Non Reactive 08/15/24 1122       Non Reactive  Non Reactive 24 1008    T. Pallidum Ab         ABO  O   24 1008    Rh  Positive   24 1008    Antibody Screen  Negative  Negative 24 1008    HIV  Non Reactive  Non Reactive 24 1008    Urine Culture  Final report   24 1008    Gonorrhea  Negative  Negative 24 0946    Chlamydia  Negative  Negative 24 0946    TSH  0.896 uIU/mL 0.450 - 4.500 24 1254       0.110 uIU/mL 0.450 - 4.500 24 1008    HgB A1c   5.3 % 4.8 - 5.6 24 1008    Varicella IgG        Hemoglobinopathy Fractionation        Hemoglobinopathy (genetic testing)        Cystic fibrosis                    Fetal testing        Test Value Reference Range Date Time    NIPT        MSAFP  *Screen Negative*   06/18/24 1254    AFP-4                  2nd and 3rd Trimester       Test Value Reference Range Date Time    Hemoglobin (repeated)  12.2 g/dL 12.0 - 15.9 11/11/24 0949       12.0 g/dL 12.0 - 15.9 08/15/24 1122    Hematocrit (repeated)  36.3 % 34.0 - 46.6 11/11/24 0949       35.0 % 34.0 - 46.6 08/15/24 1122    Platelets   229 10*3/mm3 140 - 450 11/11/24 0949       248 10*3/mm3 140 - 450 08/15/24 1122       304 x10E3/uL 150 - 450 04/12/24 1008       272 10*3/mm3 140 - 450 04/02/24 0144       259 10*3/mm3 140 - 450 03/25/24 0947    1 hour GTT   116 mg/dL 65 - 139 08/15/24 1122    Antibody Screen (repeated)        3rd TM syphilis scrn (repeated)  RPR   Non Reactive  Non Reactive 08/15/24 1122    3rd TM syphilis scrn (repeated) TP-Ab        3rd TM syphilis screen TB-Ab (FTA)        Syphilis cascade test TP-Ab (EIA)        Syphilis cascade TPPA        GTT Fasting        GTT 1 Hr        GTT 2 Hr        GTT 3 Hr        Group B Strep  Negative  Negative 10/21/24               Other testing        Test Value Reference Range Date Time    Parvo IgG         CMV IgG                   Drug Screening       Test Value Reference Range Date Time    Amphetamine Screen        Barbiturate Screen        Benzodiazepine Screen        Methadone Screen        Phencyclidine Screen        Opiates Screen        THC Screen        Cocaine Screen        Propoxyphene Screen        Buprenorphine Screen        Methamphetamine Screen        Oxycodone Screen        Tricyclic Antidepressants Screen                  Legend    ^: Historical                          External Prenatal Results       Pregnancy Outside Results - Transcribed From Office Records - See Scanned Records For Details       Test Value Date Time    ABO  O  04/12/24 1008    Rh  Positive  04/12/24 1008    Antibody Screen  Negative  04/12/24 1008    Varicella IgG        Rubella  3.56 index 04/12/24 1008    Hgb  12.2 g/dL 11/11/24 0949       12.0 g/dL 08/15/24 1122       14.1 g/dL 04/12/24 1008       13.2 g/dL 04/02/24 0144       13.4 g/dL 03/25/24 0947    Hct  36.3 % 11/11/24 0949       35.0 % 08/15/24 1122       41.7 % 04/12/24 1008       40.4 % 04/02/24 0144       41.0 % 03/25/24 0947    HgB A1c   5.3 % 04/12/24 1008    1h GTT  116 mg/dL 08/15/24 1122    3h GTT Fasting       3h GTT 1 hour       3h GTT 2 hour       3h GTT 3 hour        Gonorrhea (discrete)  Negative  04/12/24 0946    Chlamydia (discrete)  Negative  04/12/24 0946    RPR  Non Reactive  08/15/24 1122       Non Reactive  04/12/24 1008    Syphils cascade: TP-Ab (FTA)       TP-Ab       TP-Ab (EIA)       TPPA       HBsAg  Negative  04/12/24 1008    Herpes Simplex Virus PCR       Herpes Simplex VIrus Culture       HIV  Non Reactive  04/12/24 1008    Hep C RNA Quant PCR       Hep C Antibody  Non Reactive  04/12/24 1008    AFP  35.7 ng/mL 06/18/24 1254    NIPT       Cystic Fibroisis        Group B Strep  Negative  10/21/24     GBS Susceptibility to Clindamycin       GBS Susceptibility to Erythromycin       Fetal Fibronectin       Genetic Testing, Maternal Blood                 Drug Screening       Test Value Date Time    Urine Drug Screen       Amphetamine Screen       Barbiturate Screen       Benzodiazepine Screen       Methadone Screen       Phencyclidine Screen       Opiates Screen       THC Screen       Cocaine Screen       Propoxyphene Screen       Buprenorphine Screen       Methamphetamine Screen       Oxycodone Screen       Tricyclic Antidepressants Screen                 Legend    ^: Historical                              Family History   Problem Relation Age of Onset    Hypertension Mother     Diabetes Mother     Migraines Mother     Hypertension Father     Hypertension Maternal Grandmother     Migraines Maternal Grandmother     Alzheimer's disease Maternal Grandfather     Diabetes Paternal Grandmother      Migraines Paternal Grandmother     Dementia Paternal Grandmother     Stroke Paternal Grandmother     Frontotemporal dementia Paternal Grandmother     Diabetes Paternal Grandfather        Social History     Tobacco Use    Smoking status: Former     Current packs/day: 0.00     Types: Cigarettes     Start date:      Quit date:      Years since quittin.8     Passive exposure: Past    Smokeless tobacco: Never   Vaping Use    Vaping status: Never Used   Substance Use Topics    Alcohol use: Not Currently    Drug use: No       Aspirin, Bactrim [sulfamethoxazole-trimethoprim], Cefdinir, Buspirone, Contrast dye (echo or unknown ct/mr), Ibuprofen, Iodinated contrast media, Naproxen, Toradol [ketorolac tromethamine], and Clindamycin/lincomycin      Current Facility-Administered Medications:     carboprost (HEMABATE) injection 250 mcg, 250 mcg, Intramuscular, PRN, Megan El MD    ceFAZolin 3000 mg IVPB in 100 mL NS (MBP), 3,000 mg, Intravenous, Once, Megan El MD    erythromycin (ROMYCIN) 5 MG/GM ophthalmic ointment  - ADS Override Pull, , , ,     incisional cocktail 6 - Ropivacaine 0.5% (50mL), Clonidine HCl 80mcg/0.8 mL,  Epinephrine 1:1000 (0.3mL), N/S 0.9% (50mL) solution 100 mL, 100 mL, Injection, Once, Radha Lai MD    lactated ringers infusion, 125 mL/hr, Intravenous, Continuous, Megan El MD    lidocaine (XYLOCAINE) 1 % injection 0.5 mL, 0.5 mL, Intradermal, Once PRN, Megan El MD    methylergonovine (METHERGINE) injection 200 mcg, 200 mcg, Intramuscular, Once PRN, Megan El MD    miSOPROStol (CYTOTEC) tablet 800 mcg, 800 mcg, Rectal, PRN, Megan El MD    oxytocin (PITOCIN) 30 units in 0.9% sodium chloride 500 mL (premix), 999 mL/hr, Intravenous, Once **FOLLOWED BY** oxytocin (PITOCIN) 30 units in 0.9% sodium chloride 500 mL (premix), 250 mL/hr, Intravenous, Continuous, Megan El MD    phytonadione (VITAMIN K) 1 MG/0.5ML injection  - ADS Override  Pull, , , ,     sodium chloride 0.9 % flush 10 mL, 10 mL, Intravenous, Q12H, Megan El MD    sodium chloride 0.9 % flush 10 mL, 10 mL, Intravenous, PRN, Megan El MD    sodium chloride 0.9 % infusion 40 mL, 40 mL, Intravenous, PRN, Megan El MD    Review of Systems  Review of Systems   All other systems reviewed and are negative.      Objective    Vital Signs       Physical Exam:  Physical Exam  Vitals and nursing note reviewed.   Constitutional:       General: She is not in acute distress.     Appearance: Normal appearance.   HENT:      Head: Normocephalic and atraumatic.   Eyes:      Extraocular Movements: Extraocular movements intact.   Cardiovascular:      Rate and Rhythm: Normal rate.   Pulmonary:      Effort: Pulmonary effort is normal. No respiratory distress.   Abdominal:      General: There is no distension.      Palpations: Abdomen is soft. There is no mass.      Tenderness: There is no abdominal tenderness.   Musculoskeletal:         General: Normal range of motion.      Cervical back: Normal range of motion.   Skin:     General: Skin is warm and dry.   Neurological:      General: No focal deficit present.      Mental Status: She is alert and oriented to person, place, and time.   Psychiatric:         Mood and Affect: Mood normal.         Behavior: Behavior normal.           Hospital problem list:    Previous  section    Migraine without aura and without status migrainosus, not intractable    Asthma, mild persistent    Sleep apnea    Adult ADHD    Anxiety in pregnancy, antepartum    Multigravida of advanced maternal age in third trimester    Previous  delivery affecting pregnancy    Gastroesophageal reflux disease without esophagitis    39 weeks gestation of pregnancy      Assessment & Plan    1. Intrauterine pregnancy at 39w2d presents for repeat   -Reviewed procedure with patient.  I discussed the risks including but not limited to bleeding, infection and  damage to internal organs.  Understanding of the procedure is voiced.       Dispo: admit for [facility]    Radha Lai MD  24  10:41 EST      Patient Care Team:  Praful Francisco MD as PCP - General (Sports Medicine)  Praful Francisco MD (Sports Medicine)            Electronically signed by Radha Lai MD at 24 1057       Facility-Administered Medications as of 2024   Medication Dose Route Frequency Provider Last Rate Last Admin    [COMPLETED] acetaminophen (TYLENOL) tablet 1,000 mg  1,000 mg Oral Once Megan El MD   1,000 mg at 24 1001    [COMPLETED] acetaminophen (TYLENOL) tablet 1,000 mg  1,000 mg Oral Q6H Radha Lai MD   1,000 mg at 24 1245    Followed by    acetaminophen (TYLENOL) tablet 650 mg  650 mg Oral Q6H Radha Lai MD   650 mg at 24 2053    [COMPLETED] ceFAZolin 3000 mg IVPB in 100 mL NS (MBP)  3,000 mg Intravenous Once Megan El MD   3,000 mg at 24 1030    Enoxaparin Sodium (LOVENOX) syringe 40 mg  40 mg Subcutaneous Q12H Radha Lai MD   40 mg at 24 1655    [] erythromycin (ROMYCIN) 5 MG/GM ophthalmic ointment  - ADS Override Pull             [] erythromycin (ROMYCIN) 5 MG/GM ophthalmic ointment  - ADS Override Pull             [COMPLETED] famotidine (PEPCID) injection 20 mg  20 mg Intravenous Once PRN Adalberto Nichols MD   20 mg at 24 1003    hydrOXYzine (ATARAX) tablet 50 mg  50 mg Oral Q6H PRN Radha Lai MD   50 mg at 24 2303    [COMPLETED] incisional cocktail 6 - Ropivacaine 0.5% (50mL), Clonidine HCl 80mcg/0.8 mL,  Epinephrine 1:1000 (0.3mL), N/S 0.9% (50mL) solution 100 mL  100 mL Injection Once Radha Lai MD   80 mL at 24 1133    [COMPLETED] lactated ringers bolus 1,000 mL  1,000 mL Intravenous Once Megan El MD 2,000 mL/hr at 24 1006 1,000 mL at 24 1006    lanolin topical 1 Application  1 Application Topical Q1H PRN  Radha Lai MD        [COMPLETED] ondansetron (ZOFRAN) injection 4 mg  4 mg Intravenous Once PRN Adalberto Nichols MD   4 mg at 24 1003    ondansetron ODT (ZOFRAN-ODT) disintegrating tablet 4 mg  4 mg Oral Q4H PRN Radha Lai MD        oxyCODONE (ROXICODONE) immediate release tablet 5 mg  5 mg Oral Q4H PRN Radha Lai MD   5 mg at 24 1541    Or    oxyCODONE (ROXICODONE) immediate release tablet 10 mg  10 mg Oral Q4H PRN Radha Lai MD   10 mg at 24    [COMPLETED] oxytocin (PITOCIN) 30 units in 0.9% sodium chloride 500 mL (premix)  999 mL/hr Intravenous Once Megan El  mL/hr at 24 1128 250 mL/hr at 24 1128    Followed by    [] oxytocin (PITOCIN) 30 units in 0.9% sodium chloride 500 mL (premix)  250 mL/hr Intravenous Continuous Megan El MD        [COMPLETED] oxytocin (PITOCIN) 30 units in 0.9% sodium chloride 500 mL (premix)  125 mL/hr Intravenous Once PRN Radha Lai  mL/hr at 24 1239 125 mL/hr at 24 1239    oxytocin (PITOCIN) 30 units in 0.9% sodium chloride 500 mL (premix)  125 mL/hr Intravenous Once PRN Radha Lai MD        [] phytonadione (VITAMIN K) 1 MG/0.5ML injection  - ADS Override Pull             [] phytonadione (VITAMIN K) 1 MG/0.5ML injection  - ADS Override Pull             sennosides-docusate (PERICOLACE) 8.6-50 MG per tablet 1 tablet  1 tablet Oral BID Radha Lai MD   1 tablet at 24    simethicone (MYLICON) chewable tablet 80 mg  80 mg Oral 4x Daily AC & at Bedtime Abdias Wallace MD   80 mg at 24    [COMPLETED] Sod Citrate-Citric Acid (BICITRA) oral solution 30 mL  30 mL Oral Once Adalberto Nichols MD   30 mL at 24 1001     Lab Results (last 72 hours)       Procedure Component Value Units Date/Time    CBC & Differential [[id number]]  (Abnormal) Collected: 24    Specimen: Blood Updated: 24     Narrative:      The following orders were created for panel order CBC & Differential.  Procedure                               Abnormality         Status                     ---------                               -----------         ------                     CBC Auto Differential[[id number]]        Abnormal            Final result                 Please view results for these tests on the individual orders.    CBC Auto Differential [[id number]]  (Abnormal) Collected: 11/12/24 0612    Specimen: Blood Updated: 11/12/24 0634     WBC 10.08 10*3/mm3      RBC 3.68 10*6/mm3      Hemoglobin 11.5 g/dL      Hematocrit 34.6 %      MCV 94.0 fL      MCH 31.3 pg      MCHC 33.2 g/dL      RDW 13.1 %      RDW-SD 44.7 fl      MPV 10.5 fL      Platelets 195 10*3/mm3      Neutrophil % 77.1 %      Lymphocyte % 13.3 %      Monocyte % 7.9 %      Eosinophil % 0.8 %      Basophil % 0.4 %      Immature Grans % 0.5 %      Neutrophils, Absolute 7.77 10*3/mm3      Lymphocytes, Absolute 1.34 10*3/mm3      Monocytes, Absolute 0.80 10*3/mm3      Eosinophils, Absolute 0.08 10*3/mm3      Basophils, Absolute 0.04 10*3/mm3      Immature Grans, Absolute 0.05 10*3/mm3      nRBC 0.0 /100 WBC     Treponema pallidum AB w/Reflex RPR [[id number]]  (Normal) Collected: 11/11/24 0949    Specimen: Blood Updated: 11/11/24 1302     Treponemal AB Total Non-Reactive    Narrative:      Reactive results will reflex RPR testing.    CBC (No Diff) [[id number]]  (Normal) Collected: 11/11/24 0949    Specimen: Blood Updated: 11/11/24 1019     WBC 8.50 10*3/mm3      RBC 3.87 10*6/mm3      Hemoglobin 12.2 g/dL      Hematocrit 36.3 %      MCV 93.8 fL      MCH 31.5 pg      MCHC 33.6 g/dL      RDW 13.4 %      RDW-SD 45.7 fl      MPV 10.5 fL      Platelets 229 10*3/mm3           Imaging Results (Last 72 Hours)       ** No results found for the last 72 hours. **          ECG/EMG Results (last 72 hours)       ** No results found for the last 72 hours. **          Orders (last 72 hrs)  "       Start     Ordered    11/12/24 1800  acetaminophen (TYLENOL) tablet 650 mg  Every 6 Hours        Placed in \"Followed by\" Linked Group    11/11/24 1514    11/12/24 1315  Enoxaparin Sodium (LOVENOX) syringe 40 mg  Every 12 Hours         11/11/24 1514    11/12/24 1230  simethicone (MYLICON) chewable tablet 80 mg  4 Times Daily Before Meals & Nightly         11/12/24 1134    11/12/24 1151  Duplex Venous Lower Extremity - Left CAR  Once         11/12/24 1151    11/12/24 0956  simethicone (MYLICON) chewable tablet 80 mg  4 Times Daily PRN,   Status:  Discontinued         11/12/24 0957    11/12/24 0600  Discontinue Indwelling Urinary Catheter in AM  Once         11/11/24 1514    11/12/24 0600  Wound Care  Per Order Details        Comments: Postop Day 1. Remove Dressing & Leave Incision Open to Air.    11/11/24 1514    11/12/24 0600  CBC & Differential  Timed         11/11/24 1514    11/12/24 0600  CBC Auto Differential  PROCEDURE ONCE         11/11/24 2202    11/12/24 0000  Discontinue IV  Once         11/11/24 1514    11/12/24 0000  Discontinue PCA  Once         11/11/24 1514    11/12/24 0000  Remove Abdominal Dressing  Once         11/11/24 1514    11/11/24 2100  sennosides-docusate (PERICOLACE) 8.6-50 MG per tablet 1 tablet  2 Times Daily         11/11/24 1514    11/11/24 1800  Ambulate Patient  2 Times Daily      Comments: After anesthesia wears off.    11/11/24 1514    11/11/24 1800  acetaminophen (TYLENOL) tablet 1,000 mg  Every 6 Hours        Placed in \"Followed by\" Linked Group    11/11/24 1514    11/11/24 1800  DIET MESSAGE Pt requests soy milk. Not lactose intolerant  Daily With Breakfast, Lunch & Dinner      Comments: Pt requests soy milk. Not lactose intolerant    11/11/24 1536    11/11/24 1600  I/O  Every 4 Hours       11/11/24 1514    11/11/24 1600  Incentive spirometry RT  Every Hour       11/11/24 1514    11/11/24 1534  Urinary Catheter Care  Every Shift,   Status:  Canceled       11/11/24 1533    " "11/11/24 1515  Code Status and Medical Interventions: CPR (Attempt to Resuscitate); Full Support  Continuous         11/11/24 1514    11/11/24 1515  Vital Signs Per Hospital Policy  Per Hospital Policy         11/11/24 1514    11/11/24 1515  Notify Physician  Until Discontinued         11/11/24 1514    11/11/24 1515  Patient May Shower  Per Order Details        Comments: After Anesthesia Wears Off & With Assistance    11/11/24 1514    11/11/24 1515  Diet: Regular/House; Fluid Consistency: Thin (IDDSI 0)  Diet Effective Now         11/11/24 1514    11/11/24 1515  Fundal and Lochia Check  Per Order Details        Comments: Every 30 Minutes x2, Every 1 Hour x4, Every 4 Hours x24 Hours, Then Every Shift.    11/11/24 1514    11/11/24 1515  Weigh Patient  Once         11/11/24 1514    11/11/24 1515  Straight Catheterize  Per Order Details        Comments: May Straight Cath One Time As Need For Inability to Void  If Necessary to Cath a Second Time, Insert Indwelling Urinary Catheter & Leave in If Residual is Greater Than 150 mL    11/11/24 1514    11/11/24 1515  Continue Indwelling Urinary Catheter Already in Place  Once         11/11/24 1514    11/11/24 1515  Notify Provider if Bladder Distention Continues  Until Discontinued         11/11/24 1514    11/11/24 1515  Turn Cough Deep Breathe  Once         11/11/24 1514    11/11/24 1515  Encourage early intake of PO fluids  Continuous         11/11/24 1514    11/11/24 1515  Ambulate Patient 3-5 times per day (with or without Kaminski)  Every Shift       11/11/24 1514    11/11/24 1515  Apply Abdominal Binding Until Discontinued  Until Discontinued         11/11/24 1514    11/11/24 1515  \"If patient tolerates food and liquids after completion of second bag of Pitocin, saline lock IV and discontinue IV fluid infusions.  Once         11/11/24 1514    11/11/24 1515  Breast pump to bed  Once         11/11/24 1514    11/11/24 1515  If indicated -- Please administer RH Immunoglobulin " "based on results of cord blood evaluation and fetal screen lab tests, pharmacy to dispense  Per Order Details        Comments: See Process Instructions For Reference Range Details.    11/11/24 1514    11/11/24 1515  Place Sequential Compression Device  Once         11/11/24 1514    11/11/24 1515  Maintain Sequential Compression Device  Continuous         11/11/24 1514    11/11/24 1514  oxytocin (PITOCIN) 30 units in 0.9% sodium chloride 500 mL (premix)  Once As Needed         11/11/24 1514    11/11/24 1514  ondansetron ODT (ZOFRAN-ODT) disintegrating tablet 4 mg  Every 4 Hours PRN         11/11/24 1514    11/11/24 1514  lanolin topical 1 Application  Every 1 Hour PRN         11/11/24 1514    11/11/24 1514  hydrOXYzine (ATARAX) tablet 50 mg  Every 6 Hours PRN         11/11/24 1514    11/11/24 1514  oxyCODONE (ROXICODONE) immediate release tablet 5 mg  Every 4 Hours PRN        Placed in \"Or\" Linked Group    11/11/24 1514    11/11/24 1514  oxyCODONE (ROXICODONE) immediate release tablet 10 mg  Every 4 Hours PRN        Placed in \"Or\" Linked Group    11/11/24 1514    11/11/24 1306  Transfer Patient  Once         11/11/24 1307    11/11/24 1235  oxytocin (PITOCIN) 30 units in 0.9% sodium chloride 500 mL (premix)  Once As Needed         11/11/24 1235    11/11/24 1210  IV Site Care  Continuous,   Status:  Canceled         11/11/24 1209    11/11/24 1209  naloxone (NARCAN) injection 0.2 mg  Every 15 Minutes PRN,   Status:  Discontinued         11/11/24 1209    11/11/24 1209  Transfer to postpartum when discharge criteria met.  Until Discontinued,   Status:  Canceled         11/11/24 1209    11/11/24 1209  Oxygen Therapy- Nasal Cannula; 2 LPM  Continuous,   Status:  Canceled         11/11/24 1208    11/11/24 1209  Continuous Pulse Oximetry  Continuous,   Status:  Canceled         11/11/24 1209    11/11/24 1209  Respirations  Continuous,   Status:  Canceled        Comments: If respiratory rate is less than 10/min, notify the " "Anesthesiologist    11/11/24 1208    11/11/24 1209  If respiratory is less than 8/min, see Narcan order below. Notify Anesthesiologist STAT.  Until Discontinued,   Status:  Canceled         11/11/24 1208    11/11/24 1209  Blood Pressure and Pulse Every 4 Hours  Continuous,   Status:  Canceled         11/11/24 1208    11/11/24 1209  Activity & Removal of Kaminski Catheter, Per Obstetrician  Continuous,   Status:  Canceled         11/11/24 1208    11/11/24 1209  Notify Anesthesiologist for Any Questions / Problems  Continuous,   Status:  Canceled         11/11/24 1208    11/11/24 1209  Notify Anesthesia for Temp Over 101.4F  Continuous,   Status:  Canceled         11/11/24 1208    11/11/24 1209  Ambu Bag at Bedside  Continuous,   Status:  Canceled         11/11/24 1208    11/11/24 1209  ondansetron (ZOFRAN) injection 4 mg  Once As Needed,   Status:  Discontinued         11/11/24 1209    11/11/24 1209  droperidol (INAPSINE) injection 0.625 mg  Every 20 Minutes PRN,   Status:  Discontinued        Placed in \"Or\" Linked Group    11/11/24 1209    11/11/24 1209  droperidol (INAPSINE) injection 0.625 mg  Every 20 Minutes PRN,   Status:  Discontinued        Placed in \"Or\" Linked Group    11/11/24 1209    11/11/24 1209  diphenhydrAMINE (BENADRYL) capsule 25 mg  Every 4 Hours PRN,   Status:  Discontinued        Placed in \"Or\" Linked Group    11/11/24 1209    11/11/24 1209  diphenhydrAMINE (BENADRYL) injection 25 mg  Every 4 Hours PRN,   Status:  Discontinued        Placed in \"Or\" Linked Group    11/11/24 1209    11/11/24 1209  diphenhydrAMINE (BENADRYL) injection 25 mg  Every 4 Hours PRN,   Status:  Discontinued        Placed in \"Or\" Linked Group    11/11/24 1209    11/11/24 1209  morphine injection 2 mg  Every 30 Minutes PRN,   Status:  Discontinued         11/11/24 1209    11/11/24 1209  Notify Provider (Specified)  Until Discontinued,   Status:  Canceled         11/11/24 1208    11/11/24 1209  Vital Signs Per Hospital Policy  " "Per Hospital Policy,   Status:  Canceled         11/11/24 1208    11/11/24 1209  Strict Bed Rest  Until Discontinued,   Status:  Canceled         11/11/24 1208    11/11/24 1209  Fundal & Lochia Check  Per Order Details,   Status:  Canceled        Comments: Every 15 Minutes x4, Then Every 30 Minutes x2, Then Every Shift    11/11/24 1208    11/11/24 1209  Diet: Regular/House; Fluid Consistency: Thin (IDDSI 0)  Diet Effective Now,   Status:  Canceled         11/11/24 1208    11/11/24 1208  HYDROmorphone (DILAUDID) injection 0.5 mg  Every 10 Minutes PRN,   Status:  Discontinued         11/11/24 1208    11/11/24 1208  Fundal & Lochia Check  Every Shift,   Status:  Canceled       11/11/24 1208    11/11/24 1200  Vital Signs q 4 while awake  Every 4 Hours,   Status:  Canceled      Comments: While the patient is awake.    11/11/24 0844    11/11/24 1130  oxytocin (PITOCIN) 30 units in 0.9% sodium chloride 500 mL (premix)  Continuous        Placed in \"Followed by\" Linked Group    11/11/24 1015    11/11/24 1116  Specimen To Pathology  Once,   Status:  Canceled         11/11/24 1115    11/11/24 1115  oxytocin (PITOCIN) 30 units in 0.9% sodium chloride 500 mL (premix)  Once        Placed in \"Followed by\" Linked Group    11/11/24 1015    11/11/24 1115  incisional cocktail 6 - Ropivacaine 0.5% (50mL), Clonidine HCl 80mcg/0.8 mL,  Epinephrine 1:1000 (0.3mL), N/S 0.9% (50mL) solution 100 mL  Once         11/11/24 1023    11/11/24 1104  erythromycin (ROMYCIN) 5 MG/GM ophthalmic ointment  - ADS Override Pull        Note to Pharmacy: Created by cabinet override    11/11/24 1104    11/11/24 1104  phytonadione (VITAMIN K) 1 MG/0.5ML injection  - ADS Override Pull        Note to Pharmacy: Created by cabinet override    11/11/24 1104    11/11/24 1041  erythromycin (ROMYCIN) 5 MG/GM ophthalmic ointment  - ADS Override Pull        Note to Pharmacy: Created by cabinet override    11/11/24 1041    11/11/24 1040  phytonadione (VITAMIN K) 1 " MG/0.5ML injection  - ADS Override Pull        Note to Pharmacy: Created by cabinet override    11/11/24 1040    11/11/24 1015  methylergonovine (METHERGINE) injection 200 mcg  Once As Needed,   Status:  Discontinued         11/11/24 1015    11/11/24 1015  carboprost (HEMABATE) injection 250 mcg  As Needed,   Status:  Discontinued         11/11/24 1015    11/11/24 1015  miSOPROStol (CYTOTEC) tablet 800 mcg  As Needed,   Status:  Discontinued         11/11/24 1015    11/11/24 0945  ceFAZolin 3000 mg IVPB in 100 mL NS (MBP)  Once         11/11/24 0846    11/11/24 0930  sodium chloride 0.9 % flush 10 mL  Every 12 Hours Scheduled,   Status:  Discontinued         11/11/24 0844    11/11/24 0930  lactated ringers bolus 1,000 mL  Once         11/11/24 0844    11/11/24 0930  lactated ringers infusion  Continuous,   Status:  Discontinued         11/11/24 0844    11/11/24 0930  acetaminophen (TYLENOL) tablet 1,000 mg  Once         11/11/24 0844    11/11/24 0930  ceFAZolin 2000 mg IVPB in 100 mL NS (MBP)  Once,   Status:  Discontinued         11/11/24 0844    11/11/24 0900  Sod Citrate-Citric Acid (BICITRA) oral solution 30 mL  Once         11/11/24 0808    11/11/24 0845  Admit To Obstetrics Inpatient  Once         11/11/24 0844    11/11/24 0845  Code Status and Medical Interventions: CPR (Attempt to Resuscitate); Full Support  Continuous,   Status:  Canceled         11/11/24 0844    11/11/24 0845  Obtain Informed Consent  Once,   Status:  Canceled         11/11/24 0844    11/11/24 0845  Vital Signs Per Hospital Policy  Per Hospital Policy,   Status:  Canceled         11/11/24 0844    11/11/24 0845  Continuous Fetal Monitoring With NST on Admission and Prior to Initiation of Oxytocin.  Per Order Details,   Status:  Canceled        Comments: Continuous Fetal Monitoring With NST on Admission    11/11/24 0844    11/11/24 0845  External Uterine Contraction Monitoring  Per Hospital Policy,   Status:  Canceled         11/11/24 0844  "   24  Notify Provider (Specified)  Until Discontinued,   Status:  Canceled         24  Notify Provider of Tachysystole (Per Hospital Algorithm)  Until Discontinued,   Status:  Canceled         24  Notify Provider if Membranes Ruptured, Bleeding Greater Than 1 Pad Per Hour, Fetal Heart Tone Abnormality or Severe Pain  Until Discontinued,   Status:  Canceled         24  Initiate Group Beta Strep (GBS) Prophylaxis Protocol, If Criteria Met  Continuous,   Status:  Canceled        Comments: NO TREATMENT RECOMMENDED IF: 1) Maternal GBS Status Known Negative 2) Scheduled  Birth With Intact Membranes, Not in Labor 3) Maternal GBS Status Unknown, No Risk Factors  TREAT WITH ANTIBIOTICS IF:  1) Maternal GBS Status Known Positive 2) Maternal GBS Status Unknown With Risk Factors: a)  Previous Infant Affected By GBS Infection b) GBS Urinary Tract Infection (UTI) or Bacteriuria During Pregnancy c) Unexplained Maternal Fever (100.4F (38C) or Greater) During Labor d)  Prolonged Rupture of Membranes (18 or More Hours) e) Gestational Age Less Than 37 Weeks    24  Insert Indwelling Urinary Catheter  Once,   Status:  Canceled        Placed in \"And\" Linked Group    24  Assess Need for Indwelling Urinary Catheter - Follow Removal Protocol  Continuous,   Status:  Canceled        Comments: Indwelling Urinary Catheter Removal Criteria  Discontinue Indwelling Urinary Catheter Unless One of the Following is Present:  Urinary Retention or Obstruction  Chronic Urinary Catheter Use  End of Life  Critical Illness with Strict I/O   Tract or Abdominal Surgery  Stage 3/4 Sacral / Perineal Wound  Required Activity Restriction: Trauma  Required Activity Restriction: Spine Surgery  If Patient is Being Followed by Urology Contact Them PRIOR to Removal  Do Not Remove Indwelling Urinary Catheter " "Order is Present with a CLINICAL REASON to Maintain the Catheter. Provider is Required to Include a Clinical Reason to Maintain a Urinary Catheter    Patient Admitted With Indwelling Urinary Catheter (Not Placed at [facility])  Assess for Continued Need & Document Medical Necessity  If Infection is Suspected, Contact the Provider       Placed in \"And\" Linked Group    11/11/24 0844 11/11/24 0845  Abdominal Prep With Clippers  Once,   Status:  Canceled         11/11/24 0844 11/11/24 0845  Chlorhexadine Skin Prep Unless Otherwise Indicated  Once,   Status:  Canceled         11/11/24 0844    11/11/24 0845  SCD (Sequential Compression Devices)  Once,   Status:  Canceled         11/11/24 0844 11/11/24 0845  POC Glucose Once  Once,   Status:  Canceled         11/11/24 0844 11/11/24 0845  Document Gatorade Consumption Prior to Admission (Yes or No)  Once,   Status:  Canceled         11/11/24 0844 11/11/24 0845  NPO Diet NPO Type: Ice Chips  Diet Effective Now,   Status:  Canceled         11/11/24 0844    11/11/24 0845  Inpatient Anesthesiology Consult  Once,   Status:  Canceled        Specialty:  Anesthesiology  Provider:  (Not yet assigned)    11/11/24 0844    11/11/24 0845  Type & Screen  Once         11/11/24 0844 11/11/24 0845  CBC (No Diff)  STAT         11/11/24 0844 11/11/24 0845  Treponema pallidum AB w/Reflex RPR  STAT         11/11/24 0844 11/11/24 0845  Insert Peripheral IV  Once,   Status:  Canceled         11/11/24 0844    11/11/24 0845  Saline Lock & Maintain IV Access  Continuous,   Status:  Canceled         11/11/24 0844    11/11/24 0844  Urinary Catheter Care  Every Shift,   Status:  Canceled      Placed in \"And\" Linked Group    11/11/24 0844 11/11/24 0844  sodium chloride 0.9 % flush 10 mL  As Needed,   Status:  Discontinued         11/11/24 0844 11/11/24 0844  sodium chloride 0.9 % infusion 40 mL  As Needed,   Status:  Discontinued         11/11/24 0844 11/11/24 0844 "  lidocaine (XYLOCAINE) 1 % injection 0.5 mL  Once As Needed,   Status:  Discontinued         24 0844    24 0809  Nurse may remove epidural catheter after delivery.  Until Discontinued,   Status:  Canceled         24 0808    24 0809  Notify physician for the following conditions:  Until Discontinued,   Status:  Canceled         24 0808    24 0808  ondansetron (ZOFRAN) injection 4 mg  Once As Needed         24 0808    24 0808  famotidine (PEPCID) injection 20 mg  Once As Needed         24 0808    Unscheduled  Blood Gas, Arterial -  As Needed       24 1208    Unscheduled  Up with Assistance  As Needed       24 1514    Unscheduled  Bladder Scan if Patient Unable to Void 4-6 Hours After Catheter Removal  As Needed         24 1514    Unscheduled  Straight Cath Every 4-6 Hours As Needed If Patient is Unable to Void After 4-6 Hours, Bladder Scan Volume is Greater Than 500mL & Patient Has Symptoms of Bladder Discomfort / Distention  As Needed       24 1514    Unscheduled  Schedule / Prompt Voiding For Patients With Urinary Incontinence  As Needed       24 1514    Unscheduled  Chewing Gum  As Needed       24 1514    Unscheduled  Apply witch hazel pads / TUCKS to perineum as needed for comfort PRN  As Needed       24 1514    Unscheduled  Warm compress  As Needed       24 1514    Unscheduled  Apply ace wrap, tight bra, or binder  As Needed       24 1514    Unscheduled  Apply ice packs  As Needed       24 1514    --  Riboflavin (Vitamin B-2) 25 MG tablet         24 0931                     Operative/Procedure Notes (last 72 hours)        Radha Lai MD at 24 1307          [facility]   Obstetrics and Gynecology     2024    Patient:Sonam Lang   MR#:[id number]     Section Procedure Note    Indications:  History of 2 prior , desires repeat      Pre-operative Diagnosis: Intrauterine pregnancy at 39w2d    Post-operative Diagnosis: same    Procedure:  Low transverse  section     Surgeon: Radha Lai MD     Assistants: Monika Chauhan MD    Anesthesia: Spinal anesthesia    Prenatal care problem list:  Patient Active Problem List   Diagnosis    Migraine without aura and without status migrainosus, not intractable    Allergy to NSAIDs    Asthma, mild persistent    Sleep apnea    Previous  section    Adult ADHD    Anxiety in pregnancy, antepartum    Multigravida of advanced maternal age in third trimester    Previous  delivery affecting pregnancy    Gastroesophageal reflux disease without esophagitis    39 weeks gestation of pregnancy     delivery delivered       Procedure Details   The patient was seen in the LDR preoperatively. The risks, benefits, complications, treatment options, and expected outcomes were discussed with the patient.  The patient concurred with the proposed plan, giving informed consent.  The site of surgery is discussed. The patient was taken to Operating Room # 2, identified as Sonam Lang and the procedure verified as  Delivery. A Time Out was held and the above information confirmed.    After induction of anesthesia, the patient was draped and prepped in the usual sterile manner. A Pfannenstiel incision was made and carried down through the subcutaneous tissue to the fascia. Fascial incision was made and extended transversely. The fascia was  from the underlying rectus tissue superiorly and inferiorly. The peritoneum was identified and entered. Peritoneal incision was extended longitudinally.  A low transverse uterine incision was made.  Delivered from vertex presentation was a female fetus 3340 g (7 lb 5.8 oz) with Apgar scores of 8 at one minute and 8 at five minutes. Umbilical cord was clamped and cut after a 30-second delay.  Cord blood was obtained for  evaluation. The placenta was removed intact and appeared normal. The uterine outline, tubes and ovaries appeared normal.  The uterine incision was closed with running locked sutures of 0 monocryl.  Excellent hemostasis was observed.  The posterior cul-de-sac was cleared of all blood.  The uterus was returned to the abdomen.  The paracolic gutters were cleared of all blood.  The uterus was reexamined and excellent hemostasis was confirmed.    The fascia was then reapproximated with running sutures of 0 Vicryl.  OB anesthetic cocktail was injected into subcutaneous and dermal layers.  The deep subcutaneous layer was reapproximated with 3-0 monocryl in a running fashion. The skin was reapproximated with 4-0 monocryl in a subcuticular fashion.     Instrument, sponge, and needle counts were correct prior to abdominal closure and at the conclusion of the case.     Surgical assistant was responsible for performing the following activities: Retraction, Suction, Irrigation, Closing, Placing Dressing and Delivery of Fetus and their skilled assistance was necessary for the success of this case.    Findings:  YOB: 2026  Time of birth: 11:14 AM  Live, viable female infant  Baby weight: 3340 g (7 lb 5.8 oz)            APGARS  One minute Five minutes   Skin color: 0   0     Heart rate: 2   2     Grimace: 2   2     Muscle tone: 2   2     Breathin   2     Totals: 8   8       Normal gravid uterus  Normal bilateral fallopian tubes and ovaries    Estimated Blood Loss:  300 mL    Calculated Blood Loss:  Quantitative Blood Loss (mL): 220 mL           Specimens:  Placenta            Complications:  None; patient tolerated the procedure well.           Disposition: PACU - hemodynamically stable.           Condition: stable    Radha Lai MD  2024   13:07 EST      Electronically signed by Radha Lai MD at 24 1984          Physician Progress Notes (last 72 hours)        Abdias Wallace MD at  24 1125          [facility]   PROGRESS NOTE    Post-Op Day 1 S/P     Subjective   CC: post  section    Patient reports:  Patient reports she is having itching on her abdomen.  She is worried that she had an allergic reaction to the prep.  Her  was about 24 hours ago.  She has a history of a severe clindamycin allergy and she is also allergic to NSAIDs.  Has 10 allergies overall.  She is tolerating a regular diet.  She has been up ambulating.  She has not passed gas yet and her dressing is still on.     Review of systems- no shortness of breath, nausea or vomiting or leg pain.    Objective      Vitals: Vital Signs Range for the last 24 hours  Temperature: Temp:  [97.3 °F (36.3 °C)-98.6 °F (37 °C)] 98.3 °F (36.8 °C)       BP: BP: ()/(44-72) 100/64   Pulse: Heart Rate:  [68-94] 72   Respirations: Resp:  [16-18] 16                            Physical exam   General-  no acute distress, conversant    Lungs- respirations unlabored   CV- trace LE edema   Abdomen-soft and nondistended.  No rashes are seen.  There is ecchymosis with bruising above the incision dressing.   Incision -dressing is intact with dried blood   Lower extremities-lower extremity edema trace.  Patient reports some pain when I squeeze her lower left leg.    Results from last 7 days   Lab Units 24  0612   WBC 10*3/mm3 10.08   HEMOGLOBIN g/dL 11.5*   HEMATOCRIT % 34.6   PLATELETS 10*3/mm3 195         Assessment & Plan        Previous  section    Migraine without aura and without status migrainosus, not intractable    Asthma, mild persistent    Sleep apnea    Adult ADHD    Anxiety in pregnancy, antepartum    Multigravida of advanced maternal age in third trimester    Previous  delivery affecting pregnancy    Gastroesophageal reflux disease without esophagitis    39 weeks gestation of pregnancy     delivery delivered      Assessment:    Sonam Lang is Day 1  post-op from       Patient is concerned that she has had a reaction to the chlorhexidine prep.  I do not see a rash.  Likely due to Duramorph.  Encouraged antihistamines.  NSAID allergy-add Mylicon and the patient will try chewing gum in addition to ambulating to help pass gas.  Patient did not complain of any leg pain but did have some discomfort in her lower left leg with exam.  I will order a left Doppler.  Continue Lovenox for DVT prophylaxis.     Plan:  continue post op care, pain medication prn and encouraged ambulation.        Abdias Wallace MD  2024  11:25 EST       Electronically signed by Abdias Wallace MD at 24 1150       Consult Notes (last 72 hours)  Notes from 24 through 24   No notes of this type exist for this encounter.

## 2024-11-13 NOTE — LACTATION NOTE
This note was copied from a baby's chart.  Mom has concern that not able to express as much colostrum now.  Educated on milk production and that has colostrum first few days and to expect milk supply day 3-5.  Educated on frequent breast stimulation to establish milk supply.  Has been using hands free personal pump and has colostrum in collection bottles.  Reports that it is from several pumping sessions combined.  Educated on feeding baby all EBM after each use and to clean pump each time.  Went over milk storage and syringe feeding.  Mom is giving formula supplement with syringe as well and educated on giving baby all EBM first, then formula.  Encouraged to call for assist if needed.  LC number on whiteboard.

## 2024-11-13 NOTE — PLAN OF CARE
Goal Outcome Evaluation:           Progress: improving  Outcome Evaluation: VSS, breastfeeding/pumping/supplementing, pain well controlled, ambulating well

## 2024-11-14 VITALS
SYSTOLIC BLOOD PRESSURE: 119 MMHG | TEMPERATURE: 97.7 F | OXYGEN SATURATION: 97 % | RESPIRATION RATE: 16 BRPM | HEART RATE: 79 BPM | BODY MASS INDEX: 41.02 KG/M2 | DIASTOLIC BLOOD PRESSURE: 77 MMHG | HEIGHT: 68 IN

## 2024-11-14 PROCEDURE — 25010000002 ENOXAPARIN PER 10 MG: Performed by: STUDENT IN AN ORGANIZED HEALTH CARE EDUCATION/TRAINING PROGRAM

## 2024-11-14 PROCEDURE — 99238 HOSP IP/OBS DSCHRG MGMT 30/<: CPT | Performed by: OBSTETRICS & GYNECOLOGY

## 2024-11-14 RX ORDER — HYDROCODONE BITARTRATE AND ACETAMINOPHEN 5; 325 MG/1; MG/1
1 TABLET ORAL EVERY 6 HOURS PRN
Qty: 20 TABLET | Refills: 0 | Status: SHIPPED | OUTPATIENT
Start: 2024-11-14

## 2024-11-14 RX ORDER — VALACYCLOVIR HYDROCHLORIDE 1 G/1
1000 TABLET, FILM COATED ORAL 2 TIMES DAILY
Qty: 14 TABLET | Refills: 1 | Status: SHIPPED | OUTPATIENT
Start: 2024-11-14

## 2024-11-14 RX ADMIN — OXYCODONE HYDROCHLORIDE 10 MG: 10 TABLET ORAL at 10:10

## 2024-11-14 RX ADMIN — ENOXAPARIN SODIUM 40 MG: 100 INJECTION SUBCUTANEOUS at 06:18

## 2024-11-14 RX ADMIN — SIMETHICONE 80 MG: 80 TABLET, CHEWABLE ORAL at 08:41

## 2024-11-14 RX ADMIN — ACETAMINOPHEN 325MG 650 MG: 325 TABLET ORAL at 06:14

## 2024-11-14 RX ADMIN — ACETAMINOPHEN 325MG 650 MG: 325 TABLET ORAL at 12:46

## 2024-11-14 RX ADMIN — SIMETHICONE 80 MG: 80 TABLET, CHEWABLE ORAL at 10:10

## 2024-11-14 RX ADMIN — OXYCODONE HYDROCHLORIDE 10 MG: 10 TABLET ORAL at 01:27

## 2024-11-14 RX ADMIN — SENNOSIDES AND DOCUSATE SODIUM 1 TABLET: 50; 8.6 TABLET ORAL at 08:41

## 2024-11-14 RX ADMIN — OXYCODONE HYDROCHLORIDE 10 MG: 10 TABLET ORAL at 06:15

## 2024-11-14 NOTE — LACTATION NOTE
Pt reports baby is latching some and pt is pumping and bottle feeding her colostrum. Pt reports she is pumping about 12 cc's. Educated on baby's expected output and weight gain. Pt plans to cont to pump every 2-3 hours. She denies questions and has [facility] info.      Lactation Consult Note    Evaluation Completed: 2024 08:41 EST  Patient Name: Sonam Lang  :  1985  MRN:  [id number]     REFERRAL  INFORMATION:                          Date of Referral: 24   Person Making Referral: patient  Maternal Reason for Referral: latch difficulty, milk supply concern       DELIVERY HISTORY:        Skin to skin initiation date/time: 2024    Skin to skin end date/time:           MATERNAL ASSESSMENT:                               INFANT ASSESSMENT:  Information for the patient's :  Viola Lang [[id number]]   No past medical history on file.                                                                                                  MATERNAL INFANT FEEDING:                                                                       EQUIPMENT TYPE:                                 BREAST PUMPING:          LACTATION REFERRALS:

## 2024-11-14 NOTE — PLAN OF CARE
Goal Outcome Evaluation:           Progress: improving  Outcome Evaluation: VSS, pain well controlled, ambulating well, breastfeeding/pumping/supplementing well, d/c today

## 2024-11-14 NOTE — PLAN OF CARE
Goal Outcome Evaluation:  Plan of Care Reviewed With: patient        Progress: improving  Outcome Evaluation: 3PO. VSS. Voiding and ambulating well. incision and bleeding wnl. pain managed with tylenol and cindy

## 2024-11-14 NOTE — PROGRESS NOTES
[facility]   Obstetrics and Gynecology     2024    Name:Sonam Lang    MR#:[id number]     Progress Note:  Post-Op    HD:3    Subjective   39 y.o. yo Female  s/p CS at 39w2d doing well. Pain well controlled. Tolerating regular diet and having flatus. Lochia normal.     Patient feels fine and is ready for discharge. She has some hand and lower extremity swelling.  We discussed.     She report oral HSV sore and requests meds a discharge.     Patient Active Problem List   Diagnosis    Migraine without aura and without status migrainosus, not intractable    Allergy to NSAIDs    Asthma, mild persistent    Sleep apnea    Previous  section    Adult ADHD    Anxiety in pregnancy, antepartum    Multigravida of advanced maternal age in third trimester    Previous  delivery affecting pregnancy    Gastroesophageal reflux disease without esophagitis    39 weeks gestation of pregnancy     delivery delivered        Objective    Vitals  Temp:  Temp:  [97.4 °F (36.3 °C)-98.2 °F (36.8 °C)] 97.7 °F (36.5 °C)  Temp src: Oral  BP:  BP: (119-127)/(72-90) 119/77  Pulse:  Heart Rate:  [71-85] 79  RR:   Resp:  [16-18] 16  Weight:    BMI:  Body mass index is 41.02 kg/m².    Physical Exam  Vitals and nursing note reviewed.   Constitutional:       General: She is not in acute distress.     Appearance: Normal appearance. She is well-developed. She is not ill-appearing.   HENT:      Head: Normocephalic.   Pulmonary:      Effort: Pulmonary effort is normal.   Abdominal:      General: Abdomen is flat. There is no distension.      Palpations: Abdomen is soft. There is no mass.      Tenderness: There is no abdominal tenderness.   Genitourinary:     Vagina: Normal.      Comments: Lochia is scant  Fundus is firm    Incision:  dry/clean/intact  Musculoskeletal:         General: No swelling or tenderness.   Neurological:      Mental Status: She is alert and oriented to person,  place, and time.   Psychiatric:         Behavior: Behavior normal.         Thought Content: Thought content normal.         Judgment: Judgment normal.         No intake/output data recorded.    LABS:  Results from last 7 days   Lab Units 24  0612 24  0949   WBC 10*3/mm3 10.08 8.50   HEMOGLOBIN g/dL 11.5* 12.2   HEMATOCRIT % 34.6 36.3   PLATELETS 10*3/mm3 195 229           Infant: female      Assessment    1.  POD#3     Migraine without aura and without status migrainosus, not intractable    Multigravida of advanced maternal age in third trimester    Previous  delivery affecting pregnancy    39 weeks gestation of pregnancy     delivery delivered      Plan:  Discharge today     Neri Dean MD  2024 11:02 EST

## 2024-11-15 ENCOUNTER — TELEPHONE (OUTPATIENT)
Dept: OBSTETRICS AND GYNECOLOGY | Age: 39
End: 2024-11-15
Payer: MEDICAID

## 2024-11-15 RX ORDER — DOCUSATE SODIUM 100 MG/1
100 CAPSULE, LIQUID FILLED ORAL 2 TIMES DAILY PRN
Qty: 60 CAPSULE | Refills: 1 | Status: SHIPPED | OUTPATIENT
Start: 2024-11-15

## 2024-11-15 NOTE — DISCHARGE SUMMARY
[facility]   Obstetrics and Gynecology    Section Discharge Summary    Date of Admission: 2024  Date of Discharge:  2024      Patient: Sonam Lang      MR#:[id number]    Surgeon: Radha Lai MD     Primary OB:  Megan El MD     Discharge Diagnosis:    section at 39w2d, uncomplicated recovery     delivery delivered    Migraine without aura and without status migrainosus, not intractable    Multigravida of advanced maternal age in third trimester    Previous  delivery affecting pregnancy    39 weeks gestation of pregnancy    Procedures:  , Low Transverse    2024   11:14 AM       Anesthesia:  Spinal    Hospital Course  Patient is a 39 y.o. female  at 39w2d status post  section with uneventful postoperative recovery.  Patient was advanced to regular diet on postoperative day#1.  On discharge, ambulating, tolerating a regular diet without any difficulties and her incision is dry, clean and intact.     Infant:   female fetus 3340 g (7 lb 5.8 oz) with Apgar scores of 8 , 8  at five minutes.    Condition on Discharge:  Stable    Vital Signs  Temp:  [97.7 °F (36.5 °C)] 97.7 °F (36.5 °C)  Heart Rate:  [79] 79  Resp:  [16] 16  BP: (119)/(77) 119/77    Results from last 7 days   Lab Units 24  0612 24  0949   WBC 10*3/mm3 10.08 8.50   HEMOGLOBIN g/dL 11.5* 12.2   HEMATOCRIT % 34.6 36.3   PLATELETS 10*3/mm3 195 229             Discharge Disposition  Home or Self Care    Discharge Medications     Your medication list        START taking these medications        Instructions Last Dose Given Next Dose Due   HYDROcodone-acetaminophen 5-325 MG per tablet  Commonly known as: Norco      Take 1 tablet by mouth Every 6 (Six) Hours As Needed for Moderate Pain.       valACYclovir 1000 MG tablet  Commonly known as: Valtrex      Take 1 tablet by mouth 2 (Two) Times a Day.              CONTINUE taking these medications         Instructions Last Dose Given Next Dose Due   acetaminophen 325 MG tablet  Commonly known as: TYLENOL      Take 2 tablets by mouth Every 6 (Six) Hours.       albuterol sulfate  (90 Base) MCG/ACT inhaler  Commonly known as: PROVENTIL HFA;VENTOLIN HFA;PROAIR HFA      Inhale 2 puffs Every 4 (Four) Hours As Needed for Wheezing or Shortness of Air.       budesonide 1 MG/2ML nebulizer solution  Commonly known as: PULMICORT      Take 2 mL by nebulization.       budesonide 32 MCG/ACT nasal spray  Commonly known as: RINOCORT AQUA      Administer 1 spray into the nostril(s) as directed by provider Daily.       budesonide-formoterol 80-4.5 MCG/ACT inhaler  Commonly known as: SYMBICORT      Inhale 2 puffs 2 (Two) Times a Day.       cetirizine 5 MG tablet  Commonly known as: zyrTEC      Take 1 tablet by mouth 3 (Three) Times a Day. PATIENT TAKING ONCE DAILY       diphenhydrAMINE 25 MG tablet  Commonly known as: Benadryl Allergy      Take 1 tablet by mouth Every 6 (Six) Hours As Needed for Itching.       EQUATE STOOL SOFTENER 100 MG capsule  Generic drug: docusate sodium      TAKE 1 CAPSULE BY MOUTH TWICE DAILY AS NEEDED FOR CONSTIPATION       MAGnesium-Oxide 400 (240 Mg) MG tablet  Generic drug: Magnesium Oxide -Mg Supplement      Take 1 tablet by mouth Daily.       montelukast 10 MG tablet  Commonly known as: SINGULAIR      Take 1 tablet by mouth once daily       mupirocin 2 % ointment  Commonly known as: BACTROBAN           pantoprazole 40 MG EC tablet  Commonly known as: PROTONIX      Take 1 tablet by mouth once daily       prenatal vitamin 27-0.8 27-0.8 MG tablet tablet      Take  by mouth Daily.       sertraline 50 MG tablet  Commonly known as: ZOLOFT      Take 2 tablets by mouth once daily       SV Iron 325 (65 Fe) MG tablet  Generic drug: ferrous sulfate      Take 1 tablet by mouth once daily with breakfast       Vitamin B-2 25 MG tablet      Take  by mouth.       vitamin B-6 50 MG tablet  Commonly known as:  PYRIDOXINE      Take 0.5 tablets by mouth 3 (Three) Times a Day.                 Where to Get Your Medications        These medications were sent to [facility] [id number] [place] [id number]      Hours: Monday to Friday 7 AM to 6 PM, Saturday & Sunday 8 AM to 4:30 PM (Closed 12 PM to 12:30 PM) Phone: [phone]   HYDROcodone-acetaminophen 5-325 MG per tablet  valACYclovir 1000 MG tablet           Discharge Diet: Regular    Follow-up Appointments  Future Appointments   Date Time Provider Department Center   11/25/2024 10:15 AM Megan El MD [facility]   12/5/2024  2:45 PM Guerita Fleming, [facility]     Additional Instructions for the Follow-ups that You Need to Schedule       Call MD for problems / concerns.   As directed      Call for any problems with  1.  Heavy vaginal bleeding (greater than 2 pads an hours for 2 hours)  2.  Fever above 101.3  3.  Incisional redness  4.  Signs of Preeclampsia:  headache, visual changes or elevated blood pressure    Order Comments: Call for any problems with 1.  Heavy vaginal bleeding (greater than 2 pads an hours for 2 hours) 2.  Fever above 101.3 3.  Incisional redness 4.  Signs of Preeclampsia:  headache, visual changes or elevated blood pressure                 Prenatal labs/vax:   Immunization History   Administered Date(s) Administered    ABRYSVO (RSV, 60+ or pregnant women 32-36 wks) 10/01/2024    COVID-19 (MODERNA) 1st,2nd,3rd Dose Monovalent 01/19/2021, 02/23/2021    Flu Vaccine Quad PF >36MO 10/18/2019    Fluzone  >6mos 09/17/2024    Fluzone (or Fluarix & Flulaval for VFC) >6mos 10/18/2019, 10/04/2023    Hpv9 11/28/2023, 01/25/2024    Influenza Injectable Mdck Pf Quad 11/16/2021    Tdap 08/29/2019, 08/03/2023, 08/15/2024       External Prenatal Results       Pregnancy Outside Results - Transcribed From Office Records - See Scanned Records For Details       Test Value Date Time    ABO  O  11/11/24 0949    Rh   Positive  11/11/24 0949    Antibody Screen  Negative  11/11/24 0949       Negative  04/12/24 1008    Varicella IgG       Rubella  3.56 index 04/12/24 1008    Hgb  11.5 g/dL 11/12/24 0612       12.2 g/dL 11/11/24 0949       12.0 g/dL 08/15/24 1122       14.1 g/dL 04/12/24 1008       13.2 g/dL 04/02/24 0144       13.4 g/dL 03/25/24 0947    Hct  34.6 % 11/12/24 0612       36.3 % 11/11/24 0949       35.0 % 08/15/24 1122       41.7 % 04/12/24 1008       40.4 % 04/02/24 0144       41.0 % 03/25/24 0947    HgB A1c   5.3 % 04/12/24 1008    1h GTT  116 mg/dL 08/15/24 1122    3h GTT Fasting       3h GTT 1 hour       3h GTT 2 hour       3h GTT 3 hour        Gonorrhea (discrete)  Negative  04/12/24 0946    Chlamydia (discrete)  Negative  04/12/24 0946    RPR  Non Reactive  08/15/24 1122       Non Reactive  04/12/24 1008    Syphils cascade: TP-Ab (FTA)  Non-Reactive  11/11/24 0949    TP-Ab  Non-Reactive  11/11/24 0949    TP-Ab (EIA)       TPPA       HBsAg  Negative  04/12/24 1008    Herpes Simplex Virus PCR       Herpes Simplex VIrus Culture       HIV  Non Reactive  04/12/24 1008    Hep C RNA Quant PCR       Hep C Antibody  Non Reactive  04/12/24 1008    AFP  35.7 ng/mL 06/18/24 1254    NIPT       Cystic Fibrosis (Wilfredo)       Cystic Fibroisis        Spinal Muscular atrophy       Fragile X       Group B Strep  Negative  10/21/24     GBS Susceptibility to Clindamycin       GBS Susceptibility to Erythromycin       Fetal Fibronectin       Genetic Testing, Maternal Blood                 Drug Screening       Test Value Date Time    Urine Drug Screen       Amphetamine Screen       Barbiturate Screen       Benzodiazepine Screen       Methadone Screen       Phencyclidine Screen       Opiates Screen       THC Screen       Cocaine Screen       Propoxyphene Screen       Buprenorphine Screen       Methamphetamine Screen       Oxycodone Screen       Tricyclic Antidepressants Screen                 Legend    ^: Historical                           Neri Dean MD  11/15/2024  07:03 EST

## 2024-11-15 NOTE — TELEPHONE ENCOUNTER
Pt of Dr El    Pt said she was discharged yesterday after having her baby and said that she wasn't sure what medication she should still be taking.  She was wanting to know if she should continue her iron? Protonix?

## 2024-11-15 NOTE — TELEPHONE ENCOUNTER
Pt notified she was wanting to know if we can call in medication for the stool softner and gas pain that she was taking in the hospital.

## 2024-11-18 ENCOUNTER — HOSPITAL ENCOUNTER (OUTPATIENT)
Dept: LACTATION | Facility: HOSPITAL | Age: 39
Discharge: HOME | End: 2024-11-18

## 2024-11-18 NOTE — LACTATION NOTE
Lactation Consult Note  Mom is here today to get help with deeper latch. Baby girl Allison [place] was born on 11/[birth year] and weighed 7lb.6oz. She was last at the Pediatrician office on 11/15 and weighed 7lb.1oz, but mom is not sure about the weight, because her  took baby there.Current weight is 6lb.15.5 oz. There may be a difference between the scales in both offices.   Mom has been feeding baby on demand. Said it's looks like baby is cluster feeding all the time- especially through the night. Her milk came in 2 days ago, but last night baby BF every 30 min. from 1AM to 5AM. [place] is also pumping few times in between, or after BF and is getting 20 to 50ml. Last night for a first time she got 2oz.    Breastfeeding: Mom started BF baby on the left breast in a football hold and infant started BF immediately, but the latch is shallow. PT said is pinching. Guided her verbally to get baby to latch deeply.PT is very independent . Educated mom on starting nipple to nose to achieve deep latch and baby was able to do it after few attempts. Also demonstrated to mother chin tug while baby is on the breast and she said that latch feels better.  Allison is latching well, has a good jaw rotation, but PT's breasts are heavy and when she is not sandwiching(holding) to support it, the nipple comes off baby's mouth and infant has to be re latched. After feeding for 15min. (10 actively latching) she released the breast. Infant got 0.4 oz. Then was burped and transferred to the right breast, where she latched for 12 min.(more consistently)and got 0.8oz.. During feeding on the second breast she got some good audible sounds, but towards the end fell asleep. While weighting her -baby woke up and mom put her back to the left breast, where Allison got .3 oz.This makes it total of 1.5 oz. For her current weight baby needs to get around 1.5-1.7oz if she feeds every 2-2.5h hours. To help baby's starts gaining well a plan was made:    PLAN: Mom will  BF baby every 2-2.5h and will try to keep her awake for good 15 min. on each breast. Different rousing techniques demonstrated. Then she will pump x3 a day and will supplement baby with 15 to 30 ml. each time. This way Allison will be full and will give mom longer stretches between feeding. Will continue  practicing deep latch. Mom will f/u in 1 week. Appointment made for her.     Birth weight: 7lb.6oz.  Current weight:6lb.15.5oz.  Amount transferred: 1.5oz. in 30 min.         Evaluation Completed: 2024 12:10 EST  Patient Name: Sonam Lang  :  1985  MRN:  [id number]     REFERRAL  INFORMATION:                          Date of Referral: 24   Person Making Referral: patient  Maternal Reason for Referral: breastfeeding currently  Infant Reason for Referral: other (see comments) (help with deeper latch)      MATERNAL ASSESSMENT:  Breast Size Issue: none (24 1000)  Breast Shape: Bilateral:, pendulous, round (24 1000)  Breast Density: Bilateral:, full (24 1000)  Areola: Bilateral:, dense (24 1000)  Nipples: Bilateral:, short, graspable (24 1000)     Left Nipple Symptoms: tender (24)          MATERNAL INFANT FEEDING:     Maternal Emotional State: independent (24 1000)  Infant Positioning: clutch/football (24 1000)   Signs of Milk Transfer: audible swallow (24 1000)  Pain with Feeding: yes (24)  Pain Location: nipples, bilateral (24 1000)  Pain Description: squeezing (24 1000)  Comfort Measures Before/During Feeding: infant position adjusted, latch adjusted, maternal position adjusted (24 1000)  Milk Ejection Reflex: present (24)     Nipple Shape After Feeding, Left Breast: pinched (24 1000)  Nipple Shape After Feeding, Right: pinched (24 1000)  Latch Assistance: minimal assistance (verbal- mom is very independent, doesn't allow hands on help) (24 1000)                           Feeding  Readiness Cues: eager, crying, rooting (24)  Satiety Cues: decreased number of sucks (24)     Effective Latch During Feeding: other (see comments) (on and off on the left and good on the right breast) (24)  Suck/Swallow/Breathing Coordination: present (24)  Skin-to-Skin Contact, Duration: 40 (24)  Prefeeding Weight (gm): 3162 g (111.5 oz) (24)  Postfeeding Weight (gm): 3205 g (113.1 oz) (24)  Weight Gain/Loss (gm) : 43 g (1.5 oz) (24)      Latch: 2-->grasps breast, tongue down, lips flanged, rhythmic sucking (24)  Audible Swallowin-->spontaneous and intermittent (24 hrs old) (24)  Type of Nipple: 2-->everted (after stimulation) (24)  Comfort (Breast/Nipple): 2-->soft/nontender (24)  Hold (Positioning): 1-->minimal assist, teach one side, mother does other, staff holds (just verbal) (24)  Latch Score: 9 (24)      EQUIPMENT TYPE:                                 BREAST PUMPING:          LACTATION REFERRALS:

## 2024-11-25 ENCOUNTER — POSTPARTUM VISIT (OUTPATIENT)
Dept: OBSTETRICS AND GYNECOLOGY | Age: 39
End: 2024-11-25
Payer: MEDICAID

## 2024-11-25 VITALS
DIASTOLIC BLOOD PRESSURE: 72 MMHG | BODY MASS INDEX: 37.59 KG/M2 | HEIGHT: 68 IN | WEIGHT: 248 LBS | SYSTOLIC BLOOD PRESSURE: 114 MMHG

## 2024-11-25 DIAGNOSIS — M54.42 BILATERAL LOW BACK PAIN WITH LEFT-SIDED SCIATICA, UNSPECIFIED CHRONICITY: ICD-10-CM

## 2024-11-25 DIAGNOSIS — M62.89 PELVIC FLOOR DYSFUNCTION: ICD-10-CM

## 2024-11-25 NOTE — PROGRESS NOTES
GYN Visit    2024    Patient: Sonam Lang          MR#:[id number]      Chief Complaint   Patient presents with    Postpartum Care     Post Partum - CS delivery on 24, baby girl 7lb 5.8oz (Lisa Deluna), pt is breast feeding, pt c/o sciatic nerve pain as well as mid back pain, Pt would like breast check today due to occasional stinging pains       History of Present Illness    39 y.o. female  who presents for postpartum visit    Patient is 2 weeks post   Baby is doing well  She is nursing  She is having some pain in her breast and asked to do a breast exam today  Clinical exam was negative for any discrete masses  She does have back pain and she still has some sciatic pain  We will continue physical therapy  She also would like to do pelvic floor therapy when she is off restrictions  She has no problems with baby blues        Patient's last menstrual period was 02/10/2024 (exact date).    ________________________________________  Patient Active Problem List   Diagnosis    Migraine without aura and without status migrainosus, not intractable    Allergy to NSAIDs    Asthma, mild persistent    Sleep apnea    Previous  section    Adult ADHD    Anxiety in pregnancy, antepartum    Multigravida of advanced maternal age in third trimester    Previous  delivery affecting pregnancy    Gastroesophageal reflux disease without esophagitis    39 weeks gestation of pregnancy     delivery delivered       Past Medical History:   Diagnosis Date    ADHD (attention deficit hyperactivity disorder)     Anxiety     Asthma     Congenital abnormalities 2022    Multiple fetal anomalies present including meningocele, unilateral real agenesis, unilateral club foot, membranous VSD     Depression     GERD (gastroesophageal reflux disease)     History of depression 2019    Irritable bowel syndrome     Migraine     Obesity (BMI 30.0-34.9) 2019    Pregnancy complicated  "by multiple fetal congenital anomalies, single gestation 2022    NTD, SUA    PTSD (post-traumatic stress disorder)     Sleep apnea 2016    CPAP       Past Surgical History:   Procedure Laterality Date     SECTION  2019     SECTION N/A 10/14/2023    Procedure:  SECTION REPEAT;  Surgeon: Megan El MD;  [facility];  Service: Obstetrics/Gynecology;  Laterality: N/A;     SECTION N/A 2024    Procedure:  SECTION REPEAT;  Surgeon: Radha Lai MD;  Location:  [facility];  Service: Obstetrics/Gynecology;  Laterality: N/A;    COLONOSCOPY      with endoscopy    SINUS SURGERY  11/22/2022    x2, 2018 (first)    WISDOM TOOTH EXTRACTION         Social History     Tobacco Use   Smoking Status Former    Current packs/day: 0.00    Types: Cigarettes    Start date:     Quit date:     Years since quittin.9    Passive exposure: Past   Smokeless Tobacco Never       has a current medication list which includes the following prescription(s): acetaminophen, albuterol sulfate hfa, budesonide, budesonide, budesonide-formoterol, cetirizine, diphenhydramine, docusate sodium, equate stool softener, hydrocodone-acetaminophen, magnesium-oxide, montelukast, pantoprazole, prenatal vitamin 27-0.8, vitamin b-2, sertraline, sv iron, valacyclovir, vitamin b-6, and mupirocin.  ________________________________________    Current contraception: abstinence      The following portions of the patient's history were reviewed and updated as appropriate: allergies, current medications, past family history, past medical history, past social history, past surgical history, and problem list.    Review of Systems    Pertinent items are noted in HPI.     Objective   Physical Exam    /72 (BP Location: Left arm, Patient Position: Sitting)   Ht 172.7 cm (68\")   Wt 112 kg (248 lb)   LMP 02/10/2024 (Exact Date)   Breastfeeding Yes   BMI 37.71 " "kg/m²    BP Readings from Last 3 Encounters:   24 114/72   24 119/77   24 114/64      Wt Readings from Last 3 Encounters:   24 112 kg (248 lb)   24 122 kg (269 lb 12.8 oz)   10/29/24 121 kg (267 lb 12.8 oz)      BMI: Estimated body mass index is 37.71 kg/m² as calculated from the following:    Height as of this encounter: 172.7 cm (68\").    Weight as of this encounter: 112 kg (248 lb).    Lungs: non labored breathing, no wheezing or tachpnea  Extremities: extremities normal, atraumatic, no cyanosis or edema  Skin: Skin color, texture, turgor normal. No rashes or lesions  Neurologic: Grossly normal  General:   alert, appears stated age, and cooperative   Abdomen: soft, non-tender, without masses or organomegaly    Incision is well-healed                         Assessment:      Diagnoses and all orders for this visit:    1. Postpartum care following  delivery (Primary)    2. Bilateral low back pain with left-sided sciatica, unspecified chronicity  -     Ambulatory Referral to Physical Therapy for Evaluation & Treatment    3. Pelvic floor dysfunction  -     Ambulatory Referral to Physical Therapy for Evaluation & Treatment      Follow-up 4 weeks for postpartum check        "

## 2024-11-26 ENCOUNTER — HOSPITAL ENCOUNTER (OUTPATIENT)
Dept: LACTATION | Facility: HOSPITAL | Age: 39
Discharge: HOME | End: 2024-11-26
Payer: MEDICAID

## 2024-11-26 NOTE — LACTATION NOTE
Lactation Consult Note  Mom is here today for a second time- wants to make sure infant is transferring well.  Baby girl was born on 24 and weighed 7lb.6oz. Infant was 6lb.15.5oz a week ago. Mom has been BF baby every 2-3 hours during the day and also pumping few times. Dad has been formula feeding during the night. Current weight is 7lb.9.1 oz. Baby gained 9.2oz in 8 days which is about 1.2 oz per day. PT has been extremity tired and said she didn't pump much the last few days. She had trouble with supply with her 1-st child, so encouraged her- if BF is too time consuming to start pumping every 3h for 20 min-( the way we talked during the last visit). Educated again on the importance of the stimulation for adulate milk supply.    Breastfeeding: Mother position baby on the right breast in a football hold and baby started BF immediately. She is latching well and has a audible swallows.  After feeding for 15 minutes he released the breast. Infant got 1.8 oz. Then was burped and transferred to the left breast, where she latched for 12 more minutes and got another once. She had total of 2.8 oz. For her current weight baby needs to get around  2-2.5oz if she feeds every 2.5 hours. It's look like baby is getting plenty of milk. Mom is very happy. She denies any questions.  Encouraged to F/u PRN.    Birth weight: 7lb.6oz  Current weight:7lb.9.1oz.  Amount transferred: 2.8oz. in 27 min.         Evaluation Completed: 2024 14:47 EST  Patient Name: Sonam Lang  :  1985  MRN:  [id number]     REFERRAL  INFORMATION:                          Date of Referral: 24   Person Making Referral: patient  Maternal Reason for Referral: other (see comments) (weighted feeding)  Infant Reason for Referral: other (see comments) (to see how much EBM baby is getting)      MATERNAL ASSESSMENT:  Breast Size Issue: none (24 1326)  Breast Shape: Bilateral:, pendulous (24 1326)  Breast Density: Bilateral:,  full (24)  Areola: Bilateral:, elastic (24)  Nipples: Bilateral:, everted, graspable (24)                MATERNAL INFANT FEEDING:     Maternal Emotional State: receptive, relaxed (24)  Infant Positioning: clutch/football (24)   Signs of Milk Transfer: audible swallow (24)  Pain with Feeding: other (see comments) (soome times) (24)        Comfort Measures Before/During Feeding: latch adjusted, infant position adjusted, maternal position adjusted (24)  Milk Ejection Reflex: present (24)     Nipple Shape After Feeding, Left Breast: pinched (24)  Nipple Shape After Feeding, Right: pinched (24)  Latch Assistance: none needed (24)                           Feeding Readiness Cues: eager, rooting (24)  Satiety Cues: calm after feeding (24)     Effective Latch During Feeding: yes (24)  Suck/Swallow/Breathing Coordination: present (24)  Skin-to-Skin Contact, Duration: 37 (24)  Prefeeding Weight (gm): 3434 g (121.1 oz) (24)  Postfeeding Weight (gm): 3512 g (123.9 oz) (24)  Weight Gain/Loss (gm) : 78 g (2.8 oz) (24)      Latch: 2-->grasps breast, tongue down, lips flanged, rhythmic sucking (24)  Audible Swallowin-->spontaneous and intermittent (24 hrs old) (24)  Type of Nipple: 2-->everted (after stimulation) (24)  Comfort (Breast/Nipple): 2-->soft/nontender (24)  Hold (Positioning): 2-->no assist from staff, mother able to position/hold infant (24 132)  Latch Score: 10 (24)      EQUIPMENT TYPE:                                 BREAST PUMPING:          LACTATION REFERRALS:

## 2024-12-05 ENCOUNTER — TRANSCRIBE ORDERS (OUTPATIENT)
Dept: PHYSICAL THERAPY | Facility: HOSPITAL | Age: 39
End: 2024-12-05
Payer: MEDICAID

## 2024-12-05 DIAGNOSIS — M79.606 PREGNANCY RELATED LEG PAIN, ANTEPARTUM, THIRD TRIMESTER: Primary | ICD-10-CM

## 2024-12-05 DIAGNOSIS — O26.893 PREGNANCY RELATED LEG PAIN, ANTEPARTUM, THIRD TRIMESTER: Primary | ICD-10-CM

## 2024-12-17 ENCOUNTER — PATIENT MESSAGE (OUTPATIENT)
Dept: SPORTS MEDICINE | Facility: CLINIC | Age: 39
End: 2024-12-17
Payer: MEDICAID

## 2024-12-31 ENCOUNTER — HOSPITAL ENCOUNTER (OUTPATIENT)
Dept: PHYSICAL THERAPY | Facility: HOSPITAL | Age: 39
Discharge: HOME | End: 2024-12-31
Admitting: OBSTETRICS & GYNECOLOGY
Payer: MEDICAID

## 2024-12-31 DIAGNOSIS — R19.8 ABDOMINAL WEAKNESS: ICD-10-CM

## 2024-12-31 DIAGNOSIS — M54.50 CHRONIC BILATERAL LOW BACK PAIN WITHOUT SCIATICA: Primary | ICD-10-CM

## 2024-12-31 DIAGNOSIS — G89.29 CHRONIC BILATERAL LOW BACK PAIN WITHOUT SCIATICA: Primary | ICD-10-CM

## 2024-12-31 DIAGNOSIS — M62.89 PELVIC FLOOR DYSFUNCTION: ICD-10-CM

## 2024-12-31 PROCEDURE — 97162 PT EVAL MOD COMPLEX 30 MIN: CPT

## 2024-12-31 NOTE — THERAPY EVALUATION
Outpatient Physical Therapy Ortho Initial Evaluation  [facility]     Patient Name: Sonam Lang  : 1985  MRN: [id number]  Today's Date: 2024      Visit Date: 2024    Patient Active Problem List   Diagnosis    Migraine without aura and without status migrainosus, not intractable    Allergy to NSAIDs    Asthma, mild persistent    Sleep apnea    Previous  section    Adult ADHD    Anxiety in pregnancy, antepartum    Multigravida of advanced maternal age in third trimester    Previous  delivery affecting pregnancy    Gastroesophageal reflux disease without esophagitis    39 weeks gestation of pregnancy     delivery delivered        Past Medical History:   Diagnosis Date    ADHD (attention deficit hyperactivity disorder)     Anxiety     Asthma     Congenital abnormalities 2022    Multiple fetal anomalies present including meningocele, unilateral real agenesis, unilateral club foot, membranous VSD     Depression     GERD (gastroesophageal reflux disease)     History of depression 2019    Irritable bowel syndrome     Migraine     Obesity (BMI 30.0-34.9) 2019    Pregnancy complicated by multiple fetal congenital anomalies, single gestation 2022    NTD, SUA    PTSD (post-traumatic stress disorder)     Sleep apnea 2016    CPAP        Past Surgical History:   Procedure Laterality Date     SECTION       SECTION N/A 10/14/2023    Procedure:  SECTION REPEAT;  Surgeon: Megan El MD;  Location: [facility] LABOR DELIVERY;  Service: Obstetrics/Gynecology;  Laterality: N/A;     SECTION N/A 2024    Procedure:  SECTION REPEAT;  Surgeon: Radha Lai MD;  Location: [facility] LABOR DELIVERY;  Service: Obstetrics/Gynecology;  Laterality: N/A;    COLONOSCOPY      with endoscopy    SINUS SURGERY  11/22/2022    x2, 2018 (first)    WISDOM TOOTH EXTRACTION         Visit Dx:     ICD-10-CM ICD-9-CM   1.  Chronic bilateral low back pain without sciatica  M54.50 724.2    G89.29 338.29   2. Pelvic floor dysfunction  M62.89 618.83   3. Abdominal weakness  R19.8 789.9          Patient History       Row Name 24 0900             Fall Risk Assessment    Any falls in the past year: No  -RS         Services    Are you currently receiving Home Health services No  -RS         Daily Activities    Primary Language English  -RS      Are you able to read Yes  -RS      Are you able to write Yes  -RS      How does patient learn best? Listening;Reading;Demonstration  -RS      Pt Participated in POC and Goals Yes  -RS         Safety    Are you being hurt, hit, or frightened by anyone at home or in your life? No  -RS      Are you being neglected by a caregiver No  -RS                User Key  (r) = Recorded By, (t) = Taken By, (c) = Cosigned By      Initials Name Provider Type    RS Guerita Fleming PT Physical Therapist                                 Pelvic Health       Row Name 24 0900             Subjective    Patient Reason for Visit Postpartum Care;Pelvic Pain  -RS      Brief Description of Chief Complaint The pt is 7 weeks postpartum with her 3rd living child born via  on 24. She was seen by PT previously for hip and back pain during her third trimester with sciatic and adductor pain. She also notes pain near her labia and with penetration with intercourse. She has had 3 previous c-sections and a vaginal birth. She is currently breastfeeding and has not gotten her period back, she is bleeding but it is getting lighter. She denies any TANA or UUI, she did have TANA while pregnant. She experiences back pain but with no particular pattern, she has been trying to focus on her posture.  -RS      Patient Goals improve pain, get stronger  -RS      Patient Participated in POC and Goals Yes  -RS         Fall Risk Assessment    Any falls in the past year: No  -RS         Services    Are you currently receiving  Home Health services No  -RS         Daily Activities    Primary Language English  -RS      Are you able to read Yes  -RS      Are you able to write Yes  -RS      How does patient learn best? Listening;Reading;Demonstration  -RS      Pt Participated in POC and Goals Yes  -RS         Safety    Are you being hurt, hit, or frightened by anyone at home or in your life? No  -RS      Are you being neglected by a caregiver No  -RS         Urinary/Bowel History    Difficulty starting stream no  -RS      Incomplete emptying no  -RS      Spraying no  -RS      Diversion of stream no  -RS      Hesitancy no  -RS      Stress incontinence during pregnancy  -RS      Urgency no  -RS         Pregnancy/Sexual History    Number of Pregnancies 6  -RS      Number of Children 3  -RS      Type of Previous Deliveries Vaginal;  -RS      Do you have radicular pain or numbness? No  -RS      Currently breastfeeding? Yes  -RS      Pain with initial penetration Yes  -RS      Pain with deep penetration Yes  -RS         Lumbosacral Palpation    Piriformis Left:;Tender  -RS      Erector Spinae (Paraspinals) Left:;Tender;Guarded/taut  -RS         Lumbosacral Accessory Motions    Lumbosacral Accessory Motions Tested? Yes  -RS      PA Glide- L4 Hypomobile  -RS      PA Glide- L5 Hypomobile  -RS         General ROM    GENERAL ROM COMMENTS L hip PROM WNL and painfree, R hip ER and flex WNL, IR 50% WNL and stiff  -RS         Pelvic Floor Muscle    External Pelvic Floor Comments next session after follow up with OB  -RS         Abdominal Assessment    Diastasis Rectus Abdominus Above Umbilicus (fingerwidth separation);At Umbilicus (fingerwidth separation);Below Umbilicus (fingerwidth separation)  -RS      Above Umbilicus (fingerwidth separation) less than 1 fingerwidth  -RS      At Umbilicus (fingerwidth separation) less than 1 fingerwidth  -RS      Below Umbilicus (fingerwidth separation) less than 1 fingerwidth  -RS      Infrasternal Angle  (degrees) slightly greater than 90  -RS      Observation posture: inc APT/lumbar lordosis, inc thoracic kyphosis and rounded shoudlers c section scar- healing, no scabbing noted, decreased soft tissue mobility above the scar with mild TTP reported  -RS         MMT (Manual Muscle Testing)    Rt Lower Ext Rt Hip Flexion;Rt Hip Extension;Rt Hip ABduction;Rt Hip Internal (Medial) Rotation;Rt Hip External (Lateral) Rotation;Rt Knee WNL  -RS      Lt Lower Ext Lt Hip Flexion;Lt Hip Extension;Lt Hip ABduction;Lt Hip Internal (Medial) Rotation;Lt Hip External (Lateral) Rotation;Lt Knee WNL  -RS         MMT Right Lower Ext    Rt Hip Flexion MMT, Gross Movement (4+/5) good plus  -RS      Rt Hip Extension MMT, Gross Movement (4/5) good  -RS      Rt Hip ABduction MMT, Gross Movement (4/5) good  -RS      Rt Hip Internal (Medial) Rotation MMT, Gross Movement (4-/5) good minus  -RS      Rt Hip External (Lateral) Rotation MMT, Gross Movement (4/5) good  -RS         MMT Left Lower Ext    Lt Hip Flexion MMT, Gross Movement (4/5) good  -RS      Lt Hip Extension MMT, Gross Movement (4/5) good  -RS      Lt Hip ABduction MMT, Gross Movement (4-/5) good minus  -RS      Lt Hip Internal (Medial) Rotation MMT, Gross Movement (4-/5) good minus  -RS      Lt Hip External (Lateral) Rotation MMT, Gross Movement (4/5) good  -RS                User Key  (r) = Recorded By, (t) = Taken By, (c) = Cosigned By      Initials Name Provider Type    RS Guerita Fleming PT Physical Therapist                    Therapy Education  Education Details: Role of outpatient PT, POC, differential diagnosis, initial HEP, expectations, goals, anatomy. Access Code [email or web address]  Given: HEP  Program: New  How Provided: Verbal, Demonstration, Written  Provided to: Patient  Level of Understanding: Verbalized, Demonstrated      PT OP Goals       Row Name 12/31/24 1000          PT Short Term Goals    STG Date to Achieve 02/14/25  -RS     STG 1 The pt will report understanding of  appropriate quan mechancis durig household and childcare activities to facilitate improved LBP and ergonomics.  -RS     STG 1 Progress New  -RS     STG 2 The pt samia report IND with c section scar mobility to facilitate improved soft tissue mobility.  -RS     STG 2 Progress New  -RS        Long Term Goals    LTG Date to Achieve 03/31/25  -RS     LTG 1 The pt will demonstrate IND and compliant with HEP focused on IND condition management and return to PLOF.  -RS     LTG 1 Progress New  -RS     LTG 2 The pt will report at least 60% improvment in pelvic pain with penetration to facilitate improved tolerance for intercourse and pelvic exam.  -RS     LTG 2 Progress New  -RS     LTG 3 The pt will demonstrate hip strength at least 4_/5 to faciltiate improved tolerance for prolonged standing/childcare duties.  -RS     LTG 3 Progress New  -RS     LTG 4 Additional goals to be established following PF exam  -RS     LTG 5 --  -RS        Time Calculation    PT Goal Re-Cert Due Date 03/31/25  -RS               User Key  (r) = Recorded By, (t) = Taken By, (c) = Cosigned By      Initials Name Provider Type    RS Guerita Fleming, PT Physical Therapist                     PT Assessment/Plan       Row Name 12/31/24 1000          PT Assessment    Functional Limitations Limitation in home management;Limitations in community activities;Performance in self-care ADL;Performance in work activities  -RS     Impairments Impaired muscle length;Impaired muscle power;Impaired muscle endurance;Range of motion;Posture;Peripheral nerve integrity;Pain;Muscle strength  -RS     Assessment Comments Sonam Lang is a 39 y.o. female referred to physical therapy for low back pain postpartum. She presents with an evolving clinical presentation, along with no remarkable comorbidities and personal factors of hx back pain, recent birth (7 weeks ago, 14 months ago) that may impact her progress in the plan of care. Pt presents today with decreased hip and  core strength, TTP L>R piriformis and erector spinae, increased anterior pelvic tilt/lumbar lordosis . her signs and symptoms are consistent with referring diagnosis. The previous impairments limit her ability to perform  and household tasks without pain. Pt will benefit from skilled PT to address the previous impairments and return to PLOF.  -RS     Please refer to paper survey for additional self-reported information No  -RS     Rehab Potential Good  -RS     Patient/caregiver participated in establishment of treatment plan and goals Yes  -RS     Patient would benefit from skilled therapy intervention Yes  -RS        PT Plan    PT Frequency 1x/week  -RS     Predicted Duration of Therapy Intervention (PT) 10-12 sessions  -RS     Planned CPT's? PT RE-EVAL: 57262;PT THER PROC EA 15 MIN: 79153;PT THER ACT EA 15 MIN: 28599;PT MANUAL THERAPY EA 15 MIN: 27249;PT NEUROMUSC RE-EDUCATION EA 15 MIN: 12983;PT GAIT TRAINING EA 15 MIN: 32452;PT SELF CARE/HOME MGMT/TRAIN EA 15: 33707;PT HOT OR COLD PACK TREAT MCARE;PT ELECTRICAL STIM UNATTEND: ;PT TRACTION LUMBAR: 82027;PT EVAL MOD COMPLELITY: 76538  -RS     PT Plan Comments PFM exam,  core stregthening, address findings of PFM exam/goals (did pt go to OB follow up?)  -RS               User Key  (r) = Recorded By, (t) = Taken By, (c) = Cosigned By      Initials Name Provider Type    RS Guerita Fleming, PT Physical Therapist                       OP Exercises       Row Name 24 1000 24 0900          Subjective    Patient Reason for Visit -- Postpartum Care;Pelvic Pain  -RS     Brief Description of Chief Complaint -- The pt is 7 weeks postpartum with her 3rd living child born via  on 24. She was seen by PT previously for hip and back pain during her third trimester with sciatic and adductor pain. She also notes pain near her labia and with penetration with intercourse. She has had 3 previous c-sections and a vaginal birth. She is currently  breastfeeding and has not gotten her period back, she is bleeding but it is getting lighter. She denies any TANA or UUI, she did have TANA while pregnant. She experiences back pain but with no particular pattern, she has been trying to focus on her posture.  -RS     Patient Goals -- improve pain, get stronger  -RS     Patient Participated in POC and Goals -- Yes  -RS        Exercise 1    Exercise Name 1 thorcic rotation  -RS --     Cueing 1 Verbal;Demo  -RS --        Exercise 2    Exercise Name 2 PPT  -RS --     Cueing 2 Verbal;Demo  -RS --        Exercise 3    Exercise Name 3 piriformis stretch  -RS --               User Key  (r) = Recorded By, (t) = Taken By, (c) = Cosigned By      Initials Name Provider Type    RS Guerita Fleming, PT Physical Therapist                                            Time Calculation:     Start Time: 0918  Stop Time: 0958  Time Calculation (min): 40 min  Untimed Charges  PT Eval/Re-eval Minutes: 38  Total Minutes  Untimed Charges Total Minutes: 38   Total Minutes: 38     Therapy Charges for Today       Code Description Service Date Service Provider Modifiers Qty    25452715749 HC PT EVAL MOD COMPLEXITY 3 12/31/2024 Guerita Fleming, PT GP 1                      Guerita Fleming PT  12/31/2024

## 2025-01-02 ENCOUNTER — TELEPHONE (OUTPATIENT)
Dept: SPORTS MEDICINE | Facility: CLINIC | Age: 40
End: 2025-01-02
Payer: MEDICAID

## 2025-01-02 NOTE — TELEPHONE ENCOUNTER
Patient thinks has a stomach bug and looking for advice.     Gastro issues- diarrhea, vomiting, body aches, headache, chillls. Has not taken temp (says gets warm when has fever, not felt like it so not taken temp).     Says Dr Francisco gave her antibiotics for possible sinus issue recently, but sinuses started getting better so didn't start them (just in case that's an option).     Requesting advice, says if needs to be seen may go to [facility] because doesn't want to drive this far feeling how she does. Confirmed pharmacy is correct.     Please advise.

## 2025-01-03 NOTE — TELEPHONE ENCOUNTER
Called and informed patient, relayed understanding. She did state she will go to urgent care however she is feeling a little better ( no vomiting or diarrhea) today.

## 2025-01-06 ENCOUNTER — TELEPHONE (OUTPATIENT)
Dept: OBSTETRICS AND GYNECOLOGY | Age: 40
End: 2025-01-06
Payer: MEDICAID

## 2025-01-06 NOTE — TELEPHONE ENCOUNTER
Caller: Sonam Lang    Relationship: Self    Best call back number: [phone]    Who are you requesting to speak with (clinical staff, provider,  specific staff member): CLINICAL     What was the call regarding: PT IS HAVING A GREENISH/YELLOW DISCHARGE. PT STATED SHE WAS VERY SICK ON THURSDAY AND STARTED HAVING THE DISCHARGE ON FRIDAY.   WOULD LIKE TO KNOW IF THIS IS CONCERNING BEING POSTPARTUM. AND IF SHE SHOULD BE SEEN SOONER SOMEWHERE ELSE

## 2025-01-08 ENCOUNTER — POSTPARTUM VISIT (OUTPATIENT)
Dept: OBSTETRICS AND GYNECOLOGY | Age: 40
End: 2025-01-08
Payer: COMMERCIAL

## 2025-01-08 VITALS
DIASTOLIC BLOOD PRESSURE: 74 MMHG | WEIGHT: 252 LBS | BODY MASS INDEX: 38.19 KG/M2 | SYSTOLIC BLOOD PRESSURE: 120 MMHG | HEIGHT: 68 IN

## 2025-01-08 DIAGNOSIS — N89.8 VAGINAL DISCHARGE: ICD-10-CM

## 2025-01-08 DIAGNOSIS — Z30.011 VISIT FOR ORAL CONTRACEPTIVE PRESCRIPTION: ICD-10-CM

## 2025-01-08 NOTE — PROGRESS NOTES
"Subjective     Chief Complaint   Patient presents with    Postpartum Care     C- sect, baby girl  CC:  vaginal discharge, slipped on stairs coming into today       Sonam Lang is a 39 y.o.  whose LMP is No LMP recorded.     Pt presents today for postpartum evaluation  She is 8 weeks postpartum  She is noting abnormal vaginal discharge x 4-5 days ago but states hasn't really noticed it the last 2 days.  She noted a yellow/green tint to the discharge  Denies any itching or burning or odor  She is breastfeeding  Pt of        No Additional Complaints Reported    The following portions of the patient's history were reviewed and updated as appropriate:vital signs, allergies, current medications, past medical history, past social history, past surgical history, and problem list      Review of Systems   Pertinent items are noted in HPI.     Objective      /74   Ht 172.7 cm (68\")   Wt 114 kg (252 lb)   Breastfeeding Yes   BMI 38.32 kg/m²     Physical Exam    General:   alert and no distress   Heart: Not performed today   Lungs: Not performed today.   Breast: Not performed today   Neck: na   Abdomen: Incision C/D/I   CVA: Not performed today   Pelvis: External genitalia: normal general appearance  Urinary system: urethral meatus normal  Vaginal: normal mucosa without prolapse or lesions, normal without tenderness, induration or masses, and normal rugae  Cervix: normal appearance   Extremities: Not performed today   Neurologic: negative   Psychiatric: Normal affect, judgement, and mood       Lab Review   Labs: No data reviewed     Imaging   No data reviewed    Assessment & Plan     ASSESSMENT  1. Postpartum care following  delivery    2. Vaginal discharge    3. Visit for oral contraceptive prescription        PLAN  1.   Orders Placed This Encounter   Procedures    NuSwab BV & Candida - Swab, Vagina       2. Medications prescribed this encounter:        New Medications Ordered This Visit "   Medications    Drospirenone 4 MG tablet     Sig: Take 1 tablet by mouth Daily.     Dispense:  28 tablet     Refill:  11       3. Has migraine with aura. Discussed progestin only options. She would like to try progesterone only pill. Discussed r/b/a, use and side effects. Pap UTD. Due in June.    Follow up: 6 month(s)    Ann Tony, APRN  1/8/2025

## 2025-01-10 LAB
A VAGINAE DNA VAG QL NAA+PROBE: NORMAL SCORE
BVAB2 DNA VAG QL NAA+PROBE: NORMAL SCORE
C ALBICANS DNA VAG QL NAA+PROBE: NEGATIVE
C GLABRATA DNA VAG QL NAA+PROBE: NEGATIVE
MEGA1 DNA VAG QL NAA+PROBE: NORMAL SCORE

## 2025-01-30 ENCOUNTER — HOSPITAL ENCOUNTER (OUTPATIENT)
Dept: PHYSICAL THERAPY | Facility: HOSPITAL | Age: 40
Setting detail: THERAPIES SERIES
Discharge: HOME | End: 2025-01-30
Payer: MEDICAID

## 2025-01-30 DIAGNOSIS — G89.29 CHRONIC BILATERAL LOW BACK PAIN WITHOUT SCIATICA: Primary | ICD-10-CM

## 2025-01-30 DIAGNOSIS — M54.50 CHRONIC BILATERAL LOW BACK PAIN WITHOUT SCIATICA: Primary | ICD-10-CM

## 2025-01-30 DIAGNOSIS — M62.89 PELVIC FLOOR DYSFUNCTION: ICD-10-CM

## 2025-01-30 DIAGNOSIS — R19.8 ABDOMINAL WEAKNESS: ICD-10-CM

## 2025-01-30 PROCEDURE — 97530 THERAPEUTIC ACTIVITIES: CPT

## 2025-01-30 PROCEDURE — 97110 THERAPEUTIC EXERCISES: CPT

## 2025-01-30 NOTE — THERAPY PROGRESS REPORT/RE-CERT
Outpatient Physical Therapy Ortho Progress Note  [facility]     Patient Name: Sonam Lang  : 1985  MRN: [id number]  Today's Date: 2025      Visit Date: 2025    Visit Dx:    ICD-10-CM ICD-9-CM   1. Chronic bilateral low back pain without sciatica  M54.50 724.2    G89.29 338.29   2. Pelvic floor dysfunction  M62.89 618.83   3. Abdominal weakness  R19.8 789.9       Patient Active Problem List   Diagnosis    Migraine without aura and without status migrainosus, not intractable    Allergy to NSAIDs    Asthma, mild persistent    Sleep apnea    Previous  section    Adult ADHD    Anxiety in pregnancy, antepartum    Multigravida of advanced maternal age in third trimester    Previous  delivery affecting pregnancy    Gastroesophageal reflux disease without esophagitis    39 weeks gestation of pregnancy     delivery delivered        Past Medical History:   Diagnosis Date    ADHD (attention deficit hyperactivity disorder)     Anxiety     Asthma     Congenital abnormalities 2022    Multiple fetal anomalies present including meningocele, unilateral real agenesis, unilateral club foot, membranous VSD     Depression     GERD (gastroesophageal reflux disease)     History of depression 2019    Irritable bowel syndrome     Migraine     Obesity (BMI 30.0-34.9) 2019    Pregnancy complicated by multiple fetal congenital anomalies, single gestation 2022    NTD, SUA    PTSD (post-traumatic stress disorder)     Sleep apnea 2016    CPAP        Past Surgical History:   Procedure Laterality Date     SECTION  2019     SECTION N/A 10/14/2023    Procedure:  SECTION REPEAT;  Surgeon: Megna El MD;  Location: [facility] LABOR DELIVERY;  Service: Obstetrics/Gynecology;  Laterality: N/A;     SECTION N/A 2024    Procedure:  SECTION REPEAT;  Surgeon: Radha Lai MD;  Location: [facility] LABOR DELIVERY;  Service:  Obstetrics/Gynecology;  Laterality: N/A;    COLONOSCOPY  2014    with endoscopy    SINUS SURGERY  11/22/2022    x2, 4/18/2018 (first)    WISDOM TOOTH EXTRACTION                      Pelvic Health       Row Name 01/30/25 1500             Pelvic Floor Muscle    Patient/Parent/Guardian Consented to Internal Pelvic Floor Exam Yes  -RS      Strength (Right) 3: Squeeze with/without lift  -RS      Strength (Left) 3: Squeeze with/without lift  -RS      Symmetry of Sustained Maximal Contraction Symmetrical  -RS      Endurance (Ability to Hold Maximal Contraction) 3 sec  -RS      # of Reps of Maximal Contractions while Maintaining Endurance and Strength 0 unable to hold 10 seconds  -RS      Fast Contraction (# of 1 sec contractions performed) 4  -RS      1st Layer Tone/Internal Palpation pain at introitus B  -RS      2nd Layer Tone/Internal Palpation dec tone, inc pain  -RS      3rd Layer Tone/Internal Palpation pain at B OI  -RS                User Key  (r) = Recorded By, (t) = Taken By, (c) = Cosigned By      Initials Name Provider Type    RS Guerita Fleming, PT Physical Therapist                     PT Assessment/Plan       Row Name 01/30/25 1500          PT Assessment    Functional Limitations Limitation in home management;Limitations in community activities;Performance in self-care ADL;Performance in work activities  -RS     Impairments Impaired muscle length;Impaired muscle power;Impaired muscle endurance;Range of motion;Posture;Peripheral nerve integrity;Pain;Muscle strength  -RS     Assessment Comments Pt returns for first follow up session after initial eval postpartum reporting overall improvement in pain however she has noted increased LBP with prolonged carrying of her baby. Discussed appropriate body mechanics and avoidance of prolonged lumbar extension, pt receptive. Performed PFM assessment this date, pt with decreased endurance, strength, and power as well as impaired coordination. Discussed initiation of  diaphragmatic breathing focused on full mobility and perineal massage to address pain at introitus. Pt reports understanding. She has not met any short or long term goals as expected at first follow up and remains appropriate for skilled PT.  -RS        PT Plan    PT Plan Comments PFM strength, how was perineal massage?, bridge, SLR, clam, STS  -RS               User Key  (r) = Recorded By, (t) = Taken By, (c) = Cosigned By      Initials Name Provider Type    RS Guerita Fleming PT Physical Therapist                       OP Exercises       Row Name 01/30/25 1500             Subjective    Subjective Comments pt reports she had intercourse and it was initially painful but with lubrication it was better  -RS         Subjective Pain    Able to rate subjective pain? yes  -RS      Pre-Treatment Pain Level 0  -RS      Post-Treatment Pain Level 0  -RS         Total Minutes    66253 - PT Therapeutic Exercise Minutes 15  -RS      41748 - PT Therapeutic Activity Minutes 23  -RS         Exercise 1    Exercise Name 1 PFM contract with Leilani breathing  -RS      Reps 1 10  -RS         Exercise 2    Exercise Name 2 perineal massage education  -RS      Cueing 2 Verbal;Demo  -RS         Exercise 3    Exercise Name 3 quick flicks  -RS      Cueing 3 Verbal;Demo  -RS      Reps 3 10  -RS         Exercise 4    Exercise Name 4 review of current and past HEP  -RS         Exercise 5    Exercise Name 5 knack education  -RS         Exercise 6    Exercise Name 6 posture/carrying education  -RS                User Key  (r) = Recorded By, (t) = Taken By, (c) = Cosigned By      Initials Name Provider Type    RS Guerita Fleming, PT Physical Therapist                                     Therapy Education  Given: HEP  Program: Reinforced, Progressed  How Provided: Verbal, Demonstration, Written  Provided to: Patient  Level of Understanding: Verbalized, Demonstrated              Time Calculation:   Start Time: 1448  Stop Time: 1530  Time  Calculation (min): 42 min  Timed Charges  67015 - PT Therapeutic Exercise Minutes: 15  95368 - PT Therapeutic Activity Minutes: 23  Total Minutes  Timed Charges Total Minutes: 38   Total Minutes: 38  Therapy Charges for Today       Code Description Service Date Service Provider Modifiers Qty    37138091978  PT THER PROC EA 15 MIN 1/30/2025 Guerita Fleming, PT GP 1    [id number]  PT THERAPEUTIC ACT EA 15 MIN 1/30/2025 Guerita Fleming, PT GP 2                      Guerita Fleming PT  1/30/2025

## 2025-02-06 ENCOUNTER — HOSPITAL ENCOUNTER (OUTPATIENT)
Dept: PHYSICAL THERAPY | Facility: HOSPITAL | Age: 40
Setting detail: THERAPIES SERIES
Discharge: HOME | End: 2025-02-06
Payer: COMMERCIAL

## 2025-02-06 DIAGNOSIS — M54.50 CHRONIC BILATERAL LOW BACK PAIN WITHOUT SCIATICA: Primary | ICD-10-CM

## 2025-02-06 DIAGNOSIS — M62.89 PELVIC FLOOR DYSFUNCTION: ICD-10-CM

## 2025-02-06 DIAGNOSIS — G89.29 CHRONIC BILATERAL LOW BACK PAIN WITHOUT SCIATICA: Primary | ICD-10-CM

## 2025-02-06 DIAGNOSIS — R19.8 ABDOMINAL WEAKNESS: ICD-10-CM

## 2025-02-06 PROCEDURE — 97110 THERAPEUTIC EXERCISES: CPT

## 2025-02-06 PROCEDURE — 97530 THERAPEUTIC ACTIVITIES: CPT

## 2025-02-06 NOTE — THERAPY TREATMENT NOTE
Outpatient Physical Therapy Ortho Treatment Note  [facility]     Patient Name: Sonam Lang  : 1985  MRN: [id number]  Today's Date: 2025      Visit Date: 2025    Visit Dx:    ICD-10-CM ICD-9-CM   1. Chronic bilateral low back pain without sciatica  M54.50 724.2    G89.29 338.29   2. Pelvic floor dysfunction  M62.89 618.83   3. Abdominal weakness  R19.8 789.9       Patient Active Problem List   Diagnosis    Migraine without aura and without status migrainosus, not intractable    Allergy to NSAIDs    Asthma, mild persistent    Sleep apnea    Previous  section    Adult ADHD    Anxiety in pregnancy, antepartum    Multigravida of advanced maternal age in third trimester    Previous  delivery affecting pregnancy    Gastroesophageal reflux disease without esophagitis    39 weeks gestation of pregnancy     delivery delivered        Past Medical History:   Diagnosis Date    ADHD (attention deficit hyperactivity disorder)     Anxiety     Asthma     Congenital abnormalities 2022    Multiple fetal anomalies present including meningocele, unilateral real agenesis, unilateral club foot, membranous VSD     Depression     GERD (gastroesophageal reflux disease)     History of depression 2019    Irritable bowel syndrome     Migraine     Obesity (BMI 30.0-34.9) 2019    Pregnancy complicated by multiple fetal congenital anomalies, single gestation 2022    NTD, SUA    PTSD (post-traumatic stress disorder)     Sleep apnea 2016    CPAP        Past Surgical History:   Procedure Laterality Date     SECTION  2019     SECTION N/A 10/14/2023    Procedure:  SECTION REPEAT;  Surgeon: Megan El MD;  Location: [facility] LABOR DELIVERY;  Service: Obstetrics/Gynecology;  Laterality: N/A;     SECTION N/A 2024    Procedure:  SECTION REPEAT;  Surgeon: Radha Lai MD;  Location: [facility] LABOR DELIVERY;  Service:  Obstetrics/Gynecology;  Laterality: N/A;    COLONOSCOPY  2014    with endoscopy    SINUS SURGERY  11/22/2022    x2, 4/18/2018 (first)    WISDOM TOOTH EXTRACTION                          PT Assessment/Plan       Row Name 02/06/25 1500          PT Assessment    Assessment Comments Pt rpeorts no significant changes since last session regarding pain however has noticed slight increase in TANA. Discussed appropriate PFM activationa nd breathing pattern to encourage exhale/engage with effort, pt receptive. Initiated basic PFM strengthening activities in supported positions, pt with difficulty with pressure management with dynamic activities. Updated HEP appropriately and reviewed wiht pt who reports understanding and remains appropriate for PT.  -RS        PT Plan    PT Plan Comments STS with PFM, knack education, side step  -RS               User Key  (r) = Recorded By, (t) = Taken By, (c) = Cosigned By      Initials Name Provider Type    RS Guerita Fleming, PT Physical Therapist                       OP Exercises       Row Name 02/06/25 1400             Subjective    Subjective Comments pt rpeorts she has noticed more TANA the past week, she has done the perineal massage 2x  -RS         Subjective Pain    Able to rate subjective pain? yes  -RS      Pre-Treatment Pain Level 0  -RS      Post-Treatment Pain Level 0  -RS      Subjective Pain Comment at rest, back hurts with prolonged sitting and standing to feed  -RS         Total Minutes    84501 - PT Therapeutic Exercise Minutes 27  -RS      82758 - PT Therapeutic Activity Minutes 13  -RS         Exercise 1    Exercise Name 1 PFM elevator  -RS      Cueing 1 Verbal;Demo  -RS      Sets 1 10  -RS      Reps 1 2 floors  -RS         Exercise 2    Exercise Name 2 perineal massage education  -RS      Cueing 2 Verbal;Demo  -RS      Additional Comments review  -RS         Exercise 3    Exercise Name 3 education- exhale with effort during transitonal positions  -RS      Cueing 3  Verbal;Demo  -RS      Reps 3 --  -RS         Exercise 4    Exercise Name 4 bridge with PFM  -RS      Cueing 4 Verbal;Demo  -RS      Sets 4 2  -RS      Reps 4 10  -RS      Time 4 3  -RS         Exercise 5    Exercise Name 5 clamshell  -RS      Cueing 5 Verbal;Demo  -RS      Reps 5 20  -RS      Time 5 RTB  -RS      Additional Comments with TA  -RS         Exercise 6    Exercise Name 6 qped TA/PFM  -RS      Cueing 6 Verbal;Demo  -RS      Reps 6 10  -RS      Time 6 5  -RS      Additional Comments hip ext caused TANA  -RS         Exercise 7    Exercise Name 7 PFM with SLR  -RS      Cueing 7 Verbal;Demo  -RS      Reps 7 10ea  -RS                User Key  (r) = Recorded By, (t) = Taken By, (c) = Cosigned By      Initials Name Provider Type    Guerita Deleon, PT Physical Therapist                                  PT OP Goals       Row Name 02/06/25 1400          PT Short Term Goals    STG Date to Achieve 02/14/25  -RS     STG 1 The pt will report understanding of appropriate quan mechancis durig household and childcare activities to facilitate improved LBP and ergonomics.  -RS     STG 1 Progress Ongoing  -RS     STG 2 The pt samia report IND with c section scar mobility to facilitate improved soft tissue mobility.  -RS     STG 2 Progress Ongoing  -RS        Long Term Goals    LTG Date to Achieve 03/31/25  -RS     LTG 1 The pt will demonstrate IND and compliant with HEP focused on IND condition management and return to PLOF.  -RS     LTG 1 Progress Ongoing  -RS     LTG 2 The pt will report at least 60% improvment in pelvic pain with penetration to facilitate improved tolerance for intercourse and pelvic exam.  -RS     LTG 2 Progress Ongoing  -RS     LTG 3 The pt will demonstrate hip strength at least 4_/5 to faciltiate improved tolerance for prolonged standing/childcare duties.  -RS     LTG 3 Progress Ongoing  -RS     LTG 4 Additional goals to be established following PF exam  -RS     LTG 4 Progress New;Ongoing  -RS                User Key  (r) = Recorded By, (t) = Taken By, (c) = Cosigned By      Initials Name Provider Type    RS Guerita Fleming, PT Physical Therapist                    Therapy Education  Given: HEP  Program: Reinforced, Progressed  How Provided: Verbal, Demonstration, Written  Provided to: Patient  Level of Understanding: Verbalized, Demonstrated              Time Calculation:   Start Time: 1445  Stop Time: 1533  Time Calculation (min): 48 min  Timed Charges  05881 - PT Therapeutic Exercise Minutes: 27  36635 - PT Therapeutic Activity Minutes: 13  Total Minutes  Timed Charges Total Minutes: 40   Total Minutes: 40  Therapy Charges for Today       Code Description Service Date Service Provider Modifiers Qty    67613076016  PT THER PROC EA 15 MIN 2/6/2025 Guerita Fleming, PT GP 2    [id number]  PT THERAPEUTIC ACT EA 15 MIN 2/6/2025 Guerita Fleming, PT GP 1                      Guerita Fleming PT  2/6/2025

## 2025-02-10 ENCOUNTER — PATIENT MESSAGE (OUTPATIENT)
Dept: SPORTS MEDICINE | Facility: CLINIC | Age: 40
End: 2025-02-10
Payer: COMMERCIAL

## 2025-02-14 ENCOUNTER — APPOINTMENT (OUTPATIENT)
Dept: PHYSICAL THERAPY | Facility: HOSPITAL | Age: 40
End: 2025-02-14
Payer: COMMERCIAL

## 2025-02-20 ENCOUNTER — APPOINTMENT (OUTPATIENT)
Dept: PHYSICAL THERAPY | Facility: HOSPITAL | Age: 40
End: 2025-02-20
Payer: COMMERCIAL

## 2025-02-26 ENCOUNTER — HOSPITAL ENCOUNTER (OUTPATIENT)
Dept: PHYSICAL THERAPY | Facility: HOSPITAL | Age: 40
Setting detail: THERAPIES SERIES
Discharge: HOME | End: 2025-02-26
Payer: COMMERCIAL

## 2025-02-26 DIAGNOSIS — R19.8 ABDOMINAL WEAKNESS: ICD-10-CM

## 2025-02-26 DIAGNOSIS — M54.50 CHRONIC BILATERAL LOW BACK PAIN WITHOUT SCIATICA: Primary | ICD-10-CM

## 2025-02-26 DIAGNOSIS — G89.29 CHRONIC BILATERAL LOW BACK PAIN WITHOUT SCIATICA: Primary | ICD-10-CM

## 2025-02-26 DIAGNOSIS — M62.89 PELVIC FLOOR DYSFUNCTION: ICD-10-CM

## 2025-02-26 PROCEDURE — 97110 THERAPEUTIC EXERCISES: CPT

## 2025-02-26 PROCEDURE — 97530 THERAPEUTIC ACTIVITIES: CPT

## 2025-02-26 NOTE — THERAPY TREATMENT NOTE
Outpatient Physical Therapy Pelvic Health Treatment Note  [facility]     Patient Name: Sonam Lang  : 1985  MRN: [id number]  Today's Date: 2025        Visit Date: 2025    Visit Dx:    ICD-10-CM ICD-9-CM   1. Chronic bilateral low back pain without sciatica  M54.50 724.2    G89.29 338.29   2. Pelvic floor dysfunction  M62.89 618.83   3. Abdominal weakness  R19.8 789.9       Patient Active Problem List   Diagnosis    Migraine without aura and without status migrainosus, not intractable    Allergy to NSAIDs    Asthma, mild persistent    Sleep apnea    Previous  section    Adult ADHD    Anxiety in pregnancy, antepartum    Multigravida of advanced maternal age in third trimester    Previous  delivery affecting pregnancy    Gastroesophageal reflux disease without esophagitis    39 weeks gestation of pregnancy     delivery delivered                          PT Assessment/Plan       Row Name 25 1500          PT Assessment    Assessment Comments Pt reports ongoing issues with TANA, so discussed appropriate voiding windows to avoid overflow incontinence. Discussed changes in intraabdominal pressure with bending forward, and reviewed education of exhale with the effort. Progressed core/PFM challenges today and updated HEP. Pt would like to decr frequency to help with time for HEP adherence.  -CC        PT Plan    PT Plan Comments STS w PFM, knack, side step. How did pt do with paying attention to voiding windows and overflow UI?  -CC               User Key  (r) = Recorded By, (t) = Taken By, (c) = Cosigned By      Initials Name Provider Type    CC Caroline Hernandes, PT Physical Therapist                       OP Exercises       Row Name 25 1400             Subjective    Subjective Comments Hasn't trial IC, but has difficulty finding time to do perineal massage. Continues to have LBP when holding baby and at end of day, notes difficulty avoiding APT when holding  baby. Still having TANA with moving  -CC         Subjective Pain    Able to rate subjective pain? yes  -CC      Pre-Treatment Pain Level 0  -CC      Post-Treatment Pain Level 0  -CC         Total Minutes    48276 - PT Therapeutic Exercise Minutes 25  -CC      05913 - PT Therapeutic Activity Minutes 15  -CC         Exercise 1    Exercise Name 1 PFM elevator  -CC      Cueing 1 Verbal;Demo  -CC      Sets 1 10  -CC      Reps 1 2 floors  -CC      Additional Comments seated  -CC         Exercise 2    Exercise Name 2 perineal massage education  -CC      Cueing 2 Verbal;Demo  -CC      Additional Comments review  -CC         Exercise 3    Exercise Name 3 education- exhale with effort during transitonal positions  -CC      Cueing 3 Verbal;Demo  -CC      Additional Comments review  -CC         Exercise 4    Exercise Name 4 bridge with PFM  -CC      Cueing 4 Verbal;Demo  -CC      Sets 4 2  -CC      Reps 4 6  -CC      Time 4 w march  -CC         Exercise 6    Exercise Name 6 qped TA/PFM w UE reach  -CC      Cueing 6 Verbal;Demo  -CC      Reps 6 10 ea arm  -CC      Time 6 5  -CC         Exercise 7    Exercise Name 7 PPT with heel slide  -CC      Cueing 7 Verbal  -CC      Sets 7 2  -CC      Reps 7 10  -CC         Exercise 8    Exercise Name 8 discussed how to integrate HEP into busy scheduled  -CC      Cueing 8 Verbal  -CC                User Key  (r) = Recorded By, (t) = Taken By, (c) = Cosigned By      Initials Name Provider Type    Caroline Redman, PT Physical Therapist                                     Therapy Education  Education Details: updated HEP  Given: HEP  Program: Reinforced, Progressed  How Provided: Verbal, Demonstration, Written  Provided to: Patient  Level of Understanding: Verbalized, Demonstrated              Time Calculation:   Start Time: 1430  Stop Time: 1510  Time Calculation (min): 40 min  Total Timed Code Minutes- PT: 40 minute(s)  Timed Charges  26392 - PT Therapeutic Exercise Minutes: 25  71829  - PT Therapeutic Activity Minutes: 15  Total Minutes  Timed Charges Total Minutes: 40   Total Minutes: 40  Therapy Charges for Today       Code Description Service Date Service Provider Modifiers Qty    [id number]  PT THER PROC EA 15 MIN 2/26/2025 Caroline Hernandes, PT GP 2    [id number]  PT THERAPEUTIC ACT EA 15 MIN 2/26/2025 Caroline Hernandes, PT GP 1                      Caroline Hernandes, PT  2/26/2025

## 2025-03-04 ENCOUNTER — TELEPHONE (OUTPATIENT)
Dept: OBSTETRICS AND GYNECOLOGY | Age: 40
End: 2025-03-04
Payer: COMMERCIAL

## 2025-03-04 RX ORDER — ACETAMINOPHEN AND CODEINE PHOSPHATE 120; 12 MG/5ML; MG/5ML
1 SOLUTION ORAL DAILY
Qty: 84 TABLET | Refills: 3 | Status: SHIPPED | OUTPATIENT
Start: 2025-03-04 | End: 2026-03-04

## 2025-03-04 NOTE — TELEPHONE ENCOUNTER
Pt called and stated that she believes she is having an allergic reaction to her Drospirenone 4mg. Pt stated that is the only new medication.  Pt stated that she is at the end of her second month of the BC pill.  Pt stated that her skin is is raised  Blotchy, itchy rashy on her  left upper thigh.  Stated she used hydrocortisone cream and benadryl and it helped but it did not go away all together.  Stated she doesn't want to take anymore benadryl because she needs to be awake. Please advise

## 2025-03-10 ENCOUNTER — OFFICE VISIT (OUTPATIENT)
Dept: SPORTS MEDICINE | Facility: CLINIC | Age: 40
End: 2025-03-10
Payer: COMMERCIAL

## 2025-03-10 ENCOUNTER — LAB (OUTPATIENT)
Dept: LAB | Facility: HOSPITAL | Age: 40
End: 2025-03-10
Payer: COMMERCIAL

## 2025-03-10 VITALS
WEIGHT: 249 LBS | DIASTOLIC BLOOD PRESSURE: 60 MMHG | HEART RATE: 64 BPM | SYSTOLIC BLOOD PRESSURE: 98 MMHG | BODY MASS INDEX: 37.74 KG/M2 | OXYGEN SATURATION: 97 % | TEMPERATURE: 96.7 F | HEIGHT: 68 IN

## 2025-03-10 DIAGNOSIS — R51.9 ACUTE NONINTRACTABLE HEADACHE, UNSPECIFIED HEADACHE TYPE: ICD-10-CM

## 2025-03-10 DIAGNOSIS — L84 FOOT CALLUS: ICD-10-CM

## 2025-03-10 DIAGNOSIS — E55.9 VITAMIN D DEFICIENCY: Primary | ICD-10-CM

## 2025-03-10 LAB — 25(OH)D3 SERPL-MCNC: 28.7 NG/ML (ref 30–100)

## 2025-03-10 PROCEDURE — 82306 VITAMIN D 25 HYDROXY: CPT | Performed by: FAMILY MEDICINE

## 2025-03-10 PROCEDURE — 99214 OFFICE O/P EST MOD 30 MIN: CPT | Performed by: FAMILY MEDICINE

## 2025-03-10 PROCEDURE — 36415 COLL VENOUS BLD VENIPUNCTURE: CPT | Performed by: FAMILY MEDICINE

## 2025-03-10 NOTE — PROGRESS NOTES
"Sonam is a 39 y.o. year old female     Chief Complaint   Patient presents with    Headache    Foot Pain     Patient is here for initial evaluation of left foot callus        History of Present Illness  History of Present Illness  The patient presents with multiple concerns. She is unsure if she needs to continue vitamin D replacement. She has a callus on both feet of the 5th MTP, mildly uncomfortable, worse with weightbearing and pivoting, with no sustained benefit from over-the-counter treatments. A recent headache, initially like her migraine, is now accompanied by sinus congestion.        I have reviewed the patient's medical, family, and social history in detail and updated the computerized patient record.    Review of Systems    BP 98/60 (BP Location: Right arm, Patient Position: Sitting, Cuff Size: Adult)   Pulse 64   Temp 96.7 °F (35.9 °C) (Temporal)   Ht 172.7 cm (68\")   Wt 113 kg (249 lb)   SpO2 97%   BMI 37.86 kg/m²      Physical Exam    Vital signs reviewed.   General: No acute distress.      Physical Exam  Bilateral ears normal. Oropharynx clear without exudate or erythema. Neck without adenopathy. Normal respiratory effort. Callus on plantar aspect of right 5th MTP, worse than left. No obvious wart.    Results  Results      Procedures     Diagnoses and all orders for this visit:    Vitamin D deficiency  -     Vitamin D,25-Hydroxy    Foot callus  -     Ambulatory Referral to Podiatry    Acute nonintractable headache, unspecified headache type      Assessment & Plan  1. Vitamin D level assessment. Re-evaluate vitamin D levels to determine need for continued supplementation.    2. Foot calluses. Callus on both 5th MTPs, mildly uncomfortable, worse with weightbearing and pivoting. Allergy to ASPIRIN, so topical salicylic acid is debatable. Recommend continued self-management. Referral for podiatry debridement made.    3. Headache. Long-term history of migraines, current status as breastfeeding mother " and multiple drug allergies limit treatment options. Consider other vitamins and potential use of antihistamines. No evidence of sinus infection today. Initiate antibiotics if sinus symptoms worsen.    Patient or patient representative verbalized consent for the use of Ambient Listening during the visit with  Praful Francisco MD for chart documentation. 3/10/2025  14:09 EDT    *Dictated after leaving exam room.     Praful Francisco MD   14:09 EDT   03/10/25

## 2025-03-17 ENCOUNTER — OFFICE VISIT (OUTPATIENT)
Age: 40
End: 2025-03-17
Payer: COMMERCIAL

## 2025-03-17 ENCOUNTER — PATIENT ROUNDING (BHMG ONLY) (OUTPATIENT)
Age: 40
End: 2025-03-17
Payer: COMMERCIAL

## 2025-03-17 VITALS — HEART RATE: 68 BPM | WEIGHT: 249 LBS | BODY MASS INDEX: 37.74 KG/M2 | HEIGHT: 68 IN

## 2025-03-17 DIAGNOSIS — M79.672 BILATERAL FOOT PAIN: Primary | ICD-10-CM

## 2025-03-17 DIAGNOSIS — M21.621 TAILOR'S BUNIONETTE, BILATERAL: ICD-10-CM

## 2025-03-17 DIAGNOSIS — M21.622 TAILOR'S BUNIONETTE, BILATERAL: ICD-10-CM

## 2025-03-17 DIAGNOSIS — L84 CORNS: ICD-10-CM

## 2025-03-17 DIAGNOSIS — M79.671 BILATERAL FOOT PAIN: Primary | ICD-10-CM

## 2025-03-17 PROCEDURE — 99203 OFFICE O/P NEW LOW 30 MIN: CPT | Performed by: PODIATRIST

## 2025-03-17 PROCEDURE — 1159F MED LIST DOCD IN RCRD: CPT | Performed by: PODIATRIST

## 2025-03-17 PROCEDURE — 1160F RVW MEDS BY RX/DR IN RCRD: CPT | Performed by: PODIATRIST

## 2025-03-17 RX ORDER — CHOLECALCIFEROL (VITAMIN D3) 1250 MCG
CAPSULE ORAL
COMMUNITY
Start: 2025-03-16 | End: 2025-03-19

## 2025-03-17 NOTE — PROGRESS NOTES
"03/17/2025  Foot and Ankle Surgery - New Patient   Provider: Dr. Miguelito Morales DPM  Location: [facility]    Subjective:  Sonam Lang is a 39 y.o. female.     Chief Complaint   Patient presents with    Left Foot - Callouses, Pain, Initial Evaluation    Right Foot - Callouses, Pain, Initial Evaluation    Initial Evaluation     PCP: Praful Francisco MD  Last PCP Visit: 3/10/25       39-year-old female complaining of bilateral foot pain.  Patient states she has had pain and calluses to the bottom of her fifth metatarsal phalangeal joints for several years.  Patient states that she uses a pumice stone or paring knife to remove callus buildup.  She is even tried accommodative inserts in her shoes with no relief.  Patient has not tried any corn pad or offloading pads.    Pain       Allergies   Allergen Reactions    Aspirin Angioedema    Bactrim [Sulfamethoxazole-Trimethoprim] Hives, Itching and Swelling    Cefdinir Hives, Itching and Swelling     FELT THROAT START TO SWELL    Clindamycin/Lincomycin Hives    Buspirone Unknown - Low Severity     migraines    Contrast Dye (Echo Or Unknown Ct/Mr) Other (See Comments)     \"felt like insides were on fire\"    Ibuprofen Swelling    Iodinated Contrast Media Unknown (See Comments)     \"felt like insides were on fire\"   \"felt like insides were on fire\"    Naproxen Swelling    Toradol [Ketorolac Tromethamine] Itching       Past Medical History:   Diagnosis Date    ADHD (attention deficit hyperactivity disorder)     Anxiety     Asthma     Congenital abnormalities 05/22/2022    Multiple fetal anomalies present including meningocele, unilateral real agenesis, unilateral club foot, membranous VSD     Depression     GERD (gastroesophageal reflux disease)     History of depression 02/21/2019    Irritable bowel syndrome     Migraine     Obesity (BMI 30.0-34.9) 04/02/2019    Pregnancy complicated by multiple fetal congenital anomalies, single gestation 05/04/2022    NTD, SUA    " PTSD (post-traumatic stress disorder)     Sleep apnea 2016    CPAP       Past Surgical History:   Procedure Laterality Date     SECTION  2019     SECTION N/A 10/14/2023    Procedure:  SECTION REPEAT;  Surgeon: Megan El MD;  Location:  [facility]IV[facility];  Service: Obstetrics/Gynecology;  Laterality: N/A;     SECTION N/A 2024    Procedure:  SECTION REPEAT;  Surgeon: Radha Lai MD;  Location:  [facility];  Service: Obstetrics/Gynecology;  Laterality: N/A;    COLONOSCOPY      with endoscopy    SINUS SURGERY  11/22/2022    x2, 2018 (first)    WISDOM TOOTH EXTRACTION         Family History   Problem Relation Age of Onset    Hypertension Mother     Diabetes Mother     Migraines Mother     Hypertension Father     Hypertension Maternal Grandmother     Migraines Maternal Grandmother     Alzheimer's disease Maternal Grandfather     Diabetes Paternal Grandmother     Migraines Paternal Grandmother     Dementia Paternal Grandmother     Stroke Paternal Grandmother     Frontotemporal dementia Paternal Grandmother     Diabetes Paternal Grandfather        Social History     Socioeconomic History    Marital status:    Tobacco Use    Smoking status: Former     Current packs/day: 0.00     Types: Cigarettes     Start date:      Quit date:      Years since quittin.2     Passive exposure: Past    Smokeless tobacco: Never   Vaping Use    Vaping status: Never Used   Substance and Sexual Activity    Alcohol use: Not Currently    Drug use: No    Sexual activity: Yes     Partners: Male     Birth control/protection: None        Current Outpatient Medications on File Prior to Visit   Medication Sig Dispense Refill    acetaminophen (TYLENOL) 325 MG tablet Take 2 tablets by mouth Every 6 (Six) Hours. 60 tablet 1    cetirizine (zyrTEC) 5 MG tablet Take 1 tablet by mouth 3 (Three) Times a Day. PATIENT TAKING ONCE DAILY      Cholecalciferol  "(Vitamin D3) 1.25 MG (98700 UT) capsule       MAGnesium-Oxide 400 (240 Mg) MG tablet Take 1 tablet by mouth Daily.      montelukast (SINGULAIR) 10 MG tablet Take 1 tablet by mouth once daily 90 tablet 0    norethindrone (MICRONOR) 0.35 MG tablet Take 1 tablet by mouth Daily. 84 tablet 3    pantoprazole (PROTONIX) 40 MG EC tablet Take 1 tablet by mouth once daily 30 tablet 1    Prenatal Vit-Fe Fumarate-FA (prenatal vitamin 27-0.8) 27-0.8 MG tablet tablet Take  by mouth Daily.      Riboflavin (Vitamin B-2) 25 MG tablet Take  by mouth.      sertraline (ZOLOFT) 50 MG tablet Take 2 tablets by mouth once daily 180 tablet 0    vitamin B-6 (PYRIDOXINE) 50 MG tablet Take 0.5 tablets by mouth 3 (Three) Times a Day. 90 tablet 1     No current facility-administered medications on file prior to visit.         Objective   Pulse 68   Ht 172.7 cm (68\")   Wt 113 kg (249 lb)   BMI 37.86 kg/m²     Foot/Ankle Exam    GENERAL  Appearance:  appears stated age  Orientation:  AAOx3  Affect:  appropriate  Gait:  unimpaired  Assistance:  independent  Right shoe gear: casual shoe  Left shoe gear: casual shoe    VASCULAR     Right Foot Vascularity   Normal vascular exam    Dorsalis pedis:  2+  Posterior tibial:  2+  Skin temperature:  warm  Edema grading:  None  CFT:  < 3 seconds  Pedal hair growth:  Present  Varicosities:  none     Left Foot Vascularity   Normal vascular exam    Dorsalis pedis:  2+  Posterior tibial:  2+  Skin temperature:  warm  Edema grading:  None  CFT:  < 3 seconds  Pedal hair growth:  Present  Varicosities:  none     NEUROLOGIC     Right Foot Neurologic   Normal sensation    Light touch sensation: normal  Vibratory sensation: normal  Hot/Cold sensation: normal  Normal reflexes    Achilles reflex:  2+  Babinski reflex:  2+     Left Foot Neurologic   Normal sensation    Light touch sensation: normal  Vibratory sensation: normal  Hot/Cold sensation:  normal  Normal reflexes    Achilles reflex:  2+  Babinski reflex:  " 2+    MUSCULOSKELETAL     Right Foot Musculoskeletal   Tenderness:  MTP 5 dorsal tenderness    Tailor's bunion: Yes (Pain with palpation over prominent fifth metatarsal phalangeal joint)       Left Foot Musculoskeletal   Tenderness:  MTP 5 dorsal tenderness  Tailor's bunion: Yes (Pain with palpation over prominent fifth metatarsal phalangeal joint)      MUSCLE STRENGTH     Right Foot Muscle Strength   Normal strength    Foot dorsiflexion:  5  Foot plantar flexion:  5  Foot inversion:  5  Foot eversion:  5     Left Foot Muscle Strength   Normal strength    Foot dorsiflexion:  5  Foot plantar flexion:  5  Foot inversion:  5  Foot eversion:  5    RANGE OF MOTION     Right Foot Range of Motion   Foot and ankle ROM within normal limits       Left Foot Range of Motion   Foot and ankle ROM within normal limits      DERMATOLOGIC      Right Foot Dermatologic   Skin  Positive for corn. (Calluses subfifth MPJ, tender with palpation)  Nails  1.  Normal.  2.  Normal.  3.  Normal.  4.  Normal.  5.  Normal.     Left Foot Dermatologic   Skin  Positive for corn. (Calluses subfifth MPJ, tender with palpation)   Nails  1.  Normal.  2.  Normal.  3.  Normal.  4.  Normal.  5.  Normal.    Bilateral foot three-view weightbearing x-rays performed today.  Impression:  No fracture dislocation noted bilaterally.  Patient has lateral bowing to fifth metatarsal bilaterally.  Increased IM angle fourth and fifth digit bilaterally.  Hindfoot midfoot alignment within normal limits.      Assessment & Plan   Diagnoses and all orders for this visit:    1. Bilateral foot pain (Primary)  -     XR Foot 3+ View Bilateral; Future    2. Tailor's bunionette, bilateral    3. Corns       Discussed with patient diagnosis, prognosis, treatment options for bilateral tailor's bunion deformity.  Patient understands this is a structural deformity where there is bowing of the bones.  Surgical correction is the only option available to align and reset the position of  the bones.  Conservative and surgical options discussed with patient.  Conservative treatment options including metatarsal pad offloading, callus paring debridement, wider toebox shoes.      Surgical options include metatarsal osteotomy decreasing lateral bowing and prominence of the fifth metatarsal phalangeal joint.    Patient has 2 young children under the age of 2 years old.  Concern for patient's ability to recover postoperatively with home responsibilities.  Patient states she does have good support with her  and family.    Patient will continue using pumice stone to routinely debride callus buildup bilateral fifth MPJs.  Patient to purchase metatarsal pads to offload prominent area.  Showed patient in detail on her inserts where to place the pads.  Patient also would benefit from wider toebox shoes.    If patient fails conservative treatment we will discuss surgical planning.  Patient understands we can only do 1 foot at a time.    Patient to follow-up in 4 to 6 weeks    Reviewed proper basic stretching and manual therapy exercises along with appropriate shoes and activity.  Discussed proper use and/or avoidance of OTC anti-inflammatories.  Patient is to call with any additional issues or concerns.  Greater than 30 minutes was spent before, during, and after evaluation for patient care.             Procedures     Horacio Morales DPM

## 2025-04-02 ENCOUNTER — HOSPITAL ENCOUNTER (OUTPATIENT)
Dept: PHYSICAL THERAPY | Facility: HOSPITAL | Age: 40
Setting detail: THERAPIES SERIES
Discharge: HOME | End: 2025-04-02
Payer: COMMERCIAL

## 2025-04-02 ENCOUNTER — TRANSCRIBE ORDERS (OUTPATIENT)
Dept: PHYSICAL THERAPY | Facility: HOSPITAL | Age: 40
End: 2025-04-02
Payer: COMMERCIAL

## 2025-04-02 DIAGNOSIS — M54.50 CHRONIC BILATERAL LOW BACK PAIN WITHOUT SCIATICA: Primary | ICD-10-CM

## 2025-04-02 DIAGNOSIS — R19.8 ABDOMINAL WEAKNESS: ICD-10-CM

## 2025-04-02 DIAGNOSIS — G89.29 CHRONIC BILATERAL LOW BACK PAIN WITHOUT SCIATICA: Primary | ICD-10-CM

## 2025-04-02 DIAGNOSIS — M62.89 PELVIC FLOOR DYSFUNCTION: ICD-10-CM

## 2025-04-02 DIAGNOSIS — M79.606: Primary | ICD-10-CM

## 2025-04-02 DIAGNOSIS — O26.891: Primary | ICD-10-CM

## 2025-04-02 PROCEDURE — 97110 THERAPEUTIC EXERCISES: CPT

## 2025-04-02 NOTE — THERAPY RE-EVALUATION
Outpatient Physical Therapy Pelvic Health 90 Day Re-Evaluation  [facility]     Patient Name: Sonam Lang  : 1985  MRN: [id number]  Today's Date: 2025        Visit Date: 2025    Patient Active Problem List   Diagnosis    Migraine without aura and without status migrainosus, not intractable    Allergy to NSAIDs    Asthma, mild persistent    Sleep apnea    Previous  section    Adult ADHD    Anxiety in pregnancy, antepartum    Multigravida of advanced maternal age in third trimester    Previous  delivery affecting pregnancy    Gastroesophageal reflux disease without esophagitis    39 weeks gestation of pregnancy     delivery delivered        Past Medical History:   Diagnosis Date    ADHD (attention deficit hyperactivity disorder)     Anxiety     Asthma     Congenital abnormalities 2022    Multiple fetal anomalies present including meningocele, unilateral real agenesis, unilateral club foot, membranous VSD     Depression     GERD (gastroesophageal reflux disease)     History of depression 2019    Irritable bowel syndrome     Migraine     Obesity (BMI 30.0-34.9) 2019    Pregnancy complicated by multiple fetal congenital anomalies, single gestation 2022    NTD, SUA    PTSD (post-traumatic stress disorder)     Sleep apnea 2016    CPAP        Past Surgical History:   Procedure Laterality Date     SECTION  2019     SECTION N/A 10/14/2023    Procedure:  SECTION REPEAT;  Surgeon: Megan lE MD;  [facility];  Service: Obstetrics/Gynecology;  Laterality: N/A;     SECTION N/A 2024    Procedure:  SECTION REPEAT;  Surgeon: Radha Lai MD;  Location:  [facility] LABOR DELIVERY;  Service: Obstetrics/Gynecology;  Laterality: N/A;    COLONOSCOPY      with endoscopy    SINUS SURGERY  11/22/2022    x2, 2018 (first)    WISDOM TOOTH EXTRACTION           Visit Dx:    ICD-10-CM  "ICD-9-CM   1. Chronic bilateral low back pain without sciatica  M54.50 724.2    G89.29 338.29   2. Pelvic floor dysfunction  M62.89 618.83   3. Abdominal weakness  R19.8 789.9                             PT Assessment/Plan       Row Name 04/02/25 1400          PT Assessment    Functional Limitations Limitation in home management;Limitations in community activities;Performance in self-care ADL;Performance in work activities  -CC     Impairments Impaired muscle length;Impaired muscle power;Impaired muscle endurance;Range of motion;Posture;Peripheral nerve integrity;Pain;Muscle strength  -CC     Assessment Comments Sonam Lang has been seen for 5 physical therapy sessions for postpartum care, including urinary incontinence and pelvic pain.  Treatment has included therapeutic exercise, therapeutic activity, and patient education with home exercise program . Progress to physical therapy goals is fair. Pt has met 2/2 STG and 0/4 LTG. Barriers to progress include infrequent appointments due to pts schedule and recently reporting limited HEP performance secondary to time constraints at home/personal life. She will benefit from continued skilled physical therapy to address remaining impairments and functional limitations.  -CC        PT Plan    PT Plan Comments HEP compliance? Consider AR Press. How is pain? Has pt attempted IC?  -CC               User Key  (r) = Recorded By, (t) = Taken By, (c) = Cosigned By      Initials Name Provider Type    Caroline Redman, PT Physical Therapist                       OP Exercises       Row Name 04/02/25 1400             Subjective    Subjective Comments Haven't had a chance to attempt IC. Haven't had a chance to do exercises. Reports increased urinary frequency due to \"tuning in to bladder signals\" to avoid overflow incontinence.  2:30 appt unable to be started until 2:42, due pt having discussion wiht  and clinic manager regarding insurance authorization.  -CC   "       Subjective Pain    Able to rate subjective pain? yes  -CC      Pre-Treatment Pain Level 0  -CC      Post-Treatment Pain Level 0  -CC         Total Minutes    23421 - PT Therapeutic Exercise Minutes 23  -CC         Exercise 1    Exercise Name 1 PFM elevator  -CC      Cueing 1 Verbal;Demo  -CC      Sets 1 10  -CC      Reps 1 2 floors  -CC      Additional Comments seated  -CC         Exercise 4    Exercise Name 4 bridge with PFM  -CC      Cueing 4 Verbal;Demo  -CC      Sets 4 2  -CC      Reps 4 6  -CC      Time 4 w march  -CC         Exercise 5    Exercise Name 5 s/l hip abd w PFM  -CC      Cueing 5 Verbal  -CC      Sets 5 2 ea leg  -CC      Reps 5 10  -CC         Exercise 6    Exercise Name 6 qped TA/PFM w UE reach  -CC      Cueing 6 Verbal;Demo  -CC      Reps 6 10 ea arm  -CC      Time 6 5  -CC         Exercise 7    Exercise Name 7 PPT with heel slide  -CC      Cueing 7 Verbal  -CC      Sets 7 2  -CC      Reps 7 10  -CC         Exercise 8    Exercise Name 8 STS w PFM  -CC      Cueing 8 Verbal  -CC      Sets 8 2  -CC      Reps 8 10  -CC      Time 8 exhale  -CC                User Key  (r) = Recorded By, (t) = Taken By, (c) = Cosigned By      Initials Name Provider Type    Caroline Redman, PT Physical Therapist                                 PT OP Goals       Row Name 04/02/25 1400          PT Short Term Goals    STG Date to Achieve 05/02/25  -CC     STG 1 The pt will report understanding of appropriate quan mechancis durig household and childcare activities to facilitate improved LBP and ergonomics.  -CC     STG 1 Progress Met  -CC     STG 2 The pt samia report IND with c section scar mobility to facilitate improved soft tissue mobility.  -CC     STG 2 Progress Met  -        Long Term Goals    LTG Date to Achieve 06/01/25  -CC     LTG 1 The pt will demonstrate IND and compliant with HEP focused on IND condition management and return to PLOF.  -CC     LTG 1 Progress Ongoing  -CC     LTG 2 The pt will  report at least 60% improvment in pelvic pain with penetration to facilitate improved tolerance for intercourse and pelvic exam.  -CC     LTG 2 Progress Ongoing  -CC     LTG 2 Progress Comments hasn't attempted IC  -CC     LTG 3 The pt will demonstrate hip strength at least 4_/5 to faciltiate improved tolerance for prolonged standing/childcare duties.  -CC     LTG 3 Progress Ongoing  -CC     LTG 4 Pt will demo at least 5 reps of 10 sec hold for PFM, to demo improved endurance  -CC     LTG 4 Progress New  -CC        Time Calculation    PT Goal Re-Cert Due Date 07/01/25  -CC               User Key  (r) = Recorded By, (t) = Taken By, (c) = Cosigned By      Initials Name Provider Type    CC Caroline Hernandes, PT Physical Therapist                                    Time Calculation:   Start Time: 1442  Stop Time: 1505  Time Calculation (min): 23 min  Total Timed Code Minutes- PT: 23 minute(s)  Timed Charges  12860 - PT Therapeutic Exercise Minutes: 23  Total Minutes  Timed Charges Total Minutes: 23   Total Minutes: 23  Therapy Charges for Today       Code Description Service Date Service Provider Modifiers Qty    58247707164 HC PT THER PROC EA 15 MIN 4/2/2025 Caroline Hernandes, PT GP 2                    Caroline Hernandes PT  4/2/2025

## 2025-04-11 DIAGNOSIS — F41.9 ANXIETY: ICD-10-CM

## 2025-04-11 RX ORDER — PYRIDOXINE HCL (VITAMIN B6) 50 MG
TABLET ORAL
Qty: 90 TABLET | Refills: 0 | Status: SHIPPED | OUTPATIENT
Start: 2025-04-11

## 2025-04-14 ENCOUNTER — PATIENT MESSAGE (OUTPATIENT)
Dept: SPORTS MEDICINE | Facility: CLINIC | Age: 40
End: 2025-04-14
Payer: COMMERCIAL

## 2025-04-14 DIAGNOSIS — M79.641 RIGHT HAND PAIN: Primary | ICD-10-CM

## 2025-04-16 ENCOUNTER — APPOINTMENT (OUTPATIENT)
Dept: PHYSICAL THERAPY | Facility: HOSPITAL | Age: 40
End: 2025-04-16
Payer: COMMERCIAL

## 2025-04-24 ENCOUNTER — TELEPHONE (OUTPATIENT)
Dept: SPORTS MEDICINE | Facility: CLINIC | Age: 40
End: 2025-04-24
Payer: COMMERCIAL

## 2025-04-24 NOTE — TELEPHONE ENCOUNTER
Patient just went to the urgent care to get checked out but has been negative with flu, step, and covid.  Patient doesn't know why she is still having body aches.  Please advise.

## 2025-04-25 NOTE — TELEPHONE ENCOUNTER
Outgoing call to the patient; 3 nights ago sinus issues started, sore throat, sinus drainage, went to  04/24 after waking up with body aches. Tested negative for flu and covid and strep. Wanting to know why she has body aches with her sinus complaints. Informed it could just be a residual symptom from whatever virus/infection she has. She does not report having body aches currently and is feeling better.   Informed to call for any other concerns.     Thanks  Xochitl

## 2025-05-06 DIAGNOSIS — J30.2 SEASONAL ALLERGIC RHINITIS, UNSPECIFIED TRIGGER: ICD-10-CM

## 2025-05-06 RX ORDER — MONTELUKAST SODIUM 10 MG/1
10 TABLET ORAL DAILY
Qty: 90 TABLET | Refills: 0 | Status: SHIPPED | OUTPATIENT
Start: 2025-05-06

## 2025-05-06 RX ORDER — FLUTICASONE PROPIONATE 50 MCG
SPRAY, SUSPENSION (ML) NASAL
Qty: 48 G | Refills: 0 | Status: SHIPPED | OUTPATIENT
Start: 2025-05-06

## 2025-05-12 ENCOUNTER — LAB (OUTPATIENT)
Dept: LAB | Facility: HOSPITAL | Age: 40
End: 2025-05-12
Payer: COMMERCIAL

## 2025-05-12 ENCOUNTER — OFFICE VISIT (OUTPATIENT)
Dept: SPORTS MEDICINE | Facility: CLINIC | Age: 40
End: 2025-05-12
Payer: COMMERCIAL

## 2025-05-12 VITALS
BODY MASS INDEX: 37.59 KG/M2 | WEIGHT: 248 LBS | TEMPERATURE: 97.6 F | HEIGHT: 68 IN | DIASTOLIC BLOOD PRESSURE: 68 MMHG | SYSTOLIC BLOOD PRESSURE: 104 MMHG

## 2025-05-12 DIAGNOSIS — F90.9 ADULT ADHD: ICD-10-CM

## 2025-05-12 DIAGNOSIS — E55.9 VITAMIN D DEFICIENCY: ICD-10-CM

## 2025-05-12 DIAGNOSIS — R53.83 FATIGUE, UNSPECIFIED TYPE: Primary | ICD-10-CM

## 2025-05-12 LAB
25(OH)D3 SERPL-MCNC: 35.5 NG/ML (ref 30–100)
ALBUMIN SERPL-MCNC: 4.5 G/DL (ref 3.5–5.2)
ALBUMIN/GLOB SERPL: 1.8 G/DL
ALP SERPL-CCNC: 69 U/L (ref 39–117)
ALT SERPL W P-5'-P-CCNC: 14 U/L (ref 1–33)
ANION GAP SERPL CALCULATED.3IONS-SCNC: 8.3 MMOL/L (ref 5–15)
AST SERPL-CCNC: 18 U/L (ref 1–32)
BASOPHILS # BLD AUTO: 0.03 10*3/MM3 (ref 0–0.2)
BASOPHILS NFR BLD AUTO: 0.5 % (ref 0–1.5)
BILIRUB SERPL-MCNC: 0.2 MG/DL (ref 0–1.2)
BUN SERPL-MCNC: 16 MG/DL (ref 6–20)
BUN/CREAT SERPL: 20 (ref 7–25)
CALCIUM SPEC-SCNC: 9.5 MG/DL (ref 8.6–10.5)
CHLORIDE SERPL-SCNC: 106 MMOL/L (ref 98–107)
CO2 SERPL-SCNC: 27.7 MMOL/L (ref 22–29)
CREAT SERPL-MCNC: 0.8 MG/DL (ref 0.57–1)
DEPRECATED RDW RBC AUTO: 44.1 FL (ref 37–54)
EGFRCR SERPLBLD CKD-EPI 2021: 95.1 ML/MIN/1.73
EOSINOPHIL # BLD AUTO: 0.11 10*3/MM3 (ref 0–0.4)
EOSINOPHIL NFR BLD AUTO: 1.8 % (ref 0.3–6.2)
ERYTHROCYTE [DISTWIDTH] IN BLOOD BY AUTOMATED COUNT: 12.4 % (ref 12.3–15.4)
GLOBULIN UR ELPH-MCNC: 2.5 GM/DL
GLUCOSE SERPL-MCNC: 89 MG/DL (ref 65–99)
HCT VFR BLD AUTO: 40.5 % (ref 34–46.6)
HGB BLD-MCNC: 13.2 G/DL (ref 12–15.9)
IMM GRANULOCYTES # BLD AUTO: 0.02 10*3/MM3 (ref 0–0.05)
IMM GRANULOCYTES NFR BLD AUTO: 0.3 % (ref 0–0.5)
LYMPHOCYTES # BLD AUTO: 2.32 10*3/MM3 (ref 0.7–3.1)
LYMPHOCYTES NFR BLD AUTO: 37.9 % (ref 19.6–45.3)
MCH RBC QN AUTO: 31.4 PG (ref 26.6–33)
MCHC RBC AUTO-ENTMCNC: 32.6 G/DL (ref 31.5–35.7)
MCV RBC AUTO: 96.4 FL (ref 79–97)
MONOCYTES # BLD AUTO: 0.54 10*3/MM3 (ref 0.1–0.9)
MONOCYTES NFR BLD AUTO: 8.8 % (ref 5–12)
NEUTROPHILS NFR BLD AUTO: 3.1 10*3/MM3 (ref 1.7–7)
NEUTROPHILS NFR BLD AUTO: 50.7 % (ref 42.7–76)
NRBC BLD AUTO-RTO: 0 /100 WBC (ref 0–0.2)
PLATELET # BLD AUTO: 269 10*3/MM3 (ref 140–450)
PMV BLD AUTO: 10.2 FL (ref 6–12)
POTASSIUM SERPL-SCNC: 4.1 MMOL/L (ref 3.5–5.2)
PROT SERPL-MCNC: 7 G/DL (ref 6–8.5)
RBC # BLD AUTO: 4.2 10*6/MM3 (ref 3.77–5.28)
SODIUM SERPL-SCNC: 142 MMOL/L (ref 136–145)
T4 FREE SERPL-MCNC: 0.94 NG/DL (ref 0.92–1.68)
TSH SERPL DL<=0.05 MIU/L-ACNC: 0.92 UIU/ML (ref 0.27–4.2)
VIT B12 BLD-MCNC: 647 PG/ML (ref 211–946)
WBC NRBC COR # BLD AUTO: 6.12 10*3/MM3 (ref 3.4–10.8)

## 2025-05-12 PROCEDURE — 82306 VITAMIN D 25 HYDROXY: CPT | Performed by: FAMILY MEDICINE

## 2025-05-12 PROCEDURE — 82607 VITAMIN B-12: CPT | Performed by: FAMILY MEDICINE

## 2025-05-12 PROCEDURE — 36415 COLL VENOUS BLD VENIPUNCTURE: CPT | Performed by: FAMILY MEDICINE

## 2025-05-12 PROCEDURE — 80050 GENERAL HEALTH PANEL: CPT | Performed by: FAMILY MEDICINE

## 2025-05-12 PROCEDURE — 84439 ASSAY OF FREE THYROXINE: CPT | Performed by: FAMILY MEDICINE

## 2025-05-12 PROCEDURE — 99214 OFFICE O/P EST MOD 30 MIN: CPT | Performed by: FAMILY MEDICINE

## 2025-05-12 NOTE — PROGRESS NOTES
"Sonam is a 39 y.o. year old female     Chief Complaint   Patient presents with    Fatigue     Patient is here for initial evaluation of fatigue.        History of Present Illness  History of Present Illness  The patient, 6 months postpartum and actively breast-feeding, complains of fatigue. She has a history of sleep apnea, with no significant improvement in energy levels from treatment. She feels constantly tired and not well-rested, but reports no dangerous situations such as falling asleep while driving. No SOB, bowel/bladder changes, or peripheral edema. Additionally, she reports recurrent inattention and hyperactivity previously treated with Strattera, which was stopped due to pregnancy.    I have reviewed the patient's medical, family, and social history in detail and updated the computerized patient record.    Review of Systems    /68 (BP Location: Right arm, Patient Position: Sitting, Cuff Size: Adult)   Temp 97.6 °F (36.4 °C) (Temporal)   Ht 172.7 cm (68\")   Wt 112 kg (248 lb)   BMI 37.71 kg/m²      Physical Exam    Vital signs reviewed.   General: No acute distress.      Physical Exam  Lungs clear. Heart regular rate and rhythm, no murmur. No peripheral edema. No skin rashes.    Results  Results      Procedures     Diagnoses and all orders for this visit:    Fatigue, unspecified type  -     CBC & Differential  -     Comprehensive Metabolic Panel  -     T4, Free  -     TSH  -     Vitamin D,25-Hydroxy  -     Vitamin B12    Adult ADHD    Vitamin D deficiency  -     Vitamin D,25-Hydroxy      Assessment & Plan  1. Fatigue. Conduct comprehensive lab workup. Continue current sleep apnea treatment.    2. ADHD. Explore resuming Strattera while nursing, pending safety data. Consider transitioning from SSRI to SNRI for increased stimulation and symptom relief, pending lab results.    Patient or patient representative verbalized consent for the use of Ambient Listening during the visit with  Praful Francisco, " MD for chart documentation. 5/12/2025  16:42 EDT    *Dictated after leaving exam room.     Praful Francisco MD   16:42 EDT   05/12/25

## 2025-05-23 ENCOUNTER — TELEPHONE (OUTPATIENT)
Dept: SPORTS MEDICINE | Facility: CLINIC | Age: 40
End: 2025-05-23
Payer: COMMERCIAL

## 2025-05-23 NOTE — TELEPHONE ENCOUNTER
Patient advising still has not heard from Carpal Tunnel referral, she is calling them again, wanted to know if should go with another doctor?  Also would like discussion on lab results. What are the next steps?  Please advise

## 2025-05-30 ENCOUNTER — HOSPITAL ENCOUNTER (OUTPATIENT)
Dept: PHYSICAL THERAPY | Facility: HOSPITAL | Age: 40
Setting detail: THERAPIES SERIES
Discharge: HOME | End: 2025-05-30
Payer: COMMERCIAL

## 2025-05-30 DIAGNOSIS — M54.50 CHRONIC BILATERAL LOW BACK PAIN WITHOUT SCIATICA: Primary | ICD-10-CM

## 2025-05-30 DIAGNOSIS — M54.9 MID BACK PAIN: ICD-10-CM

## 2025-05-30 DIAGNOSIS — R19.8 ABDOMINAL WEAKNESS: ICD-10-CM

## 2025-05-30 DIAGNOSIS — G89.29 CHRONIC BILATERAL LOW BACK PAIN WITHOUT SCIATICA: Primary | ICD-10-CM

## 2025-05-30 DIAGNOSIS — M62.89 PELVIC FLOOR DYSFUNCTION: ICD-10-CM

## 2025-05-30 PROCEDURE — 97110 THERAPEUTIC EXERCISES: CPT

## 2025-05-30 PROCEDURE — 97530 THERAPEUTIC ACTIVITIES: CPT

## 2025-05-30 NOTE — THERAPY PROGRESS REPORT/RE-CERT
Outpatient Physical Therapy Ortho Progress Note  [facility]     Patient Name: Sonam Lang  : 1985  MRN: [id number]  Today's Date: 2025      Visit Date: 2025    Visit Dx:    ICD-10-CM ICD-9-CM   1. Chronic bilateral low back pain without sciatica  M54.50 724.2    G89.29 338.29   2. Pelvic floor dysfunction  M62.89 618.83   3. Abdominal weakness  R19.8 789.9   4. Mid back pain  M54.9 724.5       Patient Active Problem List   Diagnosis    Migraine without aura and without status migrainosus, not intractable    Allergy to NSAIDs    Asthma, mild persistent    Sleep apnea    Previous  section    Adult ADHD    Anxiety in pregnancy, antepartum    Multigravida of advanced maternal age in third trimester    Previous  delivery affecting pregnancy    Gastroesophageal reflux disease without esophagitis    39 weeks gestation of pregnancy     delivery delivered        Past Medical History:   Diagnosis Date    ADHD (attention deficit hyperactivity disorder)     Anxiety     Asthma     Congenital abnormalities 2022    Multiple fetal anomalies present including meningocele, unilateral real agenesis, unilateral club foot, membranous VSD     Depression     GERD (gastroesophageal reflux disease)     History of depression 2019    Irritable bowel syndrome     Migraine     Obesity (BMI 30.0-34.9) 2019    Pregnancy complicated by multiple fetal congenital anomalies, single gestation 2022    NTD, SUA    PTSD (post-traumatic stress disorder)     Sleep apnea 2016    CPAP        Past Surgical History:   Procedure Laterality Date     SECTION       SECTION N/A 10/14/2023    Procedure:  SECTION REPEAT;  Surgeon: Megan El MD;  Location: [facility] LABOR DELIVERY;  Service: Obstetrics/Gynecology;  Laterality: N/A;     SECTION N/A 2024    Procedure:  SECTION REPEAT;  Surgeon: Radha Lai MD;  Location: [facility]  LABOR DELIVERY;  Service: Obstetrics/Gynecology;  Laterality: N/A;    COLONOSCOPY  2014    with endoscopy    SINUS SURGERY  11/22/2022    x2, 4/18/2018 (first)    WISDOM TOOTH EXTRACTION          PT Ortho       Row Name 05/30/25 1400       Posture/Observations    Posture/Observations Comments rounded shoulders, mild inc thoracic kyphosis  -RS       Special Tests/Palpation    Special Tests/Palpation Cervical/Thoracic  -RS       Cervical Palpation    Cervical Palpation- Location? Upper traps;Suboccipital  -RS    Suboccipital Bilateral:;Tender  -RS    Upper Traps Bilateral:;Guarded/taut;Tender  -RS       Thoracic Accessory Motions    Thoracic Accessory Motions Tested? Yes  -RS    Pa glide- Upper thoracic Center:;Hypomobile  -RS       Lumbosacral Palpation    Piriformis Left:;Tender  -RS    Erector Spinae (Paraspinals) Bilateral:;Tender  -RS       MMT (Manual Muscle Testing)    Rt Upper Ext Rt Shoulder Flexion;Rt Shoulder Extension;Rt Shoulder Internal Rotation;Rt Shoulder External Rotation  -RS    Lt Upper Ext Lt Shoulder Flexion;Lt Shoulder Extension;Lt Shoulder Internal Rotation;Lt Shoulder External Rotation  -RS       MMT Right Upper Ext    Rt Shoulder Flexion MMT, Gross Movement (4/5) good  -RS    Rt Shoulder Extension MMT, Gross Movement (4/5) good  -RS    Rt Shoulder Internal Rotation MMT, Gross Movement (4+/5) good plus  -RS    Rt Shoulder External Rotation MMT, Gross Movement (4/5) good  -RS       MMT Left Upper Ext    Lt Shoulder Flexion MMT, Gross Movement (4/5) good  -RS    Lt Shoulder Extension MMT, Gross Movement (4/5) good  -RS    Lt Shoulder Internal Rotation MMT, Gross Movement (4+/5) good plus  -RS    Lt Shoulder External Rotation MMT, Gross Movement (4/5) good  -RS              User Key  (r) = Recorded By, (t) = Taken By, (c) = Cosigned By      Initials Name Provider Type    RS Guerita Fleming, PT Physical Therapist                                 PT Assessment/Plan       Row Name 05/30/25 2880           PT Assessment    Functional Limitations Limitation in home management;Limitations in community activities;Performance in self-care ADL;Performance in work activities  -RS     Impairments Impaired muscle length;Impaired muscle power;Impaired muscle endurance;Range of motion;Posture;Peripheral nerve integrity;Pain;Muscle strength  -RS     Assessment Comments Sonam Lang is a 39 y.o. female referred to physical therapy for postpartum care. She presents with a stable clinical presentation, along with no remarkable comorbidities and personal factors of recent pregnancy, chronicity of pain that may impact her progress in the plan of care. Pt presents today with decreased hip strength, altered posture, TTP B UT, decreased scapular muscle strength .  The previous impairments limit her ability to perform household tasks, recreational activities, childcare duties. Discussed gym routine, plan to initiate routine focused on strength/stability ith PT.  Pt will benefit from skilled PT to address the previous impairments and return to PLOF.  -RS        PT Plan    PT Plan Comments AR press, squat, side step, leg press, row, ext  -RS               User Key  (r) = Recorded By, (t) = Taken By, (c) = Cosigned By      Initials Name Provider Type    RS Guerita Fleming, PT Physical Therapist                       OP Exercises       Row Name 05/30/25 1400             Subjective    Subjective Comments The pt has been seen by PT for 6 total sessions postpartum focused on back pain, pelvic pain, and urinary incontinence. She reports improvement in amount of urine loss  since starting timed voiding every 3 or so hours and improvement in pain with penetration to 2/10 on average. Her most significant complaint is of upper and lower back pain. She has near constant pain in her back, worse in her upper than lower and has been trying to be more aware of her body mechanics. She has been doing LE and UE stretching which help with pain in  the AM.  -RS         Subjective Pain    Able to rate subjective pain? yes  -RS      Subjective Pain Comment 7/10 at highest  -RS         Total Minutes    01381 - PT Therapeutic Exercise Minutes 15  -RS      81789 - PT Therapeutic Activity Minutes 23  -RS         Exercise 1    Exercise Name 1 objective update  -RS         Exercise 2    Exercise Name 2 discussion of gym routine, HEP, POC  -RS         Exercise 3    Exercise Name 3 posture discussion- dishes/laundry ith one foot up  -RS         Exercise 4    Exercise Name 4 whn to engage PFM ad ore education  -RS                User Key  (r) = Recorded By, (t) = Taken By, (c) = Cosigned By      Initials Name Provider Type    RS Guerita Fleming, PT Physical Therapist                                  PT OP Goals       Row Name 05/30/25 1400          PT Short Term Goals    STG Date to Achieve 05/02/25  -RS     STG 1 The pt will report understanding of appropriate quan mechancis durig household and childcare activities to facilitate improved LBP and ergonomics.  -RS     STG 1 Progress Met  -RS     STG 2 The pt samia report IND with c section scar mobility to facilitate improved soft tissue mobility.  -RS     STG 2 Progress Met  -RS        Long Term Goals    LTG Date to Achieve 08/28/25  -RS     LTG 1 The pt will demonstrate IND and compliant with HEP focused on IND condition management and return to PLOF.  -RS     LTG 1 Progress Ongoing  -RS     LTG 2 The pt will report at least 60% improvment in pelvic pain with penetration to facilitate improved tolerance for intercourse and pelvic exam.  -RS     LTG 2 Progress Met  -RS     LTG 3 The pt will demonstrate hip strength at least 4/5 to faciltiate improved tolerance for prolonged standing/childcare duties.  -RS     LTG 3 Progress Ongoing  -RS     LTG 3 Progress Comments see ortho  -RS     LTG 4 Pt will demo at least 5 reps of 10 sec hold for PFM, to demo improved endurance  -RS     LTG 4 Progress Ongoing  -RS     LTG 5 Pt will  report pain no higher than 3/10 at the end of a typical day to indicate improved tolerance for functional activities.  -RS     LTG 5 Progress New  -RS        Time Calculation    PT Goal Re-Cert Due Date 08/28/25  -RS               User Key  (r) = Recorded By, (t) = Taken By, (c) = Cosigned By      Initials Name Provider Type    RS Guerita Fleming PT Physical Therapist                    Therapy Education  Given: HEP  Program: Reinforced, Progressed  How Provided: Verbal, Demonstration, Written  Provided to: Patient  Level of Understanding: Verbalized, Demonstrated              Time Calculation:   Start Time: 1422  Stop Time: 1500  Time Calculation (min): 38 min  Timed Charges  18945 - PT Therapeutic Exercise Minutes: 15  79166 - PT Therapeutic Activity Minutes: 23  Total Minutes  Timed Charges Total Minutes: 38   Total Minutes: 38  Therapy Charges for Today       Code Description Service Date Service Provider Modifiers Qty    18588754798  PT THER PROC EA 15 MIN 5/30/2025 Guerita Fleming, PT GP 1    84863445691 HC PT THERAPEUTIC ACT EA 15 MIN 5/30/2025 Guerita Fleming, PT GP 2                      Guerita Fleming PT  5/30/2025

## 2025-06-03 ENCOUNTER — APPOINTMENT (OUTPATIENT)
Dept: PHYSICAL THERAPY | Facility: HOSPITAL | Age: 40
End: 2025-06-03
Payer: COMMERCIAL

## 2025-06-06 ENCOUNTER — TELEPHONE (OUTPATIENT)
Dept: SPORTS MEDICINE | Facility: CLINIC | Age: 40
End: 2025-06-06
Payer: COMMERCIAL

## 2025-06-06 NOTE — TELEPHONE ENCOUNTER
Sonam called experiencing sinus drainage and sore throat on Monday morning, keeps progressing to raspy cough.  Please advise whether to begin medication and if so, can it be called in?

## 2025-06-26 ENCOUNTER — TRANSCRIBE ORDERS (OUTPATIENT)
Dept: PHYSICAL THERAPY | Facility: HOSPITAL | Age: 40
End: 2025-06-26
Payer: COMMERCIAL

## 2025-06-26 ENCOUNTER — HOSPITAL ENCOUNTER (OUTPATIENT)
Dept: PHYSICAL THERAPY | Facility: HOSPITAL | Age: 40
Setting detail: THERAPIES SERIES
Discharge: HOME | End: 2025-06-26
Payer: COMMERCIAL

## 2025-06-26 DIAGNOSIS — M62.89 PELVIC FLOOR DYSFUNCTION: ICD-10-CM

## 2025-06-26 DIAGNOSIS — M54.50 CHRONIC BILATERAL LOW BACK PAIN WITHOUT SCIATICA: Primary | ICD-10-CM

## 2025-06-26 DIAGNOSIS — M79.606: Primary | ICD-10-CM

## 2025-06-26 DIAGNOSIS — R19.8 ABDOMINAL WEAKNESS: ICD-10-CM

## 2025-06-26 DIAGNOSIS — O26.891: Primary | ICD-10-CM

## 2025-06-26 DIAGNOSIS — M54.9 MID BACK PAIN: ICD-10-CM

## 2025-06-26 DIAGNOSIS — G89.29 CHRONIC BILATERAL LOW BACK PAIN WITHOUT SCIATICA: Primary | ICD-10-CM

## 2025-06-26 PROCEDURE — 97530 THERAPEUTIC ACTIVITIES: CPT

## 2025-06-26 PROCEDURE — 97110 THERAPEUTIC EXERCISES: CPT

## 2025-06-26 NOTE — THERAPY PROGRESS REPORT/RE-CERT
Outpatient Physical Therapy Ortho Progress Note  [facility]     Patient Name: Sonam Lang  : 1985  MRN: [id number]  Today's Date: 2025      Visit Date: 2025    Visit Dx:    ICD-10-CM ICD-9-CM   1. Chronic bilateral low back pain without sciatica  M54.50 724.2    G89.29 338.29   2. Pelvic floor dysfunction  M62.89 618.83   3. Abdominal weakness  R19.8 789.9   4. Mid back pain  M54.9 724.5       Patient Active Problem List   Diagnosis    Migraine without aura and without status migrainosus, not intractable    Allergy to NSAIDs    Asthma, mild persistent    Sleep apnea    Previous  section    Adult ADHD    Anxiety in pregnancy, antepartum    Multigravida of advanced maternal age in third trimester    Previous  delivery affecting pregnancy    Gastroesophageal reflux disease without esophagitis    39 weeks gestation of pregnancy     delivery delivered        Past Medical History:   Diagnosis Date    ADHD (attention deficit hyperactivity disorder)     Anxiety     Asthma     Congenital abnormalities 2022    Multiple fetal anomalies present including meningocele, unilateral real agenesis, unilateral club foot, membranous VSD     Depression     GERD (gastroesophageal reflux disease)     History of depression 2019    Irritable bowel syndrome     Migraine     Obesity (BMI 30.0-34.9) 2019    Pregnancy complicated by multiple fetal congenital anomalies, single gestation 2022    NTD, SUA    PTSD (post-traumatic stress disorder)     Sleep apnea 2016    CPAP        Past Surgical History:   Procedure Laterality Date     SECTION       SECTION N/A 10/14/2023    Procedure:  SECTION REPEAT;  Surgeon: Megan El MD;  Location: [facility] LABOR DELIVERY;  Service: Obstetrics/Gynecology;  Laterality: N/A;     SECTION N/A 2024    Procedure:  SECTION REPEAT;  Surgeon: Radha Lai MD;  Location: [facility]  LABOR DELIVERY;  Service: Obstetrics/Gynecology;  Laterality: N/A;    COLONOSCOPY  2014    with endoscopy    SINUS SURGERY  11/22/2022    x2, 4/18/2018 (first)    WISDOM TOOTH EXTRACTION                          PT Assessment/Plan       Row Name 06/26/25 1600          PT Assessment    Functional Limitations Limitation in home management;Limitations in community activities;Performance in self-care ADL;Performance in work activities  -RS     Impairments Impaired muscle length;Impaired muscle power;Impaired muscle endurance;Range of motion;Posture;Peripheral nerve integrity;Pain;Muscle strength  -RS     Assessment Comments The patient has been seen by physical therapy for postpartum care, she reports marked improvement in pelvic pain and stress incontinence since starting PT however she continues to experience increased upper and middle back pain specifically with prolonged activity.  She has met 2 out of 2 short-term goals and partially met 1 and fully met another long-term goal with 3 remaining unmet but progressing.  Treatment has included therapeutic exercise and therapeutic activity with patient education.  Continue to focus on lumbopelvic stability with the addition of 9090 hold, squats with weight, antirotation press, PNF star.  Patient requires frequent cues for technique however tolerates session well overall and she remains appropriate for skilled therapy to address remaining limitations in strength, functional activity tolerance and pain.  -RS        PT Plan    PT Plan Comments How was gym routine, continue to progress core challenges, consider side plank on elbow and knee, elevated plank on elbows on table?,  Suitcase carry  -RS               User Key  (r) = Recorded By, (t) = Taken By, (c) = Cosigned By      Initials Name Provider Type    RS Guerita Fleming, PT Physical Therapist                       OP Exercises       Row Name 06/26/25 1600             Subjective    Subjective Comments Patient has been  to the gym 1 time, massage helped her pain however at the end of the day it is still increased  -RS         Subjective Pain    Able to rate subjective pain? yes  -RS      Subjective Pain Comment Sore at rest 7-8 out of 10 at highest  -RS         Total Minutes    64438 - PT Therapeutic Exercise Minutes 15  -RS      72044 - PT Therapeutic Activity Minutes 25  -RS         Exercise 1    Exercise Name 1 Objective update  -RS         Exercise 2    Exercise Name 2 discussion of gym routine, HEP, POC  -RS         Exercise 3    Exercise Name 3 Bridge with march  -RS      Reps 3 10  -RS         Exercise 4    Exercise Name 4 9090 hold with Kegel  -RS      Reps 4 5  -RS         Exercise 5    Exercise Name 5 Sidestep  -RS      Additional Comments Review only  -RS         Exercise 6    Exercise Name 6 Antirotation press  -RS      Reps 6 10 each  -RS      Additional Comments 30  -RS         Exercise 7    Exercise Name 7 Single arm row  -RS      Sets 7 10 each  -RS      Additional Comments 40  -RS         Exercise 8    Exercise Name 8 Sit to stand with weight and pelvic floor  -RS      Reps 8 10  -RS      Additional Comments Pain when attempted squatting  -RS         Exercise 9    Exercise Name 9 Primal push-up  -RS      Reps 9 2  -RS      Additional Comments Held further due to pain in wrist  -RS         Exercise 10    Exercise Name 10 Thoracic rotation arm bent  -RS      Reps 10 10  -RS         Exercise 11    Exercise Name 11 PNF star  -RS      Reps 11 8  -RS      Additional Comments red  -RS                User Key  (r) = Recorded By, (t) = Taken By, (c) = Cosigned By      Initials Name Provider Type    RS Guerita Fleming, PT Physical Therapist                                  PT OP Goals       Row Name 06/26/25 1500          PT Short Term Goals    STG Date to Achieve 05/02/25  -RS     STG 1 The pt will report understanding of appropriate [place] household and childcare activities to facilitate improved LBP and  ergonomics.  -RS     STG 1 Progress Met  -RS     STG 2 The pt samia report IND with c section scar mobility to facilitate improved soft tissue mobility.  -RS     STG 2 Progress Met  -RS        Long Term Goals    LTG Date to Achieve 08/28/25  -RS     LTG 1 The pt will demonstrate IND and compliant with HEP focused on IND condition management and return to PLOF.  -RS     LTG 1 Progress Ongoing  -RS     LTG 2 The pt will report at least 60% improvment in pelvic pain with penetration to facilitate improved tolerance for intercourse and pelvic exam.  -RS     LTG 2 Progress Met  -RS     LTG 2 Progress Comments no pain  -RS     LTG 3 The pt will demonstrate hip strength at least 4/5 to faciltiate improved tolerance for prolonged standing/childcare duties.  -RS     LTG 3 Progress Ongoing  -RS     LTG 4 Pt will demo at least 5 reps of 10 sec hold for PFM, to demo improved endurance  -RS     LTG 4 Progress Partially Met  -RS     LTG 4 Progress Comments 8x3  -RS     LTG 5 Pt will report pain no higher than 3/10 at the end of a typical day to indicate improved tolerance for functional activities.  -RS     LTG 5 Progress Ongoing  -RS     LTG 5 Progress Comments 7-8/10  -RS        Time Calculation    PT Goal Re-Cert Due Date 08/28/25  -RS               User Key  (r) = Recorded By, (t) = Taken By, (c) = Cosigned By      Initials Name Provider Type    Guerita Deleon PT Physical Therapist                    Therapy Education  Given: HEP  Program: Reinforced, Progressed  How Provided: Verbal, Demonstration, Written  Provided to: Patient  Level of Understanding: Verbalized, Demonstrated              Time Calculation:   Start Time: 1527  Stop Time: 1615  Time Calculation (min): 48 min  Timed Charges  01405 - PT Therapeutic Exercise Minutes: 15  27668 - PT Therapeutic Activity Minutes: 25  Total Minutes  Timed Charges Total Minutes: 40   Total Minutes: 40  Therapy Charges for Today       Code Description Service Date Service  Provider Modifiers Qty    43157362092  PT THER PROC EA 15 MIN 6/26/2025 uGerita Fleming, PT GP 1    [id number]  PT THERAPEUTIC ACT EA 15 MIN 6/26/2025 Guerita Fleming, PT GP 2                      Guerita Fleming, PT  6/26/2025

## 2025-07-01 ENCOUNTER — APPOINTMENT (OUTPATIENT)
Dept: PHYSICAL THERAPY | Facility: HOSPITAL | Age: 40
End: 2025-07-01
Payer: COMMERCIAL

## 2025-07-09 DIAGNOSIS — F41.9 ANXIETY: ICD-10-CM

## 2025-07-11 ENCOUNTER — HOSPITAL ENCOUNTER (OUTPATIENT)
Dept: PHYSICAL THERAPY | Facility: HOSPITAL | Age: 40
Setting detail: THERAPIES SERIES
Discharge: HOME | End: 2025-07-11
Payer: COMMERCIAL

## 2025-07-11 DIAGNOSIS — R19.8 ABDOMINAL WEAKNESS: ICD-10-CM

## 2025-07-11 DIAGNOSIS — G89.29 CHRONIC BILATERAL LOW BACK PAIN WITHOUT SCIATICA: Primary | ICD-10-CM

## 2025-07-11 DIAGNOSIS — M54.50 CHRONIC BILATERAL LOW BACK PAIN WITHOUT SCIATICA: Primary | ICD-10-CM

## 2025-07-11 DIAGNOSIS — M54.9 MID BACK PAIN: ICD-10-CM

## 2025-07-11 DIAGNOSIS — M62.89 PELVIC FLOOR DYSFUNCTION: ICD-10-CM

## 2025-07-11 PROCEDURE — 97530 THERAPEUTIC ACTIVITIES: CPT

## 2025-07-11 PROCEDURE — 97110 THERAPEUTIC EXERCISES: CPT

## 2025-07-11 NOTE — THERAPY TREATMENT NOTE
Outpatient Physical Therapy Ortho Treatment Note  [facility]     Patient Name: Sonma Lang  : 1985  MRN: [id number]  Today's Date: 2025      Visit Date: 2025    Visit Dx:    ICD-10-CM ICD-9-CM   1. Chronic bilateral low back pain without sciatica  M54.50 724.2    G89.29 338.29   2. Pelvic floor dysfunction  M62.89 618.83   3. Abdominal weakness  R19.8 789.9   4. Mid back pain  M54.9 724.5       Patient Active Problem List   Diagnosis    Migraine without aura and without status migrainosus, not intractable    Allergy to NSAIDs    Asthma, mild persistent    Sleep apnea    Previous  section    Adult ADHD    Anxiety in pregnancy, antepartum    Multigravida of advanced maternal age in third trimester    Previous  delivery affecting pregnancy    Gastroesophageal reflux disease without esophagitis    39 weeks gestation of pregnancy     delivery delivered        Past Medical History:   Diagnosis Date    ADHD (attention deficit hyperactivity disorder)     Anxiety     Asthma     Congenital abnormalities 2022    Multiple fetal anomalies present including meningocele, unilateral real agenesis, unilateral club foot, membranous VSD     Depression     GERD (gastroesophageal reflux disease)     History of depression 2019    Irritable bowel syndrome     Migraine     Obesity (BMI 30.0-34.9) 2019    Pregnancy complicated by multiple fetal congenital anomalies, single gestation 2022    NTD, SUA    PTSD (post-traumatic stress disorder)     Sleep apnea 2016    CPAP        Past Surgical History:   Procedure Laterality Date     SECTION       SECTION N/A 10/14/2023    Procedure:  SECTION REPEAT;  Surgeon: Megan El MD;  [place] [facility];  Service: Obstetrics/Gynecology;  Laterality: N/A;     SECTION N/A 2024    Procedure:  SECTION REPEAT;  Surgeon: Radha Lai MD;  Location[place] [facility] [place]  "LABOR DELIVERY;  Service: Obstetrics/Gynecology;  Laterality: N/A;    COLONOSCOPY  2014    with endoscopy    SINUS SURGERY  11/22/2022    x2, 4/18/2018 (first)    WISDOM TOOTH EXTRACTION                          PT Assessment/Plan       Row Name 07/11/25 1400          PT Assessment    Assessment Comments Patient reports difficulty with compliance with home exercise program due to limited childcare and recent change to home schooling for the upcoming school year.  Therefore, focused on home exercise program which can be performed with minimal equipment, and short amount of time, at home rather than gym.  Provided intermittent cues for technique and discussed \"some is better than none\" approach, patient receptive.  Patient is undergoing workup for upper back and neck pain with neural symptoms, will continue to follow-up.  The patient remains appropriate for skilled therapy to address limitations in lumbopelvic stability and pelvic pain.  -RS        PT Plan    PT Plan Comments progression of resistance if patient able to go to [facility], has perineal massage been helpful?  -RS               User Key  (r) = Recorded By, (t) = Taken By, (c) = Cosigned By      Initials Name Provider Type    RS Guerita Fleming, PT Physical Therapist                       OP Exercises       Row Name 07/11/25 1400             Subjective    Subjective Comments The patient is going to begin homeschooling her children which she believes will allow for use of the [facility] for working out with regular childcare, plans to start next month.  She had an EMG nerve conduction velocity study and MRI recently, awaiting results of MRI, EMG/NCV normal  -RS         Subjective Pain    Able to rate subjective pain? yes  -RS         Total Minutes    75002 - PT Therapeutic Exercise Minutes 15  -RS      66842 - PT Therapeutic Activity Minutes 23  -RS         Exercise 1    Exercise Name 1 Discussion of plan of care, home exercise update  -RS         Exercise 2    " Exercise Name 2 --  -RS         Exercise 3    Exercise Name 3 Bridge with march  -RS      Reps 3 10  -RS         Exercise 4    Exercise Name 4 9090 March with pelvic floor  -RS      Reps 4 10  -RS         Exercise 5    Exercise Name 5 Sidestep  -RS         Exercise 6    Exercise Name 6 Antirotation press  -RS      Reps 6 Review only  -RS         Exercise 7    Exercise Name 7 --  -RS      Sets 7 --  -RS         Exercise 8    Exercise Name 8 Sit to stand with weight and pelvic floor  -RS      Reps 8 10  -RS      Additional Comments Cues for technique  -RS         Exercise 9    Exercise Name 9 --  -RS      Reps 9 --  -RS         Exercise 10    Exercise Name 10 Thoracic rotation arm bent  -RS      Reps 10 Review only  -RS         Exercise 11    Exercise Name 11 PNF star  -RS      Reps 11 8  -RS                User Key  (r) = Recorded By, (t) = Taken By, (c) = Cosigned By      Initials Name Provider Type    RS Guerita Fleming, PT Physical Therapist                                     Therapy Education  Given: HEP  Program: Reinforced, Progressed  How Provided: Verbal, Demonstration, Written  Provided to: Patient  Level of Understanding: Verbalized, Demonstrated              Time Calculation:   Start Time: 1415  Stop Time: 1455  Time Calculation (min): 40 min  Timed Charges  40401 - PT Therapeutic Exercise Minutes: 15  82201 - PT Therapeutic Activity Minutes: 23  Total Minutes  Timed Charges Total Minutes: 38   Total Minutes: 38  Therapy Charges for Today       Code Description Service Date Service Provider Modifiers Qty    80656862971 HC PT THER PROC EA 15 MIN 7/11/2025 Guerita Fleming, PT GP 1    [id number] HC PT THERAPEUTIC ACT EA 15 MIN 7/11/2025 Guerita Fleming, PT GP 2                      Guerita Fleming PT  7/11/2025

## 2025-07-24 ENCOUNTER — APPOINTMENT (OUTPATIENT)
Dept: PHYSICAL THERAPY | Facility: HOSPITAL | Age: 40
End: 2025-07-24
Payer: COMMERCIAL

## 2025-08-05 DIAGNOSIS — J30.2 SEASONAL ALLERGIC RHINITIS, UNSPECIFIED TRIGGER: ICD-10-CM

## 2025-08-05 RX ORDER — MONTELUKAST SODIUM 10 MG/1
10 TABLET ORAL DAILY
Qty: 90 TABLET | Refills: 0 | Status: SHIPPED | OUTPATIENT
Start: 2025-08-05

## 2025-08-06 ENCOUNTER — HOSPITAL ENCOUNTER (OUTPATIENT)
Dept: PHYSICAL THERAPY | Facility: HOSPITAL | Age: 40
Setting detail: THERAPIES SERIES
Discharge: HOME | End: 2025-08-06
Payer: COMMERCIAL

## 2025-08-06 DIAGNOSIS — Z74.09 IMPAIRED MOBILITY: ICD-10-CM

## 2025-08-06 DIAGNOSIS — M54.2 CERVICALGIA: Primary | ICD-10-CM

## 2025-08-06 PROCEDURE — 97162 PT EVAL MOD COMPLEX 30 MIN: CPT | Performed by: PHYSICAL THERAPIST

## 2025-08-15 RX ORDER — CHOLECALCIFEROL (VITAMIN D3) 1250 MCG
50000 CAPSULE ORAL WEEKLY
Qty: 6 CAPSULE | Refills: 0 | Status: SHIPPED | OUTPATIENT
Start: 2025-08-15

## 2025-08-21 ENCOUNTER — TRANSCRIBE ORDERS (OUTPATIENT)
Dept: PHYSICAL THERAPY | Facility: HOSPITAL | Age: 40
End: 2025-08-21
Payer: COMMERCIAL

## 2025-08-21 ENCOUNTER — HOSPITAL ENCOUNTER (OUTPATIENT)
Dept: PHYSICAL THERAPY | Facility: HOSPITAL | Age: 40
Setting detail: THERAPIES SERIES
Discharge: HOME | End: 2025-08-21
Payer: COMMERCIAL

## 2025-08-21 DIAGNOSIS — M62.89 PELVIC FLOOR DYSFUNCTION: Primary | ICD-10-CM

## 2025-08-21 DIAGNOSIS — M54.9 MID BACK PAIN: ICD-10-CM

## 2025-08-21 DIAGNOSIS — M54.12 CERVICAL RADICULOPATHY: Primary | ICD-10-CM

## 2025-08-21 DIAGNOSIS — R19.8 ABDOMINAL WEAKNESS: ICD-10-CM

## 2025-08-21 DIAGNOSIS — M54.2 CERVICAL PAIN: ICD-10-CM

## 2025-08-21 PROCEDURE — 97530 THERAPEUTIC ACTIVITIES: CPT

## 2025-08-22 ENCOUNTER — TELEPHONE (OUTPATIENT)
Dept: SPORTS MEDICINE | Facility: CLINIC | Age: 40
End: 2025-08-22
Payer: COMMERCIAL

## 2025-08-22 DIAGNOSIS — F41.9 ANXIETY: ICD-10-CM

## 2025-08-25 ENCOUNTER — OFFICE VISIT (OUTPATIENT)
Dept: OBSTETRICS AND GYNECOLOGY | Age: 40
End: 2025-08-25
Payer: COMMERCIAL

## 2025-08-25 VITALS
WEIGHT: 233 LBS | HEIGHT: 68 IN | DIASTOLIC BLOOD PRESSURE: 70 MMHG | BODY MASS INDEX: 35.31 KG/M2 | SYSTOLIC BLOOD PRESSURE: 114 MMHG

## 2025-08-25 DIAGNOSIS — Z23 NEED FOR HPV VACCINATION: Primary | ICD-10-CM

## 2025-08-25 DIAGNOSIS — N92.6 IRREGULAR MENSES: ICD-10-CM

## 2025-08-25 PROCEDURE — 99213 OFFICE O/P EST LOW 20 MIN: CPT | Performed by: OBSTETRICS & GYNECOLOGY

## 2025-08-25 PROCEDURE — 90471 IMMUNIZATION ADMIN: CPT | Performed by: OBSTETRICS & GYNECOLOGY

## 2025-08-25 PROCEDURE — 90651 9VHPV VACCINE 2/3 DOSE IM: CPT | Performed by: OBSTETRICS & GYNECOLOGY

## 2025-08-25 RX ORDER — BUDESONIDE AND FORMOTEROL FUMARATE DIHYDRATE 160; 4.5 UG/1; UG/1
2 AEROSOL RESPIRATORY (INHALATION) 2 TIMES DAILY
COMMUNITY
Start: 2025-08-14

## 2025-08-25 RX ORDER — ALBUTEROL SULFATE 90 UG/1
AEROSOL, METERED RESPIRATORY (INHALATION)
COMMUNITY

## 2025-08-25 RX ORDER — NORETHINDRONE ACETATE AND ETHINYL ESTRADIOL, ETHINYL ESTRADIOL AND FERROUS FUMARATE 1MG-10(24)
1 KIT ORAL DAILY
Qty: 84 TABLET | Refills: 3 | Status: SHIPPED | OUTPATIENT
Start: 2025-08-25

## 2025-08-26 ENCOUNTER — TELEPHONE (OUTPATIENT)
Dept: PHYSICAL THERAPY | Facility: HOSPITAL | Age: 40
End: 2025-08-26
Payer: COMMERCIAL

## 2025-08-26 DIAGNOSIS — Z74.09 IMPAIRED MOBILITY: ICD-10-CM

## 2025-08-26 DIAGNOSIS — M54.2 CERVICALGIA: Primary | ICD-10-CM

## (undated) DEVICE — SUT MONOCRYL PLS 3/0 CT1 UD/MF 90CM MCP944H

## (undated) DEVICE — GLV SURG BIOGEL LTX PF 6 1/2

## (undated) DEVICE — SUT MNCRYL 0/0 CTX 36IN Y398H

## (undated) DEVICE — ANTIBACTERIAL UNDYED BRAIDED (POLYGLACTIN 910), SYNTHETIC ABSORBABLE SUTURE: Brand: COATED VICRYL

## (undated) DEVICE — 3M(TM) TEGADERM(TM) TRANSPARENT FILM DRESSING FRAME STYLE 1627: Brand: 3M™ TEGADERM™

## (undated) DEVICE — SOL IRR H2O BTL 1000ML STRL

## (undated) DEVICE — CUFF SCD HEMOFORCE SEQ CALF STD MD

## (undated) DEVICE — 3M™ TEGADERM™ TRANSPARENT FILM DRESSING FRAME STYLE, 1627, 4 IN X 10 IN (10 CM X 25 CM), 20/CT 4CT/CASE: Brand: 3M™ TEGADERM™

## (undated) DEVICE — SOL IRR NACL 0.9PCT BO 1000ML

## (undated) DEVICE — SUT MNCRYL 3/0 KS 27IN UD MCP523H

## (undated) DEVICE — STRIP,CLOSURE,WOUND,MEDI-STRIP,1/2X4: Brand: MEDLINE

## (undated) DEVICE — 3M™ STERI-STRIP™ COMPOUND BENZOIN TINCTURE 40 BAGS/CARTON 4 CARTONS/CASE C1544: Brand: 3M™ STERI-STRIP™

## (undated) DEVICE — SLV SCD CALF HEMOFORCE DVT THERP REPROC MD

## (undated) DEVICE — SUT MNCRYL 0 CT1 36IN UD MCP946H